# Patient Record
Sex: MALE | Race: WHITE | NOT HISPANIC OR LATINO | Employment: OTHER | URBAN - METROPOLITAN AREA
[De-identification: names, ages, dates, MRNs, and addresses within clinical notes are randomized per-mention and may not be internally consistent; named-entity substitution may affect disease eponyms.]

---

## 2017-01-12 ENCOUNTER — ALLSCRIPTS OFFICE VISIT (OUTPATIENT)
Dept: OTHER | Facility: OTHER | Age: 77
End: 2017-01-12

## 2017-03-06 ENCOUNTER — GENERIC CONVERSION - ENCOUNTER (OUTPATIENT)
Dept: OTHER | Facility: OTHER | Age: 77
End: 2017-03-06

## 2017-04-17 ENCOUNTER — GENERIC CONVERSION - ENCOUNTER (OUTPATIENT)
Dept: OTHER | Facility: OTHER | Age: 77
End: 2017-04-17

## 2017-04-28 ENCOUNTER — GENERIC CONVERSION - ENCOUNTER (OUTPATIENT)
Dept: OTHER | Facility: OTHER | Age: 77
End: 2017-04-28

## 2017-06-17 ENCOUNTER — TRANSCRIBE ORDERS (OUTPATIENT)
Dept: ADMINISTRATIVE | Facility: HOSPITAL | Age: 77
End: 2017-06-17

## 2017-06-17 ENCOUNTER — APPOINTMENT (OUTPATIENT)
Dept: LAB | Facility: HOSPITAL | Age: 77
End: 2017-06-17
Attending: INTERNAL MEDICINE
Payer: COMMERCIAL

## 2017-06-17 DIAGNOSIS — I10 ESSENTIAL HYPERTENSION, MALIGNANT: Primary | ICD-10-CM

## 2017-06-17 DIAGNOSIS — I10 ESSENTIAL HYPERTENSION, MALIGNANT: ICD-10-CM

## 2017-06-17 LAB
ALBUMIN SERPL BCP-MCNC: 3 G/DL (ref 3.5–5)
ALP SERPL-CCNC: 108 U/L (ref 46–116)
ALT SERPL W P-5'-P-CCNC: 31 U/L (ref 12–78)
ANION GAP SERPL CALCULATED.3IONS-SCNC: 10 MMOL/L (ref 4–13)
AST SERPL W P-5'-P-CCNC: 24 U/L (ref 5–45)
BILIRUB SERPL-MCNC: 0.2 MG/DL (ref 0.2–1)
BUN SERPL-MCNC: 26 MG/DL (ref 5–25)
CALCIUM SERPL-MCNC: 9.5 MG/DL (ref 8.3–10.1)
CHLORIDE SERPL-SCNC: 103 MMOL/L (ref 100–108)
CHOLEST SERPL-MCNC: 129 MG/DL (ref 50–200)
CO2 SERPL-SCNC: 27 MMOL/L (ref 21–32)
CREAT SERPL-MCNC: 1.13 MG/DL (ref 0.6–1.3)
ERYTHROCYTE [DISTWIDTH] IN BLOOD BY AUTOMATED COUNT: 19 % (ref 11.6–15.1)
GFR SERPL CREATININE-BSD FRML MDRD: >60 ML/MIN/1.73SQ M
GLUCOSE P FAST SERPL-MCNC: 113 MG/DL (ref 65–99)
HCT VFR BLD AUTO: 31.5 % (ref 36.5–49.3)
HDLC SERPL-MCNC: 33 MG/DL (ref 40–60)
HGB BLD-MCNC: 9.7 G/DL (ref 12–17)
LDLC SERPL CALC-MCNC: 75 MG/DL (ref 0–100)
MCH RBC QN AUTO: 22.1 PG (ref 26.8–34.3)
MCHC RBC AUTO-ENTMCNC: 30.8 G/DL (ref 31.4–37.4)
MCV RBC AUTO: 72 FL (ref 82–98)
PLATELET # BLD AUTO: 573 THOUSANDS/UL (ref 149–390)
PMV BLD AUTO: 10.2 FL (ref 8.9–12.7)
POTASSIUM SERPL-SCNC: 3.7 MMOL/L (ref 3.5–5.3)
PROT SERPL-MCNC: 6.7 G/DL (ref 6.4–8.2)
PSA SERPL-MCNC: 1 NG/ML (ref 0–4)
RBC # BLD AUTO: 4.39 MILLION/UL (ref 3.88–5.62)
SODIUM SERPL-SCNC: 140 MMOL/L (ref 136–145)
TRIGL SERPL-MCNC: 103 MG/DL
WBC # BLD AUTO: 10.58 THOUSAND/UL (ref 4.31–10.16)

## 2017-06-17 PROCEDURE — 80061 LIPID PANEL: CPT

## 2017-06-17 PROCEDURE — 36415 COLL VENOUS BLD VENIPUNCTURE: CPT

## 2017-06-17 PROCEDURE — 85027 COMPLETE CBC AUTOMATED: CPT

## 2017-06-17 PROCEDURE — G0103 PSA SCREENING: HCPCS

## 2017-06-17 PROCEDURE — 80053 COMPREHEN METABOLIC PANEL: CPT

## 2017-06-19 ENCOUNTER — ALLSCRIPTS OFFICE VISIT (OUTPATIENT)
Dept: OTHER | Facility: OTHER | Age: 77
End: 2017-06-19

## 2017-06-19 ENCOUNTER — APPOINTMENT (OUTPATIENT)
Dept: LAB | Facility: HOSPITAL | Age: 77
End: 2017-06-19
Attending: INTERNAL MEDICINE
Payer: COMMERCIAL

## 2017-06-19 ENCOUNTER — GENERIC CONVERSION - ENCOUNTER (OUTPATIENT)
Dept: OTHER | Facility: OTHER | Age: 77
End: 2017-06-19

## 2017-06-19 DIAGNOSIS — D50.9 IRON DEFICIENCY ANEMIA: ICD-10-CM

## 2017-06-19 LAB
IRON SATN MFR SERPL: 7 %
IRON SERPL-MCNC: 33 UG/DL (ref 65–175)
TIBC SERPL-MCNC: 492 UG/DL (ref 250–450)

## 2017-06-19 PROCEDURE — 83540 ASSAY OF IRON: CPT

## 2017-06-19 PROCEDURE — 36415 COLL VENOUS BLD VENIPUNCTURE: CPT

## 2017-06-19 PROCEDURE — 83550 IRON BINDING TEST: CPT

## 2017-07-17 ENCOUNTER — APPOINTMENT (OUTPATIENT)
Dept: LAB | Facility: HOSPITAL | Age: 77
End: 2017-07-17
Attending: INTERNAL MEDICINE
Payer: COMMERCIAL

## 2017-07-17 ENCOUNTER — TRANSCRIBE ORDERS (OUTPATIENT)
Dept: ADMINISTRATIVE | Facility: HOSPITAL | Age: 77
End: 2017-07-17

## 2017-07-17 DIAGNOSIS — D50.9 IRON DEFICIENCY ANEMIA, UNSPECIFIED: Primary | ICD-10-CM

## 2017-07-17 DIAGNOSIS — D50.9 IRON DEFICIENCY ANEMIA, UNSPECIFIED: ICD-10-CM

## 2017-07-17 LAB
HCT VFR BLD AUTO: 43.1 % (ref 36.5–49.3)
HGB BLD-MCNC: 13.6 G/DL (ref 12–17)
MCH RBC QN AUTO: 25.8 PG (ref 26.8–34.3)
MCHC RBC AUTO-ENTMCNC: 31.6 G/DL (ref 31.4–37.4)
MCV RBC AUTO: 82 FL (ref 82–98)
PLATELET # BLD AUTO: 488 THOUSANDS/UL (ref 149–390)
PMV BLD AUTO: 10.6 FL (ref 8.9–12.7)
RBC # BLD AUTO: 5.27 MILLION/UL (ref 3.88–5.62)
WBC # BLD AUTO: 12.76 THOUSAND/UL (ref 4.31–10.16)

## 2017-07-17 PROCEDURE — 85027 COMPLETE CBC AUTOMATED: CPT

## 2017-07-17 PROCEDURE — 36415 COLL VENOUS BLD VENIPUNCTURE: CPT

## 2017-07-18 ENCOUNTER — GENERIC CONVERSION - ENCOUNTER (OUTPATIENT)
Dept: OTHER | Facility: OTHER | Age: 77
End: 2017-07-18

## 2017-07-18 LAB — OCCULT BLD, FECAL IMMUNOLOGICAL (HISTORICAL): NEGATIVE

## 2017-07-20 ENCOUNTER — GENERIC CONVERSION - ENCOUNTER (OUTPATIENT)
Dept: OTHER | Facility: OTHER | Age: 77
End: 2017-07-20

## 2017-07-20 LAB — HM COLONOSCOPY: NORMAL

## 2017-09-18 DIAGNOSIS — D50.9 IRON DEFICIENCY ANEMIA: ICD-10-CM

## 2017-10-17 ENCOUNTER — ALLSCRIPTS OFFICE VISIT (OUTPATIENT)
Dept: OTHER | Facility: OTHER | Age: 77
End: 2017-10-17

## 2017-10-18 NOTE — PROGRESS NOTES
Assessment  1  Benign essential HTN (401 1) (I10)   2  History of hyperlipidemia (V12 29) (Z86 39)   3  Iron deficiency anemia (280 9) (D50 9)   4  DVT of leg (deep venous thrombosis) (453 40) (I82 409)    Plan  GERD without esophagitis    · Lansoprazole 30 MG Oral Capsule Delayed Release; TAKE ONE CAPSULE BY MOUTH  EVERY DAY  Health Maintenance    · Multiple Vitamins Oral Tablet; TAKE 1 TABLET DAILY  Iron deficiency anemia    · (1) CBC/ PLT (NO DIFF); Status:Active; Requested PXP:07SPK4959;   Need for influenza vaccination    · Fluzone High-Dose 0 5 ML Intramuscular Suspension Prefilled Syringe    Discussion/Summary  Discussion Summary:   Stop iron  me know when Xarelto runs out-will swtich to Eliquis 2 5 mg 2x per day  History of Present Illness  Hypertension (Follow-Up): The patient presents for follow-up of essential hypertension  The patient states he has been doing well with his blood pressure control since the last visit  Symptoms:      Review of Systems  Complete-Male:   Constitutional: No fever or chills, feels well, no tiredness, no recent weight gain or weight loss  Cardiovascular: No complaints of slow heart rate, no fast heart rate, no chest pain, no palpitations, no leg claudication, no lower extremity  Respiratory: No complaints of shortness of breath, no wheezing, no cough, no SOB on exertion, no orthopnea or PND  Gastrointestinal: No complaints of abdominal pain, no constipation, no nausea or vomiting, no diarrhea or bloody stools  Active Problems  1  History of Active advance directive (V49 89) (Z78 9)   2  Benign essential HTN (401 1) (I10)   3  BPH (benign prostatic hyperplasia) (600 00) (N40 0)   4  DVT of leg (deep venous thrombosis) (453 40) (I82 409)   5  Edema (782 3) (R60 9)   6  GERD without esophagitis (530 81) (K21 9)   7  History of hyperlipidemia (V12 29) (Z86 39)   8  Iron deficiency anemia (280 9) (D50 9)   9  Need for influenza vaccination (V04 81) (Z23)   10  Right foot drop (736 79) (M21 371)    Past Medical History  1  History of Allergic contact dermatitis due to plant (692 6) (L23 7)   2  History of Benign skin lesion (709 9) (L98 9)   3  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)   4  History of Elevated PSA (790 93) (R97 20)   5  History of Hand joint pain (719 44) (M79 643)   6  History of basal cell carcinoma (V10 83) (Z85 828)   7  History of blood clots (V12 51) (Z86 718)   8  History of BPH (V13 89) (Z87 438)   9  History of deep venous thrombosis (V12 51) (Z86 718)   10  History of hypotension (V12 59) (Z86 79)   11  History of intestinal obstruction (V12 79) (Z87 19)   12  History of Screening for colon cancer (V76 51) (Z12 11)   13  History of Screening for genitourinary condition (V81 6) (Z13 89)   14  History of Screening for neurological condition (V80 09) (Z13 89)   15  History of Social anxiety disorder (300 23) (F40 10)   16  History of Ventral hernia (553 20) (K43 9)    Surgical History  1  History of Appendectomy   2  History of Hernia Repair   3  History of Knee Replacement   4  History of Nose Surgery   5  History of Splenectomy   6  History of Tonsillectomy    Family History  Mother    1  Family history of Bleeding disorder   2  Family history of cerebrovascular accident (V17 1) (Z82 3)   3  Family history of diabetes mellitus (V18 0) (Z83 3)   4  Family history of Heart Disease (V17 49)  Father    5  Family history of Acute Myocardial Infarction (V17 3)   6  Family history of cerebrovascular accident (V17 1) (Z82 3)   7  Family history of Heart Disease (V17 49)    Social History   · History of Active advance directive (V49 89) (Z78 9)   · Being A Social Drinker   · Cultural background   · Dental care, regularly   · Living Situation: Supportive and safe   ·    · Never A Smoker   · Occasional alcohol use   · Primary language is English   · Racial background    Current Meds   1   Celecoxib 200 MG Oral Capsule; take 1 capsule by mouth once daily; Therapy: 85OAQ9000 to (Evaluate:14Jun2018)  Requested for: 79DTX8630; Last Rx:19Jun2017   Ordered   2  Docusate Sodium 100 MG Oral Capsule; TAKE 1 CAPSULE TWICE DAILY AS NEEDED; Therapy: 80Okw4733 to Recorded   3  Dutasteride 0 5 MG Oral Capsule; TAKE ONE CAPSULE BY MOUTH EVERY DAY; Therapy: 86LSW0500 to (Evaluate:16Apr2018)  Requested for: 52Dsa7808; Last Rx:21Apr2017   Ordered   4  Escitalopram Oxalate 10 MG Oral Tablet; take 1 tablet by mouth every day; Therapy: 02CEE6296 to (Evaluate:14Jun2018)  Requested for: 66UJC7673; Last Rx:19Jun2017   Ordered   5  Furosemide 80 MG Oral Tablet; take 1 tablet by mouth every day; Therapy: 84LFY4404 to (Evaluate:16Mar2018)  Requested for: 20UNA3946; Last Rx:19Jun2017   Ordered   6  Klor-Con 10 10 MEQ Oral Tablet Extended Release; Take 1 tablet daily; Therapy: 66THJ6214 to (Last Rx:19Jun2017)  Requested for: 28UQC3203 Ordered   7  Lansoprazole 30 MG Oral Capsule Delayed Release; TAKE ONE CAPSULE BY MOUTH EVERY DAY; Therapy: 79IGH5585 to (Lico Sanders)  Requested for: 76LWW4351; Last Rx:10Nov2016   Ordered   8  Polyethylene Glycol 3350 Oral Powder; MIX 1 CAPFUL IN 8 OUNCES OF WATER AND DRINK AT   BEDTIME AS NEEDED FOR CONSTIPATION; Therapy: 37TPY3093 to (Evaluate:22Dec2016) Recorded   9  Simvastatin 10 MG Oral Tablet; take 1 tablet by mouth every day; Therapy: 70KAY2387 to (Evaluate:14Jun2018)  Requested for: 14MIB8375; Last Rx:19Jun2017   Ordered   10  Spironolactone 25 MG Oral Tablet; take 1/2 tablet daily; Therapy: 38Jng0819 to (Evaluate:14Jun2018)  Requested for: 37GRR6728; Last Rx:19Jun2017    Ordered   11  Sulfamethoxazole-Trimethoprim 400-80 MG Oral Tablet; TAKE 1 TABLET DAILY; Therapy: 96RHA5188 to (Evaluate:50Stv2597); Last Rx:19Jun2017 Ordered   12  Tamsulosin HCl - 0 4 MG Oral Capsule; TAKE ONE CAPSULE BY MOUTH EVERY DAY AT SUPPER;     Therapy: 49AND2098 to (Flo Klein)  Requested for: 47OMS2726; Last Rx: 69EEC2615 Ordered   13  Xarelto 20 MG Oral Tablet; Take 1 tablet daily; Therapy: 01Apr2016 to (Evaluate:14Jun2018)  Requested for: 41GXG2944; Last Rx:19Jun2017    Ordered  Medication List Reviewed: The medication list was reviewed and updated today  Allergies  1  ACE Inhibitors  2  No Known Environmental Allergies   3  No Known Food Allergies    Vitals  Vital Signs    Recorded: 36WFA9277 02:46PM Recorded: 01QYJ5551 02:13PM   Heart Rate  78, L Radial   Pulse Quality  Regular, L Radial   Systolic 976 112, LUE, Sitting   Diastolic 78 78, LUE, Sitting   Weight  242 lb    BMI Calculated  39 06   BSA Calculated  2 17     Physical Exam    Constitutional   General appearance: No acute distress, well appearing and well nourished  Pulmonary   Auscultation of lungs: Clear to auscultation, equal breath sounds bilaterally, no wheezes, no rales, no rhonci  Cardiovascular   Auscultation of heart: Normal rate and rhythm, normal S1 and S2, without murmurs  Examination of extremities for edema and/or varicosities: Abnormal  -- plus 2 edema  Health Management  History of Screening for colon cancer   COLONOSCOPY; every 10 years; Last 44Hik1006; Next Due: 56Cms2523; Active  Health Maintenance   Medicare Annual Wellness Visit; every 1 year; Next Due: 14Apr2015;  Overdue    Signatures   Electronically signed by : JORGE Alegria ; Oct 17 2017  2:49PM EST                       (Author)

## 2018-01-09 NOTE — RESULT NOTES
Message   Call white count is 12 28     Verified Results  (1) CBC/PLT/DIFF 45KNG4372 07:43PM Stephania Andrade    Order Number: [id number]     Order Number: [id number]     Test Name Result Flag Reference   WBC COUNT 12 28 Thousand/uL H 4 31-10 16   RBC COUNT 4 53 Million/uL  3 88-5 62   HEMOGLOBIN 13 3 g/dL  12 0-17 0   HEMATOCRIT 40 3 %  36 5-49 3   MCV 89 0 fL  82 0-98 0   MCH 29 4 pg  26 8-34 3   MCHC 33 0 g/dL  31 4-37 4   RDW 17 3 % H 11 6-15 1   MPV 11 0 fL  8 9-12 7   PLATELET COUNT 340 Thousands/uL H 149-390   NEUTROPHILS - REL 46 %  43-75   LYMPHOCYTES - REL 23 %  14-44   MONOCYTES - REL 6 %  4-12   EOSINOPHILS - REL 1 %  0-6   BASOPHILS - REL 1 %  0-1   ATYPICAL LYMPH 23 % H <=0   NEUTROPHILS ABS 5 65 Thousand/uL  1 85-7 62   LYMPHOTCYTES ABS 2 82 Thousand/uL  0 60-4 47   MONOCYTES ABS 0 74 Thousand/uL  0 00-1 22   EOSINOPHILS ABS 0 12 Thousand/uL  0 00-0 40   BASOPHILS ABS 0 12 Thousand/uL H 0 00-0 10   TOTAL COUNTED 100     RBC MORPHOLOGY Present     Acanthocytosis Present     Hypochromasia Present     Poikilocytosis Present     SHISTOCYTES Present     Target Cells Present     PLT ESTIMATE Increased A Adequate     (1) BASIC METABOLIC PROFILE 19KSX4578 05:55PM Stephania Andrade    Order Number: [id number]      National Kidney Disease Education Program recommendations are as follows:  GFR calculation is accurate only with a steady state creatinine  Chronic Kidney disease less than 60 ml/min/1 73 sq  meters  Kidney failure less than 15 ml/min/1 73 sq  meters  Test Name Result Flag Reference   GLUCOSE,RANDM 109 mg/dL     If the patient is fasting, the ADA then defines impaired fasting glucose as > 100 mg/dL and diabetes as > or equal to 123 mg/dL     SODIUM 138 mmol/L  136-145   POTASSIUM 4 0 mmol/L  3 5-5 3   CHLORIDE 104 mmol/L  100-108   CARBON DIOXIDE 27 mmol/L  21-32   ANION GAP (CALC) 7 mmol/L  4-13   BLOOD UREA NITROGEN 19 mg/dL  5-25   CREATININE 1 11 mg/dL  0 60-1 30   Standardized to [facility] reference method   CALCIUM 9 5 mg/dL  8 3-10 1   eGFR Non-African American > ml/min/1 73sq m

## 2018-01-10 NOTE — PROGRESS NOTES
Assessment    1  Benign essential HTN (401 1) (I10)   2  History of hyperlipidemia (V12 29) (Z86 39)   3  Iron deficiency anemia (280 9) (D50 9)    Plan  GERD without esophagitis    · Lansoprazole 30 MG Oral Capsule Delayed Release; TAKE ONE CAPSULE BY  MOUTH EVERY DAY  Health Maintenance    · Multiple Vitamins Oral Tablet; TAKE 1 TABLET DAILY  Need for influenza vaccination    · Fluzone High-Dose 0 5 ML Intramuscular Suspension Prefilled Syringe    Discussion/Summary  Impression: Initial Annual Wellness Visit  Diabetes screening and counseling: screening is current  Colorectal cancer screening and counseling: screening is current  Prostate cancer screening and counseling: screening is current  Osteoporosis screening and counseling: screening not indicated  Abdominal aortic aneurysm screening and counseling: screening not indicated  HIV screening and counseling: screening not indicated  Chief Complaint  Pt is here for Medicare wellness visit  flu vaccine ordered  Med list reviewed  discuss otc iron  Pt is also concerned about ankle edema  CR      History of Present Illness  Welcome to Medicare and Wellness Visits: The patient is being seen for the initial annual wellness visit  Medicare Screening and Risk Factors   Hospitalizations: no previous hospitalizations  Once per lifetime medicare screening tests: ECG has not been done and AAA screening US has not yet been done  Medicare Screening Tests Risk Questions   Abdominal aortic aneurysm risk assessment: none indicated  Osteoporosis risk assessment:  and over 48years of age, but none indicated  HIV risk assessment: none indicated  Drug and Alcohol Use: The patient has never smoked cigarettes  The patient reports rare alcohol use  Alcohol concern:   The patient has no concerns about alcohol abuse  He has never used illicit drugs  Diet and Physical Activity: Current diet includes well balanced meals   He exercises infrequently and exercises 2-3 times per week  Exercise: walking hours per week, starting exercise tomorrow  Mood Disorder and Cognitive Impairment Screening: He denies feeling down, depressed, or hopeless over the past two weeks  He denies feeling little interest or pleasure in doing things over the past two weeks  Cognitive impairment screening: denies difficulty learning/retaining new information, denies difficulty handling complex tasks, denies difficulty with reasoning, denies difficulty with spatial ability and orientation, denies difficulty with language and denies difficulty with behavior  Functional Ability/Level of Safety: Hearing is slightly decreased, slightly decreased in the right ear and slightly decreased in the left ear  He does not use a hearing aid  The patient is currently unable to drive, but able to do activities of daily living without limitations and able to participate in social activities without limitations  Activities of daily living details: transportation help needed  Fall risk factors: The patient fell 0 times in the past 12 months  Advance Directives: Advance directives: living will, durable power of  for health care directives and advance directives  Co-Managers and Medical Equipment/Suppliers: See Patient Care Team      Patient Care Team    Care Team Member Role Specialty Office Number   Stewart Phillip MD  Orthopedic Surgery [phone]   Asaf RECINOS  Attending Internal Medicine [phone]   Mahesh Ansari   [phone]   Alfredo Moreno   [phone]     Active Problems    1  History of Active advance directive (V49 89) (Z78 9)   2  Benign essential HTN (401 1) (I10)   3  BPH (benign prostatic hyperplasia) (600 00) (N40 0)   4  DVT of leg (deep venous thrombosis) (453 40) (I82 409)   5  Edema (782 3) (R60 9)   6  GERD without esophagitis (530 81) (K21 9)   7  History of hyperlipidemia (V12 29) (Z86 39)   8  Iron deficiency anemia (280 9) (D50 9)   9   Right foot drop (736 79) (M21 371)    Past Medical History    · History of Allergic contact dermatitis due to plant (692 6) (L23 7)   · History of Benign skin lesion (709 9) (L98 9)   · 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)   · History of Elevated PSA (790 93) (R97 20)   · History of Hand joint pain (719 44) (M79 643)   · History of basal cell carcinoma (V10 83) (Z85 828)   · History of blood clots (V12 51) (Z86 718)   · History of BPH (V13 89) (C40 091)   · History of deep venous thrombosis (V12 51) (Z86 718)   · History of hypotension (V12 59) (Z86 79)   · History of intestinal obstruction (V12 79) (Z87 19)   · History of Screening for colon cancer (V76 51) (Z12 11)   · History of Screening for genitourinary condition (V81 6) (Z13 89)   · History of Screening for neurological condition (V80 09) (Z13 89)   · History of Social anxiety disorder (300 23) (F40 10)   · History of Ventral hernia (553 20) (K43 9)    Surgical History    · History of Appendectomy   · History of Hernia Repair   · History of Knee Replacement   · History of Nose Surgery   · History of Splenectomy   · History of Tonsillectomy    Family History  Mother    · Family history of Bleeding disorder   · Family history of cerebrovascular accident (V17 1) (Z82 3)   · Family history of diabetes mellitus (V18 0) (Z83 3)   · Family history of Heart Disease (V17 49)  Father    · Family history of Acute Myocardial Infarction (V17 3)   · Family history of cerebrovascular accident (V17 1) (Z82 3)   · Family history of Heart Disease (V17 49)    Social History    · History of Active advance directive (V49 89) (Z78 9)   · Being A Social Drinker   · Cultural background   · non-   · Dental care, regularly   · Living Situation: Supportive and safe   ·    · Never A Smoker   · Occasional alcohol use   · Primary language is English   · Racial background   · white    Current Meds   1   Celecoxib 200 MG Oral Capsule; take 1 capsule by mouth once daily; Therapy: 04JWU8569 to (Evaluate:14Jun2018)  Requested for: 49FXO2641; Last   Rx:19Jun2017 Ordered   2  Docusate Sodium 100 MG Oral Capsule; TAKE 1 CAPSULE TWICE DAILY AS NEEDED; Therapy: 71Czo6630 to Recorded   3  Dutasteride 0 5 MG Oral Capsule; TAKE ONE CAPSULE BY MOUTH EVERY DAY; Therapy: 40UWY5543 to (Evaluate:16Apr2018)  Requested for: 95Vif6327; Last   Rx:21Apr2017 Ordered   4  Escitalopram Oxalate 10 MG Oral Tablet; take 1 tablet by mouth every day; Therapy: 97GAF5795 to (Evaluate:14Jun2018)  Requested for: 22RKS9843; Last   Rx:19Jun2017 Ordered   5  Furosemide 80 MG Oral Tablet; take 1 tablet by mouth every day; Therapy: 05CPD4057 to (Evaluate:16Mar2018)  Requested for: 56QPN6333; Last   Rx:19Jun2017 Ordered   6  Klor-Con 10 10 MEQ Oral Tablet Extended Release; Take 1 tablet daily; Therapy: 70XNB5192 to (Last Rx:19Jun2017)  Requested for: 03WPD1714 Ordered   7  Lansoprazole 30 MG Oral Capsule Delayed Release; TAKE ONE CAPSULE BY MOUTH   EVERY DAY; Therapy: 24GNC9588 to (Jolene Baker)  Requested for: 38SRJ5373; Last   Rx:10Nov2016 Ordered   8  Polyethylene Glycol 3350 Oral Powder; MIX 1 CAPFUL IN 8 OUNCES OF WATER AND   DRINK AT BEDTIME AS NEEDED FOR CONSTIPATION; Therapy: 92AOE6982 to (Evaluate:72Vac3279) Recorded   9  Simvastatin 10 MG Oral Tablet; take 1 tablet by mouth every day; Therapy: 40UIC4543 to (Evaluate:14Jun2018)  Requested for: 50ZTC9885; Last   Rx:19Jun2017 Ordered   10  Spironolactone 25 MG Oral Tablet; take 1/2 tablet daily; Therapy: 17Epx5406 to (Evaluate:14Jun2018)  Requested for: 25DWG3377; Last    Rx:19Jun2017 Ordered   11  Sulfamethoxazole-Trimethoprim 400-80 MG Oral Tablet; TAKE 1 TABLET DAILY; Therapy: 10TIR0645 to (Evaluate:24Vkh9787); Last Rx:19Jun2017 Ordered   12  Tamsulosin HCl - 0 4 MG Oral Capsule; TAKE ONE CAPSULE BY MOUTH EVERY DAY    AT SUPPER;     Therapy: 40WJD0429 to (Emre )  Requested for: 66QKF4119; Last    Rx:85Dxs3762 Ordered   13  Xarelto 20 MG Oral Tablet; Take 1 tablet daily; Therapy: 2016 to (Evaluate:2018)  Requested for: 38JBK4960; Last    Rx:2017 Ordered    Allergies    1  ACE Inhibitors    2  No Known Environmental Allergies   3  No Known Food Allergies    Immunizations   ** Printed in Appendix #1 below  Vitals  Signs    Heart Rate: 78, L Radial  Pulse Quality: Regular, L Radial  Systolic: 453, LUE, Sitting  Diastolic: 78, LUE, Sitting  Weight: 242 lb   BMI Calculated: 39 06  BSA Calculated: 2 17    Health Management  History of Screening for colon cancer   COLONOSCOPY; every 10 years; Last 23Jpm2588; Next Due: 58Gmh6245; Active  Health Maintenance   Medicare Annual Wellness Visit; every 1 year; Next Due: 2015;  Overdue    Signatures   Electronically signed by : JORGE Canela ; Oct 17 2017  2:33PM EST                       (Author)    Appendix #1     Patient: Charu Hewitt ; : 1940; MRN: [id number]      1 2 3 4 5    Influenza  15-Sep-2010 03-Sep-2011 01-Oct-2012 23-Aug-2013 02-Oct-2015    PPSV  10-Nov-2016        Pneumo Other

## 2018-01-11 NOTE — RESULT NOTES
Verified Results  (1) IRON SATURATION %, TIBC 39IAT2274 11:16AM Darrellaubree Kapoor    Order Number: [id number]     Test Name Result Flag Reference   IRON SATURATION 7 %     TOTAL IRON BINDING CAPACITY 492 ug/dL H 250-450   IRON 33 ug/dL L    Patients treated with metal-binding drugs (ie  Deferoxamine) may have depressed iron values

## 2018-01-11 NOTE — RESULT NOTES
Message   labs ok     Verified Results  (1) Edwin 14CYE6135 05:55PM Antolin Guardado, [address] [address] [facility] Order Number: [id number]      National Kidney Disease Education Program recommendations are as follows:  GFR calculation is accurate only with a steady state creatinine  Chronic Kidney disease less than 60 ml/min/1 73 sq  meters  Kidney failure less than 15 ml/min/1 73 sq  meters  Test Name Result Flag Reference   GLUCOSE,RANDM 109 mg/dL     If the patient is fasting, the ADA then defines impaired fasting glucose as > 100 mg/dL and diabetes as > or equal to 123 mg/dL     SODIUM 138 mmol/L  136-145   POTASSIUM 4 0 mmol/L  3 5-5 3   CHLORIDE 104 mmol/L  100-108   CARBON DIOXIDE 27 mmol/L  21-32   ANION GAP (CALC) 7 mmol/L  4-13   BLOOD UREA NITROGEN 19 mg/dL  5-25   CREATININE 1 11 mg/dL  0 60-1 30   Standardized to IDMS reference method   CALCIUM 9 5 mg/dL  8 3-10 1   eGFR Non-African American      >60 0 ml/min/1 73sq m

## 2018-01-12 VITALS
DIASTOLIC BLOOD PRESSURE: 78 MMHG | WEIGHT: 242 LBS | SYSTOLIC BLOOD PRESSURE: 120 MMHG | HEART RATE: 78 BPM | BODY MASS INDEX: 39.06 KG/M2

## 2018-01-12 NOTE — RESULT NOTES
Message   Call TSH normal     Verified Results  (1) TSH 34PIS7200 02:03PM [facility]     Test Name Result Flag Reference   TSH 2 911 uIU/mL  0 358-3 740   Patients undergoing fluorescein dye angiography may retain small amounts of fluorescein in the body for 48-72 hours post procedure  Samples containing fluorescein can produce falsely depressed TSH values  If the patient had this procedure,a specimen should be resubmitted post fluorescein clearance

## 2018-01-12 NOTE — RESULT NOTES
Verified Results  Hemoccult Screening - POC 56ANI6154 09:01AM Freeman Cesia     Test Name Result Flag Reference   Hemoccult Negative       (1) CBC/ PLT (NO DIFF) 42DTK3636 03:33PM Freeman Cesia     Test Name Result Flag Reference   HEMATOCRIT 43 1 %  36 5-49 3   HEMOGLOBIN 13 6 g/dL  12 0-17 0   MCHC 31 6 g/dL  31 4-37 4   MCH 25 8 pg L 26 8-34 3   MCV 82 fL  82-98   PLATELET COUNT 096 Thousands/uL H 149-390   RBC COUNT 5 27 Million/uL  3 88-5 62   WBC COUNT 12 76 Thousand/uL H 4 31-10 16   MPV 10 6 fL  8 9-12 7

## 2018-01-13 VITALS
HEIGHT: 66 IN | SYSTOLIC BLOOD PRESSURE: 126 MMHG | HEART RATE: 76 BPM | BODY MASS INDEX: 37.45 KG/M2 | DIASTOLIC BLOOD PRESSURE: 72 MMHG | WEIGHT: 233 LBS

## 2018-01-14 VITALS
HEIGHT: 66 IN | BODY MASS INDEX: 39.1 KG/M2 | HEART RATE: 80 BPM | WEIGHT: 243.31 LBS | DIASTOLIC BLOOD PRESSURE: 78 MMHG | SYSTOLIC BLOOD PRESSURE: 120 MMHG

## 2018-01-17 NOTE — RESULT NOTES
Message   same 3 weeks or can go back on xarelto     Verified Results  (1) PT WITH INR 81MGA6405 10:05AM Yun Armstrong     Test Name Result Flag Reference   INR 2 12 H 0 86-1 16   PT 22 4 seconds H 11 8-14 1

## 2018-01-17 NOTE — RESULT NOTES
Message   Add KCl 10 mEq daily repeat BMP in 4 weeks     Verified Results  (1) CBC/ PLT (NO DIFF) 13NAX1477 02:04PM [facility]     Test Name Result Flag Reference   HEMATOCRIT 41 6 %  36 5-49 3   HEMOGLOBIN 13 3 g/dL  12 0-17 0   MCHC 32 0 g/dL  31 4-37 4   MCH 27 0 pg  26 8-34 3   MCV 84 fL  82-98   PLATELET COUNT 329 Thousands/uL H 149-390   RBC COUNT 4 93 Million/uL  3 88-5 62   RDW 18 4 % H 11 6-15 1   WBC COUNT 12 02 Thousand/uL H 4 31-10 16   MPV 10 8 fL  8 9-12 7     (1) COMPREHENSIVE METABOLIC PANEL 73HLJ0090 95:60EG [facility]     Test Name Result Flag Reference   GLUCOSE,RANDM 108 mg/dL     If the patient is fasting, the ADA then defines impaired fasting glucose as > 100 mg/dL and diabetes as > or equal to 123 mg/dL  SODIUM 141 mmol/L  136-145   POTASSIUM 3 4 mmol/L L 3 5-5 3   CHLORIDE 102 mmol/L  100-108   CARBON DIOXIDE 31 mmol/L  21-32   ANION GAP (CALC) 8 mmol/L  4-13   BLOOD UREA NITROGEN 25 mg/dL  5-25   CREATININE 1 00 mg/dL  0 60-1 30   Standardized to IDMS reference method   CALCIUM 9 3 mg/dL  8 3-10 1   BILI, TOTAL 0 30 mg/dL  0 20-1 00   ALK PHOSPHATAS 134 U/L H    ALT (SGPT) 26 U/L  12-78   AST(SGOT) 19 U/L  5-45   ALBUMIN 3 3 g/dL L 3 5-5 0   TOTAL PROTEIN 7 2 g/dL  6 4-8 2   eGFR Non-African American      >60 0 ml/min/1 73sq [facility] [employer] Disease Education Program recommendations are as follows:  GFR calculation is accurate only with a steady state creatinine  Chronic Kidney disease less than 60 ml/min/1 73 sq  meters  Kidney failure less than 15 ml/min/1 73 sq  meters       (1) PSA (SCREEN) (Dx V76 44 Screen for Prostate Cancer) 19ASA6204 02:04PM Hessie Bouche     Test Name Result Flag Reference   PROSTATE SPECIFIC ANTIGEN 1 2 ng/mL  0 0-4 0

## 2018-03-29 ENCOUNTER — TELEPHONE (OUTPATIENT)
Dept: FAMILY MEDICINE CLINIC | Facility: HOSPITAL | Age: 78
End: 2018-03-29

## 2018-03-30 DIAGNOSIS — D50.9 IRON DEFICIENCY ANEMIA, UNSPECIFIED IRON DEFICIENCY ANEMIA TYPE: Primary | ICD-10-CM

## 2018-03-30 DIAGNOSIS — E78.49 OTHER HYPERLIPIDEMIA: ICD-10-CM

## 2018-03-30 DIAGNOSIS — I10 HTN (HYPERTENSION), BENIGN: ICD-10-CM

## 2018-03-30 DIAGNOSIS — Z12.5 SCREENING PSA (PROSTATE SPECIFIC ANTIGEN): ICD-10-CM

## 2018-04-24 ENCOUNTER — TELEPHONE (OUTPATIENT)
Dept: FAMILY MEDICINE CLINIC | Facility: HOSPITAL | Age: 78
End: 2018-04-24

## 2018-04-24 RX ORDER — SULFAMETHOXAZOLE AND TRIMETHOPRIM 800; 160 MG/1; MG/1
1 TABLET ORAL DAILY
Refills: 0 | COMMUNITY
Start: 2018-02-21 | End: 2018-04-30 | Stop reason: SDUPTHER

## 2018-04-24 RX ORDER — FUROSEMIDE 80 MG
80 TABLET ORAL DAILY
Refills: 0 | COMMUNITY
Start: 2018-02-14 | End: 2018-04-30 | Stop reason: SDUPTHER

## 2018-04-24 RX ORDER — POTASSIUM CHLORIDE 750 MG/1
1 TABLET, FILM COATED, EXTENDED RELEASE ORAL DAILY
COMMUNITY
Start: 2016-06-14 | End: 2018-10-30 | Stop reason: ALTCHOICE

## 2018-04-24 RX ORDER — ESCITALOPRAM OXALATE 5 MG/1
5 TABLET ORAL
COMMUNITY
End: 2018-04-24 | Stop reason: SDUPTHER

## 2018-04-24 RX ORDER — POLYETHYLENE GLYCOL 3350 17 G/17G
POWDER, FOR SOLUTION ORAL
COMMUNITY

## 2018-04-24 RX ORDER — POTASSIUM CHLORIDE 750 MG/1
10 TABLET, FILM COATED, EXTENDED RELEASE ORAL DAILY
Refills: 3 | COMMUNITY
Start: 2018-01-19 | End: 2018-04-24 | Stop reason: SDUPTHER

## 2018-04-24 RX ORDER — DUTASTERIDE 0.5 MG/1
CAPSULE, LIQUID FILLED ORAL
COMMUNITY
End: 2018-04-30 | Stop reason: SDUPTHER

## 2018-04-24 RX ORDER — SPIRONOLACTONE 25 MG/1
12.5 TABLET ORAL DAILY
Refills: 1 | COMMUNITY
Start: 2018-02-14 | End: 2018-08-22 | Stop reason: SDUPTHER

## 2018-04-24 RX ORDER — SIMVASTATIN 10 MG
TABLET ORAL
COMMUNITY
End: 2018-04-30 | Stop reason: SDUPTHER

## 2018-04-24 RX ORDER — LANSOPRAZOLE 30 MG/1
CAPSULE, DELAYED RELEASE ORAL
COMMUNITY
End: 2018-12-07 | Stop reason: SDUPTHER

## 2018-04-24 RX ORDER — TAMSULOSIN HYDROCHLORIDE 0.4 MG/1
CAPSULE ORAL
Refills: 3 | COMMUNITY
Start: 2018-01-16 | End: 2018-04-30 | Stop reason: SDUPTHER

## 2018-04-24 RX ORDER — ESCITALOPRAM OXALATE 10 MG/1
10 TABLET ORAL DAILY
Refills: 3 | COMMUNITY
Start: 2018-01-19 | End: 2020-01-10 | Stop reason: ALTCHOICE

## 2018-04-24 RX ORDER — CELECOXIB 200 MG/1
CAPSULE ORAL
COMMUNITY
End: 2018-04-30 | Stop reason: SDUPTHER

## 2018-04-24 RX ORDER — DOCUSATE SODIUM 100 MG/1
CAPSULE, LIQUID FILLED ORAL
COMMUNITY
End: 2022-04-15

## 2018-04-24 NOTE — TELEPHONE ENCOUNTER
Spoke to Dr Snow Cross, and reviewed patient's chart, patient has been on both meds  For a while, he is due to have blood test  Last potassium level was June 2017, will attempt to call patient to have blood rechecked, orders in chart

## 2018-04-24 NOTE — TELEPHONE ENCOUNTER
I spoke with the pharmacist, there is a class 1 severity interaction between Spironolactone and Klor-Con  Spironolactone is K-sparing, using both together could cause toxic levels of K  Should patient be taking both of these drugs? [facility] [place] [phone]

## 2018-04-25 ENCOUNTER — APPOINTMENT (OUTPATIENT)
Dept: LAB | Facility: HOSPITAL | Age: 78
End: 2018-04-25
Attending: INTERNAL MEDICINE
Payer: COMMERCIAL

## 2018-04-25 DIAGNOSIS — E78.49 OTHER HYPERLIPIDEMIA: ICD-10-CM

## 2018-04-25 DIAGNOSIS — D50.9 IRON DEFICIENCY ANEMIA, UNSPECIFIED IRON DEFICIENCY ANEMIA TYPE: ICD-10-CM

## 2018-04-25 DIAGNOSIS — Z12.5 SCREENING PSA (PROSTATE SPECIFIC ANTIGEN): ICD-10-CM

## 2018-04-25 DIAGNOSIS — I10 HTN (HYPERTENSION), BENIGN: ICD-10-CM

## 2018-04-25 LAB
ALBUMIN SERPL BCP-MCNC: 3.3 G/DL (ref 3.5–5)
ALP SERPL-CCNC: 121 U/L (ref 46–116)
ALT SERPL W P-5'-P-CCNC: 44 U/L (ref 12–78)
ANION GAP SERPL CALCULATED.3IONS-SCNC: 6 MMOL/L (ref 4–13)
AST SERPL W P-5'-P-CCNC: 26 U/L (ref 5–45)
BASOPHILS # BLD AUTO: 0.04 THOUSANDS/ΜL (ref 0–0.1)
BASOPHILS NFR BLD AUTO: 0 % (ref 0–1)
BILIRUB SERPL-MCNC: 0.4 MG/DL (ref 0.2–1)
BUN SERPL-MCNC: 29 MG/DL (ref 5–25)
CALCIUM SERPL-MCNC: 9.1 MG/DL (ref 8.3–10.1)
CHLORIDE SERPL-SCNC: 105 MMOL/L (ref 100–108)
CHOLEST SERPL-MCNC: 121 MG/DL (ref 50–200)
CO2 SERPL-SCNC: 31 MMOL/L (ref 21–32)
CREAT SERPL-MCNC: 0.98 MG/DL (ref 0.6–1.3)
EOSINOPHIL # BLD AUTO: 0.18 THOUSAND/ΜL (ref 0–0.61)
EOSINOPHIL NFR BLD AUTO: 2 % (ref 0–6)
ERYTHROCYTE [DISTWIDTH] IN BLOOD BY AUTOMATED COUNT: 16.5 % (ref 11.6–15.1)
GFR SERPL CREATININE-BSD FRML MDRD: 75 ML/MIN/1.73SQ M
GLUCOSE P FAST SERPL-MCNC: 105 MG/DL (ref 65–99)
HCT VFR BLD AUTO: 42.4 % (ref 36.5–49.3)
HDLC SERPL-MCNC: 38 MG/DL (ref 40–60)
HGB BLD-MCNC: 13.8 G/DL (ref 12–17)
LDLC SERPL CALC-MCNC: 69 MG/DL (ref 0–100)
LYMPHOCYTES # BLD AUTO: 3.54 THOUSANDS/ΜL (ref 0.6–4.47)
LYMPHOCYTES NFR BLD AUTO: 33 % (ref 14–44)
MCH RBC QN AUTO: 28.6 PG (ref 26.8–34.3)
MCHC RBC AUTO-ENTMCNC: 32.5 G/DL (ref 31.4–37.4)
MCV RBC AUTO: 88 FL (ref 82–98)
MONOCYTES # BLD AUTO: 1.24 THOUSAND/ΜL (ref 0.17–1.22)
MONOCYTES NFR BLD AUTO: 12 % (ref 4–12)
NEUTROPHILS # BLD AUTO: 5.65 THOUSANDS/ΜL (ref 1.85–7.62)
NEUTS SEG NFR BLD AUTO: 53 % (ref 43–75)
NONHDLC SERPL-MCNC: 83 MG/DL
PLATELET # BLD AUTO: 426 THOUSANDS/UL (ref 149–390)
PMV BLD AUTO: 10 FL (ref 8.9–12.7)
POTASSIUM SERPL-SCNC: 3.8 MMOL/L (ref 3.5–5.3)
PROT SERPL-MCNC: 6.9 G/DL (ref 6.4–8.2)
RBC # BLD AUTO: 4.82 MILLION/UL (ref 3.88–5.62)
SODIUM SERPL-SCNC: 142 MMOL/L (ref 136–145)
TRIGL SERPL-MCNC: 70 MG/DL
WBC # BLD AUTO: 10.65 THOUSAND/UL (ref 4.31–10.16)

## 2018-04-25 PROCEDURE — 80053 COMPREHEN METABOLIC PANEL: CPT

## 2018-04-25 PROCEDURE — 84153 ASSAY OF PSA TOTAL: CPT

## 2018-04-25 PROCEDURE — 84154 ASSAY OF PSA FREE: CPT

## 2018-04-25 PROCEDURE — 36415 COLL VENOUS BLD VENIPUNCTURE: CPT

## 2018-04-25 PROCEDURE — 80061 LIPID PANEL: CPT

## 2018-04-25 PROCEDURE — 85025 COMPLETE CBC W/AUTO DIFF WBC: CPT

## 2018-04-26 LAB
PSA FREE MFR SERPL: 21.7 %
PSA FREE SERPL-MCNC: 0.26 NG/ML
PSA SERPL-MCNC: 1.2 NG/ML (ref 0–4)

## 2018-04-28 PROBLEM — N40.0 BPH (BENIGN PROSTATIC HYPERPLASIA): Status: ACTIVE | Noted: 2017-07-13

## 2018-04-30 ENCOUNTER — OFFICE VISIT (OUTPATIENT)
Dept: FAMILY MEDICINE CLINIC | Facility: HOSPITAL | Age: 78
End: 2018-04-30
Payer: COMMERCIAL

## 2018-04-30 VITALS
SYSTOLIC BLOOD PRESSURE: 122 MMHG | BODY MASS INDEX: 36.08 KG/M2 | HEIGHT: 66 IN | DIASTOLIC BLOOD PRESSURE: 86 MMHG | WEIGHT: 224.5 LBS

## 2018-04-30 DIAGNOSIS — I10 BENIGN ESSENTIAL HTN: Primary | ICD-10-CM

## 2018-04-30 DIAGNOSIS — E78.00 PURE HYPERCHOLESTEROLEMIA: ICD-10-CM

## 2018-04-30 DIAGNOSIS — T84.53XS INFECTION ASSOCIATED WITH INTERNAL RIGHT KNEE PROSTHESIS, SEQUELA: ICD-10-CM

## 2018-04-30 DIAGNOSIS — I87.8 VENOUS STASIS: ICD-10-CM

## 2018-04-30 DIAGNOSIS — K21.9 GERD WITHOUT ESOPHAGITIS: ICD-10-CM

## 2018-04-30 DIAGNOSIS — I82.5Y3 CHRONIC DEEP VEIN THROMBOSIS (DVT) OF PROXIMAL VEIN OF BOTH LOWER EXTREMITIES (HCC): ICD-10-CM

## 2018-04-30 DIAGNOSIS — N40.0 BENIGN PROSTATIC HYPERPLASIA WITHOUT LOWER URINARY TRACT SYMPTOMS: ICD-10-CM

## 2018-04-30 PROBLEM — T84.53XA INFECTION OF PROSTHETIC RIGHT KNEE JOINT (HCC): Status: ACTIVE | Noted: 2018-04-30

## 2018-04-30 PROCEDURE — 3008F BODY MASS INDEX DOCD: CPT | Performed by: INTERNAL MEDICINE

## 2018-04-30 PROCEDURE — 3725F SCREEN DEPRESSION PERFORMED: CPT | Performed by: INTERNAL MEDICINE

## 2018-04-30 PROCEDURE — 1101F PT FALLS ASSESS-DOCD LE1/YR: CPT | Performed by: INTERNAL MEDICINE

## 2018-04-30 PROCEDURE — 3074F SYST BP LT 130 MM HG: CPT | Performed by: INTERNAL MEDICINE

## 2018-04-30 PROCEDURE — 99214 OFFICE O/P EST MOD 30 MIN: CPT | Performed by: INTERNAL MEDICINE

## 2018-04-30 PROCEDURE — 3079F DIAST BP 80-89 MM HG: CPT | Performed by: INTERNAL MEDICINE

## 2018-04-30 RX ORDER — ESCITALOPRAM OXALATE 10 MG/1
10 TABLET ORAL DAILY
Qty: 90 TABLET | Refills: 3 | Status: CANCELLED | OUTPATIENT
Start: 2018-04-30

## 2018-04-30 RX ORDER — CELECOXIB 200 MG/1
200 CAPSULE ORAL DAILY
Qty: 90 CAPSULE | Refills: 3 | Status: SHIPPED | OUTPATIENT
Start: 2018-04-30 | End: 2018-07-08 | Stop reason: SDUPTHER

## 2018-04-30 RX ORDER — POTASSIUM CHLORIDE 750 MG/1
10 TABLET, FILM COATED, EXTENDED RELEASE ORAL DAILY
Qty: 90 TABLET | Refills: 3 | Status: CANCELLED | OUTPATIENT
Start: 2018-04-30

## 2018-04-30 RX ORDER — DUTASTERIDE 0.5 MG/1
CAPSULE, LIQUID FILLED ORAL
Qty: 90 CAPSULE | Refills: 3 | Status: SHIPPED | OUTPATIENT
Start: 2018-04-30 | End: 2018-05-19 | Stop reason: SDUPTHER

## 2018-04-30 RX ORDER — SULFAMETHOXAZOLE AND TRIMETHOPRIM 800; 160 MG/1; MG/1
1 TABLET ORAL DAILY
Qty: 90 TABLET | Refills: 3 | Status: SHIPPED | OUTPATIENT
Start: 2018-04-30 | End: 2019-06-20 | Stop reason: SDUPTHER

## 2018-04-30 RX ORDER — SIMVASTATIN 10 MG
10 TABLET ORAL
Qty: 90 TABLET | Refills: 3 | Status: SHIPPED | OUTPATIENT
Start: 2018-04-30 | End: 2018-07-08 | Stop reason: SDUPTHER

## 2018-04-30 RX ORDER — TAMSULOSIN HYDROCHLORIDE 0.4 MG/1
0.4 CAPSULE ORAL
Qty: 90 CAPSULE | Refills: 3 | Status: SHIPPED | OUTPATIENT
Start: 2018-04-30 | End: 2019-05-29 | Stop reason: SDUPTHER

## 2018-04-30 RX ORDER — FUROSEMIDE 40 MG/1
40 TABLET ORAL DAILY
Qty: 90 TABLET | Refills: 3 | Status: SHIPPED | OUTPATIENT
Start: 2018-04-30 | End: 2018-10-30 | Stop reason: ALTCHOICE

## 2018-04-30 NOTE — PROGRESS NOTES
Assessment/Plan:       Diagnoses and all orders for this visit:    Benign essential HTN    Chronic deep vein thrombosis (DVT) of proximal vein of both lower extremities (HCC)    Venous stasis    Other orders  -     furosemide (LASIX) 80 mg tablet; Take 1 tablet (80 mg total) by mouth daily  -     dutasteride (AVODART) 0 5 mg capsule; One daily  -     simvastatin (ZOCOR) 10 mg tablet; Take 1 tablet (10 mg total) by mouth daily at bedtime  -     celecoxib (CELEBREX) 200 mg capsule; Take 1 capsule (200 mg total) by mouth daily  -     potassium chloride (KLOR-CON 10) 10 mEq tablet; Take 1 tablet (10 mEq total) by mouth daily  -     rivaroxaban (XARELTO) 10 mg tablet; Take 2 tablets (20 mg total) by mouth daily with breakfast  -     escitalopram (LEXAPRO) 10 mg tablet; Take 1 tablet (10 mg total) by mouth daily  -     tamsulosin (FLOMAX) 0 4 mg; Take 1 capsule (0 4 mg total) by mouth daily with dinner          All of the above diagnoses have been assessed  Additional COMMENTS/PLAN: Has done well with weight watchers  Labs reviewed and discussed with the patient  Will stop the Lexapro and see how he does off of it  Subjective:      Patient ID: Pritesh Loving is a 78 y o  male  HPI     Hypertension  Patient is here for follow-up of elevated blood pressure  He is  adherent to a low-salt diet  Patient denies headache, dizziness or lightheadedness Cardiovascular risk factors: none  History of target organ damage: none  GERD-this is stable    Anemia-has been off the iron  The following portions of the patient's history were revised and updated as appropriate: Problem list, allergies, med list, FH, SH, Past medical and surgical histories      Current Outpatient Prescriptions   Medication Sig Dispense Refill    celecoxib (CELEBREX) 200 mg capsule 1 CAPSULE  DAILY      docusate sodium (COLACE) 100 mg capsule Take by mouth      dutasteride (AVODART) 0 5 mg capsule 1 CAPSULE DAILY      escitalopram (LEXAPRO) 10 mg tablet Take 10 mg by mouth daily  3    furosemide (LASIX) 80 mg tablet Take 80 mg by mouth daily  0    lansoprazole (PREVACID) 30 mg capsule Take by mouth      Multiple Vitamins-Minerals (MULTIVITAMIN ADULT EXTRA C PO) 1 TABLET DAILY      polyethylene glycol (MIRALAX) 17 g packet Take by mouth      potassium chloride (KLOR-CON 10) 10 mEq tablet Take 1 tablet by mouth daily      Probiotic Product (SOLUBLE FIBER/PROBIOTICS PO) Take by mouth      rivaroxaban (XARELTO) 10 mg tablet Take by mouth      simvastatin (ZOCOR) 10 mg tablet 1 TABLET AT BEDTIME      spironolactone (ALDACTONE) 25 mg tablet Take 12 5 mg by mouth daily  1    sulfamethoxazole-trimethoprim (BACTRIM DS) 800-160 mg per tablet Take 1 tablet by mouth daily  0    tamsulosin (FLOMAX) 0 4 mg TAKE ONE CAPSULE BY MOUTH EVERY DAY AT SUPPER  3     No current facility-administered medications for this visit  Review of Systems   Constitutional: Negative  Respiratory: Negative  Cardiovascular: Negative  Gastrointestinal: Negative  Genitourinary: Negative  Objective:    /86 (BP Location: Left arm, Patient Position: Sitting, Cuff Size: Standard)   Ht 5' 6" (1 676 m)   Wt 102 kg (224 lb 8 oz)   BMI 36 24 kg/m²      Physical Exam   Constitutional: He appears well-developed and well-nourished  Neck: No JVD present  Cardiovascular: Normal rate and regular rhythm  Pulmonary/Chest: Effort normal and breath sounds normal    Abdominal: Soft  Bowel sounds are normal    Ventral hernia no   Musculoskeletal: He exhibits no edema  Venous stasis changes   Vitals reviewed          Abstract on 04/26/2018   Component Date Value Ref Range Status    HM Colonoscopy 07/20/2017 colonoscopy   Final   Appointment on 04/25/2018   Component Date Value Ref Range Status    WBC 04/25/2018 10 65* 4 31 - 10 16 Thousand/uL Final    RBC 04/25/2018 4 82  3 88 - 5 62 Million/uL Final    Hemoglobin 04/25/2018 13 8  12 0 - 17 0 g/dL Final    Hematocrit 04/25/2018 42 4  36 5 - 49 3 % Final    MCV 04/25/2018 88  82 - 98 fL Final    MCH 04/25/2018 28 6  26 8 - 34 3 pg Final    MCHC 04/25/2018 32 5  31 4 - 37 4 g/dL Final    RDW 04/25/2018 16 5* 11 6 - 15 1 % Final    MPV 04/25/2018 10 0  8 9 - 12 7 fL Final    Platelets 05/46/9645 426* 149 - 390 Thousands/uL Final    Neutrophils Relative 04/25/2018 53  43 - 75 % Final    Lymphocytes Relative 04/25/2018 33  14 - 44 % Final    Monocytes Relative 04/25/2018 12  4 - 12 % Final    Eosinophils Relative 04/25/2018 2  0 - 6 % Final    Basophils Relative 04/25/2018 0  0 - 1 % Final    Neutrophils Absolute 04/25/2018 5 65  1 85 - 7 62 Thousands/µL Final    Lymphocytes Absolute 04/25/2018 3 54  0 60 - 4 47 Thousands/µL Final    Monocytes Absolute 04/25/2018 1 24* 0 17 - 1 22 Thousand/µL Final    Eosinophils Absolute 04/25/2018 0 18  0 00 - 0 61 Thousand/µL Final    Basophils Absolute 04/25/2018 0 04  0 00 - 0 10 Thousands/µL Final    Sodium 04/25/2018 142  136 - 145 mmol/L Final    Potassium 04/25/2018 3 8  3 5 - 5 3 mmol/L Final    Chloride 04/25/2018 105  100 - 108 mmol/L Final    CO2 04/25/2018 31  21 - 32 mmol/L Final    Anion Gap 04/25/2018 6  4 - 13 mmol/L Final    BUN 04/25/2018 29* 5 - 25 mg/dL Final    Creatinine 04/25/2018 0 98  0 60 - 1 30 mg/dL Final    Standardized to IDMS reference method    Glucose, Fasting 04/25/2018 105* 65 - 99 mg/dL Final      Specimen collection should occur prior to Sulfasalazine administration due to the potential for falsely depressed results  Specimen collection should occur prior to Sulfapyridine administration due to the potential for falsely elevated results   Calcium 04/25/2018 9 1  8 3 - 10 1 mg/dL Final    AST 04/25/2018 26  5 - 45 U/L Final      Specimen collection should occur prior to Sulfasalazine administration due to the potential for falsely depressed results       ALT 04/25/2018 44  12 - 78 U/L Final      Specimen collection should occur prior to Sulfasalazine administration due to the potential for falsely depressed results   Alkaline Phosphatase 04/25/2018 121* 46 - 116 U/L Final    Total Protein 04/25/2018 6 9  6 4 - 8 2 g/dL Final    Albumin 04/25/2018 3 3* 3 5 - 5 0 g/dL Final    Total Bilirubin 04/25/2018 0 40  0 20 - 1 00 mg/dL Final    eGFR 04/25/2018 75  ml/min/1 73sq m Final    Prostate Specific Antigen Total 04/25/2018 1 2  0 0 - 4 0 ng/mL Final    Roche ECLIA methodology  According to the American Urological Association, Serum PSA should  decrease and remain at undetectable levels after radical  prostatectomy  The AUA defines biochemical recurrence as an initial  PSA value 0 2 ng/mL or greater followed by a subsequent confirmatory  PSA value 0 2 ng/mL or greater  Values obtained with different assay methods or kits cannot be used  interchangeably  Results cannot be interpreted as absolute evidence  of the presence or absence of malignant disease   PSA, Free 04/25/2018 0 26  N/A ng/mL Final    Roche ECLIA methodology   PSA, Free Pct 04/25/2018 21 7  % Final    The table below lists the probability of prostate cancer for  men with non-suspicious JOSE results and total PSA between  4 and 10 ng/mL, by patient age Prakash Casandra, [facility],  [phone])  % Free PSA       50-64 yr        65-75 yr                    0 00-10 00%        56%             55%                   10 01-15 00%        24%             35%                   15 01-20 00%        17%             23%                   20 01-25 00%        10%             20%                        >25 00%         5%              9%  Please note:  Marysol et al did not make specific                recommendations regarding the use of                percent free PSA for any other population                of men      Cholesterol 04/25/2018 121  50 - 200 mg/dL Final      Cholesterol:       Desirable         <200 mg/dl       Borderline         200-239 mg/dl       High              >239           Triglycerides 04/25/2018 70  <=150 mg/dL Final    Specimen collection should occur prior to N-Acetylcysteine or Metamizole administration due to the potential for falsely depressed results   HDL, Direct 04/25/2018 38* 40 - 60 mg/dL Final      HDL Cholesterol:       High    >59 mg/dL       Low     <41 mg/dL    LDL Calculated 04/25/2018 69  0 - 100 mg/dL Final      This screening LDL is a calculated result  It does not have the accuracy of the Direct Measured LDL in the monitoring of patients with hyperlipidemia and/or statin therapy  Direct Measure LDL (BVU618) must be ordered separately in these patients      Non-HDL-Chol (CHOL-HDL) 04/25/2018 83  mg/dl Final         Brett Campuzano MD

## 2018-05-19 DIAGNOSIS — I10 ESSENTIAL HYPERTENSION: Primary | ICD-10-CM

## 2018-05-19 DIAGNOSIS — N40.0 BENIGN PROSTATIC HYPERPLASIA WITHOUT LOWER URINARY TRACT SYMPTOMS: ICD-10-CM

## 2018-05-20 RX ORDER — FUROSEMIDE 80 MG
TABLET ORAL
Qty: 90 TABLET | Refills: 0 | Status: SHIPPED | OUTPATIENT
Start: 2018-05-20 | End: 2018-09-07 | Stop reason: SDUPTHER

## 2018-05-21 RX ORDER — DUTASTERIDE 0.5 MG/1
CAPSULE, LIQUID FILLED ORAL
Qty: 90 CAPSULE | Refills: 0 | Status: SHIPPED | OUTPATIENT
Start: 2018-05-21 | End: 2020-09-15

## 2018-07-08 DIAGNOSIS — E78.00 PURE HYPERCHOLESTEROLEMIA: ICD-10-CM

## 2018-07-08 DIAGNOSIS — I87.8 VENOUS STASIS: ICD-10-CM

## 2018-07-08 RX ORDER — CELECOXIB 200 MG/1
CAPSULE ORAL
Qty: 90 CAPSULE | Refills: 0 | Status: SHIPPED | OUTPATIENT
Start: 2018-07-08 | End: 2020-01-10

## 2018-07-08 RX ORDER — SIMVASTATIN 10 MG
TABLET ORAL
Qty: 90 TABLET | Refills: 0 | Status: SHIPPED | OUTPATIENT
Start: 2018-07-08 | End: 2021-03-22 | Stop reason: SDUPTHER

## 2018-08-22 DIAGNOSIS — R60.0 LOCAL EDEMA: Primary | ICD-10-CM

## 2018-08-22 DIAGNOSIS — I10 ESSENTIAL HYPERTENSION: Primary | ICD-10-CM

## 2018-08-22 RX ORDER — SPIRONOLACTONE 25 MG/1
12.5 TABLET ORAL DAILY
Qty: 30 TABLET | Refills: 1 | Status: SHIPPED | OUTPATIENT
Start: 2018-08-22 | End: 2019-06-24 | Stop reason: SDUPTHER

## 2018-08-22 RX ORDER — SPIRONOLACTONE 25 MG/1
TABLET ORAL
Qty: 45 TABLET | Refills: 1 | Status: SHIPPED | OUTPATIENT
Start: 2018-08-22 | End: 2018-08-22 | Stop reason: SDUPTHER

## 2018-09-07 DIAGNOSIS — I82.409 DEEP VEIN THROMBOSIS (DVT) DURING CURRENT HOSPITALIZATION (HCC): Primary | ICD-10-CM

## 2018-09-07 DIAGNOSIS — I10 ESSENTIAL HYPERTENSION: ICD-10-CM

## 2018-09-07 RX ORDER — RIVAROXABAN 20 MG/1
TABLET, FILM COATED ORAL
Qty: 90 TABLET | Refills: 3 | Status: SHIPPED | OUTPATIENT
Start: 2018-09-07 | End: 2019-10-09 | Stop reason: SDUPTHER

## 2018-09-07 RX ORDER — FUROSEMIDE 80 MG
80 TABLET ORAL DAILY
Qty: 90 TABLET | Refills: 3 | Status: SHIPPED | OUTPATIENT
Start: 2018-09-07 | End: 2019-11-18 | Stop reason: SDUPTHER

## 2018-10-30 ENCOUNTER — OFFICE VISIT (OUTPATIENT)
Dept: FAMILY MEDICINE CLINIC | Facility: HOSPITAL | Age: 78
End: 2018-10-30
Payer: COMMERCIAL

## 2018-10-30 ENCOUNTER — APPOINTMENT (OUTPATIENT)
Dept: LAB | Facility: HOSPITAL | Age: 78
End: 2018-10-30
Attending: INTERNAL MEDICINE
Payer: COMMERCIAL

## 2018-10-30 VITALS
HEART RATE: 86 BPM | HEIGHT: 66 IN | WEIGHT: 220 LBS | DIASTOLIC BLOOD PRESSURE: 70 MMHG | BODY MASS INDEX: 35.36 KG/M2 | SYSTOLIC BLOOD PRESSURE: 125 MMHG

## 2018-10-30 DIAGNOSIS — I87.8 VENOUS STASIS: ICD-10-CM

## 2018-10-30 DIAGNOSIS — I82.5Y3 CHRONIC DEEP VEIN THROMBOSIS (DVT) OF PROXIMAL VEIN OF BOTH LOWER EXTREMITIES (HCC): Primary | ICD-10-CM

## 2018-10-30 DIAGNOSIS — E78.00 PURE HYPERCHOLESTEROLEMIA: ICD-10-CM

## 2018-10-30 DIAGNOSIS — I10 BENIGN ESSENTIAL HTN: ICD-10-CM

## 2018-10-30 LAB
ANION GAP SERPL CALCULATED.3IONS-SCNC: 8 MMOL/L (ref 4–13)
BUN SERPL-MCNC: 39 MG/DL (ref 5–25)
CALCIUM SERPL-MCNC: 10.1 MG/DL (ref 8.3–10.1)
CHLORIDE SERPL-SCNC: 103 MMOL/L (ref 100–108)
CO2 SERPL-SCNC: 30 MMOL/L (ref 21–32)
CREAT SERPL-MCNC: 1.2 MG/DL (ref 0.6–1.3)
GFR SERPL CREATININE-BSD FRML MDRD: 58 ML/MIN/1.73SQ M
GLUCOSE P FAST SERPL-MCNC: 104 MG/DL (ref 65–99)
POTASSIUM SERPL-SCNC: 3.5 MMOL/L (ref 3.5–5.3)
SODIUM SERPL-SCNC: 141 MMOL/L (ref 136–145)

## 2018-10-30 PROCEDURE — 1036F TOBACCO NON-USER: CPT | Performed by: INTERNAL MEDICINE

## 2018-10-30 PROCEDURE — 80048 BASIC METABOLIC PNL TOTAL CA: CPT

## 2018-10-30 PROCEDURE — 99214 OFFICE O/P EST MOD 30 MIN: CPT | Performed by: INTERNAL MEDICINE

## 2018-10-30 PROCEDURE — 1160F RVW MEDS BY RX/DR IN RCRD: CPT | Performed by: INTERNAL MEDICINE

## 2018-10-30 PROCEDURE — 36415 COLL VENOUS BLD VENIPUNCTURE: CPT

## 2018-10-30 PROCEDURE — 4040F PNEUMOC VAC/ADMIN/RCVD: CPT | Performed by: INTERNAL MEDICINE

## 2018-10-30 PROCEDURE — 3008F BODY MASS INDEX DOCD: CPT | Performed by: INTERNAL MEDICINE

## 2018-10-30 RX ORDER — SULFAMETHOXAZOLE AND TRIMETHOPRIM 800; 160 MG/1; MG/1
1 TABLET ORAL DAILY
Refills: 3 | COMMUNITY
Start: 2018-09-07 | End: 2020-03-30 | Stop reason: SDUPTHER

## 2018-10-30 NOTE — PROGRESS NOTES
Assessment/Plan:       Diagnoses and all orders for this visit:    Chronic deep vein thrombosis (DVT) of proximal vein of both lower extremities (HCC)  -     CBC; Future  -     Comprehensive metabolic panel; Future    Pure hypercholesterolemia  -     Lipid Panel with Direct LDL reflex; Future    Venous stasis    Other orders  -     sulfamethoxazole-trimethoprim (BACTRIM DS) 800-160 mg per tablet; Take 1 tablet by mouth daily          All of the above diagnoses have been assessed  Additional COMMENTS/PLAN: Labs reviewed and discussed with the patient  Subjective:      Patient ID: Trina Pimentel is a 70 y o  male  HPI     HTN-this is treated with DM    Infected joint-on maintenance Bactrim daily    Hyperlipidemia- The patient is compliant with diet and taking meds  The patient understands the efficacy of the treatment in vascular prevention  The following portions of the patient's history were revised and updated as appropriate: Problem list, allergies, med list, FH, SH, Past medical and surgical histories      Current Outpatient Prescriptions   Medication Sig Dispense Refill    celecoxib (CeleBREX) 200 mg capsule TAKE 1 CAPSULE EVERY DAY 90 capsule 0    docusate sodium (COLACE) 100 mg capsule Take by mouth      dutasteride (AVODART) 0 5 mg capsule TAKE ONE CAPSULE BY MOUTH EVERY DAY 90 capsule 0    escitalopram (LEXAPRO) 10 mg tablet Take 10 mg by mouth daily  3    furosemide (LASIX) 80 mg tablet Take 1 tablet (80 mg total) by mouth daily 90 tablet 3    lansoprazole (PREVACID) 30 mg capsule Take by mouth      Multiple Vitamins-Minerals (MULTIVITAMIN ADULT EXTRA C PO) 1 TABLET DAILY      polyethylene glycol (MIRALAX) 17 g packet Take by mouth      Probiotic Product (SOLUBLE FIBER/PROBIOTICS PO) Take by mouth      simvastatin (ZOCOR) 10 mg tablet TAKE 1 TABLET BY MOUTH EVERY DAY 90 tablet 0    spironolactone (ALDACTONE) 25 mg tablet Take 0 5 tablets (12 5 mg total) by mouth daily 30 tablet 1    sulfamethoxazole-trimethoprim (BACTRIM DS) 800-160 mg per tablet Take 1 tablet by mouth daily  3    tamsulosin (FLOMAX) 0 4 mg Take 1 capsule (0 4 mg total) by mouth daily with dinner 90 capsule 3    XARELTO 20 MG tablet TAKE 1 TABLET BY MOUTH EVERY DAY 90 tablet 3     No current facility-administered medications for this visit  Review of Systems   Constitutional: Negative  Respiratory: Negative  Cardiovascular: Negative  Gastrointestinal: Negative  Genitourinary: Negative  Musculoskeletal: Positive for arthralgias  Has some neck and left shoulder pain         Objective:    /70 (BP Location: Left arm, Patient Position: Sitting, Cuff Size: Large)   Pulse 86   Ht 5' 6" (1 676 m)   Wt 99 8 kg (220 lb)   BMI 35 51 kg/m²      Physical Exam   Constitutional: He appears well-developed and well-nourished  Neck: No JVD present  Cardiovascular: Normal rate and regular rhythm  Pulmonary/Chest: Effort normal and breath sounds normal    Abdominal: Soft  Bowel sounds are normal  He exhibits distension  Ventral hernia   Musculoskeletal: Edema: plus 1 edema  Skin: Skin is warm and dry  Vitals reviewed  Appointment on 10/30/2018   Component Date Value Ref Range Status    Sodium 10/30/2018 141  136 - 145 mmol/L Final    Potassium 10/30/2018 3 5  3 5 - 5 3 mmol/L Final    Chloride 10/30/2018 103  100 - 108 mmol/L Final    CO2 10/30/2018 30  21 - 32 mmol/L Final    ANION GAP 10/30/2018 8  4 - 13 mmol/L Final    BUN 10/30/2018 39* 5 - 25 mg/dL Final    Creatinine 10/30/2018 1 20  0 60 - 1 30 mg/dL Final    Standardized to IDMS reference method    Glucose, Fasting 10/30/2018 104* 65 - 99 mg/dL Final      Specimen collection should occur prior to Sulfasalazine administration due to the potential for falsely depressed results  Specimen collection should occur prior to Sulfapyridine administration due to the potential for falsely elevated results      Calcium 10/30/2018 10 1  8 3 - 10 1 mg/dL Final    eGFR 10/30/2018 58  ml/min/1 73sq m Final         Radha Palm MD

## 2018-12-07 DIAGNOSIS — K21.9 GASTROESOPHAGEAL REFLUX DISEASE WITHOUT ESOPHAGITIS: Primary | ICD-10-CM

## 2018-12-07 RX ORDER — LANSOPRAZOLE 30 MG/1
CAPSULE, DELAYED RELEASE ORAL
Qty: 90 CAPSULE | Refills: 3 | Status: SHIPPED | OUTPATIENT
Start: 2018-12-07 | End: 2020-02-09

## 2019-01-10 ENCOUNTER — TELEPHONE (OUTPATIENT)
Dept: FAMILY MEDICINE CLINIC | Facility: HOSPITAL | Age: 79
End: 2019-01-10

## 2019-05-06 DIAGNOSIS — E78.00 PURE HYPERCHOLESTEROLEMIA: ICD-10-CM

## 2019-05-06 DIAGNOSIS — I87.8 VENOUS STASIS: ICD-10-CM

## 2019-05-06 RX ORDER — CELECOXIB 200 MG/1
CAPSULE ORAL
Qty: 90 CAPSULE | Refills: 2 | Status: SHIPPED | OUTPATIENT
Start: 2019-05-06 | End: 2020-01-10 | Stop reason: SDUPTHER

## 2019-05-06 RX ORDER — SIMVASTATIN 10 MG
TABLET ORAL
Qty: 90 TABLET | Refills: 2 | Status: SHIPPED | OUTPATIENT
Start: 2019-05-06 | End: 2020-01-10 | Stop reason: SDUPTHER

## 2019-05-29 DIAGNOSIS — N40.0 BENIGN PROSTATIC HYPERPLASIA WITHOUT LOWER URINARY TRACT SYMPTOMS: ICD-10-CM

## 2019-05-29 RX ORDER — TAMSULOSIN HYDROCHLORIDE 0.4 MG/1
CAPSULE ORAL
Qty: 90 CAPSULE | Refills: 3 | Status: SHIPPED | OUTPATIENT
Start: 2019-05-29 | End: 2020-03-30 | Stop reason: SDUPTHER

## 2019-06-17 DIAGNOSIS — N40.0 BENIGN PROSTATIC HYPERPLASIA WITHOUT LOWER URINARY TRACT SYMPTOMS: ICD-10-CM

## 2019-06-17 RX ORDER — DUTASTERIDE 0.5 MG/1
CAPSULE, LIQUID FILLED ORAL
Qty: 90 CAPSULE | Refills: 3 | Status: SHIPPED | OUTPATIENT
Start: 2019-06-17 | End: 2020-01-10 | Stop reason: SDUPTHER

## 2019-06-20 DIAGNOSIS — T84.53XS INFECTION ASSOCIATED WITH INTERNAL RIGHT KNEE PROSTHESIS, SEQUELA: ICD-10-CM

## 2019-06-20 RX ORDER — SULFAMETHOXAZOLE AND TRIMETHOPRIM 800; 160 MG/1; MG/1
TABLET ORAL
Qty: 90 TABLET | Refills: 3 | Status: SHIPPED | OUTPATIENT
Start: 2019-06-20 | End: 2019-09-18

## 2019-06-24 DIAGNOSIS — I10 ESSENTIAL HYPERTENSION: ICD-10-CM

## 2019-06-24 RX ORDER — SPIRONOLACTONE 25 MG/1
TABLET ORAL
Qty: 45 TABLET | Refills: 3 | Status: SHIPPED | OUTPATIENT
Start: 2019-06-24 | End: 2020-07-17 | Stop reason: SDUPTHER

## 2019-07-03 ENCOUNTER — TELEPHONE (OUTPATIENT)
Dept: FAMILY MEDICINE CLINIC | Facility: HOSPITAL | Age: 79
End: 2019-07-03

## 2019-07-03 ENCOUNTER — APPOINTMENT (EMERGENCY)
Dept: CT IMAGING | Facility: HOSPITAL | Age: 79
End: 2019-07-03
Payer: COMMERCIAL

## 2019-07-03 ENCOUNTER — HOSPITAL ENCOUNTER (EMERGENCY)
Facility: HOSPITAL | Age: 79
Discharge: HOME | End: 2019-07-03
Attending: EMERGENCY MEDICINE
Payer: COMMERCIAL

## 2019-07-03 VITALS
RESPIRATION RATE: 18 BRPM | OXYGEN SATURATION: 89 % | DIASTOLIC BLOOD PRESSURE: 56 MMHG | HEIGHT: 66 IN | HEART RATE: 78 BPM | BODY MASS INDEX: 35.36 KG/M2 | WEIGHT: 220.02 LBS | TEMPERATURE: 98.7 F | SYSTOLIC BLOOD PRESSURE: 108 MMHG

## 2019-07-03 DIAGNOSIS — S09.90XA HEAD INJURY: Primary | ICD-10-CM

## 2019-07-03 PROCEDURE — 99284 EMERGENCY DEPT VISIT MOD MDM: CPT | Performed by: EMERGENCY MEDICINE

## 2019-07-03 PROCEDURE — 72125 CT NECK SPINE W/O DYE: CPT

## 2019-07-03 PROCEDURE — 70450 CT HEAD/BRAIN W/O DYE: CPT

## 2019-07-03 PROCEDURE — 99284 EMERGENCY DEPT VISIT MOD MDM: CPT

## 2019-07-03 NOTE — ED PROVIDER NOTES
H&P Exam - Trauma   Gopi Small 70 y o  male MRN: [id number]  Unit/Bed#: ED 03/ED 03 Encounter: [id number]    Assessment/Plan   Trauma Alert: Trauma Acuity: Trauma Evaluation  Model of Arrival: Trauma Mode of Arrival: Other (Comment) via    Trauma Team: Current Providers  Attending Provider: Abdirahman Elliott DO  Registered Nurse: Lillian Heath RN  ED Technician: Dariela Smith  Consultants: None    Trauma Active Problems:  Head injury on anticoagulant    Trauma Plan:  CT scan ordered    Chief Complaint:   Chief Complaint   Patient presents with    Fall     Pt presents to ED from home following a fall on concrete       History of Present Illness   HPI:  Gopi Small is a 70 y o  male who presents with right parietal scalp injury after a trip and fall some chronic neck pain patient is on Xarelto for history of blood clots  Mechanism:Details of Incident: Pt was walking with his cane and tripped and landed on cement  Pt hqas an abrasion on top right side of head and c/o right shoulder pain  Injury Date: 07/03/19        This is a 79-year-old male who presents for evaluation of a trip and fall he is currently on Xarelto and has a contusion to the right side of his head and some chronic neck pain      History provided by:  Patient  Fall   Mechanism of injury: fall    Injury location:  Head/neck  Head/neck injury location:  Scalp  Incident location:  Home  Time since incident:  2 hours  Fall:     Fall occurred:  Standing    Impact surface: Fell onto a concrete porch and hit his head on wooden siding  Point of impact:  Head  Suspicion of alcohol use: no    Associated symptoms: headaches and neck pain      Review of Systems   Cardiovascular: Positive for leg swelling (Chronic)  Musculoskeletal: Positive for neck pain  Neurological: Positive for headaches  All other systems reviewed and are negative        Historical Information     Immunizations:   Immunization History   Administered Date(s) Administered  INFLUENZA 09/15/2018    Influenza Split High Dose Preservative Free IM 10/02/2015, 10/17/2017    Influenza TIV (IM) 09/15/2010, 09/03/2011, 10/01/2012, 08/23/2013    Pneumococcal Conjugate 13-Valent 10/17/2017    Pneumococcal Polysaccharide PPV23 01/01/2006, 11/10/2016       Past Medical History:   Diagnosis Date    Basal cell carcinoma     nose     Benign skin lesion     BPH (benign prostatic hyperplasia)     Diverticular disease of colon     DVT (deep venous thrombosis) (HCC)     Elevated PSA     History of blood clots     Hypotension     Intestinal obstruction (HCC)     Social anxiety disorder     last assessed 1/12/17, resolved 10/17/17    Ventral hernia        Family History   Problem Relation Age of Onset    Other Mother         Bleeding disorder     Stroke Mother         CVA    Diabetes Mother     Heart disease Mother     Stroke Father         CVA    Heart disease Father     Heart attack Father     Substance Abuse Neg Hx     Mental illness Neg Hx      Past Surgical History:   Procedure Laterality Date    APPENDECTOMY      BASAL CELL CARCINOMA EXCISION      Nose     HERNIA REPAIR      9 hernia repairs 1995 - 2012   [address]    with speenectomy    REPLACEMENT TOTAL KNEE Bilateral     SPLENECTOMY  1995    TONSILLECTOMY         Social History     Socioeconomic History    Marital status: /Civil Union     Spouse name: None    Number of children: None    Years of education: None    Highest education level: None   Occupational History    None   Social Needs    Financial resource strain: None    Food insecurity:     Worry: None     Inability: None    Transportation needs:     Medical: None     Non-medical: None   Tobacco Use    Smoking status: Never Smoker    Smokeless tobacco: Never Used   Substance and Sexual Activity    Alcohol use: Yes     Comment: Social / occasional     Drug use: No    Sexual activity: None   Lifestyle    Physical activity:     Days per week: None     Minutes per session: None    Stress: None   Relationships    Social connections:     Talks on phone: None     Gets together: None     Attends Protestant service: None     Active member of club or organization: None     Attends meetings of clubs or organizations: None     Relationship status: None    Intimate partner violence:     Fear of current or ex partner: None     Emotionally abused: None     Physically abused: None     Forced sexual activity: None   Other Topics Concern    None   Social History Narrative    Active advance directive     Dental care, regularly     Living situation, supportive and safe        Family History: non-contributory    Meds/Allergies   Prior to Admission Medications   Prescriptions Last Dose Informant Patient Reported? Taking?    Multiple Vitamins-Minerals (MULTIVITAMIN ADULT EXTRA C PO)  Self Yes No   Si TABLET DAILY   Probiotic Product (SOLUBLE FIBER/PROBIOTICS PO)  Self Yes No   Sig: Take by mouth   XARELTO 20 MG tablet  Self No No   Sig: TAKE 1 TABLET BY MOUTH EVERY DAY   celecoxib (CeleBREX) 200 mg capsule  Self No No   Sig: TAKE 1 CAPSULE EVERY DAY   celecoxib (CeleBREX) 200 mg capsule   No No   Sig: TAKE 1 CAPSULE EVERY DAY   docusate sodium (COLACE) 100 mg capsule  Self Yes No   Sig: Take by mouth   dutasteride (AVODART) 0 5 mg capsule  Self No No   Sig: TAKE ONE CAPSULE BY MOUTH EVERY DAY   dutasteride (AVODART) 0 5 mg capsule   No No   Sig: TAKE 1 CAPSULE EVERY DAY   escitalopram (LEXAPRO) 10 mg tablet  Self Yes No   Sig: Take 10 mg by mouth daily   furosemide (LASIX) 80 mg tablet  Self No No   Sig: Take 1 tablet (80 mg total) by mouth daily   lansoprazole (PREVACID) 30 mg capsule   No No   Sig: TAKE ONE CAPSULE BY MOUTH EVERY DAY   polyethylene glycol (MIRALAX) 17 g packet  Self Yes No   Sig: Take by mouth   simvastatin (ZOCOR) 10 mg tablet  Self No No   Sig: TAKE 1 TABLET BY MOUTH EVERY DAY   simvastatin (ZOCOR) 10 mg tablet   No No Sig: TAKE 1 TABLET AT BEDTIME   spironolactone (ALDACTONE) 25 mg tablet   No No   Sig: TAKE 1/2 TABLET BY MOUTH EVERY DAY   sulfamethoxazole-trimethoprim (BACTRIM DS) 800-160 mg per tablet  Self Yes No   Sig: Take 1 tablet by mouth daily   sulfamethoxazole-trimethoprim (BACTRIM DS) 800-160 mg per tablet   No No   Sig: TAKE 1 TABLET EVERY DAY   tamsulosin (FLOMAX) 0 4 mg   No No   Sig: TAKE 1 CAPSULE EVERY DAY WITH DINNER      Facility-Administered Medications: None       Allergies   Allergen Reactions    Ace Inhibitors Cough    Fosinopril Cough     Cough after years of being on the medication       PHYSICAL EXAM    PE limited by:  Nothing    Objective   Vitals:   First set: Temperature: 98 7 °F (37 1 °C) (07/03/19 1102)  Pulse: 83 (07/03/19 1102)  Respirations: 18 (07/03/19 1102)  Blood Pressure: 150/67 (07/03/19 1102)  SpO2: 98 % (07/03/19 1102)    Primary Survey:   (A) Airway:  Patent  (B) Breathing:  Clear bilateral  (C) Circulation: Pulses:   normal  (D) Disabliity:  GCS Total:  15  (E) Expose:  Completed    Secondary Survey: (Click on Physical Exam tab above)  Physical Exam   Constitutional: He is oriented to person, place, and time  He appears well-developed and well-nourished  No distress  HENT:   Head: Normocephalic  Small contusion to the right parietal area   Eyes: Pupils are equal, round, and reactive to light  EOM are normal  Right eye exhibits no discharge  Left eye exhibits no discharge  Neck: No JVD present  No tracheal deviation present  Generalized chronic cervical pain   Cardiovascular: Normal rate, regular rhythm and intact distal pulses  Exam reveals no gallop and no friction rub  No murmur heard  Pulmonary/Chest: Effort normal and breath sounds normal  No stridor  No respiratory distress  He has no wheezes  Abdominal: Soft  Bowel sounds are normal  He exhibits distension  There is no tenderness  There is no rebound and no guarding  Musculoskeletal: He exhibits edema   He exhibits no tenderness or deformity  Neurological: He is alert and oriented to person, place, and time  No cranial nerve deficit  Skin: Skin is warm and dry  No rash noted  He is not diaphoretic  Psychiatric: He has a normal mood and affect  His behavior is normal  Thought content normal    Nursing note and vitals reviewed  Invasive Devices     None                 Lab Results:   Results Reviewed     None                 Imaging Studies:   Direct to CT: No  CT head without contrast   Final Result by Faby Garcia MD (07/03 1153)      No acute intracranial hemorrhage or depressed calvarial fracture identified  Workstation performed: [id number]         CT cervical spine without contrast   Final Result by Faby Garcia MD (07/03 1153)      No definite evidence for acute fracture or traumatic subluxation  Osteopenia with multilevel cervical spondylosis and degenerative change, as described above  Workstation performed: [id number]             Other Studies:  Not indicated    Code Status: No Order  Advance Directive and Living Will:      Power of :    POLST:      Procedures  Procedures         ED Course         MDM    Disposition  [facility] Transfer: No  Final diagnoses:   Head injury     Time reflects when diagnosis was documented in both MDM as applicable and the Disposition within this note     Time User Action Codes Description Comment    7/3/2019 12:02 PM [address] [[id number]] Head injury       ED Disposition     ED Disposition Condition Date/Time Comment    Discharge Stable Wed Jul 3, 2019 12:02 PM Pink Vega discharge to home/self care              Follow-up Information     Follow up With Specialties Details Why Contact Info    Ayla Rossi MD Internal Medicine  As needed [place]  [address]  [phone]          Patient's Medications   Discharge Prescriptions    No medications on file     No discharge procedures on file       ED Provider  Electronically Signed by         [facility], DO  07/03/19 [id number]

## 2019-07-09 ENCOUNTER — VBI (OUTPATIENT)
Dept: ADMINISTRATIVE | Facility: OTHER | Age: 79
End: 2019-07-09

## 2019-07-09 NOTE — TELEPHONE ENCOUNTER
Surjit Reach    ED Visit Information     Ed visit date: 7/3/2019  Diagnosis Description: Head injury  In Network? Yes Thomas Sin  Discharge status: Home  Discharged with meds ? No  Number of ED visits to date: 1  ED Severity:n/a     Outreach Information    Outreach successful: Yes 2  Date letter mailed:7/10/2019  Date Finalized:7/10/2019    Care Coordination    Follow up appointment with pcp: no No ED f/u appt scheduled  Transportation issues ? NA    Value [employer] type:  7 Day Outreach  Emergent necessity warranted by diagnosis:  No   [facility] PCP:  Yes  Transportation:  Friend/Family Transport  07/09/2019 12:20 PM Phone (Marino Marinelli) Ever Alvarenga (Self) [phone] (H)   Left Message - requesting a call back  Unable to reach patient regarding recent ED visit on 7/3 for Head injury  Patient was discharged without medication and advised to follow up with PCP, as needed  2nd attempt will be made on 7/10      07/10/2019 03:23 PM Phone (Your.MDadán) Ever Alvarenga (Self) [phone] (H)  Left Message - requesting a call back  Unable to reach patient regarding recent ED visit on 7/3 for Head injury  Patient was discharged without medication and advised to follow up with PCP, as needed   Letter mailed

## 2019-10-09 DIAGNOSIS — I82.409 DEEP VEIN THROMBOSIS (DVT) DURING CURRENT HOSPITALIZATION (HCC): ICD-10-CM

## 2019-10-09 RX ORDER — RIVAROXABAN 20 MG/1
TABLET, FILM COATED ORAL
Qty: 90 TABLET | Refills: 3 | Status: SHIPPED | OUTPATIENT
Start: 2019-10-09 | End: 2020-01-10

## 2019-11-18 DIAGNOSIS — I10 ESSENTIAL HYPERTENSION: ICD-10-CM

## 2019-11-18 RX ORDER — FUROSEMIDE 80 MG
TABLET ORAL
Qty: 90 TABLET | Refills: 3 | Status: SHIPPED | OUTPATIENT
Start: 2019-11-18 | End: 2020-09-16

## 2019-11-20 ENCOUNTER — TELEPHONE (OUTPATIENT)
Dept: FAMILY MEDICINE CLINIC | Facility: HOSPITAL | Age: 79
End: 2019-11-20

## 2019-11-20 DIAGNOSIS — I82.5Y3 CHRONIC DEEP VEIN THROMBOSIS (DVT) OF PROXIMAL VEIN OF BOTH LOWER EXTREMITIES (HCC): ICD-10-CM

## 2019-11-20 DIAGNOSIS — E78.00 PURE HYPERCHOLESTEROLEMIA: Primary | ICD-10-CM

## 2020-01-06 ENCOUNTER — APPOINTMENT (OUTPATIENT)
Dept: LAB | Facility: HOSPITAL | Age: 80
End: 2020-01-06
Attending: INTERNAL MEDICINE
Payer: COMMERCIAL

## 2020-01-06 ENCOUNTER — HOSPITAL ENCOUNTER (EMERGENCY)
Facility: HOSPITAL | Age: 80
Discharge: HOME | End: 2020-01-07
Attending: EMERGENCY MEDICINE | Admitting: EMERGENCY MEDICINE
Payer: COMMERCIAL

## 2020-01-06 ENCOUNTER — TELEPHONE (OUTPATIENT)
Dept: FAMILY MEDICINE CLINIC | Facility: HOSPITAL | Age: 80
End: 2020-01-06

## 2020-01-06 DIAGNOSIS — D64.9 ANEMIA: Primary | ICD-10-CM

## 2020-01-06 DIAGNOSIS — E78.00 PURE HYPERCHOLESTEROLEMIA: ICD-10-CM

## 2020-01-06 DIAGNOSIS — I82.5Y3 CHRONIC DEEP VEIN THROMBOSIS (DVT) OF PROXIMAL VEIN OF BOTH LOWER EXTREMITIES (HCC): ICD-10-CM

## 2020-01-06 LAB
ABO GROUP BLD: NORMAL
ABO GROUP BLD: NORMAL
ALBUMIN SERPL BCP-MCNC: 3.2 G/DL (ref 3.5–5)
ALBUMIN SERPL BCP-MCNC: 3.3 G/DL (ref 3.5–5)
ALP SERPL-CCNC: 154 U/L (ref 46–116)
ALP SERPL-CCNC: 155 U/L (ref 46–116)
ALT SERPL W P-5'-P-CCNC: 25 U/L (ref 12–78)
ALT SERPL W P-5'-P-CCNC: 27 U/L (ref 12–78)
ANION GAP SERPL CALCULATED.3IONS-SCNC: 7 MMOL/L (ref 4–13)
ANION GAP SERPL CALCULATED.3IONS-SCNC: 8 MMOL/L (ref 4–13)
ANISOCYTOSIS BLD QL SMEAR: PRESENT
APTT PPP: 32 SECONDS (ref 23–37)
AST SERPL W P-5'-P-CCNC: 15 U/L (ref 5–45)
AST SERPL W P-5'-P-CCNC: 31 U/L (ref 5–45)
BASOPHILS # BLD AUTO: 0.14 THOUSANDS/ΜL (ref 0–0.1)
BASOPHILS # BLD MANUAL: 0 THOUSAND/UL (ref 0–0.1)
BASOPHILS NFR BLD AUTO: 1 % (ref 0–1)
BASOPHILS NFR MAR MANUAL: 0 % (ref 0–1)
BILIRUB SERPL-MCNC: 0.2 MG/DL (ref 0.2–1)
BILIRUB SERPL-MCNC: 0.45 MG/DL (ref 0.2–1)
BLD GP AB SCN SERPL QL: NEGATIVE
BUN SERPL-MCNC: 22 MG/DL (ref 5–25)
BUN SERPL-MCNC: 26 MG/DL (ref 5–25)
CALCIUM SERPL-MCNC: 10 MG/DL (ref 8.3–10.1)
CALCIUM SERPL-MCNC: 10.1 MG/DL (ref 8.3–10.1)
CHLORIDE SERPL-SCNC: 105 MMOL/L (ref 100–108)
CHLORIDE SERPL-SCNC: 108 MMOL/L (ref 100–108)
CHOLEST SERPL-MCNC: 120 MG/DL (ref 50–200)
CO2 SERPL-SCNC: 28 MMOL/L (ref 21–32)
CO2 SERPL-SCNC: 28 MMOL/L (ref 21–32)
CREAT SERPL-MCNC: 1.05 MG/DL (ref 0.6–1.3)
CREAT SERPL-MCNC: 1.19 MG/DL (ref 0.6–1.3)
EOSINOPHIL # BLD AUTO: 0.3 THOUSAND/ΜL (ref 0–0.61)
EOSINOPHIL # BLD MANUAL: 0 THOUSAND/UL (ref 0–0.4)
EOSINOPHIL NFR BLD AUTO: 2 % (ref 0–6)
EOSINOPHIL NFR BLD MANUAL: 0 % (ref 0–6)
ERYTHROCYTE [DISTWIDTH] IN BLOOD BY AUTOMATED COUNT: 22.6 % (ref 11.6–15.1)
ERYTHROCYTE [DISTWIDTH] IN BLOOD BY AUTOMATED COUNT: 23.1 % (ref 11.6–15.1)
GFR SERPL CREATININE-BSD FRML MDRD: 59 ML/MIN/1.73SQ M
GFR SERPL CREATININE-BSD FRML MDRD: 68 ML/MIN/1.73SQ M
GLUCOSE P FAST SERPL-MCNC: 113 MG/DL (ref 65–99)
GLUCOSE SERPL-MCNC: 114 MG/DL (ref 65–140)
HCT VFR BLD AUTO: 25.1 % (ref 36.5–49.3)
HCT VFR BLD AUTO: 25.9 % (ref 36.5–49.3)
HDLC SERPL-MCNC: 38 MG/DL
HGB BLD-MCNC: 6.6 G/DL (ref 12–17)
HGB BLD-MCNC: 6.6 G/DL (ref 12–17)
HYPERCHROMIA BLD QL SMEAR: PRESENT
IMM GRANULOCYTES # BLD AUTO: 0.08 THOUSAND/UL (ref 0–0.2)
IMM GRANULOCYTES NFR BLD AUTO: 1 % (ref 0–2)
INR PPP: 1.86 (ref 0.84–1.19)
LDLC SERPL CALC-MCNC: 64 MG/DL (ref 0–100)
LYMPHOCYTES # BLD AUTO: 2.84 THOUSAND/UL (ref 0.6–4.47)
LYMPHOCYTES # BLD AUTO: 21 % (ref 14–44)
LYMPHOCYTES # BLD AUTO: 3.6 THOUSANDS/ΜL (ref 0.6–4.47)
LYMPHOCYTES NFR BLD AUTO: 23 % (ref 14–44)
MCH RBC QN AUTO: 15.6 PG (ref 26.8–34.3)
MCH RBC QN AUTO: 16 PG (ref 26.8–34.3)
MCHC RBC AUTO-ENTMCNC: 25.5 G/DL (ref 31.4–37.4)
MCHC RBC AUTO-ENTMCNC: 26.3 G/DL (ref 31.4–37.4)
MCV RBC AUTO: 61 FL (ref 82–98)
MCV RBC AUTO: 61 FL (ref 82–98)
MONOCYTES # BLD AUTO: 1.27 THOUSAND/ΜL (ref 0.17–1.22)
MONOCYTES # BLD AUTO: 1.49 THOUSAND/UL (ref 0–1.22)
MONOCYTES NFR BLD AUTO: 8 % (ref 4–12)
MONOCYTES NFR BLD: 11 % (ref 4–12)
NEUTROPHILS # BLD AUTO: 10.06 THOUSANDS/ΜL (ref 1.85–7.62)
NEUTROPHILS # BLD MANUAL: 9.18 THOUSAND/UL (ref 1.85–7.62)
NEUTS SEG NFR BLD AUTO: 65 % (ref 43–75)
NEUTS SEG NFR BLD AUTO: 68 % (ref 43–75)
NONHDLC SERPL-MCNC: 82 MG/DL
NRBC BLD AUTO-RTO: 5 /100 WBC (ref 0–2)
NRBC BLD AUTO-RTO: 5 /100 WBCS
NRBC BLD AUTO-RTO: 5 /100 WBCS
PLATELET # BLD AUTO: 647 THOUSANDS/UL (ref 149–390)
PLATELET # BLD AUTO: 651 THOUSANDS/UL (ref 149–390)
PLATELET BLD QL SMEAR: ABNORMAL
PMV BLD AUTO: 10.3 FL (ref 8.9–12.7)
PMV BLD AUTO: 10.8 FL (ref 8.9–12.7)
POIKILOCYTOSIS BLD QL SMEAR: PRESENT
POLYCHROMASIA BLD QL SMEAR: PRESENT
POTASSIUM SERPL-SCNC: 3.4 MMOL/L (ref 3.5–5.3)
POTASSIUM SERPL-SCNC: 4 MMOL/L (ref 3.5–5.3)
PROT SERPL-MCNC: 6.7 G/DL (ref 6.4–8.2)
PROT SERPL-MCNC: 6.8 G/DL (ref 6.4–8.2)
PROTHROMBIN TIME: 21.1 SECONDS (ref 11.6–14.5)
RBC # BLD AUTO: 4.13 MILLION/UL (ref 3.88–5.62)
RBC # BLD AUTO: 4.22 MILLION/UL (ref 3.88–5.62)
RBC MORPH BLD: PRESENT
RH BLD: POSITIVE
RH BLD: POSITIVE
SODIUM SERPL-SCNC: 141 MMOL/L (ref 136–145)
SODIUM SERPL-SCNC: 143 MMOL/L (ref 136–145)
SPECIMEN EXPIRATION DATE: NORMAL
TARGETS BLD QL SMEAR: PRESENT
TOTAL CELLS COUNTED SPEC: 100
TRIGL SERPL-MCNC: 90 MG/DL
WBC # BLD AUTO: 13.5 THOUSAND/UL (ref 4.31–10.16)
WBC # BLD AUTO: 15.45 THOUSAND/UL (ref 4.31–10.16)

## 2020-01-06 PROCEDURE — 86901 BLOOD TYPING SEROLOGIC RH(D): CPT | Performed by: EMERGENCY MEDICINE

## 2020-01-06 PROCEDURE — 85730 THROMBOPLASTIN TIME PARTIAL: CPT | Performed by: PHYSICIAN ASSISTANT

## 2020-01-06 PROCEDURE — 86900 BLOOD TYPING SEROLOGIC ABO: CPT | Performed by: EMERGENCY MEDICINE

## 2020-01-06 PROCEDURE — 99284 EMERGENCY DEPT VISIT MOD MDM: CPT | Performed by: PHYSICIAN ASSISTANT

## 2020-01-06 PROCEDURE — P9016 RBC LEUKOCYTES REDUCED: HCPCS

## 2020-01-06 PROCEDURE — 99283 EMERGENCY DEPT VISIT LOW MDM: CPT

## 2020-01-06 PROCEDURE — 85027 COMPLETE CBC AUTOMATED: CPT

## 2020-01-06 PROCEDURE — 85007 BL SMEAR W/DIFF WBC COUNT: CPT

## 2020-01-06 PROCEDURE — 86920 COMPATIBILITY TEST SPIN: CPT

## 2020-01-06 PROCEDURE — 80061 LIPID PANEL: CPT

## 2020-01-06 PROCEDURE — 36430 TRANSFUSION BLD/BLD COMPNT: CPT

## 2020-01-06 PROCEDURE — 85610 PROTHROMBIN TIME: CPT | Performed by: PHYSICIAN ASSISTANT

## 2020-01-06 PROCEDURE — 83540 ASSAY OF IRON: CPT | Performed by: EMERGENCY MEDICINE

## 2020-01-06 PROCEDURE — 80053 COMPREHEN METABOLIC PANEL: CPT

## 2020-01-06 PROCEDURE — 36415 COLL VENOUS BLD VENIPUNCTURE: CPT

## 2020-01-06 PROCEDURE — 83550 IRON BINDING TEST: CPT | Performed by: EMERGENCY MEDICINE

## 2020-01-06 PROCEDURE — 82728 ASSAY OF FERRITIN: CPT | Performed by: EMERGENCY MEDICINE

## 2020-01-06 PROCEDURE — 86850 RBC ANTIBODY SCREEN: CPT | Performed by: EMERGENCY MEDICINE

## 2020-01-06 PROCEDURE — 85025 COMPLETE CBC W/AUTO DIFF WBC: CPT

## 2020-01-06 NOTE — ED PROVIDER NOTES
History  Chief Complaint   Patient presents with    Abnormal Lab     pt presents to ED d/t his PCP calling and telling him his hemoglobin is low  pt states he has been tired for week  Patient is a 69 y/o M with h/o multiple abdominal surgeries, iron deficiency anemia that presents to the ED with low hemoglobin  Patient denies blood in stool or dark stools  He states he has felt lightheaded and fatigued  He has SOB on exertion  He states he has had anemia after surgeries in the past   He is on xarelto  History provided by:  Patient  Evaluation of Abnormal Diagnostic Test   Time since result: This am  Patient referred by:  PCP  Resulting agency:  [facility]  Result type: hematology    Hematology:     Hemoglobin:  Low (6 6)      Prior to Admission Medications   Prescriptions Last Dose Informant Patient Reported? Taking?    Multiple Vitamins-Minerals (MULTIVITAMIN ADULT EXTRA C PO)  Self Yes No   Si TABLET DAILY   Probiotic Product (SOLUBLE FIBER/PROBIOTICS PO)  Self Yes No   Sig: Take by mouth   XARELTO 20 MG tablet   No No   Sig: TAKE 1 TABLET BY MOUTH EVERY DAY   celecoxib (CeleBREX) 200 mg capsule  Self No No   Sig: TAKE 1 CAPSULE EVERY DAY   celecoxib (CeleBREX) 200 mg capsule   No No   Sig: TAKE 1 CAPSULE EVERY DAY   docusate sodium (COLACE) 100 mg capsule  Self Yes No   Sig: Take by mouth   dutasteride (AVODART) 0 5 mg capsule  Self No No   Sig: TAKE ONE CAPSULE BY MOUTH EVERY DAY   dutasteride (AVODART) 0 5 mg capsule   No No   Sig: TAKE 1 CAPSULE EVERY DAY   escitalopram (LEXAPRO) 10 mg tablet  Self Yes No   Sig: Take 10 mg by mouth daily   furosemide (LASIX) 80 mg tablet   No No   Sig: TAKE 1 TABLET BY MOUTH EVERY DAY   lansoprazole (PREVACID) 30 mg capsule   No No   Sig: TAKE ONE CAPSULE BY MOUTH EVERY DAY   polyethylene glycol (MIRALAX) 17 g packet  Self Yes No   Sig: Take by mouth   simvastatin (ZOCOR) 10 mg tablet  Self No No   Sig: TAKE 1 TABLET BY MOUTH EVERY DAY   simvastatin (ZOCOR) 10 mg tablet   No No   Sig: TAKE 1 TABLET AT BEDTIME   spironolactone (ALDACTONE) 25 mg tablet   No No   Sig: TAKE 1/2 TABLET BY MOUTH EVERY DAY   sulfamethoxazole-trimethoprim (BACTRIM DS) 800-160 mg per tablet  Self Yes No   Sig: Take 1 tablet by mouth daily   tamsulosin (FLOMAX) 0 4 mg   No No   Sig: TAKE 1 CAPSULE EVERY DAY WITH DINNER      Facility-Administered Medications: None       Past Medical History:   Diagnosis Date    Basal cell carcinoma     nose     Benign skin lesion     BPH (benign prostatic hyperplasia)     Diverticular disease of colon     DVT (deep venous thrombosis) (HCC)     Elevated PSA     History of blood clots     Hypotension     Intestinal obstruction (HCC)     Social anxiety disorder     last assessed 1/12/17, resolved 10/17/17    Ventral hernia        Past Surgical History:   Procedure Laterality Date    APPENDECTOMY      BASAL CELL CARCINOMA EXCISION      Nose     HERNIA REPAIR      9 hernia repairs 1995 - 2012    HIATAL HERNIA REPAIR  1995    with speenectomy    REPLACEMENT TOTAL KNEE Bilateral     SPLENECTOMY  1995    TONSILLECTOMY         Family History   Problem Relation Age of Onset    Other Mother         Bleeding disorder     Stroke Mother         CVA    Diabetes Mother     Heart disease Mother     Stroke Father         CVA    Heart disease Father     Heart attack Father     Substance Abuse Neg Hx     Mental illness Neg Hx      I have reviewed and agree with the history as documented  Social History     Tobacco Use    Smoking status: Never Smoker    Smokeless tobacco: Never Used   Substance Use Topics    Alcohol use: Yes     Comment: Social / occasional     Drug use: No        Review of Systems   Constitutional: Positive for fatigue  HENT: Negative  Respiratory: Positive for shortness of breath  Negative for cough  Cardiovascular: Negative for chest pain and leg swelling     Gastrointestinal: Negative for abdominal pain, diarrhea, nausea and vomiting  Skin: Positive for pallor  Neurological: Positive for weakness  All other systems reviewed and are negative  Physical Exam  Physical Exam   Constitutional: He is oriented to person, place, and time  He appears well-developed and well-nourished  He is cooperative  He does not appear ill  No distress  HENT:   Head: Normocephalic and atraumatic  Nose: Nose normal    Mouth/Throat: Oropharynx is clear and moist and mucous membranes are normal    Eyes: Conjunctivae are normal    Neck: Normal range of motion  Cardiovascular: Normal rate, regular rhythm and normal heart sounds  No murmur heard  Pulmonary/Chest: Effort normal  He has no wheezes  He has no rhonchi  He has no rales  Abdominal: Soft  Bowel sounds are normal  There is no tenderness  Multiple scars to abdomen from previous surgeries  Genitourinary: Rectal exam shows no external hemorrhoid, no mass, no tenderness, anal tone normal and guaiac negative stool (light brown stool  )  Musculoskeletal: Normal range of motion  He exhibits no edema  Neurological: He is alert and oriented to person, place, and time  He has normal strength  No sensory deficit  Gait normal    Skin: Skin is warm and dry  No rash noted  He is not diaphoretic  No pallor  Nursing note and vitals reviewed        Vital Signs  ED Triage Vitals [01/06/20 1814]   Temperature Pulse Respirations Blood Pressure SpO2   98 7 °F (37 1 °C) 84 17 156/70 100 %      Temp Source Heart Rate Source Patient Position - Orthostatic VS BP Location FiO2 (%)   Oral Monitor Lying Left arm --      Pain Score       --           Vitals:    01/06/20 1815 01/06/20 1830 01/06/20 1900 01/06/20 1930   BP: 156/70 160/66 132/63 122/58   Pulse: 79 77 78 73   Patient Position - Orthostatic VS: Lying  Lying          Visual Acuity      ED Medications  Medications - No data to display    Diagnostic Studies  Results Reviewed     Procedure Component Value Units Date/Time    Protime-INR [[id number]]  (Abnormal) Collected:  01/06/20 1857    Lab Status:  Final result Specimen:  Blood from Arm, Left Updated:  01/06/20 1917     Protime 21 1 seconds      INR 1 86    APTT [[id number]]  (Normal) Collected:  01/06/20 1857    Lab Status:  Final result Specimen:  Blood from Arm, Left Updated:  01/06/20 1917     PTT 32 seconds     CBC and differential [[id number]]  (Abnormal) Collected:  01/06/20 1821    Lab Status:  Final result Specimen:  Blood from Arm, Left Updated:  01/06/20 1905     WBC 13 50 Thousand/uL      RBC 4 13 Million/uL      Hemoglobin 6 6 g/dL      Hematocrit 25 1 %      MCV 61 fL      MCH 16 0 pg      MCHC 26 3 g/dL      RDW 22 6 %      MPV 10 3 fL      Platelets 048 Thousands/uL      nRBC 5 /100 WBCs     Comprehensive metabolic panel [[id number]]  (Abnormal) Collected:  01/06/20 1821    Lab Status:  Final result Specimen:  Blood from Arm, Left Updated:  01/06/20 1856     Sodium 141 mmol/L      Potassium 4 0 mmol/L      Chloride 105 mmol/L      CO2 28 mmol/L      ANION GAP 8 mmol/L      BUN 26 mg/dL      Creatinine 1 19 mg/dL      Glucose 114 mg/dL      Calcium 10 1 mg/dL      AST 31 U/L      ALT 27 U/L      Alkaline Phosphatase 154 U/L      Total Protein 6 8 g/dL      Albumin 3 3 g/dL      Total Bilirubin 0 20 mg/dL      eGFR 59 ml/min/1 73sq m     Narrative:       [place] guidelines for Chronic Kidney Disease (CKD):     Stage 1 with normal or high GFR (GFR > 90 mL/min/1 73 square meters)    Stage 2 Mild CKD (GFR = 60-89 mL/min/1 73 square meters)    Stage 3A Moderate CKD (GFR = 45-59 mL/min/1 73 square meters)    Stage 3B Moderate CKD (GFR = 30-44 mL/min/1 73 square meters)    Stage 4 Severe CKD (GFR = 15-29 mL/min/1 73 square meters)    Stage 5 End Stage CKD (GFR <15 mL/min/1 73 square meters)  Note: GFR calculation is accurate only with a steady state creatinine                 No orders to display              Procedures  Procedures         ED Course  ED Course as of Jan 06 2103   Grady Snyder Jan 06, 2020 2101 Care transferred to Dr Dee Vallejo  [facility]  Number of Diagnoses or Management Options  Anemia:   Diagnosis management comments: Offered patient admission, but he declined and states he would rather have a workup as an outpatient  Disposition  Final diagnoses:   Anemia     Time reflects when diagnosis was documented in both MDM as applicable and the Disposition within this note     Time User Action Codes Description Comment    1/6/2020  9:02 PM Yasmine Mueller Add [D64 9] Anemia       ED Disposition     None      Follow-up Information    None         Patient's Medications   Discharge Prescriptions    No medications on file     No discharge procedures on file      ED Provider  Electronically Signed by           Roel Jackman PA-C  01/06/20 2103

## 2020-01-07 ENCOUNTER — TELEPHONE (OUTPATIENT)
Dept: FAMILY MEDICINE CLINIC | Facility: HOSPITAL | Age: 80
End: 2020-01-07

## 2020-01-07 ENCOUNTER — APPOINTMENT (OUTPATIENT)
Dept: LAB | Facility: HOSPITAL | Age: 80
End: 2020-01-07
Attending: EMERGENCY MEDICINE
Payer: COMMERCIAL

## 2020-01-07 VITALS
SYSTOLIC BLOOD PRESSURE: 141 MMHG | TEMPERATURE: 97.5 F | RESPIRATION RATE: 18 BRPM | OXYGEN SATURATION: 98 % | WEIGHT: 220.02 LBS | DIASTOLIC BLOOD PRESSURE: 67 MMHG | BODY MASS INDEX: 35.36 KG/M2 | HEIGHT: 66 IN | HEART RATE: 65 BPM

## 2020-01-07 DIAGNOSIS — D64.9 ANEMIA: ICD-10-CM

## 2020-01-07 LAB
ABO GROUP BLD BPU: NORMAL
ABO GROUP BLD BPU: NORMAL
ANION GAP BLD CALC-SCNC: 14 MMOL/L (ref 4–13)
BASOPHILS # BLD AUTO: 0.16 THOUSANDS/ΜL (ref 0–0.1)
BASOPHILS NFR BLD AUTO: 1 % (ref 0–1)
BPU ID: NORMAL
BPU ID: NORMAL
BUN BLD-MCNC: 27 MG/DL (ref 5–25)
CA-I BLD-SCNC: 1.35 MMOL/L (ref 1.12–1.32)
CHLORIDE BLD-SCNC: 104 MMOL/L (ref 100–108)
CREAT BLD-MCNC: 1.2 MG/DL (ref 0.6–1.3)
CROSSMATCH: NORMAL
CROSSMATCH: NORMAL
EOSINOPHIL # BLD AUTO: 0.46 THOUSAND/ΜL (ref 0–0.61)
EOSINOPHIL NFR BLD AUTO: 4 % (ref 0–6)
ERYTHROCYTE [DISTWIDTH] IN BLOOD BY AUTOMATED COUNT: 25.9 % (ref 11.6–15.1)
FERRITIN SERPL-MCNC: 7 NG/ML (ref 8–388)
GFR SERPL CREATININE-BSD FRML MDRD: 58 ML/MIN/1.73SQ M
GLUCOSE SERPL-MCNC: 117 MG/DL (ref 65–140)
HCT VFR BLD AUTO: 28.1 % (ref 36.5–49.3)
HCT VFR BLD CALC: 28 % (ref 36.5–49.3)
HGB BLD-MCNC: 7.6 G/DL (ref 12–17)
HGB BLDA-MCNC: 9.5 G/DL (ref 12–17)
IMM GRANULOCYTES # BLD AUTO: 0.08 THOUSAND/UL (ref 0–0.2)
IMM GRANULOCYTES NFR BLD AUTO: 1 % (ref 0–2)
IRON SATN MFR SERPL: 3 %
IRON SERPL-MCNC: 18 UG/DL (ref 65–175)
LYMPHOCYTES # BLD AUTO: 2.56 THOUSANDS/ΜL (ref 0.6–4.47)
LYMPHOCYTES NFR BLD AUTO: 20 % (ref 14–44)
MCH RBC QN AUTO: 17.8 PG (ref 26.8–34.3)
MCHC RBC AUTO-ENTMCNC: 27 G/DL (ref 31.4–37.4)
MCV RBC AUTO: 66 FL (ref 82–98)
MONOCYTES # BLD AUTO: 1.15 THOUSAND/ΜL (ref 0.17–1.22)
MONOCYTES NFR BLD AUTO: 9 % (ref 4–12)
NEUTROPHILS # BLD AUTO: 8.11 THOUSANDS/ΜL (ref 1.85–7.62)
NEUTS SEG NFR BLD AUTO: 65 % (ref 43–75)
NRBC BLD AUTO-RTO: 4 /100 WBCS
PCO2 BLD: 27 MMOL/L (ref 21–32)
PLATELET # BLD AUTO: 573 THOUSANDS/UL (ref 149–390)
PMV BLD AUTO: 10.8 FL (ref 8.9–12.7)
POTASSIUM BLD-SCNC: 3.5 MMOL/L (ref 3.5–5.3)
RBC # BLD AUTO: 4.28 MILLION/UL (ref 3.88–5.62)
SODIUM BLD-SCNC: 141 MMOL/L (ref 136–145)
SPECIMEN SOURCE: ABNORMAL
TIBC SERPL-MCNC: 575 UG/DL (ref 250–450)
UNIT DISPENSE STATUS: NORMAL
UNIT DISPENSE STATUS: NORMAL
UNIT PRODUCT CODE: NORMAL
UNIT PRODUCT CODE: NORMAL
UNIT RH: NORMAL
UNIT RH: NORMAL
WBC # BLD AUTO: 12.52 THOUSAND/UL (ref 4.31–10.16)

## 2020-01-07 PROCEDURE — 85014 HEMATOCRIT: CPT

## 2020-01-07 PROCEDURE — 80047 BASIC METABLC PNL IONIZED CA: CPT

## 2020-01-07 PROCEDURE — 85025 COMPLETE CBC W/AUTO DIFF WBC: CPT

## 2020-01-07 PROCEDURE — 36415 COLL VENOUS BLD VENIPUNCTURE: CPT

## 2020-01-07 NOTE — TELEPHONE ENCOUNTER
----- Message from Pau Abrams MD sent at 1/7/2020  3:32 PM EST -----  Call wife-hgb 7 6-will repeat at [address]

## 2020-01-07 NOTE — ED NOTES
Chem 8 iStat    Na 141  K 3 5  Cl 104  Ca 1 35  CO2 27  Glu 117  Bun 27  Creat 1 2  Hct: 28  Hgb: 9 5       Brendan Diaz, [facility]  01/07/20 0324

## 2020-01-07 NOTE — ED NOTES
Barcodes for blood would not scan, this is a known issue per Erica Hassan  Blood was verified 4 times with my self and  2 other RNs   Will monitor patient as per policy          Vilma Peña RN  01/06/20 4694

## 2020-01-07 NOTE — ED CARE HANDOFF
Emergency Department Sign Out Note        Sign out and transfer of care from [place] [id number]  See Separate Emergency Department note  The patient, Muriel Bonilla, was evaluated by the previous provider for anemia  Workup Completed:  No orders to display      Labs Reviewed   CBC AND DIFFERENTIAL - Abnormal       Result Value Ref Range Status    WBC 13 50 (*) 4 31 - 10 16 Thousand/uL Final    RBC 4 13  3 88 - 5 62 Million/uL Final    Hemoglobin 6 6 (*) 12 0 - 17 0 g/dL Final    Comment: This result has been called to [facility] by [facility] on 01 06 2020 at 1903, and has been read back  critical result was called by LD at 1830    Hematocrit 25 1 (*) 36 5 - 49 3 % Final    MCV 61 (*) 82 - 98 fL Final    MCH 16 0 (*) 26 8 - 34 3 pg Final    MCHC 26 3 (*) 31 4 - 37 4 g/dL Final    RDW 22 6 (*) 11 6 - 15 1 % Final    MPV 10 3  8 9 - 12 7 fL Final    Platelets 615 (*) 113 - 390 Thousands/uL Final    nRBC 5  /100 WBCs Final   COMPREHENSIVE METABOLIC PANEL - Abnormal    Sodium 141  136 - 145 mmol/L Final    Potassium 4 0  3 5 - 5 3 mmol/L Final    Chloride 105  100 - 108 mmol/L Final    CO2 28  21 - 32 mmol/L Final    ANION GAP 8  4 - 13 mmol/L Final    BUN 26 (*) 5 - 25 mg/dL Final    Creatinine 1 19  0 60 - 1 30 mg/dL Final    Comment: Standardized to IDMS reference method    Glucose 114  65 - 140 mg/dL Final    Comment:   If the patient is fasting, the ADA then defines impaired fasting glucose as > 100 mg/dL and diabetes as > or equal to 123 mg/dL  Specimen collection should occur prior to Sulfasalazine administration due to the potential for falsely depressed results  Specimen collection should occur prior to Sulfapyridine administration due to the potential for falsely elevated results  Calcium 10 1  8 3 - 10 1 mg/dL Final    AST 31  5 - 45 U/L Final    Comment:   Specimen collection should occur prior to Sulfasalazine administration due to the potential for falsely depressed results       ALT 27  12 - 78 U/L Final    Comment:   Specimen collection should occur prior to Sulfasalazine administration due to the potential for falsely depressed results       Alkaline Phosphatase 154 (*) 46 - 116 U/L Final    Total Protein 6 8  6 4 - 8 2 g/dL Final    Albumin 3 3 (*) 3 5 - 5 0 g/dL Final    Total Bilirubin 0 20  0 20 - 1 00 mg/dL Final    eGFR 59  ml/min/1 73sq m Final    Narrative:     [place] guidelines for Chronic Kidney Disease (CKD):     Stage 1 with normal or high GFR (GFR > 90 mL/min/1 73 square meters)    Stage 2 Mild CKD (GFR = 60-89 mL/min/1 73 square meters)    Stage 3A Moderate CKD (GFR = 45-59 mL/min/1 73 square meters)    Stage 3B Moderate CKD (GFR = 30-44 mL/min/1 73 square meters)    Stage 4 Severe CKD (GFR = 15-29 mL/min/1 73 square meters)    Stage 5 End Stage CKD (GFR <15 mL/min/1 73 square meters)  Note: GFR calculation is accurate only with a steady state creatinine   PROTIME-INR - Abnormal    Protime 21 1 (*) 11 6 - 14 5 seconds Final    Comment: Specimen hemolyzed-results may be affected     INR 1 86 (*) 0 84 - 1 19 Final   POCT CHEM 8+ - Abnormal    SODIUM, I-STAT 141  136 - 145 mmol/l Final    Potassium, i-STAT 3 5  3 5 - 5 3 mmol/L Final    Chloride, istat 104  100 - 108 mmol/L Final    CO2, i-STAT 27  21 - 32 mmol/L Final    Anion Gap, i-STAT 14 (*) 4 - 13 mmol/L Final    Calcium, Ionized i-STAT 1 35 (*) 1 12 - 1 32 mmol/L Final    BUN, I-STAT 27 (*) 5 - 25 mg/dl Final    Creatinine, i-STAT 1 2  0 6 - 1 3 mg/dl Final    eGFR 58  ml/min/1 73sq m Final    Glucose, i-STAT 117  65 - 140 mg/dl Final    Hct, i-STAT 28 (*) 36 5 - 49 3 % Final    Hgb, i-STAT 9 5 (*) 12 0 - 17 0 g/dl Final    Specimen Type VENOUS   Final    Narrative:     National Kidney Disease Foundation guidelines for Chronic Kidney Disease (CKD):     Stage 1 with normal or high GFR (GFR > 90 mL/min/1 73 square meters)    Stage 2 Mild CKD (GFR = 60-89 mL/min/1 73 square meters)    Stage 3A Moderate CKD (GFR = 45-59 mL/min/1 73 square meters)    Stage 3B Moderate CKD (GFR = 30-44 mL/min/1 73 square meters)    Stage 4 Severe CKD (GFR = 15-29 mL/min/1 73 square meters)    Stage 5 End Stage CKD (GFR <15 mL/min/1 73 square meters)  Note: GFR calculation is accurate only with a steady state creatinine   MANUAL DIFFERENTIAL(PHLEBS DO NOT ORDER) - Abnormal    Segmented % 68  43 - 75 % Final    Lymphocytes % 21  14 - 44 % Final    Monocytes % 11  4 - 12 % Final    Eosinophils, % 0  0 - 6 % Final    Basophils % 0  0 - 1 % Final    Absolute Neutrophils 9 18 (*) 1 85 - 7 62 Thousand/uL Final    Lymphocytes Absolute 2 84  0 60 - 4 47 Thousand/uL Final    Monocytes Absolute 1 49 (*) 0 00 - 1 22 Thousand/uL Final    Eosinophils Absolute 0 00  0 00 - 0 40 Thousand/uL Final    Basophils Absolute 0 00  0 00 - 0 10 Thousand/uL Final    Total Counted 100   Final    nRBC 5 (*) 0 - 2 /100 WBC Final    RBC Morphology Present   Final    Anisocytosis Present   Final    Hypochromia Present   Final    Poikilocytes Present   Final    Polychromasia Present   Final    Target Cells Present   Final    Platelet Estimate Increased (*) Adequate Final   APTT - Normal    PTT 32  23 - 37 seconds Final    Comment: Therapeutic Heparin Range =  60-90 seconds   IRON SATURATION   FERRITIN   PREPARE LEUKOREDUCED RBC    Unit Product Code F2028W69       Unit Number [id number]       Unit ABO A       Unit [facility] POS       Crossmatch Compatible   Final    Unit Dispense Status Issued       Unit Product Code W1615T15       Unit Number [id number]       Unit ABO A       Unit [facility] POS       Crossmatch Compatible   Final    Unit Dispense Status Issued      TYPE AND SCREEN    ABO Grouping A   Final    Rh Factor Positive   Final    Antibody Screen Negative   Final    Specimen Expiration Date [id number]   Final   ABO/RH    ABO Grouping A   Final    Rh Factor Positive   Final   IRON PANEL (INCLUDES FERRITIN,IRON SAT%, IRON, AND TIBC)    Narrative:      The following orders were created for panel order Iron Panel (Includes Ferritin, Iron Sat%, Iron, and TIBC)  Procedure                               Abnormality         Status                     ---------                               -----------         ------                     Iron Saturation %[[id number]]                                In process                 Ferritin[[id number]]                                         In process                   Please view results for these tests on the individual orders  ED Course / Workup Pending (followup):                            ED Course as of Jan 07 0205   [facility] Jan 06, 2020   2054 Pt signed out from Bhupinder Wise, awaiting blood transfusion x 2 units, then discharge  2228 Eval'd pt, doing well, getting transfusion  Tue Jan 07, 2020   0200 Pt has ambulated, feels good  Repeat Hg is 9 5 after 2 uPRBCs  Will d/c home to f/u with PCP  RTED if sx worsen  Procedures  MDM  Number of Diagnoses or Management Options  Anemia: new and requires workup     Amount and/or Complexity of Data Reviewed  Clinical lab tests: reviewed  Tests in the medicine section of CPT®: reviewed  Discuss the patient with other providers: yes    Risk of Complications, Morbidity, and/or Mortality  Presenting problems: moderate  Diagnostic procedures: low  Management options: low    Patient Progress  Patient progress: stable      Disposition  Final diagnoses:   Anemia     Time reflects when diagnosis was documented in both MDM as applicable and the Disposition within this note     Time User Action Codes Description Comment    1/6/2020  9:02 PM Tarun Orr Add [D64 9] Anemia       ED Disposition     ED Disposition Condition Date/Time Comment    Discharge Stable Tue Jan 7, 2020  2:00 AM Dickson Olivas discharge to home/self care              Follow-up Information     Follow up With Specialties Details Why Contact Info Additional Information    Jael Otto MD Internal Medicine Call today [address]  [facility] Emergency Medicine  If symptoms worsen [address] [place], [id number] [phone]  ED, [address] [place] [address], [facility]        Patient's Medications   Discharge Prescriptions    No medications on file     Outpatient Discharge Orders   CBC and differential   Standing Status: Future Standing Exp   Date: 01/07/21          ED Provider  Electronically Signed by     Malgorzata Taylor MD  01/07/20 8380

## 2020-01-07 NOTE — TELEPHONE ENCOUNTER
Patient was sent to the emergency room yesterday for hemoglobin of 6 6  The given 2 units of cells  I talked to his wife  Patient did have a complete GI evaluation at [facility] including capsule endoscopy 2 years ago  Will place on b i d  Iron  I have stopped his Xarelto as well as his Celebrex  He has an appointment in 3 days

## 2020-01-10 ENCOUNTER — APPOINTMENT (OUTPATIENT)
Dept: LAB | Facility: HOSPITAL | Age: 80
End: 2020-01-10
Attending: INTERNAL MEDICINE
Payer: COMMERCIAL

## 2020-01-10 ENCOUNTER — OFFICE VISIT (OUTPATIENT)
Dept: FAMILY MEDICINE CLINIC | Facility: HOSPITAL | Age: 80
End: 2020-01-10
Payer: COMMERCIAL

## 2020-01-10 ENCOUNTER — TELEPHONE (OUTPATIENT)
Dept: FAMILY MEDICINE CLINIC | Facility: HOSPITAL | Age: 80
End: 2020-01-10

## 2020-01-10 VITALS
HEART RATE: 72 BPM | SYSTOLIC BLOOD PRESSURE: 120 MMHG | WEIGHT: 230 LBS | HEIGHT: 66 IN | DIASTOLIC BLOOD PRESSURE: 70 MMHG | BODY MASS INDEX: 36.96 KG/M2

## 2020-01-10 DIAGNOSIS — D50.9 IRON DEFICIENCY ANEMIA, UNSPECIFIED IRON DEFICIENCY ANEMIA TYPE: ICD-10-CM

## 2020-01-10 DIAGNOSIS — E78.00 PURE HYPERCHOLESTEROLEMIA: ICD-10-CM

## 2020-01-10 DIAGNOSIS — M79.672 LEFT FOOT PAIN: ICD-10-CM

## 2020-01-10 DIAGNOSIS — M54.2 CERVICALGIA: ICD-10-CM

## 2020-01-10 DIAGNOSIS — I82.5Y3 CHRONIC DEEP VEIN THROMBOSIS (DVT) OF PROXIMAL VEIN OF BOTH LOWER EXTREMITIES (HCC): Primary | ICD-10-CM

## 2020-01-10 DIAGNOSIS — K43.2 INCISIONAL HERNIA, WITHOUT OBSTRUCTION OR GANGRENE: ICD-10-CM

## 2020-01-10 LAB
ERYTHROCYTE [DISTWIDTH] IN BLOOD BY AUTOMATED COUNT: 29.8 % (ref 11.6–15.1)
HCT VFR BLD AUTO: 33.1 % (ref 36.5–49.3)
HGB BLD-MCNC: 9 G/DL (ref 12–17)
MCH RBC QN AUTO: 18.4 PG (ref 26.8–34.3)
MCHC RBC AUTO-ENTMCNC: 27.2 G/DL (ref 31.4–37.4)
MCV RBC AUTO: 68 FL (ref 82–98)
PLATELET # BLD AUTO: 531 THOUSANDS/UL (ref 149–390)
PMV BLD AUTO: 10.7 FL (ref 8.9–12.7)
RBC # BLD AUTO: 4.89 MILLION/UL (ref 3.88–5.62)
WBC # BLD AUTO: 17.47 THOUSAND/UL (ref 4.31–10.16)

## 2020-01-10 PROCEDURE — 1125F AMNT PAIN NOTED PAIN PRSNT: CPT | Performed by: INTERNAL MEDICINE

## 2020-01-10 PROCEDURE — 1170F FXNL STATUS ASSESSED: CPT | Performed by: INTERNAL MEDICINE

## 2020-01-10 PROCEDURE — 1160F RVW MEDS BY RX/DR IN RCRD: CPT | Performed by: INTERNAL MEDICINE

## 2020-01-10 PROCEDURE — 85027 COMPLETE CBC AUTOMATED: CPT

## 2020-01-10 PROCEDURE — 3725F SCREEN DEPRESSION PERFORMED: CPT | Performed by: INTERNAL MEDICINE

## 2020-01-10 PROCEDURE — G0439 PPPS, SUBSEQ VISIT: HCPCS | Performed by: INTERNAL MEDICINE

## 2020-01-10 PROCEDURE — 36415 COLL VENOUS BLD VENIPUNCTURE: CPT

## 2020-01-10 PROCEDURE — 99214 OFFICE O/P EST MOD 30 MIN: CPT | Performed by: INTERNAL MEDICINE

## 2020-01-10 RX ORDER — FERROUS SULFATE 325(65) MG
325 TABLET ORAL DAILY
COMMUNITY

## 2020-01-10 NOTE — PROGRESS NOTES
Assessment and Plan:     Problem List Items Addressed This Visit     None      Visit Diagnoses     Medicare annual wellness visit, subsequent    -  Primary           Preventive health issues were discussed with patient, and age appropriate screening tests were ordered as noted in patient's After Visit Summary  Personalized health advice and appropriate referrals for health education or preventive services given if needed, as noted in patient's After Visit Summary       History of Present Illness:     Patient presents for Medicare Annual Wellness visit    Patient Care Team:  Lc Doherty MD as PCP - General  MD Lc Singh MD     Problem List:     Patient Active Problem List   Diagnosis    Benign prostatic hyperplasia without lower urinary tract symptoms    Chronic deep vein thrombosis (DVT) of proximal vein of both lower extremities (HCC)    GERD without esophagitis    Incisional hernia, without obstruction or gangrene    Venous stasis    Infection of prosthetic right knee joint ([address] )    Pure hypercholesterolemia      Past Medical and Surgical History:     Past Medical History:   Diagnosis Date    Basal cell carcinoma     nose     Benign skin lesion     BPH (benign prostatic hyperplasia)     Diverticular disease of colon     DVT (deep venous thrombosis) (HCC)     Elevated PSA     History of blood clots     Hypotension     Intestinal obstruction ([address] )     Social anxiety disorder     last assessed 1/12/17, resolved 10/17/17    Ventral hernia      Past Surgical History:   Procedure Laterality Date    APPENDECTOMY      BASAL CELL CARCINOMA EXCISION      Nose     HERNIA REPAIR      9 hernia repairs 1995 - 2012    HIATAL HERNIA REPAIR  1995    with speenectomy    REPLACEMENT TOTAL KNEE Bilateral     SPLENECTOMY  1995    TONSILLECTOMY        Family History:     Family History   Problem Relation Age of Onset    Other Mother         Bleeding disorder     Stroke Mother         CVA  Diabetes Mother     Heart disease Mother     Stroke Father         CVA    Heart disease Father     Heart attack Father     Substance Abuse Neg Hx     Mental illness Neg Hx       Social History:     Social History     Socioeconomic History    Marital status: /Civil Union     Spouse name: None    Number of children: None    Years of education: None    Highest education level: None   Occupational History    None   Social Needs    Financial resource strain: None    Food insecurity:     Worry: None     Inability: None    Transportation needs:     Medical: None     Non-medical: None   Tobacco Use    Smoking status: Never Smoker    Smokeless tobacco: Never Used   Substance and Sexual Activity    Alcohol use: Yes     Frequency: Monthly or less     Drinks per session: 1 or 2     Binge frequency: Never    Drug use: No    Sexual activity: None   Lifestyle    Physical activity:     Days per week: None     Minutes per session: None    Stress: None   Relationships    Social connections:     Talks on phone: None     Gets together: None     Attends Christian service: None     Active member of club or organization: None     Attends meetings of clubs or organizations: None     Relationship status: None    Intimate partner violence:     Fear of current or ex partner: None     Emotionally abused: None     Physically abused: None     Forced sexual activity: None   Other Topics Concern    None   Social History Narrative    Active advance directive     Dental care, regularly     Living situation, supportive and safe        Medications and Allergies:     Current Outpatient Medications   Medication Sig Dispense Refill    docusate sodium (COLACE) 100 mg capsule Take by mouth      dutasteride (AVODART) 0 5 mg capsule TAKE ONE CAPSULE BY MOUTH EVERY DAY 90 capsule 0    ferrous sulfate 325 (65 Fe) mg tablet Take 325 mg by mouth 2 (two) times a day      furosemide (LASIX) 80 mg tablet TAKE 1 TABLET BY MOUTH EVERY DAY 90 tablet 3    lansoprazole (PREVACID) 30 mg capsule TAKE ONE CAPSULE BY MOUTH EVERY DAY 90 capsule 3    Multiple Vitamins-Minerals (MULTIVITAMIN ADULT EXTRA C PO) 1 TABLET DAILY      polyethylene glycol (MIRALAX) 17 g packet Take by mouth      Probiotic Product (SOLUBLE FIBER/PROBIOTICS PO) Take by mouth      simvastatin (ZOCOR) 10 mg tablet TAKE 1 TABLET BY MOUTH EVERY DAY 90 tablet 0    spironolactone (ALDACTONE) 25 mg tablet TAKE 1/2 TABLET BY MOUTH EVERY DAY 45 tablet 3    sulfamethoxazole-trimethoprim (BACTRIM DS) 800-160 mg per tablet Take 1 tablet by mouth daily  3    tamsulosin (FLOMAX) 0 4 mg TAKE 1 CAPSULE EVERY DAY WITH DINNER 90 capsule 3    celecoxib (CeleBREX) 200 mg capsule TAKE 1 CAPSULE EVERY DAY (Patient not taking: Reported on 1/10/2020) 90 capsule 0    XARELTO 20 MG tablet TAKE 1 TABLET BY MOUTH EVERY DAY (Patient not taking: Reported on 1/10/2020) 90 tablet 3     No current facility-administered medications for this visit        Allergies   Allergen Reactions    Ace Inhibitors Cough    Fosinopril Cough     Cough after years of being on the medication      Immunizations:     Immunization History   Administered Date(s) Administered    INFLUENZA 09/15/2018    Influenza Split High Dose Preservative Free IM 10/02/2015, 10/17/2017, 09/21/2019    Influenza TIV (IM) 09/15/2010, 09/03/2011, 10/01/2012, 08/23/2013    Pneumococcal Conjugate 13-Valent 10/17/2017    Pneumococcal Polysaccharide PPV23 01/01/2006, 11/10/2016      Health Maintenance:         Topic Date Due    CRC Screening: Colonoscopy  07/20/2027         Topic Date Due    Meningococcal ACWY Vaccine (1 - Risk start before 7 months 4-dose series) 1940    HIB Vaccine (1 of 1 - Risk 1-dose series) 12[birth year]17/1943    DTaP,Tdap,and Td Vaccines (1 - Tdap) 09[birth year]17/1953      Medicare Health Risk Assessment:     /70 (BP Location: Left arm, Patient Position: Sitting, Cuff Size: Large)   Pulse 72   Ht 5' 6" (1 676 m) Wt 104 kg (230 lb)   BMI 37 12 kg/m²      Jd Soriano is here for his Subsequent Wellness visit  Health Risk Assessment:   Patient rates overall health as good  Patient feels that their physical health rating is slightly worse  Eyesight was rated as same  Hearing was rated as same  Patient feels that their emotional and mental health rating is same  Pain experienced in the last 7 days has been some  Patient's pain rating has been 5/10  Patient states that he has experienced no weight loss or gain in last 6 months  Neck pain from time to time  Depression Screening:   PHQ-2 Score: 0      Fall Risk Screening: In the past year, patient has experienced: history of falling in past year    Number of falls: 1  Injured during fall?: No    Feels unsteady when standing or walking?: No    Worried about falling?: Yes      Home Safety:  Patient has trouble with stairs inside or outside of their home  Patient has working smoke alarms and has no working carbon monoxide detector  Home safety hazards include: none  Nutrition:   Current diet is Regular  Medications:   Patient is currently taking over-the-counter supplements  OTC medications include: see medication list  Patient is able to manage medications  Activities of Daily Living (ADLs)/Instrumental Activities of Daily Living (IADLs):   Walk and transfer into and out of bed and chair?: Yes  Dress and groom yourself?: Yes    Bathe or shower yourself?: Yes    Feed yourself? Yes  Do your laundry/housekeeping?: Yes  Manage your money, pay your bills and track your expenses?: Yes  Make your own meals?: Yes    Do your own shopping?: Yes    Previous Hospitalizations:   Any hospitalizations or ED visits within the last 12 months?: Yes    How many hospitalizations have you had in the last year?: 1-2    Advance Care Planning:   Living will: Yes    Durable POA for healthcare:  Yes    Advanced directive: Yes      PREVENTIVE SCREENINGS      Cardiovascular Screening: General: Screening Not Indicated and History Lipid Disorder      Diabetes Screening:     General: Screening Current      Colorectal Cancer Screening:     General: Screening Current      Prostate Cancer Screening:    General: Screening Not Indicated      Osteoporosis Screening:    General: Screening Not Indicated      Abdominal Aortic Aneurysm (AAA) Screening:        General: Screening Not Indicated      Lung Cancer Screening:     General: Screening Not Indicated      Hepatitis C Screening:    General: Screening Current      Gretel Krishna MD  BMI Counseling: Body mass index is 37 12 kg/m²  The BMI is above normal  Nutrition recommendations include reducing portion sizes

## 2020-01-10 NOTE — TELEPHONE ENCOUNTER
Hemoglobin up to 9 0, patient notified  I told him you still need to review it and I will call Monday with any instructions from you

## 2020-01-10 NOTE — PROGRESS NOTES
Assessment/Plan:       Diagnoses and all orders for this visit:    Chronic deep vein thrombosis (DVT) of proximal vein of both lower extremities ([address] )    Pure hypercholesterolemia    Incisional hernia, without obstruction or gangrene    Iron deficiency anemia, unspecified iron deficiency anemia type  -     CBC; Future    Left foot pain  -     Ambulatory referral to Orthopedic Surgery; Future    Cervicalgia    Other orders  -     ferrous sulfate 325 (65 Fe) mg tablet; Take 325 mg by mouth 2 (two) times a day          All of the above diagnoses have been assessed  Additional COMMENTS/PLAN:  Will get repeat CBC today  Will plan on restarting the Eliquis in about a week or 2 if his H&H good was up  If he is not responding to oral iron I will order IV iron  Considering whether to restart the Celebrex will be something more problematic  Subjective:      Patient ID: Aga Lopez is a 70 y o  male  HPI     Anemia-has noted not change in the stools  Is on iron  Had complete W/U at [facility]  Did have whole GI tract looked including capsule endoscopy  Will need iron  Chronic DVT  WIllgo to Eliquis half dose in the next week  Hyperlipidemia- The patient is compliant with diet and taking meds  The patient understands the efficacy of the treatment in vascular prevention  The following portions of the patient's history were revised and updated as appropriate: Problem list, allergies, med list, FH, SH, Past medical and surgical histories      Current Outpatient Medications   Medication Sig Dispense Refill    docusate sodium (COLACE) 100 mg capsule Take by mouth      dutasteride (AVODART) 0 5 mg capsule TAKE ONE CAPSULE BY MOUTH EVERY DAY 90 capsule 0    ferrous sulfate 325 (65 Fe) mg tablet Take 325 mg by mouth 2 (two) times a day      furosemide (LASIX) 80 mg tablet TAKE 1 TABLET BY MOUTH EVERY DAY 90 tablet 3    lansoprazole (PREVACID) 30 mg capsule TAKE ONE CAPSULE BY MOUTH EVERY DAY 90 capsule 3    Multiple Vitamins-Minerals (MULTIVITAMIN ADULT EXTRA C PO) 1 TABLET DAILY      polyethylene glycol (MIRALAX) 17 g packet Take by mouth      Probiotic Product (SOLUBLE FIBER/PROBIOTICS PO) Take by mouth      simvastatin (ZOCOR) 10 mg tablet TAKE 1 TABLET BY MOUTH EVERY DAY 90 tablet 0    spironolactone (ALDACTONE) 25 mg tablet TAKE 1/2 TABLET BY MOUTH EVERY DAY 45 tablet 3    sulfamethoxazole-trimethoprim (BACTRIM DS) 800-160 mg per tablet Take 1 tablet by mouth daily  3    tamsulosin (FLOMAX) 0 4 mg TAKE 1 CAPSULE EVERY DAY WITH DINNER 90 capsule 3     No current facility-administered medications for this visit  Review of Systems   Constitutional: Positive for activity change  Respiratory:        Did have sob with exertion  Cardiovascular: Negative  Gastrointestinal: Abdominal distention: chronic distention  Genitourinary: Negative  Musculoskeletal:        Foot pain-has been on steroid taper  Objective:    /70 (BP Location: Left arm, Patient Position: Sitting, Cuff Size: Large)   Pulse 72   Ht 5' 6" (1 676 m)   Wt 104 kg (230 lb)   BMI 37 12 kg/m²     BP Readings from Last 3 Encounters:   01/10/20 120/70   01/07/20 141/67   07/03/19 108/56                  Wt Readings from Last 3 Encounters:   01/10/20 104 kg (230 lb)   01/06/20 99 8 kg (220 lb 0 3 oz)   07/03/19 99 8 kg (220 lb 0 3 oz)         Physical Exam   Constitutional: He appears well-developed and well-nourished  Neck:   Significant decreased range of motion neck  No bruits   Cardiovascular: Normal rate and regular rhythm  Pulmonary/Chest: Effort normal and breath sounds normal    Abdominal: Soft  Bowel sounds are normal    Ventral hernia state   Musculoskeletal: He exhibits edema (1 to 2)  Left great toe shows rocker bottom deformity MCT joint  Skin: Skin is warm and dry  Vitals reviewed          Appointment on 01/07/2020   Component Date Value Ref Range Status    WBC 01/07/2020 12 52* 4 31 - 10 16 Thousand/uL Final    RBC 01/07/2020 4 28  3 88 - 5 62 Million/uL Final    Hemoglobin 01/07/2020 7 6* 12 0 - 17 0 g/dL Final    Hematocrit 01/07/2020 28 1* 36 5 - 49 3 % Final    MCV 01/07/2020 66* 82 - 98 fL Final    MCH 01/07/2020 17 8* 26 8 - 34 3 pg Final    MCHC 01/07/2020 27 0* 31 4 - 37 4 g/dL Final    RDW 01/07/2020 25 9* 11 6 - 15 1 % Final    MPV 01/07/2020 10 8  8 9 - 12 7 fL Final    Platelets 61/36/6092 573* 149 - 390 Thousands/uL Final    nRBC 01/07/2020 4  /100 WBCs Final    Neutrophils Relative 01/07/2020 65  43 - 75 % Final    Immat GRANS % 01/07/2020 1  0 - 2 % Final    Lymphocytes Relative 01/07/2020 20  14 - 44 % Final    Monocytes Relative 01/07/2020 9  4 - 12 % Final    Eosinophils Relative 01/07/2020 4  0 - 6 % Final    Basophils Relative 01/07/2020 1  0 - 1 % Final    Neutrophils Absolute 01/07/2020 8 11* 1 85 - 7 62 Thousands/µL Final    Immature Grans Absolute 01/07/2020 0 08  0 00 - 0 20 Thousand/uL Final    Lymphocytes Absolute 01/07/2020 2 56  0 60 - 4 47 Thousands/µL Final    Monocytes Absolute 01/07/2020 1 15  0 17 - 1 22 Thousand/µL Final    Eosinophils Absolute 01/07/2020 0 46  0 00 - 0 61 Thousand/µL Final    Basophils Absolute 01/07/2020 0 16* 0 00 - 0 10 Thousands/µL Final   Admission on 01/06/2020, Discharged on 01/07/2020   Component Date Value Ref Range Status    WBC 01/06/2020 13 50* 4 31 - 10 16 Thousand/uL Final    RBC 01/06/2020 4 13  3 88 - 5 62 Million/uL Final    Hemoglobin 01/06/2020 6 6* 12 0 - 17 0 g/dL Final    This result has been called to Skyler Vera by MICKIE KILGORE on 01 06 2020 at 1903, and has been read back   critical result was called by RALPH at 1830    Hematocrit 01/06/2020 25 1* 36 5 - 49 3 % Final    MCV 01/06/2020 61* 82 - 98 fL Final    MCH 01/06/2020 16 0* 26 8 - 34 3 pg Final    MCHC 01/06/2020 26 3* 31 4 - 37 4 g/dL Final    RDW 01/06/2020 22 6* 11 6 - 15 1 % Final    MPV 01/06/2020 10 3  8 9 - 12 7 fL Final  Platelets 52/97/0309 651* 149 - 390 Thousands/uL Final    nRBC 01/06/2020 5  /100 WBCs Final    Sodium 01/06/2020 141  136 - 145 mmol/L Final    Potassium 01/06/2020 4 0  3 5 - 5 3 mmol/L Final    Chloride 01/06/2020 105  100 - 108 mmol/L Final    CO2 01/06/2020 28  21 - 32 mmol/L Final    ANION GAP 01/06/2020 8  4 - 13 mmol/L Final    BUN 01/06/2020 26* 5 - 25 mg/dL Final    Creatinine 01/06/2020 1 19  0 60 - 1 30 mg/dL Final    Standardized to IDMS reference method    Glucose 01/06/2020 114  65 - 140 mg/dL Final      If the patient is fasting, the ADA then defines impaired fasting glucose as > 100 mg/dL and diabetes as > or equal to 123 mg/dL  Specimen collection should occur prior to Sulfasalazine administration due to the potential for falsely depressed results  Specimen collection should occur prior to Sulfapyridine administration due to the potential for falsely elevated results   Calcium 01/06/2020 10 1  8 3 - 10 1 mg/dL Final    AST 01/06/2020 31  5 - 45 U/L Final      Specimen collection should occur prior to Sulfasalazine administration due to the potential for falsely depressed results   ALT 01/06/2020 27  12 - 78 U/L Final      Specimen collection should occur prior to Sulfasalazine administration due to the potential for falsely depressed results       Alkaline Phosphatase 01/06/2020 154* 46 - 116 U/L Final    Total Protein 01/06/2020 6 8  6 4 - 8 2 g/dL Final    Albumin 01/06/2020 3 3* 3 5 - 5 0 g/dL Final    Total Bilirubin 01/06/2020 0 20  0 20 - 1 00 mg/dL Final    eGFR 01/06/2020 59  ml/min/1 73sq m Final    Unit Product Code 01/07/2020 [id number]   Final-Edited    Unit Number 01/07/2020 [id number]   Final-Edited    Unit ABO 01/07/2020 A   Final-Edited    Unit [facility] 01/07/2020 POS   Final-Edited    Crossmatch 01/07/2020 Compatible   Final    Unit Dispense Status 01/07/2020 Presumed Trans   Final-Edited    Unit Product Code 01/07/2020 [id number]   Final-Edited    Unit Number 01/07/2020 [id number]   Final-Edited    Unit ABO 01/07/2020 A   Final-Edited    Unit [facility] 01/07/2020 POS   Final-Edited    Crossmatch 01/07/2020 Compatible   Final    Unit Dispense Status 01/07/2020 Presumed Trans   Final-Edited    Protime 01/06/2020 21 1* 11 6 - 14 5 seconds Final    Specimen hemolyzed-results may be affected     INR 01/06/2020 1 86* 0 84 - 1 19 Final    PTT 01/06/2020 32  23 - 37 seconds Final    Therapeutic Heparin Range =  60-90 seconds    Segmented % 01/06/2020 68  43 - 75 % Final    Lymphocytes % 01/06/2020 21  14 - 44 % Final    Monocytes % 01/06/2020 11  4 - 12 % Final    Eosinophils, % 01/06/2020 0  0 - 6 % Final    Basophils % 01/06/2020 0  0 - 1 % Final    Absolute Neutrophils 01/06/2020 9 18* 1 85 - 7 62 Thousand/uL Final    Lymphocytes Absolute 01/06/2020 2 84  0 60 - 4 47 Thousand/uL Final    Monocytes Absolute 01/06/2020 1 49* 0 00 - 1 22 Thousand/uL Final    Eosinophils Absolute 01/06/2020 0 00  0 00 - 0 40 Thousand/uL Final    Basophils Absolute 01/06/2020 0 00  0 00 - 0 10 Thousand/uL Final    Total Counted 01/06/2020 100   Final    nRBC 01/06/2020 5* 0 - 2 /100 WBC Final    RBC Morphology 01/06/2020 Present   Final    Anisocytosis 01/06/2020 Present   Final    Hypochromia 01/06/2020 Present   Final    Poikilocytes 01/06/2020 Present   Final    Polychromasia 01/06/2020 Present   Final    Target Cells 01/06/2020 Present   Final    Platelet Estimate 92/62/5675 Increased* Adequate Final    ABO Grouping 01/06/2020 A   Final    Rh Factor 01/06/2020 Positive   Final    Antibody Screen 01/06/2020 Negative   Final    Specimen Expiration Date 01/06/2020 [id number]   Final    ABO Grouping 01/06/2020 A   Final    Rh Factor 01/06/2020 Positive   Final    Iron Saturation 01/06/2020 3  % Final    TIBC 01/06/2020 575* 250 - 450 ug/dL Final    Slightly Hemolyzed; Results May be Affected&XA&Slightly Hemolyzed;  Results May be Affected    Iron 01/06/2020 18* 65 - 175 ug/dL Final    Slightly Hemolyzed; Results May be Affected&XA&Slightly Hemolyzed; Results May be Affected  Patients treated with metal-binding drugs (ie  Deferoxamine) may have depressed iron values   Ferritin 01/06/2020 7* 8 - 388 ng/mL Final    SODIUM, I-STAT 01/07/2020 141  136 - 145 mmol/l Final    Potassium, i-STAT 01/07/2020 3 5  3 5 - 5 3 mmol/L Final    Chloride, istat 01/07/2020 104  100 - 108 mmol/L Final    CO2, i-STAT 01/07/2020 27  21 - 32 mmol/L Final    Anion Gap, i-STAT 01/07/2020 14* 4 - 13 mmol/L Final    Calcium, Ionized i-STAT 01/07/2020 1 35* 1 12 - 1 32 mmol/L Final    BUN, I-STAT 01/07/2020 27* 5 - 25 mg/dl Final    Creatinine, i-STAT 01/07/2020 1 2  0 6 - 1 3 mg/dl Final    eGFR 01/07/2020 58  ml/min/1 73sq m Final    Glucose, i-STAT 01/07/2020 117  65 - 140 mg/dl Final    Hct, i-STAT 01/07/2020 28* 36 5 - 49 3 % Final    Hgb, i-STAT 01/07/2020 9 5* 12 0 - 17 0 g/dl Final    Specimen Type 01/07/2020 VENOUS   Final   Appointment on 01/06/2020   Component Date Value Ref Range Status    WBC 01/06/2020 15 45* 4 31 - 10 16 Thousand/uL Final    RBC 01/06/2020 4 22  3 88 - 5 62 Million/uL Final    Hemoglobin 01/06/2020 6 6* 12 0 - 17 0 g/dL Final    This result has been called to Fabrizio Mark by Rina Knight on 01 06 2020 at , and has been read back       Hematocrit 01/06/2020 25 9* 36 5 - 49 3 % Final    MCV 01/06/2020 61* 82 - 98 fL Final    MCH 01/06/2020 15 6* 26 8 - 34 3 pg Final    MCHC 01/06/2020 25 5* 31 4 - 37 4 g/dL Final    RDW 01/06/2020 23 1* 11 6 - 15 1 % Final    MPV 01/06/2020 10 8  8 9 - 12 7 fL Final    Platelets 11/78/0850 647* 149 - 390 Thousands/uL Final    nRBC 01/06/2020 5  /100 WBCs Final    Neutrophils Relative 01/06/2020 65  43 - 75 % Final    Immat GRANS % 01/06/2020 1  0 - 2 % Final    Lymphocytes Relative 01/06/2020 23  14 - 44 % Final    Monocytes Relative 01/06/2020 8  4 - 12 % Final    Eosinophils Relative 01/06/2020 2  0 - 6 % Final    Basophils Relative 01/06/2020 1  0 - 1 % Final    Neutrophils Absolute 01/06/2020 10 06* 1 85 - 7 62 Thousands/µL Final    Immature Grans Absolute 01/06/2020 0 08  0 00 - 0 20 Thousand/uL Final    Lymphocytes Absolute 01/06/2020 3 60  0 60 - 4 47 Thousands/µL Final    Monocytes Absolute 01/06/2020 1 27* 0 17 - 1 22 Thousand/µL Final    Eosinophils Absolute 01/06/2020 0 30  0 00 - 0 61 Thousand/µL Final    Basophils Absolute 01/06/2020 0 14* 0 00 - 0 10 Thousands/µL Final    Sodium 01/06/2020 143  136 - 145 mmol/L Final    Potassium 01/06/2020 3 4* 3 5 - 5 3 mmol/L Final    Chloride 01/06/2020 108  100 - 108 mmol/L Final    CO2 01/06/2020 28  21 - 32 mmol/L Final    ANION GAP 01/06/2020 7  4 - 13 mmol/L Final    BUN 01/06/2020 22  5 - 25 mg/dL Final    Creatinine 01/06/2020 1 05  0 60 - 1 30 mg/dL Final    Standardized to IDMS reference method    Glucose, Fasting 01/06/2020 113* 65 - 99 mg/dL Final      Specimen collection should occur prior to Sulfasalazine administration due to the potential for falsely depressed results  Specimen collection should occur prior to Sulfapyridine administration due to the potential for falsely elevated results   Calcium 01/06/2020 10 0  8 3 - 10 1 mg/dL Final    AST 01/06/2020 15  5 - 45 U/L Final      Specimen collection should occur prior to Sulfasalazine administration due to the potential for falsely depressed results   ALT 01/06/2020 25  12 - 78 U/L Final      Specimen collection should occur prior to Sulfasalazine and/or Sulfapyridine administration due to the potential for falsely depressed results       Alkaline Phosphatase 01/06/2020 155* 46 - 116 U/L Final    Total Protein 01/06/2020 6 7  6 4 - 8 2 g/dL Final    Albumin 01/06/2020 3 2* 3 5 - 5 0 g/dL Final    Total Bilirubin 01/06/2020 0 45  0 20 - 1 00 mg/dL Final    eGFR 01/06/2020 68  ml/min/1 73sq m Final    Cholesterol 01/06/2020 120  50 - 200 mg/dL Final      Cholesterol:       Desirable         <200 mg/dl       Borderline         200-239 mg/dl       High              >239           Triglycerides 01/06/2020 90  <=150 mg/dL Final      Triglyceride:     Normal          <150 mg/dl     Borderline High 150-199 mg/dl     High            200-499 mg/dl        Very High       >499 mg/dl    Specimen collection should occur prior to N-Acetylcysteine or Metamizole administration due to the potential for falsely depressed results   HDL, Direct 01/06/2020 38* >=40 mg/dL Final      HDL Cholesterol:       Low     <41 mg/dL  Specimen collection should occur prior to Metamizole administration due to the potential for falsley depressed results   LDL Calculated 01/06/2020 64  0 - 100 mg/dL Final      LDL Cholesterol:     Optimal           <100 mg/dl     Near Optimal      100-129 mg/dl     Above Optimal       Borderline High 130-159 mg/dl       High            160-189 mg/dl       Very High       >189 mg/dl         This screening LDL is a calculated result  It does not have the accuracy of the Direct Measured LDL in the monitoring of patients with hyperlipidemia and/or statin therapy  Direct Measure LDL (WAM110) must be ordered separately in these patients      Non-HDL-Chol (CHOL-HDL) 01/06/2020 82  mg/dl Final         Piotr Landeros MD

## 2020-01-13 ENCOUNTER — TELEPHONE (OUTPATIENT)
Dept: FAMILY MEDICINE CLINIC | Facility: HOSPITAL | Age: 80
End: 2020-01-13

## 2020-01-13 DIAGNOSIS — I82.5Y3 CHRONIC DEEP VEIN THROMBOSIS (DVT) OF PROXIMAL VEIN OF BOTH LOWER EXTREMITIES (HCC): ICD-10-CM

## 2020-01-13 DIAGNOSIS — D50.9 IRON DEFICIENCY ANEMIA, UNSPECIFIED IRON DEFICIENCY ANEMIA TYPE: Primary | ICD-10-CM

## 2020-01-23 ENCOUNTER — APPOINTMENT (OUTPATIENT)
Dept: RADIOLOGY | Facility: CLINIC | Age: 80
End: 2020-01-23
Payer: COMMERCIAL

## 2020-01-23 ENCOUNTER — CONSULT (OUTPATIENT)
Dept: OBGYN CLINIC | Facility: CLINIC | Age: 80
End: 2020-01-23
Payer: COMMERCIAL

## 2020-01-23 VITALS — SYSTOLIC BLOOD PRESSURE: 130 MMHG | HEART RATE: 89 BPM | DIASTOLIC BLOOD PRESSURE: 90 MMHG

## 2020-01-23 DIAGNOSIS — M79.672 PAIN IN LEFT FOOT: ICD-10-CM

## 2020-01-23 DIAGNOSIS — S99.922A INJURY OF PLANTAR PLATE OF LEFT FOOT, INITIAL ENCOUNTER: Primary | ICD-10-CM

## 2020-01-23 DIAGNOSIS — M79.672 LEFT FOOT PAIN: ICD-10-CM

## 2020-01-23 PROCEDURE — 73630 X-RAY EXAM OF FOOT: CPT

## 2020-01-23 PROCEDURE — 1160F RVW MEDS BY RX/DR IN RCRD: CPT | Performed by: ORTHOPAEDIC SURGERY

## 2020-01-23 PROCEDURE — 99203 OFFICE O/P NEW LOW 30 MIN: CPT | Performed by: ORTHOPAEDIC SURGERY

## 2020-01-23 NOTE — PROGRESS NOTES
JORGE Hogan  Attending, Orthopaedic Surgery  [facility] and [address] [facility] [address] [facility]      ORTHOPAEDIC FOOT AND ANKLE CLINIC VISIT     Assessment:     Encounter Diagnoses   Name Primary?  Pain in left foot     Left foot pain     Injury of plantar plate of left foot, initial encounter Yes            Plan:   The patient verbalized understanding of exam findings and treatment plan  We engaged in the shared decision-making process and treatment options were discussed at length with the patient  Surgical and conservative management discussed today along with risks and benefits  Brent Sandy has a left plantar plate injury  Treatment options were discuss conservative vs surgical intervention  He is to wear a budin splint on the left 2nd toe given to him today  He is to wear a metatarsal pad on the insole of his well supportive shoe given to him today  Information on how to obtain more budin splints and metatarsal pads was given  Activity to tolerance  Return in about 3 months (around 4/23/2020) for Recheck left foot, plantar plate injury  Taurus Hardenin History of Present Illness:   Chief Complaint:   Chief Complaint   Patient presents with   [address] [place] [address] Main is a 70 y o  male who is referred by Dr Zarate Cea  being seen for left forefoot pain  He denies any injury mechanism and states pain started 1 month ago  Pain is localized at forefoot with minimal radiating and described as sharp and severe  Patient denies numbness, tingling or radicular pain  Denies history of neuropathy  Patient does not smoke, does not have diabetes and does not take blood thinners  Patient denies family history of anesthesia complications and has not had any complications with anesthesia       Pain/symptom timing:  Worse during the day when active  Pain/symptom context:  Worse with activites and work  Pain/symptom modifying factors:  Rest makes better, activities make worse  Pain/symptom associated signs/symptoms: none    Prior treatment   NSAIDsNo   Injections No   Bracing/Orthotics No    Physical Therapy No     Orthopedic Surgical History:   See below    Past Medical, Surgical and Social History:  Past Medical History:  has a past medical history of Basal cell carcinoma, Benign skin lesion, BPH (benign prostatic hyperplasia), Diverticular disease of colon, DVT (deep venous thrombosis) ([facility]), Elevated PSA, History of blood clots, Hypotension, Intestinal obstruction ([address] ), Social anxiety disorder, and Ventral hernia  Problem List: does not have any pertinent problems on file  Past Surgical History:  has a past surgical history that includes Appendectomy; Splenectomy (1995); Hiatal hernia repair (1995); Hernia repair; Replacement total knee (Bilateral); Excision basal cell carcinoma; and Tonsillectomy  Family History: family history includes Diabetes in his mother; Heart attack in his father; Heart disease in his father and mother; Other in his mother; Stroke in his father and mother  Social History:  reports that he has never smoked  He has never used smokeless tobacco  He reports that he drinks alcohol  He reports that he does not use drugs  Current Medications: has a current medication list which includes the following prescription(s): apixaban, docusate sodium, dutasteride, ferrous sulfate, furosemide, lansoprazole, multiple vitamins-minerals, polyethylene glycol, probiotic product, simvastatin, spironolactone, sulfamethoxazole-trimethoprim, and tamsulosin  Allergies: is allergic to ace inhibitors and fosinopril       Review of Systems:  General- denies fever/chills  HEENT- denies hearing loss or sore throat  Eyes- denies eye pain or visual disturbances, denies red eyes  Respiratory- denies cough or SOB  Cardio- denies chest pain or palpitations  GI- denies abdominal pain  Endocrine- denies urinary frequency  Urinary- denies pain with urination  Musculoskeletal- Negative except noted above  Skin- denies rashes or wounds  Neurological- denies dizziness or headache  Psychiatric- denies anxiety or difficulty concentrating    Physical Exam:   /90 (BP Location: Left arm, Patient Position: Sitting, Cuff Size: Adult)   Pulse 89   General/Constitutional: No apparent distress: well-nourished and well developed  Eyes: normal ocular motion  Lymphatic: No appreciable lymphadenopathy  Respiratory: Non-labored breathing  Vascular: No edema, swelling or tenderness, except as noted in detailed exam   Integumentary: No impressive skin lesions present, except as noted in detailed exam   Neuro: No ataxia or tremors noted  Psych: Normal mood and affect, oriented to person, place and time  Appropriate affect  Musculoskeletal: Normal, except as noted in detailed exam and in HPI  Examination    Left    Gait Antalgic   Musculoskeletal Tender to palpation at 2nd metatarsal head plantar aspect  Skin Venous stasis changes present bilateral lower extremities with +3 pitting edema, absent hair growth, dry skin, no skin fissures present  Nails Normal    Range of Motion  10 degrees dorsiflexion, 30 degrees plantarflexion  Subtalar motion: wnl, subluxation of the 2nd toe MTPJ present  Stability Stable    Muscle Strength 5/5 tibialis anterior  5/5 gastrocnemius-soleus  5/5 posterior tibialis  5/5 peroneal/eversion strength  5/5 EHL  5/5 FHL    Neurologic Normal    Sensation Intact to light touch throughout sural, saphenous, superficial peroneal, deep peroneal and medial/lateral plantar nerve distributions  Quinton-Tonia 5 07 filament (10g) testing deferred  Cardiovascular Brisk capillary refill < 2 seconds,intact DP and PT pulses    Special Tests       Imaging Studies:   3 views of the left foot were taken, reviewed and interpreted independently that demonstrate no fracture or dislocation present  Reviewed by me personally      Scribe Attestation    I,:   Sandra Will am acting as a scribe while in the presence of the attending physician :        I,:   Shelia Cruz MD personally performed the services described in this documentation    as scribed in my presence :            Jude Sing Lachman, MD  Foot & Ankle Surgery   Department of [address] [place] [address]      I personally performed the service  Jude Sing Lachman, MD

## 2020-01-23 NOTE — PATIENT INSTRUCTIONS
INSTRUCTIONS FOR THE USE OF METATARSAL PADS   The purpose of the METATARSAL PAD or HAPAD is to transfer pressure away from   the sore area of your foot under the metatarsal heads and place the pressure more in the   arch  This area if your foot is not used to taking this pressure and you must accomplish   this transition gradually  To obtain the best results the following schedule should be   followed  DAY 1: Wear the shoe with the pad for 4 hours, then switch to a shoe without the   pad  DAY 2: Wear the shoe with the pad for one additional hour (5 hours) then switch   to a shoe without the pad  THE FOLLOWING DAYS:   Continue to increase the length of time that you wear the pad by one hour   each day until you are wearing the pad all day  We always begin with a small pad  Often it is necessary to increase the pad to get   complete relief of the symptoms  If this is necessary we will do this on the follow-up   visit once we have your foot accustomed to the pad  To order these pads you can call their toll free number at [phone] or go to [employer] and order directly from the website  [employer]

## 2020-02-09 DIAGNOSIS — K21.9 GASTROESOPHAGEAL REFLUX DISEASE WITHOUT ESOPHAGITIS: ICD-10-CM

## 2020-02-09 RX ORDER — LANSOPRAZOLE 30 MG/1
CAPSULE, DELAYED RELEASE ORAL
Qty: 90 CAPSULE | Refills: 3 | Status: SHIPPED | OUTPATIENT
Start: 2020-02-09 | End: 2021-01-19 | Stop reason: SDUPTHER

## 2020-02-10 ENCOUNTER — TELEPHONE (OUTPATIENT)
Dept: FAMILY MEDICINE CLINIC | Facility: HOSPITAL | Age: 80
End: 2020-02-10

## 2020-02-10 NOTE — TELEPHONE ENCOUNTER
RELAYED TO PATIENT - I CALLED [facility] AND SPOKE TO PHARMACIST SHE SAID IT MUST HAVE BEEN AN ERROR ON THEIR PART - HE WILL GET REFILL READY

## 2020-02-18 ENCOUNTER — APPOINTMENT (OUTPATIENT)
Dept: LAB | Facility: HOSPITAL | Age: 80
End: 2020-02-18
Attending: INTERNAL MEDICINE
Payer: COMMERCIAL

## 2020-02-18 DIAGNOSIS — D50.9 IRON DEFICIENCY ANEMIA, UNSPECIFIED IRON DEFICIENCY ANEMIA TYPE: ICD-10-CM

## 2020-02-18 LAB
BASOPHILS # BLD AUTO: 0.11 THOUSANDS/ΜL (ref 0–0.1)
BASOPHILS NFR BLD AUTO: 1 % (ref 0–1)
EOSINOPHIL # BLD AUTO: 0.16 THOUSAND/ΜL (ref 0–0.61)
EOSINOPHIL NFR BLD AUTO: 1 % (ref 0–6)
HCT VFR BLD AUTO: 46.4 % (ref 36.5–49.3)
HGB BLD-MCNC: 13.2 G/DL (ref 12–17)
IMM GRANULOCYTES # BLD AUTO: 0.05 THOUSAND/UL (ref 0–0.2)
IMM GRANULOCYTES NFR BLD AUTO: 0 % (ref 0–2)
LYMPHOCYTES # BLD AUTO: 3.1 THOUSANDS/ΜL (ref 0.6–4.47)
LYMPHOCYTES NFR BLD AUTO: 26 % (ref 14–44)
MCH RBC QN AUTO: 24.3 PG (ref 26.8–34.3)
MCHC RBC AUTO-ENTMCNC: 28.4 G/DL (ref 31.4–37.4)
MCV RBC AUTO: 85 FL (ref 82–98)
MONOCYTES # BLD AUTO: 1.33 THOUSAND/ΜL (ref 0.17–1.22)
MONOCYTES NFR BLD AUTO: 11 % (ref 4–12)
NEUTROPHILS # BLD AUTO: 7.38 THOUSANDS/ΜL (ref 1.85–7.62)
NEUTS SEG NFR BLD AUTO: 61 % (ref 43–75)
NRBC BLD AUTO-RTO: 0 /100 WBCS
PLATELET # BLD AUTO: 472 THOUSANDS/UL (ref 149–390)
PMV BLD AUTO: 10.3 FL (ref 8.9–12.7)
RBC # BLD AUTO: 5.44 MILLION/UL (ref 3.88–5.62)
WBC # BLD AUTO: 12.13 THOUSAND/UL (ref 4.31–10.16)

## 2020-02-18 PROCEDURE — 36415 COLL VENOUS BLD VENIPUNCTURE: CPT

## 2020-02-18 PROCEDURE — 85025 COMPLETE CBC W/AUTO DIFF WBC: CPT

## 2020-02-24 ENCOUNTER — OFFICE VISIT (OUTPATIENT)
Dept: FAMILY MEDICINE CLINIC | Facility: HOSPITAL | Age: 80
End: 2020-02-24
Payer: COMMERCIAL

## 2020-02-24 VITALS
SYSTOLIC BLOOD PRESSURE: 140 MMHG | DIASTOLIC BLOOD PRESSURE: 78 MMHG | HEIGHT: 66 IN | WEIGHT: 229 LBS | BODY MASS INDEX: 36.8 KG/M2 | OXYGEN SATURATION: 93 % | HEART RATE: 75 BPM

## 2020-02-24 DIAGNOSIS — D50.9 IRON DEFICIENCY ANEMIA, UNSPECIFIED IRON DEFICIENCY ANEMIA TYPE: ICD-10-CM

## 2020-02-24 DIAGNOSIS — K21.9 GERD WITHOUT ESOPHAGITIS: ICD-10-CM

## 2020-02-24 DIAGNOSIS — I82.5Y3 CHRONIC DEEP VEIN THROMBOSIS (DVT) OF PROXIMAL VEIN OF BOTH LOWER EXTREMITIES (HCC): Primary | ICD-10-CM

## 2020-02-24 DIAGNOSIS — E78.00 PURE HYPERCHOLESTEROLEMIA: ICD-10-CM

## 2020-02-24 PROCEDURE — 3077F SYST BP >= 140 MM HG: CPT | Performed by: INTERNAL MEDICINE

## 2020-02-24 PROCEDURE — 1160F RVW MEDS BY RX/DR IN RCRD: CPT | Performed by: INTERNAL MEDICINE

## 2020-02-24 PROCEDURE — 4040F PNEUMOC VAC/ADMIN/RCVD: CPT | Performed by: INTERNAL MEDICINE

## 2020-02-24 PROCEDURE — 3078F DIAST BP <80 MM HG: CPT | Performed by: INTERNAL MEDICINE

## 2020-02-24 PROCEDURE — 1036F TOBACCO NON-USER: CPT | Performed by: INTERNAL MEDICINE

## 2020-02-24 PROCEDURE — 99214 OFFICE O/P EST MOD 30 MIN: CPT | Performed by: INTERNAL MEDICINE

## 2020-02-24 PROCEDURE — 3008F BODY MASS INDEX DOCD: CPT | Performed by: INTERNAL MEDICINE

## 2020-02-24 NOTE — PROGRESS NOTES
Assessment/Plan:       Diagnoses and all orders for this visit:    Chronic deep vein thrombosis (DVT) of proximal vein of both lower extremities (HCC)  -     apixaban (Eliquis) 2 5 mg; Take 1 tablet (2 5 mg total) by mouth 2 (two) times a day    Pure hypercholesterolemia    Iron deficiency anemia, unspecified iron deficiency anemia type  -     CBC; Future  -     Basic metabolic panel; Future    GERD without esophagitis          All of the above diagnoses have been assessed  Additional COMMENTS/PLAN:  Will decrease iron to once daily  Will hold off using Celebrex at this time  Will see back in 4 months with CBC and a BMP prior to that  Subjective:      Patient ID: Alba James is a 70 y o  male  HPI   Patient here to follow-up on iron deficiency anemia  Hgb back to normal  Will decrease the iron to once daily    DJD-this is stable off the celebrex  GERD-this is stable on the current meds  The following portions of the patient's history were revised and updated as appropriate: Problem list, allergies, med list, FH, SH, Past medical and surgical histories      Current Outpatient Medications   Medication Sig Dispense Refill    apixaban (Eliquis) 2 5 mg Take 1 tablet (2 5 mg total) by mouth 2 (two) times a day 180 tablet 3    docusate sodium (COLACE) 100 mg capsule Take by mouth      dutasteride (AVODART) 0 5 mg capsule TAKE ONE CAPSULE BY MOUTH EVERY DAY 90 capsule 0    ferrous sulfate 325 (65 Fe) mg tablet Take 325 mg by mouth daily      furosemide (LASIX) 80 mg tablet TAKE 1 TABLET BY MOUTH EVERY DAY 90 tablet 3    lansoprazole (PREVACID) 30 mg capsule TAKE ONE CAPSULE BY MOUTH EVERY DAY 90 capsule 3    Multiple Vitamins-Minerals (MULTIVITAMIN ADULT EXTRA C PO) 1 TABLET DAILY      polyethylene glycol (MIRALAX) 17 g packet Take by mouth      Probiotic Product (SOLUBLE FIBER/PROBIOTICS PO) Take by mouth      simvastatin (ZOCOR) 10 mg tablet TAKE 1 TABLET BY MOUTH EVERY DAY 90 tablet 0    spironolactone (ALDACTONE) 25 mg tablet TAKE 1/2 TABLET BY MOUTH EVERY DAY 45 tablet 3    sulfamethoxazole-trimethoprim (BACTRIM DS) 800-160 mg per tablet Take 1 tablet by mouth daily  3    tamsulosin (FLOMAX) 0 4 mg TAKE 1 CAPSULE EVERY DAY WITH DINNER 90 capsule 3     No current facility-administered medications for this visit  Review of Systems   All other systems reviewed and are negative  Objective:    /78 (BP Location: Left arm, Patient Position: Sitting, Cuff Size: Standard)   Pulse 75   Ht 5' 6" (1 676 m)   Wt 104 kg (229 lb)   SpO2 93%   BMI 36 96 kg/m²     BP Readings from Last 3 Encounters:   02/24/20 140/78   01/23/20 130/90   01/10/20 120/70                  Wt Readings from Last 3 Encounters:   02/24/20 104 kg (229 lb)   01/10/20 104 kg (230 lb)   01/06/20 99 8 kg (220 lb 0 3 oz)         Physical Exam   Constitutional: He appears well-developed and well-nourished  Neck: No JVD present  Cardiovascular: Normal rate and regular rhythm  Pulmonary/Chest: Effort normal and breath sounds normal    Abdominal: Soft  Bowel sounds are normal  He exhibits no distension  Musculoskeletal: Edema: plus 1  Vitals reviewed          Appointment on 02/18/2020   Component Date Value Ref Range Status    WBC 02/18/2020 12 13* 4 31 - 10 16 Thousand/uL Final    RBC 02/18/2020 5 44  3 88 - 5 62 Million/uL Final    Hemoglobin 02/18/2020 13 2  12 0 - 17 0 g/dL Final    Hematocrit 02/18/2020 46 4  36 5 - 49 3 % Final    MCV 02/18/2020 85  82 - 98 fL Final    MCH 02/18/2020 24 3* 26 8 - 34 3 pg Final    MCHC 02/18/2020 28 4* 31 4 - 37 4 g/dL Final    MPV 02/18/2020 10 3  8 9 - 12 7 fL Final    Platelets 62/43/9340 472* 149 - 390 Thousands/uL Final    nRBC 02/18/2020 0  /100 WBCs Final    Neutrophils Relative 02/18/2020 61  43 - 75 % Final    Immat GRANS % 02/18/2020 0  0 - 2 % Final    Lymphocytes Relative 02/18/2020 26  14 - 44 % Final    Monocytes Relative 02/18/2020 11  4 - 12 % Final    Eosinophils Relative 02/18/2020 1  0 - 6 % Final    Basophils Relative 02/18/2020 1  0 - 1 % Final    Neutrophils Absolute 02/18/2020 7 38  1 85 - 7 62 Thousands/µL Final    Immature Grans Absolute 02/18/2020 0 05  0 00 - 0 20 Thousand/uL Final    Lymphocytes Absolute 02/18/2020 3 10  0 60 - 4 47 Thousands/µL Final    Monocytes Absolute 02/18/2020 1 33* 0 17 - 1 22 Thousand/µL Final    Eosinophils Absolute 02/18/2020 0 16  0 00 - 0 61 Thousand/µL Final    Basophils Absolute 02/18/2020 0 11* 0 00 - 0 10 Thousands/µL Final         Tonja Hampton MD

## 2020-03-30 DIAGNOSIS — T84.53XS INFECTION ASSOCIATED WITH INTERNAL RIGHT KNEE PROSTHESIS, SEQUELA: Primary | ICD-10-CM

## 2020-03-30 DIAGNOSIS — N40.0 BENIGN PROSTATIC HYPERPLASIA WITHOUT LOWER URINARY TRACT SYMPTOMS: ICD-10-CM

## 2020-03-30 NOTE — TELEPHONE ENCOUNTER
PT needs his Avodart - when I try to refill - it wants to change it to finasteride/Proscar what would you like to do?

## 2020-03-31 RX ORDER — SULFAMETHOXAZOLE AND TRIMETHOPRIM 800; 160 MG/1; MG/1
1 TABLET ORAL DAILY
Qty: 90 TABLET | Refills: 3 | Status: SHIPPED | OUTPATIENT
Start: 2020-03-31 | End: 2020-06-29

## 2020-03-31 RX ORDER — TAMSULOSIN HYDROCHLORIDE 0.4 MG/1
CAPSULE ORAL
Qty: 90 CAPSULE | Refills: 3 | Status: SHIPPED | OUTPATIENT
Start: 2020-03-31 | End: 2021-04-14 | Stop reason: SDUPTHER

## 2020-04-01 ENCOUNTER — TELEPHONE (OUTPATIENT)
Dept: FAMILY MEDICINE CLINIC | Facility: HOSPITAL | Age: 80
End: 2020-04-01

## 2020-06-08 ENCOUNTER — OFFICE VISIT (OUTPATIENT)
Dept: FAMILY MEDICINE CLINIC | Facility: HOSPITAL | Age: 80
End: 2020-06-08
Payer: COMMERCIAL

## 2020-06-08 ENCOUNTER — TELEPHONE (OUTPATIENT)
Dept: FAMILY MEDICINE CLINIC | Facility: HOSPITAL | Age: 80
End: 2020-06-08

## 2020-06-08 VITALS
HEIGHT: 66 IN | BODY MASS INDEX: 35.65 KG/M2 | TEMPERATURE: 98.1 F | SYSTOLIC BLOOD PRESSURE: 120 MMHG | DIASTOLIC BLOOD PRESSURE: 70 MMHG | WEIGHT: 221.8 LBS | HEART RATE: 78 BPM

## 2020-06-08 DIAGNOSIS — M54.50 ACUTE MIDLINE LOW BACK PAIN WITHOUT SCIATICA: Primary | ICD-10-CM

## 2020-06-08 PROCEDURE — 3074F SYST BP LT 130 MM HG: CPT | Performed by: INTERNAL MEDICINE

## 2020-06-08 PROCEDURE — 1036F TOBACCO NON-USER: CPT | Performed by: INTERNAL MEDICINE

## 2020-06-08 PROCEDURE — 1160F RVW MEDS BY RX/DR IN RCRD: CPT | Performed by: INTERNAL MEDICINE

## 2020-06-08 PROCEDURE — 99214 OFFICE O/P EST MOD 30 MIN: CPT | Performed by: INTERNAL MEDICINE

## 2020-06-08 PROCEDURE — 3008F BODY MASS INDEX DOCD: CPT | Performed by: INTERNAL MEDICINE

## 2020-06-08 PROCEDURE — 4040F PNEUMOC VAC/ADMIN/RCVD: CPT | Performed by: INTERNAL MEDICINE

## 2020-06-08 PROCEDURE — 3078F DIAST BP <80 MM HG: CPT | Performed by: INTERNAL MEDICINE

## 2020-06-08 RX ORDER — METHYLPREDNISOLONE 4 MG/1
TABLET ORAL
Qty: 1 EACH | Refills: 0 | Status: SHIPPED | OUTPATIENT
Start: 2020-06-08 | End: 2020-08-13 | Stop reason: ALTCHOICE

## 2020-06-08 RX ORDER — METHOCARBAMOL 500 MG/1
500 TABLET, FILM COATED ORAL 3 TIMES DAILY PRN
Qty: 40 TABLET | Refills: 1 | Status: SHIPPED | OUTPATIENT
Start: 2020-06-08 | End: 2020-08-13 | Stop reason: ALTCHOICE

## 2020-06-08 RX ORDER — TRAMADOL HYDROCHLORIDE 50 MG/1
50 TABLET ORAL EVERY 6 HOURS PRN
Qty: 30 TABLET | Refills: 0 | Status: SHIPPED | OUTPATIENT
Start: 2020-06-08 | End: 2020-06-17

## 2020-06-09 ENCOUNTER — APPOINTMENT (OUTPATIENT)
Dept: LAB | Facility: HOSPITAL | Age: 80
End: 2020-06-09
Attending: INTERNAL MEDICINE
Payer: COMMERCIAL

## 2020-06-09 ENCOUNTER — HOSPITAL ENCOUNTER (OUTPATIENT)
Dept: RADIOLOGY | Facility: HOSPITAL | Age: 80
Discharge: HOME | End: 2020-06-09
Attending: INTERNAL MEDICINE
Payer: COMMERCIAL

## 2020-06-09 ENCOUNTER — TELEPHONE (OUTPATIENT)
Dept: FAMILY MEDICINE CLINIC | Facility: HOSPITAL | Age: 80
End: 2020-06-09

## 2020-06-09 DIAGNOSIS — M54.50 ACUTE MIDLINE LOW BACK PAIN WITHOUT SCIATICA: ICD-10-CM

## 2020-06-09 DIAGNOSIS — D50.9 IRON DEFICIENCY ANEMIA, UNSPECIFIED IRON DEFICIENCY ANEMIA TYPE: ICD-10-CM

## 2020-06-09 DIAGNOSIS — E83.52 SERUM CALCIUM ELEVATED: Primary | ICD-10-CM

## 2020-06-09 LAB
ALBUMIN SERPL BCP-MCNC: 3.5 G/DL (ref 3.5–5)
ANION GAP SERPL CALCULATED.3IONS-SCNC: 6 MMOL/L (ref 4–13)
BUN SERPL-MCNC: 26 MG/DL (ref 5–25)
CALCIUM ALBUM COR SERPL-MCNC: 11.2 MG/DL (ref 8.3–10.1)
CALCIUM SERPL-MCNC: 10.8 MG/DL (ref 8.3–10.1)
CALCIUM SERPL-MCNC: 10.8 MG/DL (ref 8.3–10.1)
CHLORIDE SERPL-SCNC: 103 MMOL/L (ref 100–108)
CO2 SERPL-SCNC: 28 MMOL/L (ref 21–32)
CREAT SERPL-MCNC: 1.1 MG/DL (ref 0.6–1.3)
ERYTHROCYTE [DISTWIDTH] IN BLOOD BY AUTOMATED COUNT: 16.7 % (ref 11.6–15.1)
GFR SERPL CREATININE-BSD FRML MDRD: 64 ML/MIN/1.73SQ M
GLUCOSE P FAST SERPL-MCNC: 114 MG/DL (ref 65–99)
HCT VFR BLD AUTO: 46.7 % (ref 36.5–49.3)
HGB BLD-MCNC: 15.6 G/DL (ref 12–17)
MCH RBC QN AUTO: 30.3 PG (ref 26.8–34.3)
MCHC RBC AUTO-ENTMCNC: 33.4 G/DL (ref 31.4–37.4)
MCV RBC AUTO: 91 FL (ref 82–98)
PLATELET # BLD AUTO: 460 THOUSANDS/UL (ref 149–390)
PMV BLD AUTO: 10.6 FL (ref 8.9–12.7)
POTASSIUM SERPL-SCNC: 3.8 MMOL/L (ref 3.5–5.3)
RBC # BLD AUTO: 5.15 MILLION/UL (ref 3.88–5.62)
SODIUM SERPL-SCNC: 137 MMOL/L (ref 136–145)
WBC # BLD AUTO: 11.51 THOUSAND/UL (ref 4.31–10.16)

## 2020-06-09 PROCEDURE — 36415 COLL VENOUS BLD VENIPUNCTURE: CPT

## 2020-06-09 PROCEDURE — 72110 X-RAY EXAM L-2 SPINE 4/>VWS: CPT

## 2020-06-09 PROCEDURE — 85027 COMPLETE CBC AUTOMATED: CPT

## 2020-06-09 PROCEDURE — 82040 ASSAY OF SERUM ALBUMIN: CPT

## 2020-06-09 PROCEDURE — 80048 BASIC METABOLIC PNL TOTAL CA: CPT

## 2020-06-10 ENCOUNTER — TELEPHONE (OUTPATIENT)
Dept: PHYSICAL THERAPY | Facility: OTHER | Age: 80
End: 2020-06-10

## 2020-06-11 ENCOUNTER — APPOINTMENT (OUTPATIENT)
Dept: LAB | Facility: HOSPITAL | Age: 80
End: 2020-06-11
Attending: INTERNAL MEDICINE
Payer: COMMERCIAL

## 2020-06-11 ENCOUNTER — TELEPHONE (OUTPATIENT)
Dept: FAMILY MEDICINE CLINIC | Facility: HOSPITAL | Age: 80
End: 2020-06-11

## 2020-06-11 DIAGNOSIS — E83.52 SERUM CALCIUM ELEVATED: ICD-10-CM

## 2020-06-11 PROBLEM — E21.3: Status: ACTIVE | Noted: 2020-06-11

## 2020-06-11 LAB — PTH-INTACT SERPL-MCNC: 117.1 PG/ML (ref 18.4–80.1)

## 2020-06-11 PROCEDURE — 36415 COLL VENOUS BLD VENIPUNCTURE: CPT

## 2020-06-11 PROCEDURE — 83970 ASSAY OF PARATHORMONE: CPT

## 2020-06-16 ENCOUNTER — TELEPHONE (OUTPATIENT)
Dept: PHYSICAL THERAPY | Facility: OTHER | Age: 80
End: 2020-06-16

## 2020-06-16 ENCOUNTER — NURSE TRIAGE (OUTPATIENT)
Dept: PHYSICAL THERAPY | Facility: OTHER | Age: 80
End: 2020-06-16

## 2020-06-16 ENCOUNTER — TELEPHONE (OUTPATIENT)
Dept: FAMILY MEDICINE CLINIC | Facility: HOSPITAL | Age: 80
End: 2020-06-16

## 2020-06-16 DIAGNOSIS — M54.50 ACUTE MIDLINE LOW BACK PAIN WITHOUT SCIATICA: Primary | ICD-10-CM

## 2020-06-16 DIAGNOSIS — M54.50 ACUTE MIDLINE LOW BACK PAIN WITHOUT SCIATICA: ICD-10-CM

## 2020-06-17 RX ORDER — TRAMADOL HYDROCHLORIDE 50 MG/1
50 TABLET ORAL EVERY 6 HOURS PRN
Qty: 30 TABLET | Refills: 0 | Status: SHIPPED | OUTPATIENT
Start: 2020-06-17 | End: 2020-07-10 | Stop reason: SDUPTHER

## 2020-06-19 ENCOUNTER — EVALUATION (OUTPATIENT)
Dept: PHYSICAL THERAPY | Facility: CLINIC | Age: 80
End: 2020-06-19
Payer: COMMERCIAL

## 2020-06-19 VITALS — SYSTOLIC BLOOD PRESSURE: 140 MMHG | DIASTOLIC BLOOD PRESSURE: 90 MMHG | TEMPERATURE: 97.9 F | HEART RATE: 66 BPM

## 2020-06-19 DIAGNOSIS — M54.50 ACUTE MIDLINE LOW BACK PAIN WITHOUT SCIATICA: ICD-10-CM

## 2020-06-19 PROCEDURE — 97162 PT EVAL MOD COMPLEX 30 MIN: CPT | Performed by: PHYSICAL THERAPIST

## 2020-06-24 ENCOUNTER — OFFICE VISIT (OUTPATIENT)
Dept: PHYSICAL THERAPY | Facility: CLINIC | Age: 80
End: 2020-06-24
Payer: COMMERCIAL

## 2020-06-24 DIAGNOSIS — M54.50 ACUTE MIDLINE LOW BACK PAIN WITHOUT SCIATICA: Primary | ICD-10-CM

## 2020-06-24 PROCEDURE — 97110 THERAPEUTIC EXERCISES: CPT | Performed by: PHYSICAL THERAPIST

## 2020-06-24 PROCEDURE — 97112 NEUROMUSCULAR REEDUCATION: CPT | Performed by: PHYSICAL THERAPIST

## 2020-06-26 ENCOUNTER — APPOINTMENT (OUTPATIENT)
Dept: PHYSICAL THERAPY | Facility: CLINIC | Age: 80
End: 2020-06-26
Payer: COMMERCIAL

## 2020-06-29 ENCOUNTER — OFFICE VISIT (OUTPATIENT)
Dept: PHYSICAL THERAPY | Facility: CLINIC | Age: 80
End: 2020-06-29
Payer: COMMERCIAL

## 2020-06-29 DIAGNOSIS — M54.50 ACUTE MIDLINE LOW BACK PAIN WITHOUT SCIATICA: Primary | ICD-10-CM

## 2020-06-29 PROCEDURE — 97110 THERAPEUTIC EXERCISES: CPT | Performed by: PHYSICAL THERAPIST

## 2020-06-29 PROCEDURE — 97112 NEUROMUSCULAR REEDUCATION: CPT | Performed by: PHYSICAL THERAPIST

## 2020-07-01 ENCOUNTER — OFFICE VISIT (OUTPATIENT)
Dept: PHYSICAL THERAPY | Facility: CLINIC | Age: 80
End: 2020-07-01
Payer: COMMERCIAL

## 2020-07-01 DIAGNOSIS — M54.50 ACUTE MIDLINE LOW BACK PAIN WITHOUT SCIATICA: Primary | ICD-10-CM

## 2020-07-01 PROCEDURE — 97112 NEUROMUSCULAR REEDUCATION: CPT | Performed by: PHYSICAL THERAPIST

## 2020-07-01 PROCEDURE — 97110 THERAPEUTIC EXERCISES: CPT | Performed by: PHYSICAL THERAPIST

## 2020-07-01 NOTE — PROGRESS NOTES
Daily Note     Today's date: 2020  Patient name: bAigail Matt  : 1940  MRN: [id number]  Referring provider: Afshin Del Valle PT  Dx:   Encounter Diagnosis     ICD-10-CM    1  Acute midline low back pain without sciatica M54 5                   Subjective: Patient reports he continues to have less pain - very controllable with his HEP  He notes he is performing his HEP regularly  He had good tolerance to his LV  He notes he is seeing improvements with strength and ability to get in and out of his chair  Objective: See treatment diary below      Assessment:   Stage: subacute  Stability of Symptoms: stable - unchanging  Symptom Irritability Level: high  Primary Movement Diagnosis: lumbar hypomobility  Goal(s):   STG to be met in 2 weeks (7/3/20):  - Increase Lumbar AROM: moderate restriction all planes  - Increase (B) LE strength by 1/2 MMT grade  - I with HEP   - Patient will be able to perform 5x sit to  25 seconds  LTG to be met in 4 weeks (20):  - Increase Lumbar AROM: minimal to moderate restrictions all planes  - Increase (B) LE strength to 4-4+/5 all planes  - I with updated HEP   - Patient will be able to stand to cook a meal    Greatest Concern: difficulty getting in and out of chair  Current Activity Recommendations: added HEP (); added to HEP ()  Current Educational Needs: progressions, expectations    Patient continues to tolerate progressions with strengthening  He had no complaints of increased pain with exercise progressions  He was able to perform sit to stand exercise with improved form and technique  He was fatigued post treatment - no reproduction of LBP         Plan: Continue with POC - monitor tolerance to treatment progressions     Daily Treatment Diary     DX: Acute midline LBP  EPOC: 20  Precautions: standard  CO-MORBIDITIES: several abdominal surgeries, (B) TKA, anxiety, Hx of DVT      Manual Exercise Diary  HEP 6/24 6/29 7/1      Therapeutic Exercise           Recumbent Bike  5 min 5 min Seat 4  Ankle Strap  6 min                Standing Lumbar Ext 6/19 10x 10x 10x                LAQ  15x ea 20x ea 20x ea      SLR  AAROM  5x ea AAROM  5x ea AAROM  10x ea      H/L Hip ABD  GTB  5"x10 GTB  5"x10 BTB  5"x15      Bridge  2x5 2x5 10x      Seated Marching  10x ea 15x ea 15x ea                Sit to Stand     Lo mat  5x                                    Neuromuscular Reeducation                    Std Hip Flex 6/29 5x ea 10x ea 10x ea      Std Hip ABD 6/29 5x ea 10x ea 10x ea      Std Hip Ext 6/29 5x ea 10x ea 10x ea      HS Curls  6/29 5x ea 10x ea 10x ea      TB Trunk Rotation           Seated on PB  TB Row          TB Ext                                        Therapeutic Activity                              Gait Training                        Modalities

## 2020-07-06 ENCOUNTER — OFFICE VISIT (OUTPATIENT)
Dept: PHYSICAL THERAPY | Facility: CLINIC | Age: 80
End: 2020-07-06
Payer: COMMERCIAL

## 2020-07-06 DIAGNOSIS — M54.50 ACUTE MIDLINE LOW BACK PAIN WITHOUT SCIATICA: Primary | ICD-10-CM

## 2020-07-06 PROCEDURE — 97112 NEUROMUSCULAR REEDUCATION: CPT | Performed by: PHYSICAL THERAPIST

## 2020-07-06 PROCEDURE — 97110 THERAPEUTIC EXERCISES: CPT | Performed by: PHYSICAL THERAPIST

## 2020-07-06 NOTE — PROGRESS NOTES
Daily Note     Today's date: 2020  Patient name: Mikaela Mcfarlane  : 1940  MRN: [id number]  Referring provider: Ayanna Sanabria PT  Dx:   Encounter Diagnosis     ICD-10-CM    1  Acute midline low back pain without sciatica M54 5            Subjective: Patient reports good toelrance to his LV  He has increased his activity level at home with good overall tolerance  He notes less back pain - no longer requires medication  Objective: See treatment diary below      Assessment:   Stage: subacute  Stability of Symptoms: stable - unchanging  Symptom Irritability Level: high  Primary Movement Diagnosis: lumbar hypomobility  Goal(s):   STG to be met in 2 weeks (7/3/20):  - Increase Lumbar AROM: moderate restriction all planes  - Increase (B) LE strength by 1/2 MMT grade  - I with HEP   - Patient will be able to perform 5x sit to  25 seconds  LTG to be met in 4 weeks (20):  - Increase Lumbar AROM: minimal to moderate restrictions all planes  - Increase (B) LE strength to 4-4+/5 all planes  - I with updated HEP   - Patient will be able to stand to cook a meal    Greatest Concern: difficulty getting in and out of chair  Current Activity Recommendations: added HEP (); added to HEP ()  Current Educational Needs: progressions, expectations    Patient continues to progress with exercises well - repetitions and resistance  Overall patient displays improvements with functional mobility - sit to stand transfer as well as bed mobility  Patient was fatigued post treatment - no complaints of increased pain         Plan: Continue with POC - monitor tolerance to treatment progressions     Daily Treatment Diary     DX: Acute midline LBP  EPOC: 20  Precautions: standard  CO-MORBIDITIES: several abdominal surgeries, (B) TKA, anxiety, Hx of DVT      Manual                                                           Exercise Diary  HEP      Therapeutic Exercise Recumbent Bike  5 min 5 min Seat 4  Ankle Strap  6 min 6 min               Standing Lumbar Ext 6/19 10x 10x 10x 10x                LAQ  15x ea 20x ea 20x ea 1# 20x ea     SLR  AAROM  5x ea AAROM  5x ea AAROM  10x ea AROM  10x ea       H/L Hip ABD  GTB  5"x10 GTB  5"x10 BTB  5"x15 BTB  5"x20     Bridge  2x5 2x5 10x 10x     Seated Marching  10x ea 15x ea 15x ea 1# 20x ea               Sit to Stand     Lo mat  5x Lo mat  10x                                   Neuromuscular Reeducation                    Std Hip Flex 6/29 5x ea 10x ea 10x ea 1# 20x ea     Std Hip ABD 6/29 5x ea 10x ea 10x ea 1# 20x ea     Std Hip Ext 6/29 5x ea 10x ea 10x ea 1# 20x ea     HS Curls  6/29 5x ea 10x ea 10x ea 1# 20x ea     TB Trunk Rotation           Seated on PB  TB Row          TB Ext                                        Therapeutic Activity                              Gait Training                        Modalities

## 2020-07-08 ENCOUNTER — OFFICE VISIT (OUTPATIENT)
Dept: PHYSICAL THERAPY | Facility: CLINIC | Age: 80
End: 2020-07-08
Payer: COMMERCIAL

## 2020-07-08 DIAGNOSIS — M54.50 ACUTE MIDLINE LOW BACK PAIN WITHOUT SCIATICA: Primary | ICD-10-CM

## 2020-07-08 PROCEDURE — 97112 NEUROMUSCULAR REEDUCATION: CPT | Performed by: PHYSICAL THERAPIST

## 2020-07-08 PROCEDURE — 97110 THERAPEUTIC EXERCISES: CPT | Performed by: PHYSICAL THERAPIST

## 2020-07-08 NOTE — PROGRESS NOTES
Daily Note     Today's date: 2020  Patient name: Ruben Hoyos  : 1940  MRN: [id number]  Referring provider: Ladi Carrasquillo PT  Dx:   Encounter Diagnosis     ICD-10-CM    1  Acute midline low back pain without sciatica M54 5            Subjective: Patient reports continued improvements with pain levels and strength  Patient notes he is having an easier time with chair transfers  He currently has no low back pain  Objective: See treatment diary below      Assessment:   Stage: subacute  Stability of Symptoms: evolving - improving  Symptom Irritability Level: low  Primary Movement Diagnosis: poor motor control   Goal(s):   STG to be met in 2 weeks (7/3/20):  - Increase Lumbar AROM: moderate restriction all planes  - Increase (B) LE strength by 1/2 MMT grade  - I with HEP   - Patient will be able to perform 5x sit to  25 seconds  LTG to be met in 4 weeks (20):  - Increase Lumbar AROM: minimal to moderate restrictions all planes  - Increase (B) LE strength to 4-4+/5 all planes  - I with updated HEP   - Patient will be able to stand to cook a meal    Greatest Concern: difficulty getting in and out of chair  Current Activity Recommendations: added HEP (); added to HEP ()  Current Educational Needs: progressions, expectations    Patient continues to respond well to exercise progressions  He is improving with functional mobility  Patient had complaints of fatigue with exercises but no reproduction of pain         Plan: Continue with POC - monitor tolerance to treatment progressions     Daily Treatment Diary     DX: Acute midline LBP  EPOC: 20  Precautions: standard  CO-MORBIDITIES: several abdominal surgeries, (B) TKA, anxiety, Hx of DVT      Manual                                                           Exercise Diary  HEP     Therapeutic Exercise           Recumbent Bike  5 min 5 min Seat 4  Ankle Strap  6 min 6 min 6 min              Standing Lumbar Ext 6/19 10x 10x 10x 10x  10x              LAQ  15x ea 20x ea 20x ea 1# 20x ea 2# 20x ea    SLR  AAROM  5x ea AAROM  5x ea AAROM  10x ea AROM  10x ea   15x ea    H/L Hip ABD  GTB  5"x10 GTB  5"x10 BTB  5"x15 BTB  5"x20 BTB  5"x20    Bridge  2x5 2x5 10x 10x 15x    Seated Marching  10x ea 15x ea 15x ea 1# 20x ea 2# 20x ea              Sit to Stand     Lo mat  5x Lo mat  10x Lo mat  15x                                  Neuromuscular Reeducation                    Std Hip Flex 6/29 5x ea 10x ea 10x ea 1# 20x ea 1# 20x ea    Std Hip ABD 6/29 5x ea 10x ea 10x ea 1# 20x ea 1# 20x ea    Std Hip Ext 6/29 5x ea 10x ea 10x ea 1# 20x ea 1# 20x ea    HS Curls  6/29 5x ea 10x ea 10x ea 1# 20x ea 1# 20x ea    TB Trunk Rotation           Seated on PB  TB Row          TB Ext                                        Therapeutic Activity                              Gait Training                        Modalities

## 2020-07-10 DIAGNOSIS — M54.50 ACUTE MIDLINE LOW BACK PAIN WITHOUT SCIATICA: ICD-10-CM

## 2020-07-10 RX ORDER — TRAMADOL HYDROCHLORIDE 50 MG/1
50 TABLET ORAL EVERY 6 HOURS PRN
Qty: 30 TABLET | Refills: 0 | Status: SHIPPED | OUTPATIENT
Start: 2020-07-10 | End: 2020-08-13 | Stop reason: ALTCHOICE

## 2020-07-13 ENCOUNTER — OFFICE VISIT (OUTPATIENT)
Dept: PHYSICAL THERAPY | Facility: CLINIC | Age: 80
End: 2020-07-13
Payer: COMMERCIAL

## 2020-07-13 DIAGNOSIS — M54.50 ACUTE MIDLINE LOW BACK PAIN WITHOUT SCIATICA: Primary | ICD-10-CM

## 2020-07-13 PROCEDURE — 97110 THERAPEUTIC EXERCISES: CPT | Performed by: PHYSICAL THERAPIST

## 2020-07-13 PROCEDURE — 97112 NEUROMUSCULAR REEDUCATION: CPT | Performed by: PHYSICAL THERAPIST

## 2020-07-13 NOTE — PROGRESS NOTES
Daily Note     Today's date: 2020  Patient name: Ruben Hoyos  : 1940  MRN: [id number]  Referring provider: Ladi Carrasquillo PT  Dx:   Encounter Diagnosis     ICD-10-CM    1  Acute midline low back pain without sciatica M54 5            Subjective: Patient reports good tolerance to his LV  He notes he continues to see improvements with his overall functional level - easier getting in and out of a chair  He reports his HEP is going well - recently purchased ankle weights to continue using  Objective: See treatment diary below      Assessment:   Stage: subacute  Stability of Symptoms: evolving - improving  Symptom Irritability Level: low  Primary Movement Diagnosis: poor motor control   Goal(s):   STG to be met in 2 weeks (7/3/20):  - Increase Lumbar AROM: moderate restriction all planes  - Increase (B) LE strength by 1/2 MMT grade  - I with HEP   - Patient will be able to perform 5x sit to  25 seconds  LTG to be met in 4 weeks (20):  - Increase Lumbar AROM: minimal to moderate restrictions all planes  - Increase (B) LE strength to 4-4+/5 all planes  - I with updated HEP   - Patient will be able to stand to cook a meal    Greatest Concern: difficulty getting in and out of chair  Current Activity Recommendations: added HEP (); added to HEP ()  Current Educational Needs: progressions, expectations    Patient continues to respond well to exercise progressions  He was able to perform full sit to supine to sit transfer independently today for the first time since starting therapy  He was able to progress with repetitions on exercises with good overall tolerance  He was fatigued with exercises but had no complaints of increased pain post treatment  Plan: Continue with POC - monitor tolerance to treatment progressions   Plan to transition to I HEP NV     Daily Treatment Diary     DX: Acute midline LBP  EPOC: 20  Precautions: standard  CO-MORBIDITIES: several abdominal surgeries, (B) TKA, anxiety, Hx of DVT      Manual                                                           Exercise Diary  HEP 7/1 7/6 7/8 7/13     Therapeutic Exercise           Recumbent Bike  Seat 4  Ankle Strap  6 min 6 min 6 min 6 min               Standing Lumbar Ext 6/19 10x 10x  10x 10x                LAQ  20x ea 1# 20x ea 2# 20x ea 2# 20x ea     SLR  AAROM  10x ea AROM  10x ea   15x ea 2x10 ea     H/L Hip ABD  BTB  5"x15 BTB  5"x20 BTB  5"x20 BTB  5"x20     Bridge  10x 10x 15x 20x     Seated Marching  15x ea 1# 20x ea 2# 20x ea 2# 20x ea               Sit to Stand   Lo mat  5x Lo mat  10x Lo mat  15x Lo mat  10x2                                   Neuromuscular Reeducation                    Std Hip Flex 6/29 10x ea 1# 20x ea 1# 20x ea 1# 20x ea     Std Hip ABD 6/29 10x ea 1# 20x ea 1# 20x ea 1# 20x ea     Std Hip Ext 6/29 10x ea 1# 20x ea 1# 20x ea 1# 20x ea     HS Curls  6/29 10x ea 1# 20x ea 1# 20x ea 1# 20x ea     TB Trunk Rotation           Seated on PB  TB Row          TB Ext                                        Therapeutic Activity                              Gait Training                        Modalities

## 2020-07-15 ENCOUNTER — OFFICE VISIT (OUTPATIENT)
Dept: PHYSICAL THERAPY | Facility: CLINIC | Age: 80
End: 2020-07-15
Payer: COMMERCIAL

## 2020-07-15 DIAGNOSIS — M54.50 ACUTE MIDLINE LOW BACK PAIN WITHOUT SCIATICA: Primary | ICD-10-CM

## 2020-07-15 PROCEDURE — 97110 THERAPEUTIC EXERCISES: CPT | Performed by: PHYSICAL THERAPIST

## 2020-07-15 PROCEDURE — 97112 NEUROMUSCULAR REEDUCATION: CPT | Performed by: PHYSICAL THERAPIST

## 2020-07-15 NOTE — PROGRESS NOTES
Daily Note     Today's date: 7/15/2020  Patient name: Sean Deutsch  : 1940  MRN: [id number]  Referring provider: Emi Albright PT  Dx:   Encounter Diagnosis     ICD-10-CM    1  Acute midline low back pain without sciatica M54 5            Subjective: Patient reports no adverse reaction to his LV  He reports he continues to progress well with exercises and function  He reports he no longer is experiencing back pain  Objective: See treatment diary below      Assessment:   Patient demonstrated excellent exercise form and recall throughout treatment  He continues to display improved form with sit to supine to sit transitions  At this time it is recommended that he continue with an I HEP  He is to contact me if he has return of symptoms or any questions/ concerns  All patient's questions were answered to their satisfaction  If questions arise they were encouraged to call or email me        Plan: DC to I HEP      Daily Treatment Diary     DX: Acute midline LBP  EPOC: 20  Precautions: standard  CO-MORBIDITIES: several abdominal surgeries, (B) TKA, anxiety, Hx of DVT      Manual                                                           Exercise Diary  HEP 7/1 7/6 7/8 7/13 7/15    Therapeutic Exercise           Recumbent Bike  Seat 4  Ankle Strap  6 min 6 min 6 min 6 min 6 min              Standing Lumbar Ext  10x 10x  10x 10x                LAQ  20x ea 1# 20x ea 2# 20x ea 2# 20x ea 2# 20x ea    SLR  AAROM  10x ea AROM  10x ea   15x ea 2x10 ea 2x10 ea    H/L Hip ABD  BTB  5"x15 BTB  5"x20 BTB  5"x20 BTB  5"x20 BTB  5"x20    Bridge  10x 10x 15x 20x 20x    Seated Marching  15x ea 1# 20x ea 2# 20x ea 2# 20x ea 2# 20x ea              Sit to Stand   Lo mat  5x Lo mat  10x Lo mat  15x Lo mat  10x2 Lo mat  10x2                                  Neuromuscular Reeducation                    Std Hip Flex  10x ea 1# 20x ea 1# 20x ea 1# 20x ea 2# 20x ea    Std Hip ABD  10x ea 1# 20x ea 1# 20x ea 1# 20x ea 2# 20x ea    Std Hip Ext 6/29 10x ea 1# 20x ea 1# 20x ea 1# 20x ea 2# 20x ea    HS Curls  6/29 10x ea 1# 20x ea 1# 20x ea 1# 20x ea 2# 20x ea    TB Trunk Rotation           Seated on PB  TB Row          TB Ext                                        Therapeutic Activity                              Gait Training                        Modalities

## 2020-07-17 DIAGNOSIS — I10 ESSENTIAL HYPERTENSION: ICD-10-CM

## 2020-07-17 DIAGNOSIS — T84.53XS INFECTION ASSOCIATED WITH INTERNAL RIGHT KNEE PROSTHESIS, SEQUELA: Primary | ICD-10-CM

## 2020-07-17 RX ORDER — SPIRONOLACTONE 25 MG/1
12.5 TABLET ORAL DAILY
Qty: 45 TABLET | Refills: 3 | Status: SHIPPED | OUTPATIENT
Start: 2020-07-17 | End: 2021-06-29

## 2020-07-17 RX ORDER — SULFAMETHOXAZOLE AND TRIMETHOPRIM 800; 160 MG/1; MG/1
1 TABLET ORAL DAILY
Qty: 90 TABLET | Refills: 0 | Status: SHIPPED | OUTPATIENT
Start: 2020-07-17 | End: 2020-10-15

## 2020-08-13 ENCOUNTER — OFFICE VISIT (OUTPATIENT)
Dept: FAMILY MEDICINE CLINIC | Facility: HOSPITAL | Age: 80
End: 2020-08-13
Payer: COMMERCIAL

## 2020-08-13 VITALS
DIASTOLIC BLOOD PRESSURE: 70 MMHG | BODY MASS INDEX: 35.84 KG/M2 | WEIGHT: 223 LBS | SYSTOLIC BLOOD PRESSURE: 130 MMHG | HEIGHT: 66 IN | HEART RATE: 74 BPM | TEMPERATURE: 98.3 F

## 2020-08-13 DIAGNOSIS — E21.3 MILD HYPERPARATHYROIDISM (HCC): ICD-10-CM

## 2020-08-13 DIAGNOSIS — E78.00 PURE HYPERCHOLESTEROLEMIA: ICD-10-CM

## 2020-08-13 DIAGNOSIS — I82.5Y3 CHRONIC DEEP VEIN THROMBOSIS (DVT) OF PROXIMAL VEIN OF BOTH LOWER EXTREMITIES (HCC): Primary | ICD-10-CM

## 2020-08-13 DIAGNOSIS — D50.9 IRON DEFICIENCY ANEMIA, UNSPECIFIED IRON DEFICIENCY ANEMIA TYPE: ICD-10-CM

## 2020-08-13 DIAGNOSIS — M54.16 LUMBAR RADICULOPATHY: ICD-10-CM

## 2020-08-13 PROCEDURE — 3078F DIAST BP <80 MM HG: CPT | Performed by: INTERNAL MEDICINE

## 2020-08-13 PROCEDURE — 3008F BODY MASS INDEX DOCD: CPT | Performed by: INTERNAL MEDICINE

## 2020-08-13 PROCEDURE — 4040F PNEUMOC VAC/ADMIN/RCVD: CPT | Performed by: INTERNAL MEDICINE

## 2020-08-13 PROCEDURE — 1036F TOBACCO NON-USER: CPT | Performed by: INTERNAL MEDICINE

## 2020-08-13 PROCEDURE — 99213 OFFICE O/P EST LOW 20 MIN: CPT | Performed by: INTERNAL MEDICINE

## 2020-08-13 PROCEDURE — 3075F SYST BP GE 130 - 139MM HG: CPT | Performed by: INTERNAL MEDICINE

## 2020-08-13 PROCEDURE — 1160F RVW MEDS BY RX/DR IN RCRD: CPT | Performed by: INTERNAL MEDICINE

## 2020-08-13 NOTE — PROGRESS NOTES
Assessment/Plan:       Diagnoses and all orders for this visit:    Chronic deep vein thrombosis (DVT) of proximal vein of both lower extremities (HCC)    Pure hypercholesterolemia    Lumbar radiculopathy    Mild hyperparathyroidism (HCC)  -     CALCIUM & CALCIUM IONIZED; Future    Iron deficiency anemia, unspecified iron deficiency anemia type  -     CBC; Future          All of the above diagnoses have been assessed  Additional COMMENTS/PLAN:  Will get repeat calcium in October along with CBC  He is going to defer the colonoscopy at this time due to the COVID crisis  Will re-evaluate in 4 months  Subjective:      Patient ID: Jessica Jung is a 70 y o  male  HPI     Lumbar radicuopathy-this has resolved with PT      DVT- on Eliquis  Hyperparathyroidism-this was just discovered  Will be repeating Calcium in October  The following portions of the patient's history were revised and updated as appropriate: Problem list, allergies, med list, FH, SH, Past medical and surgical histories      Current Outpatient Medications   Medication Sig Dispense Refill    apixaban (Eliquis) 2 5 mg Take 1 tablet (2 5 mg total) by mouth 2 (two) times a day 180 tablet 3    docusate sodium (COLACE) 100 mg capsule Take by mouth      dutasteride (AVODART) 0 5 mg capsule TAKE ONE CAPSULE BY MOUTH EVERY DAY 90 capsule 0    ferrous sulfate 325 (65 Fe) mg tablet Take 325 mg by mouth daily      furosemide (LASIX) 80 mg tablet TAKE 1 TABLET BY MOUTH EVERY DAY 90 tablet 3    lansoprazole (PREVACID) 30 mg capsule TAKE ONE CAPSULE BY MOUTH EVERY DAY 90 capsule 3    Multiple Vitamins-Minerals (MULTIVITAMIN ADULT EXTRA C PO) 1 TABLET DAILY      polyethylene glycol (MIRALAX) 17 g packet Take by mouth      Probiotic Product (SOLUBLE FIBER/PROBIOTICS PO) Take by mouth      simvastatin (ZOCOR) 10 mg tablet TAKE 1 TABLET BY MOUTH EVERY DAY 90 tablet 0    spironolactone (ALDACTONE) 25 mg tablet Take 0 5 tablets (12 5 mg total) by mouth daily 45 tablet 3    sulfamethoxazole-trimethoprim (BACTRIM DS) 800-160 mg per tablet Take 1 tablet by mouth daily 90 tablet 0    tamsulosin (FLOMAX) 0 4 mg Daily with dinner 90 capsule 3     No current facility-administered medications for this visit  Review of Systems   All other systems reviewed and are negative  Objective:    /70 (BP Location: Left arm, Patient Position: Sitting, Cuff Size: Standard)   Pulse 74   Temp 98 3 °F (36 8 °C) (Tympanic)   Ht 5' 6" (1 676 m)   Wt 101 kg (223 lb)   BMI 35 99 kg/m²     BP Readings from Last 3 Encounters:   08/13/20 130/70   06/19/20 140/90   06/08/20 120/70                  Wt Readings from Last 3 Encounters:   08/13/20 101 kg (223 lb)   06/08/20 101 kg (221 lb 12 8 oz)   02/24/20 104 kg (229 lb)         Physical Exam  Vitals signs reviewed  Cardiovascular:      Rate and Rhythm: Normal rate and regular rhythm  Pulmonary:      Effort: Pulmonary effort is normal       Breath sounds: Normal breath sounds  Musculoskeletal:      Comments: Plus 2 edema           No visits with results within 2 Week(s) from this visit     Latest known visit with results is:   Appointment on 06/11/2020   Component Date Value Ref Range Status    PTH 06/11/2020 117 1* 18 4 - 80 1 pg/mL Final         Isaac Zavala MD

## 2020-08-26 ENCOUNTER — TELEPHONE (OUTPATIENT)
Dept: FAMILY MEDICINE CLINIC | Facility: HOSPITAL | Age: 80
End: 2020-08-26

## 2020-08-26 NOTE — TELEPHONE ENCOUNTER
FYI - Patient called to let you know that he applied for a mail in [facility] for voting, due to his difficulty walking  He wanted you to be aware in case you receive a call or letter to verify this

## 2020-09-15 DIAGNOSIS — N40.0 BENIGN PROSTATIC HYPERPLASIA WITHOUT LOWER URINARY TRACT SYMPTOMS: ICD-10-CM

## 2020-09-15 RX ORDER — DUTASTERIDE 0.5 MG/1
CAPSULE, LIQUID FILLED ORAL
Qty: 90 CAPSULE | Refills: 3 | Status: SHIPPED | OUTPATIENT
Start: 2020-09-15 | End: 2021-09-27 | Stop reason: SDUPTHER

## 2020-09-16 DIAGNOSIS — I10 ESSENTIAL HYPERTENSION: ICD-10-CM

## 2020-09-16 RX ORDER — FUROSEMIDE 80 MG
TABLET ORAL
Qty: 90 TABLET | Refills: 3 | Status: SHIPPED | OUTPATIENT
Start: 2020-09-16 | End: 2021-11-12

## 2020-10-13 ENCOUNTER — TELEPHONE (OUTPATIENT)
Dept: FAMILY MEDICINE CLINIC | Facility: HOSPITAL | Age: 80
End: 2020-10-13

## 2020-12-08 ENCOUNTER — LAB (OUTPATIENT)
Dept: LAB | Facility: HOSPITAL | Age: 80
End: 2020-12-08
Attending: INTERNAL MEDICINE
Payer: COMMERCIAL

## 2020-12-08 DIAGNOSIS — D50.9 IRON DEFICIENCY ANEMIA, UNSPECIFIED IRON DEFICIENCY ANEMIA TYPE: ICD-10-CM

## 2020-12-08 DIAGNOSIS — E21.3 MILD HYPERPARATHYROIDISM (HCC): ICD-10-CM

## 2020-12-08 DIAGNOSIS — E83.52 SERUM CALCIUM ELEVATED: ICD-10-CM

## 2020-12-08 DIAGNOSIS — E83.52 SERUM CALCIUM ELEVATED: Primary | ICD-10-CM

## 2020-12-08 LAB
CA-I BLD-SCNC: 1.36 MMOL/L (ref 1.12–1.32)
CALCIUM SERPL-MCNC: 11.3 MG/DL (ref 8.3–10.1)
ERYTHROCYTE [DISTWIDTH] IN BLOOD BY AUTOMATED COUNT: 15.9 % (ref 11.6–15.1)
HCT VFR BLD AUTO: 50.9 % (ref 36.5–49.3)
HGB BLD-MCNC: 16.6 G/DL (ref 12–17)
MCH RBC QN AUTO: 30.8 PG (ref 26.8–34.3)
MCHC RBC AUTO-ENTMCNC: 32.6 G/DL (ref 31.4–37.4)
MCV RBC AUTO: 94 FL (ref 82–98)
PLATELET # BLD AUTO: 402 THOUSANDS/UL (ref 149–390)
PMV BLD AUTO: 10.7 FL (ref 8.9–12.7)
PTH-INTACT SERPL-MCNC: 132 PG/ML (ref 18.4–80.1)
RBC # BLD AUTO: 5.39 MILLION/UL (ref 3.88–5.62)
WBC # BLD AUTO: 11.4 THOUSAND/UL (ref 4.31–10.16)

## 2020-12-08 PROCEDURE — 82310 ASSAY OF CALCIUM: CPT

## 2020-12-08 PROCEDURE — 36415 COLL VENOUS BLD VENIPUNCTURE: CPT

## 2020-12-08 PROCEDURE — 85027 COMPLETE CBC AUTOMATED: CPT

## 2020-12-08 PROCEDURE — 82330 ASSAY OF CALCIUM: CPT

## 2020-12-08 PROCEDURE — 83970 ASSAY OF PARATHORMONE: CPT

## 2020-12-11 ENCOUNTER — OFFICE VISIT (OUTPATIENT)
Dept: FAMILY MEDICINE CLINIC | Facility: HOSPITAL | Age: 80
End: 2020-12-11
Payer: COMMERCIAL

## 2020-12-11 VITALS
DIASTOLIC BLOOD PRESSURE: 82 MMHG | SYSTOLIC BLOOD PRESSURE: 130 MMHG | HEIGHT: 66 IN | BODY MASS INDEX: 35.55 KG/M2 | HEART RATE: 73 BPM | WEIGHT: 221.2 LBS

## 2020-12-11 DIAGNOSIS — E21.3 MILD HYPERPARATHYROIDISM (HCC): ICD-10-CM

## 2020-12-11 DIAGNOSIS — T84.53XS INFECTION ASSOCIATED WITH INTERNAL RIGHT KNEE PROSTHESIS, SEQUELA: ICD-10-CM

## 2020-12-11 DIAGNOSIS — D50.9 IRON DEFICIENCY ANEMIA, UNSPECIFIED IRON DEFICIENCY ANEMIA TYPE: ICD-10-CM

## 2020-12-11 DIAGNOSIS — K21.9 GERD WITHOUT ESOPHAGITIS: ICD-10-CM

## 2020-12-11 DIAGNOSIS — E78.00 PURE HYPERCHOLESTEROLEMIA: ICD-10-CM

## 2020-12-11 DIAGNOSIS — Z74.09 IMMOBILITY: ICD-10-CM

## 2020-12-11 DIAGNOSIS — E66.01 OBESITY, MORBID (HCC): ICD-10-CM

## 2020-12-11 DIAGNOSIS — I82.5Y3 CHRONIC DEEP VEIN THROMBOSIS (DVT) OF PROXIMAL VEIN OF BOTH LOWER EXTREMITIES (HCC): Primary | ICD-10-CM

## 2020-12-11 PROBLEM — D75.839 THROMBOCYTOSIS: Status: ACTIVE | Noted: 2020-12-11

## 2020-12-11 PROCEDURE — 3075F SYST BP GE 130 - 139MM HG: CPT | Performed by: INTERNAL MEDICINE

## 2020-12-11 PROCEDURE — 1160F RVW MEDS BY RX/DR IN RCRD: CPT | Performed by: INTERNAL MEDICINE

## 2020-12-11 PROCEDURE — 99214 OFFICE O/P EST MOD 30 MIN: CPT | Performed by: INTERNAL MEDICINE

## 2020-12-11 PROCEDURE — 3079F DIAST BP 80-89 MM HG: CPT | Performed by: INTERNAL MEDICINE

## 2020-12-11 PROCEDURE — 1036F TOBACCO NON-USER: CPT | Performed by: INTERNAL MEDICINE

## 2020-12-11 RX ORDER — SULFAMETHOXAZOLE AND TRIMETHOPRIM 800; 160 MG/1; MG/1
1 TABLET ORAL DAILY
COMMUNITY
End: 2021-01-19 | Stop reason: SDUPTHER

## 2021-01-08 ENCOUNTER — TELEPHONE (OUTPATIENT)
Dept: OBGYN CLINIC | Facility: HOSPITAL | Age: 81
End: 2021-01-08

## 2021-01-08 NOTE — TELEPHONE ENCOUNTER
Dr Mera Ross    [phone]    Patient states that when he was was the last appt he was given information on an insert that he can use for his feet  He would like to know the name and where he can purchase  Please advise

## 2021-01-08 NOTE — TELEPHONE ENCOUNTER
Called and spoke with patient  Gave him info about metatarsal pads  I also put his after visit instructions in a letter today, could we please print this letter and email it to him at [email or web address] ? Thanks!

## 2021-01-19 DIAGNOSIS — K21.9 GASTROESOPHAGEAL REFLUX DISEASE WITHOUT ESOPHAGITIS: ICD-10-CM

## 2021-01-19 DIAGNOSIS — T84.53XS INFECTION ASSOCIATED WITH INTERNAL RIGHT KNEE PROSTHESIS, SEQUELA: Primary | ICD-10-CM

## 2021-01-19 RX ORDER — LANSOPRAZOLE 30 MG/1
30 CAPSULE, DELAYED RELEASE ORAL DAILY
Qty: 90 CAPSULE | Refills: 3 | Status: SHIPPED | OUTPATIENT
Start: 2021-01-19 | End: 2022-01-03

## 2021-01-19 RX ORDER — SULFAMETHOXAZOLE AND TRIMETHOPRIM 800; 160 MG/1; MG/1
1 TABLET ORAL DAILY
Qty: 30 TABLET | Refills: 11 | Status: SHIPPED | OUTPATIENT
Start: 2021-01-19 | End: 2021-01-26 | Stop reason: SDUPTHER

## 2021-01-22 ENCOUNTER — IMMUNIZATIONS (OUTPATIENT)
Dept: FAMILY MEDICINE CLINIC | Facility: HOSPITAL | Age: 81
End: 2021-01-22

## 2021-01-22 DIAGNOSIS — Z23 ENCOUNTER FOR IMMUNIZATION: Primary | ICD-10-CM

## 2021-01-22 PROCEDURE — 91301 SARS-COV-2 / COVID-19 MRNA VACCINE (MODERNA) 100 MCG: CPT

## 2021-01-22 PROCEDURE — 0011A SARS-COV-2 / COVID-19 MRNA VACCINE (MODERNA) 100 MCG: CPT

## 2021-01-26 DIAGNOSIS — T84.53XS INFECTION ASSOCIATED WITH INTERNAL RIGHT KNEE PROSTHESIS, SEQUELA: ICD-10-CM

## 2021-01-26 RX ORDER — SULFAMETHOXAZOLE AND TRIMETHOPRIM 800; 160 MG/1; MG/1
1 TABLET ORAL DAILY
Qty: 90 TABLET | Refills: 3 | Status: SHIPPED | OUTPATIENT
Start: 2021-01-26 | End: 2022-01-26

## 2021-02-17 ENCOUNTER — IMMUNIZATIONS (OUTPATIENT)
Dept: FAMILY MEDICINE CLINIC | Facility: HOSPITAL | Age: 81
End: 2021-02-17

## 2021-02-17 DIAGNOSIS — Z23 ENCOUNTER FOR IMMUNIZATION: Primary | ICD-10-CM

## 2021-02-17 PROCEDURE — 0012A SARS-COV-2 / COVID-19 MRNA VACCINE (MODERNA) 100 MCG: CPT

## 2021-02-17 PROCEDURE — 91301 SARS-COV-2 / COVID-19 MRNA VACCINE (MODERNA) 100 MCG: CPT

## 2021-03-22 DIAGNOSIS — E78.00 PURE HYPERCHOLESTEROLEMIA: ICD-10-CM

## 2021-03-22 RX ORDER — SIMVASTATIN 10 MG
10 TABLET ORAL DAILY
Qty: 90 TABLET | Refills: 3 | Status: SHIPPED | OUTPATIENT
Start: 2021-03-22 | End: 2022-03-29 | Stop reason: SDUPTHER

## 2021-03-23 DIAGNOSIS — I82.5Y3 CHRONIC DEEP VEIN THROMBOSIS (DVT) OF PROXIMAL VEIN OF BOTH LOWER EXTREMITIES (HCC): ICD-10-CM

## 2021-03-29 ENCOUNTER — TELEPHONE (OUTPATIENT)
Dept: FAMILY MEDICINE CLINIC | Facility: HOSPITAL | Age: 81
End: 2021-03-29

## 2021-03-29 NOTE — TELEPHONE ENCOUNTER
Pt states he needs a refill for simvastatin (ZOCOR) 10 mg tablet and   apixaban (Eliquis) 2 5 mg sent to [facility] in [place]

## 2021-04-06 ENCOUNTER — LAB (OUTPATIENT)
Dept: LAB | Facility: HOSPITAL | Age: 81
End: 2021-04-06
Attending: INTERNAL MEDICINE
Payer: COMMERCIAL

## 2021-04-06 DIAGNOSIS — E21.3 MILD HYPERPARATHYROIDISM (HCC): ICD-10-CM

## 2021-04-06 DIAGNOSIS — E78.00 PURE HYPERCHOLESTEROLEMIA: ICD-10-CM

## 2021-04-06 LAB
ALBUMIN SERPL BCP-MCNC: 3.5 G/DL (ref 3.5–5)
ALP SERPL-CCNC: 121 U/L (ref 46–116)
ALT SERPL W P-5'-P-CCNC: 32 U/L (ref 12–78)
ANION GAP SERPL CALCULATED.3IONS-SCNC: 5 MMOL/L (ref 4–13)
AST SERPL W P-5'-P-CCNC: 22 U/L (ref 5–45)
BILIRUB SERPL-MCNC: 0.54 MG/DL (ref 0.2–1)
BUN SERPL-MCNC: 26 MG/DL (ref 5–25)
CA-I BLD-SCNC: 1.39 MMOL/L (ref 1.12–1.32)
CALCIUM SERPL-MCNC: 10.5 MG/DL (ref 8.3–10.1)
CHLORIDE SERPL-SCNC: 108 MMOL/L (ref 100–108)
CHOLEST SERPL-MCNC: 174 MG/DL (ref 50–200)
CO2 SERPL-SCNC: 28 MMOL/L (ref 21–32)
CREAT SERPL-MCNC: 0.95 MG/DL (ref 0.6–1.3)
ERYTHROCYTE [DISTWIDTH] IN BLOOD BY AUTOMATED COUNT: 15.7 % (ref 11.6–15.1)
GFR SERPL CREATININE-BSD FRML MDRD: 76 ML/MIN/1.73SQ M
GLUCOSE P FAST SERPL-MCNC: 106 MG/DL (ref 65–99)
HCT VFR BLD AUTO: 51.3 % (ref 36.5–49.3)
HDLC SERPL-MCNC: 42 MG/DL
HGB BLD-MCNC: 16.8 G/DL (ref 12–17)
LDLC SERPL CALC-MCNC: 104 MG/DL (ref 0–100)
MCH RBC QN AUTO: 30.8 PG (ref 26.8–34.3)
MCHC RBC AUTO-ENTMCNC: 32.7 G/DL (ref 31.4–37.4)
MCV RBC AUTO: 94 FL (ref 82–98)
PLATELET # BLD AUTO: 381 THOUSANDS/UL (ref 149–390)
PMV BLD AUTO: 10.8 FL (ref 8.9–12.7)
POTASSIUM SERPL-SCNC: 3.8 MMOL/L (ref 3.5–5.3)
PROT SERPL-MCNC: 6.9 G/DL (ref 6.4–8.2)
RBC # BLD AUTO: 5.45 MILLION/UL (ref 3.88–5.62)
SODIUM SERPL-SCNC: 141 MMOL/L (ref 136–145)
TRIGL SERPL-MCNC: 142 MG/DL
WBC # BLD AUTO: 10.4 THOUSAND/UL (ref 4.31–10.16)

## 2021-04-06 PROCEDURE — 80061 LIPID PANEL: CPT

## 2021-04-06 PROCEDURE — 36415 COLL VENOUS BLD VENIPUNCTURE: CPT

## 2021-04-06 PROCEDURE — 80053 COMPREHEN METABOLIC PANEL: CPT

## 2021-04-06 PROCEDURE — 82330 ASSAY OF CALCIUM: CPT

## 2021-04-06 PROCEDURE — 85027 COMPLETE CBC AUTOMATED: CPT

## 2021-04-14 DIAGNOSIS — N40.0 BENIGN PROSTATIC HYPERPLASIA WITHOUT LOWER URINARY TRACT SYMPTOMS: ICD-10-CM

## 2021-04-14 RX ORDER — TAMSULOSIN HYDROCHLORIDE 0.4 MG/1
CAPSULE ORAL
Qty: 90 CAPSULE | Refills: 3 | Status: SHIPPED | OUTPATIENT
Start: 2021-04-14 | End: 2021-04-16 | Stop reason: SDUPTHER

## 2021-04-16 ENCOUNTER — OFFICE VISIT (OUTPATIENT)
Dept: FAMILY MEDICINE CLINIC | Facility: HOSPITAL | Age: 81
End: 2021-04-16
Payer: COMMERCIAL

## 2021-04-16 VITALS
DIASTOLIC BLOOD PRESSURE: 72 MMHG | SYSTOLIC BLOOD PRESSURE: 130 MMHG | BODY MASS INDEX: 38.62 KG/M2 | WEIGHT: 218 LBS | HEART RATE: 76 BPM | HEIGHT: 63 IN

## 2021-04-16 DIAGNOSIS — N40.0 BENIGN PROSTATIC HYPERPLASIA WITHOUT LOWER URINARY TRACT SYMPTOMS: Primary | ICD-10-CM

## 2021-04-16 DIAGNOSIS — E21.3 MILD HYPERPARATHYROIDISM (HCC): ICD-10-CM

## 2021-04-16 DIAGNOSIS — K21.9 GERD WITHOUT ESOPHAGITIS: ICD-10-CM

## 2021-04-16 DIAGNOSIS — I82.5Y3 CHRONIC DEEP VEIN THROMBOSIS (DVT) OF PROXIMAL VEIN OF BOTH LOWER EXTREMITIES (HCC): ICD-10-CM

## 2021-04-16 DIAGNOSIS — E78.00 PURE HYPERCHOLESTEROLEMIA: ICD-10-CM

## 2021-04-16 PROBLEM — D50.9 IRON DEFICIENCY ANEMIA: Status: RESOLVED | Noted: 2020-01-10 | Resolved: 2021-04-16

## 2021-04-16 PROBLEM — D75.839 THROMBOCYTOSIS: Status: RESOLVED | Noted: 2020-12-11 | Resolved: 2021-04-16

## 2021-04-16 PROCEDURE — G0439 PPPS, SUBSEQ VISIT: HCPCS | Performed by: INTERNAL MEDICINE

## 2021-04-16 PROCEDURE — 1036F TOBACCO NON-USER: CPT | Performed by: INTERNAL MEDICINE

## 2021-04-16 PROCEDURE — 1170F FXNL STATUS ASSESSED: CPT | Performed by: INTERNAL MEDICINE

## 2021-04-16 PROCEDURE — 1125F AMNT PAIN NOTED PAIN PRSNT: CPT | Performed by: INTERNAL MEDICINE

## 2021-04-16 PROCEDURE — 3288F FALL RISK ASSESSMENT DOCD: CPT | Performed by: INTERNAL MEDICINE

## 2021-04-16 PROCEDURE — 3725F SCREEN DEPRESSION PERFORMED: CPT | Performed by: INTERNAL MEDICINE

## 2021-04-16 PROCEDURE — 99214 OFFICE O/P EST MOD 30 MIN: CPT | Performed by: INTERNAL MEDICINE

## 2021-04-16 PROCEDURE — 1160F RVW MEDS BY RX/DR IN RCRD: CPT | Performed by: INTERNAL MEDICINE

## 2021-04-16 RX ORDER — TAMSULOSIN HYDROCHLORIDE 0.4 MG/1
CAPSULE ORAL
Qty: 90 CAPSULE | Refills: 3 | Status: SHIPPED | OUTPATIENT
Start: 2021-04-16 | End: 2022-05-08

## 2021-04-16 NOTE — PROGRESS NOTES
Assessment/Plan:       Diagnoses and all orders for this visit:    Benign prostatic hyperplasia without lower urinary tract symptoms  -     tamsulosin (FLOMAX) 0 4 mg; Daily with dinner    Pure hypercholesterolemia    GERD without esophagitis    Chronic deep vein thrombosis (DVT) of proximal vein of both lower extremities (HCC)    Mild hyperparathyroidism (HCC)  -     Comprehensive metabolic panel; Future          All of the above diagnoses have been assessed  Additional COMMENTS/PLAN: seems compensated    Will see in 6 months  Subjective:      Patient ID: En Ziegler is a 68 y o  male  HPI   Mobility-this is about the same  Hyperlipidemia- The patient is compliant with diet and taking meds  The patient understands the efficacy of the treatment in vascular prevention  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  Labs reviewed and discussed with the patient  GERD-this is stable with the protonix  The following portions of the patient's history were revised and updated as appropriate: Problem list, allergies, med list, FH, SH, Past medical and surgical histories      Current Outpatient Medications   Medication Sig Dispense Refill    apixaban (Eliquis) 2 5 mg Take 1 tablet (2 5 mg total) by mouth 2 (two) times a day 180 tablet 3    docusate sodium (COLACE) 100 mg capsule Take by mouth      dutasteride (AVODART) 0 5 mg capsule TAKE 1 CAPSULE EVERY DAY 90 capsule 3    ferrous sulfate 325 (65 Fe) mg tablet Take 325 mg by mouth daily      furosemide (LASIX) 80 mg tablet TAKE 1 TABLET BY MOUTH EVERY DAY 90 tablet 3    lansoprazole (PREVACID) 30 mg capsule Take 1 capsule (30 mg total) by mouth daily 90 capsule 3    Multiple Vitamins-Minerals (MULTIVITAMIN ADULT EXTRA C PO) 1 TABLET DAILY      polyethylene glycol (MIRALAX) 17 g packet Take by mouth      Probiotic Product (SOLUBLE FIBER/PROBIOTICS PO) Take by mouth      simvastatin (ZOCOR) 10 mg tablet Take 1 tablet (10 mg total) by mouth daily 90 tablet 3    spironolactone (ALDACTONE) 25 mg tablet Take 0 5 tablets (12 5 mg total) by mouth daily 45 tablet 3    sulfamethoxazole-trimethoprim (Bactrim DS) 800-160 mg per tablet Take 1 tablet by mouth daily 90 tablet 3    tamsulosin (FLOMAX) 0 4 mg Daily with dinner 90 capsule 3     No current facility-administered medications for this visit  Review of Systems   All other systems reviewed and are negative  Objective:    /72 (BP Location: Left arm, Patient Position: Sitting, Cuff Size: Standard)   Pulse 76   Ht 5' 3" (1 6 m)   Wt 98 9 kg (218 lb)   BMI 38 62 kg/m²     BP Readings from Last 3 Encounters:   04/16/21 130/72   12/11/20 130/82   08/13/20 130/70                  Wt Readings from Last 3 Encounters:   04/16/21 98 9 kg (218 lb)   12/11/20 100 kg (221 lb 3 2 oz)   08/13/20 101 kg (223 lb)         Physical Exam  Vitals signs reviewed  Constitutional:       Appearance: He is obese  Neck:      Musculoskeletal: Normal range of motion  Vascular: No carotid bruit  Cardiovascular:      Rate and Rhythm: Normal rate and regular rhythm  Pulmonary:      Effort: Pulmonary effort is normal       Breath sounds: Normal breath sounds  Abdominal:      General: Abdomen is flat  Bowel sounds are normal       Palpations: Abdomen is soft  Musculoskeletal:         General: Deformity present  Comments: Plus 2 edema   Lymphadenopathy:      Cervical: No cervical adenopathy  Neurological:      General: No focal deficit present  Mental Status: He is alert  He is disoriented  Psychiatric:         Mood and Affect: Mood normal          Thought Content:  Thought content normal            Lab on 04/06/2021   Component Date Value Ref Range Status    WBC 04/06/2021 10 40* 4 31 - 10 16 Thousand/uL Final    RBC 04/06/2021 5 45  3 88 - 5 62 Million/uL Final    Hemoglobin 04/06/2021 16 8  12 0 - 17 0 g/dL Final    Hematocrit 04/06/2021 51 3* 36 5 - 49 3 % Final  MCV 04/06/2021 94  82 - 98 fL Final    MCH 04/06/2021 30 8  26 8 - 34 3 pg Final    MCHC 04/06/2021 32 7  31 4 - 37 4 g/dL Final    RDW 04/06/2021 15 7* 11 6 - 15 1 % Final    Platelets 55/35/4806 381  149 - 390 Thousands/uL Final    MPV 04/06/2021 10 8  8 9 - 12 7 fL Final    Sodium 04/06/2021 141  136 - 145 mmol/L Final    Potassium 04/06/2021 3 8  3 5 - 5 3 mmol/L Final    Chloride 04/06/2021 108  100 - 108 mmol/L Final    CO2 04/06/2021 28  21 - 32 mmol/L Final    ANION GAP 04/06/2021 5  4 - 13 mmol/L Final    BUN 04/06/2021 26* 5 - 25 mg/dL Final    Creatinine 04/06/2021 0 95  0 60 - 1 30 mg/dL Final    Standardized to IDMS reference method    Glucose, Fasting 04/06/2021 106* 65 - 99 mg/dL Final    Specimen collection should occur prior to Sulfasalazine administration due to the potential for falsely depressed results  Specimen collection should occur prior to Sulfapyridine administration due to the potential for falsely elevated results   Calcium 04/06/2021 10 5* 8 3 - 10 1 mg/dL Final    AST 04/06/2021 22  5 - 45 U/L Final    Specimen collection should occur prior to Sulfasalazine administration due to the potential for falsely depressed results   ALT 04/06/2021 32  12 - 78 U/L Final    Specimen collection should occur prior to Sulfasalazine and/or Sulfapyridine administration due to the potential for falsely depressed results   Alkaline Phosphatase 04/06/2021 121* 46 - 116 U/L Final    Total Protein 04/06/2021 6 9  6 4 - 8 2 g/dL Final    Albumin 04/06/2021 3 5  3 5 - 5 0 g/dL Final    Total Bilirubin 04/06/2021 0 54  0 20 - 1 00 mg/dL Final    Use of this assay is not recommended for patients undergoing treatment with eltrombopag due to the potential for falsely elevated results      eGFR 04/06/2021 76  ml/min/1 73sq m Final    Cholesterol 04/06/2021 174  50 - 200 mg/dL Final    Cholesterol:       Desirable         <200 mg/dl       Borderline         200-239 mg/dl       High              >239           Triglycerides 04/06/2021 142  <=150 mg/dL Final    Triglyceride:     Normal          <150 mg/dl     Borderline High 150-199 mg/dl     High            200-499 mg/dl        Very High       >499 mg/dl    Specimen collection should occur prior to N-Acetylcysteine or Metamizole administration due to the potential for falsely depressed results   HDL, Direct 04/06/2021 42  >=40 mg/dL Final    HDL Cholesterol:       Low     <41 mg/dL  Specimen collection should occur prior to Metamizole administration due to the potential for falsley depressed results   LDL Calculated 04/06/2021 104* 0 - 100 mg/dL Final    LDL Cholesterol:     Optimal           <100 mg/dl     Near Optimal      100-129 mg/dl     Above Optimal       Borderline High 130-159 mg/dl       High            160-189 mg/dl       Very High       >189 mg/dl         This screening LDL is a calculated result  It does not have the accuracy of the Direct Measured LDL in the monitoring of patients with hyperlipidemia and/or statin therapy  Direct Measure LDL (UGB416) must be ordered separately in these patients   Calcium, Ionized 04/06/2021 1 39* 1 12 - 1 32 mmol/L Final         Emmanuelle Perdomo MD    Some or all of this note was generated with a voice recognition dictation system and therefore my contain grammatical or spelling errors

## 2021-04-16 NOTE — PROGRESS NOTES
Assessment and Plan:     Problem List Items Addressed This Visit        Genitourinary    Benign prostatic hyperplasia without lower urinary tract symptoms    Relevant Medications    tamsulosin (FLOMAX) 0 4 mg      Other Visit Diagnoses     Medicare annual wellness visit, subsequent    -  Primary           Preventive health issues were discussed with patient, and age appropriate screening tests were ordered as noted in patient's After Visit Summary  Personalized health advice and appropriate referrals for health education or preventive services given if needed, as noted in patient's After Visit Summary       History of Present Illness:     Patient presents for Medicare Annual Wellness visit    Patient Care Team:  Willy Randolph MD as PCP - General  MD Willy Dias MD     Problem List:     Patient Active Problem List   Diagnosis    Benign prostatic hyperplasia without lower urinary tract symptoms    Chronic deep vein thrombosis (DVT) of proximal vein of both lower extremities (HCC)    GERD without esophagitis    Incisional hernia, without obstruction or gangrene    Venous stasis    Infection of prosthetic right knee joint ([address] )    Pure hypercholesterolemia    Cervicalgia    Mild hyperparathyroidism ([address] )    Immobility    Obesity, morbid ([address] )      Past Medical and Surgical History:     Past Medical History:   Diagnosis Date    Basal cell carcinoma     nose     Benign skin lesion     BPH (benign prostatic hyperplasia)     Diverticular disease of colon     DVT (deep venous thrombosis) ([address] )     Elevated PSA     History of blood clots     Hypotension     Intestinal obstruction ([address] )     Social anxiety disorder     last assessed 1/12/17, resolved 10/17/17    Ventral hernia      Past Surgical History:   Procedure Laterality Date    APPENDECTOMY      BASAL CELL CARCINOMA EXCISION      Nose     HERNIA REPAIR      9 hernia repairs 1995 - 2012   [address]    with speenectomy    REPLACEMENT TOTAL KNEE Bilateral     SPLENECTOMY  1995    TONSILLECTOMY        Family History:     Family History   Problem Relation Age of Onset    Other Mother         Bleeding disorder     Stroke Mother         CVA    Diabetes Mother     Heart disease Mother     Stroke Father         CVA    Heart disease Father     Heart attack Father     Substance Abuse Neg Hx     Mental illness Neg Hx       Social History:        Social History     Socioeconomic History    Marital status: /Civil Union     Spouse name: None    Number of children: None    Years of education: None    Highest education level: None   Occupational History    None   Social Needs    Financial resource strain: None    Food insecurity     Worry: None     Inability: None    Transportation needs     Medical: None     Non-medical: None   Tobacco Use    Smoking status: Never Smoker    Smokeless tobacco: Never Used   Substance and Sexual Activity    Alcohol use: Yes     Frequency: Monthly or less     Drinks per session: 1 or 2     Binge frequency: Never    Drug use: No    Sexual activity: None   Lifestyle    Physical activity     Days per week: None     Minutes per session: None    Stress: None   Relationships    Social connections     Talks on phone: None     Gets together: None     Attends Christianity service: None     Active member of club or organization: None     Attends meetings of clubs or organizations: None     Relationship status: None    Intimate partner violence     Fear of current or ex partner: None     Emotionally abused: None     Physically abused: None     Forced sexual activity: None   Other Topics Concern    None   Social History Narrative    Active advance directive     Dental care, regularly     Living situation, supportive and safe       Medications and Allergies:     Current Outpatient Medications   Medication Sig Dispense Refill    apixaban (Eliquis) 2 5 mg Take 1 tablet (2 5 mg total) by mouth 2 (two) times a day 180 tablet 3    docusate sodium (COLACE) 100 mg capsule Take by mouth      dutasteride (AVODART) 0 5 mg capsule TAKE 1 CAPSULE EVERY DAY 90 capsule 3    ferrous sulfate 325 (65 Fe) mg tablet Take 325 mg by mouth daily      furosemide (LASIX) 80 mg tablet TAKE 1 TABLET BY MOUTH EVERY DAY 90 tablet 3    lansoprazole (PREVACID) 30 mg capsule Take 1 capsule (30 mg total) by mouth daily 90 capsule 3    Multiple Vitamins-Minerals (MULTIVITAMIN ADULT EXTRA C PO) 1 TABLET DAILY      polyethylene glycol (MIRALAX) 17 g packet Take by mouth      Probiotic Product (SOLUBLE FIBER/PROBIOTICS PO) Take by mouth      simvastatin (ZOCOR) 10 mg tablet Take 1 tablet (10 mg total) by mouth daily 90 tablet 3    spironolactone (ALDACTONE) 25 mg tablet Take 0 5 tablets (12 5 mg total) by mouth daily 45 tablet 3    sulfamethoxazole-trimethoprim (Bactrim DS) 800-160 mg per tablet Take 1 tablet by mouth daily 90 tablet 3    tamsulosin (FLOMAX) 0 4 mg Daily with dinner 90 capsule 3     No current facility-administered medications for this visit  Allergies   Allergen Reactions    Ace Inhibitors Cough    Fosinopril Cough     Cough after years of being on the medication      Immunizations:     Immunization History   Administered Date(s) Administered    INFLUENZA 09/15/2018    Influenza Split High Dose Preservative Free IM 10/02/2015, 10/17/2017, 09/21/2019    Influenza, high dose seasonal 0 7 mL 08/30/2020    Influenza, seasonal, injectable 09/15/2010, 09/03/2011, 10/01/2012, 08/23/2013    Pneumococcal Conjugate 13-Valent 10/17/2017    Pneumococcal Polysaccharide PPV23 01/01/2006, 11/10/2016    SARS-CoV-2 / COVID-19 mRNA IM (Alverna Justice) 01/22/2021, 02/17/2021      Health Maintenance: There are no preventive care reminders to display for this patient        Topic Date Due    Meningococcal ACWY Vaccine (1 - Risk start before 7 months 4-dose series) Never done    HIB Vaccine (1 of 1 - Risk 1-dose series) Never done    DTaP,Tdap,and Td Vaccines (1 - Tdap) Never done      Medicare Health Risk Assessment:     /82 (BP Location: Left arm, Patient Position: Sitting, Cuff Size: Standard)   Pulse 76   Ht 5' 3" (1 6 m)   Wt 98 9 kg (218 lb)   BMI 38 62 kg/m²      Gunnar Sevilla is here for his Subsequent Wellness visit  Health Risk Assessment:   Patient rates overall health as good  Patient feels that their physical health rating is same  Patient is satisfied with their life  Eyesight was rated as same  Hearing was rated as same  Patient feels that their emotional and mental health rating is same  Patients states they are never, rarely angry  Patient states they are sometimes unusually tired/fatigued  Pain experienced in the last 7 days has been some  Patient's pain rating has been 4/10  Patient states that he has experienced no weight loss or gain in last 6 months  This is foot  Depression Screening:   PHQ-2 Score: 1      Fall Risk Screening: In the past year, patient has experienced: history of falling in past year    Number of falls: 1  Injured during fall?: No    Feels unsteady when standing or walking?: Yes    Worried about falling?: No      Home Safety:  Patient has trouble with stairs inside or outside of their home  Patient has working smoke alarms and has no working carbon monoxide detector  Home safety hazards include: none  Nutrition:   Current diet is Regular  Medications:   Patient is currently taking over-the-counter supplements  OTC medications include: see medication list  Patient is able to manage medications  Activities of Daily Living (ADLs)/Instrumental Activities of Daily Living (IADLs):   Walk and transfer into and out of bed and chair?: Yes  Dress and groom yourself?: Yes    Bathe or shower yourself?: Yes    Feed yourself?  Yes  Do your laundry/housekeeping?: Yes  Manage your money, pay your bills and track your expenses?: Yes  Make your own meals?: Yes    Do your own shopping?: Yes    Previous Hospitalizations:   Any hospitalizations or ED visits within the last 12 months?: No    How many hospitalizations have you had in the last year?: 1-2    Advance Care Planning:   Living will: Yes    Durable POA for healthcare: Yes    Advanced directive: Yes      Cognitive Screening:   Provider or family/friend/caregiver concerned regarding cognition?: No    PREVENTIVE SCREENINGS      Cardiovascular Screening:    General: Screening Not Indicated and History Lipid Disorder      Diabetes Screening:     General: Screening Current      Prostate Cancer Screening:    General: Screening Not Indicated      Abdominal Aortic Aneurysm (AAA) Screening:        General: Screening Not Indicated      Lung Cancer Screening:     General: Screening Not Indicated      Hepatitis C Screening:    General: Screening Not Indicated      Preventive Screening Comments: Exercise-some leg lifts with weights and back exercises    Screening, Brief Intervention, and Referral to Treatment (SBIRT)    Screening  Typical number of drinks in a day: 0  Typical number of drinks in a week: 0  Interpretation: Low risk drinking behavior  Single Item Drug Screening:  How often have you used an illegal drug (including marijuana) or a prescription medication for non-medical reasons in the past year? never    Single Item Drug Screen Score: 0  Interpretation: Negative screen for possible drug use disorder    Other Counseling Topics:   Up to date on vaccines  BMI Counseling: Body mass index is 38 62 kg/m²  The BMI is above normal  Nutrition recommendations include reducing portion sizes  Radha Palm MD     Falls Plan of Care: Balance, strength, and gait training instructions were provided

## 2021-04-16 NOTE — PATIENT INSTRUCTIONS
Medicare Preventive Visit Patient Instructions  Thank you for completing your Welcome to Medicare Visit or Medicare Annual Wellness Visit today  Your next wellness visit will be due in one year (4/17/2022)  The screening/preventive services that you may require over the next 5-10 years are detailed below  Some tests may not apply to you based off risk factors and/or age  Screening tests ordered at today's visit but not completed yet may show as past due  Also, please note that scanned in results may not display below  Preventive Screenings:  Service Recommendations Previous Testing/Comments   Colorectal Cancer Screening  · Colonoscopy    · Fecal Occult Blood Test (FOBT)/Fecal Immunochemical Test (FIT)  · Fecal DNA/Cologuard Test  · Flexible Sigmoidoscopy Age: 54-65 years old   Colonoscopy: every 10 years (May be performed more frequently if at higher risk)  OR  FOBT/FIT: every 1 year  OR  Cologuard: every 3 years  OR  Sigmoidoscopy: every 5 years  Screening may be recommended earlier than age 48 if at higher risk for colorectal cancer  Also, an individualized decision between you and your healthcare provider will decide whether screening between the ages of 74-80 would be appropriate   Colonoscopy: 07/20/2017  FOBT/FIT: Not on file  Cologuard: Not on file  Sigmoidoscopy: Not on file          Prostate Cancer Screening Individualized decision between patient and health care provider in men between ages of 53-78   Medicare will cover every 12 months beginning on the day after your 50th birthday PSA: 1 2 ng/mL     Screening Not Indicated     Hepatitis C Screening Once for adults born between 1945 and 1965  More frequently in patients at high risk for Hepatitis C Hep C Antibody: Not on file        Diabetes Screening 1-2 times per year if you're at risk for diabetes or have pre-diabetes Fasting glucose: 106 mg/dL   A1C: No results in last 5 years    Screening Current   Cholesterol Screening Once every 5 years if you don't have a lipid disorder  May order more often based on risk factors  Lipid panel: 04/06/2021    Screening Not Indicated  History Lipid Disorder      Other Preventive Screenings Covered by Medicare:  1  Abdominal Aortic Aneurysm (AAA) Screening: covered once if your at risk  You're considered to be at risk if you have a family history of AAA or a male between the age of 73-68 who smoking at least 100 cigarettes in your lifetime  2  Lung Cancer Screening: covers low dose CT scan once per year if you meet all of the following conditions: (1) Age 50-69; (2) No signs or symptoms of lung cancer; (3) Current smoker or have quit smoking within the last 15 years; (4) You have a tobacco smoking history of at least 30 pack years (packs per day x number of years you smoked); (5) You get a written order from a healthcare provider  3  Glaucoma Screening: covered annually if you're considered high risk: (1) You have diabetes OR (2) Family history of glaucoma OR (3)  aged 48 and older OR (3)  American aged 72 and older  3  Osteoporosis Screening: covered every 2 years if you meet one of the following conditions: (1) Have a vertebral abnormality; (2) On glucocorticoid therapy for more than 3 months; (3) Have primary hyperparathyroidism; (4) On osteoporosis medications and need to assess response to drug therapy  5  HIV Screening: covered annually if you're between the age of 12-76  Also covered annually if you are younger than 13 and older than 72 with risk factors for HIV infection  For pregnant patients, it is covered up to 3 times per pregnancy      Immunizations:  Immunization Recommendations   Influenza Vaccine Annual influenza vaccination during flu season is recommended for all persons aged >= 6 months who do not have contraindications   Pneumococcal Vaccine (Prevnar and Pneumovax)  * Prevnar = PCV13  * Pneumovax = PPSV23 Adults 25-60 years old: 1-3 doses may be recommended based on certain risk factors  Adults 72 years old: Prevnar (PCV13) vaccine recommended followed by Pneumovax (PPSV23) vaccine  If already received PPSV23 since turning 65, then PCV13 recommended at least one year after PPSV23 dose  Hepatitis B Vaccine 3 dose series if at intermediate or high risk (ex: diabetes, end stage renal disease, liver disease)   Tetanus (Td) Vaccine - COST NOT COVERED BY MEDICARE PART B Following completion of primary series, a booster dose should be given every 10 years to maintain immunity against tetanus  Td may also be given as tetanus wound prophylaxis  Tdap Vaccine - COST NOT COVERED BY MEDICARE PART B Recommended at least once for all adults  For pregnant patients, recommended with each pregnancy  Shingles Vaccine (Shingrix) - COST NOT COVERED BY MEDICARE PART B  2 shot series recommended in those aged 48 and above     Health Maintenance Due:  There are no preventive care reminders to display for this patient  Immunizations Due:      Topic Date Due    Meningococcal ACWY Vaccine (1 - Risk start before 7 months 4-dose series) Never done    HIB Vaccine (1 of 1 - Risk 1-dose series) Never done    DTaP,Tdap,and Td Vaccines (1 - Tdap) Never done     Advance Directives   What are advance directives? Advance directives are legal documents that state your wishes and plans for medical care  These plans are made ahead of time in case you lose your ability to make decisions for yourself  Advance directives can apply to any medical decision, such as the treatments you want, and if you want to donate organs  What are the types of advance directives? There are many types of advance directives, and each state has rules about how to use them  You may choose a combination of any of the following:  · Living will: This is a written record of the treatment you want  You can also choose which treatments you do not want, which to limit, and which to stop at a certain time   This includes surgery, medicine, IV fluid, and tube feedings  · Durable power of  for healthcare [facility]): This is a written record that states who you want to make healthcare choices for you when you are unable to make them for yourself  This person, called a proxy, is usually a family member or a friend  You may choose more than 1 proxy  · Do not resuscitate (DNR) order:  A DNR order is used in case your heart stops beating or you stop breathing  It is a request not to have certain forms of treatment, such as CPR  A DNR order may be included in other types of advance directives  · Medical directive: This covers the care that you want if you are in a coma, near death, or unable to make decisions for yourself  You can list the treatments you want for each condition  Treatment may include pain medicine, surgery, blood transfusions, dialysis, IV or tube feedings, and a ventilator (breathing machine)  · Values history: This document has questions about your views, beliefs, and how you feel and think about life  This information can help others choose the care that you would choose  Why are advance directives important? An advance directive helps you control your care  Although spoken wishes may be used, it is better to have your wishes written down  Spoken wishes can be misunderstood, or not followed  Treatments may be given even if you do not want them  An advance directive may make it easier for your family to make difficult choices about your care  Fall Prevention    Fall prevention  includes ways to make your home and other areas safer  It also includes ways you can move more carefully to prevent a fall  Health conditions that cause changes in your blood pressure, vision, or muscle strength and coordination may increase your risk for falls  Medicines may also increase your risk for falls if they make you dizzy, weak, or sleepy  Fall prevention tips:   · Stand or sit up slowly  · Use assistive devices as directed      · Wear shoes that fit well and have soles that   · Wear a personal alarm  · Stay active  · Manage your medical conditions  Home Safety Tips:  · Add items to prevent falls in the bathroom  · Keep paths clear  · Install bright lights in your home  · Keep items you use often on shelves within reach  · Paint or place reflective tape on the edges of your stairs  Weight Management   Why it is important to manage your weight:  Being overweight increases your risk of health conditions such as heart disease, high blood pressure, type 2 diabetes, and certain types of cancer  It can also increase your risk for osteoarthritis, sleep apnea, and other respiratory problems  Aim for a slow, steady weight loss  Even a small amount of weight loss can lower your risk of health problems  How to lose weight safely:  A safe and healthy way to lose weight is to eat fewer calories and get regular exercise  You can lose up about 1 pound a week by decreasing the number of calories you eat by 500 calories each day  Healthy meal plan for weight management:  A healthy meal plan includes a variety of foods, contains fewer calories, and helps you stay healthy  A healthy meal plan includes the following:  · Eat whole-grain foods more often  A healthy meal plan should contain fiber  Fiber is the part of grains, fruits, and vegetables that is not broken down by your body  Whole-grain foods are healthy and provide extra fiber in your diet  Some examples of whole-grain foods are whole-wheat breads and pastas, oatmeal, brown rice, and bulgur  · Eat a variety of vegetables every day  Include dark, leafy greens such as spinach, kale, troy greens, and mustard greens  Eat yellow and orange vegetables such as carrots, sweet potatoes, and winter squash  · Eat a variety of fruits every day  Choose fresh or canned fruit (canned in its own juice or light syrup) instead of juice  Fruit juice has very little or no fiber    · Eat low-fat dairy foods  Drink fat-free (skim) milk or 1% milk  Eat fat-free yogurt and low-fat cottage cheese  Try low-fat cheeses such as mozzarella and other reduced-fat cheeses  · Choose meat and other protein foods that are low in fat  Choose beans or other legumes such as split peas or lentils  Choose fish, skinless poultry (chicken or turkey), or lean cuts of red meat (beef or pork)  Before you cook meat or poultry, cut off any visible fat  · Use less fat and oil  Try baking foods instead of frying them  Add less fat, such as margarine, sour cream, regular salad dressing and mayonnaise to foods  Eat fewer high-fat foods  Some examples of high-fat foods include french fries, doughnuts, ice cream, and cakes  · Eat fewer sweets  Limit foods and drinks that are high in sugar  This includes candy, cookies, regular soda, and sweetened drinks  Exercise:  Exercise at least 30 minutes per day on most days of the week  Some examples of exercise include walking, biking, dancing, and swimming  You can also fit in more physical activity by taking the stairs instead of the elevator or parking farther away from stores  Ask your healthcare provider about the best exercise plan for you  © Copyright [address] 2018 Information is for End User's use only and may not be sold, redistributed or otherwise used for commercial purposes   All illustrations and images included in CareNotes® are the copyrighted property of A D A M , Inc  or [address] [place] [address]

## 2021-06-29 DIAGNOSIS — I10 ESSENTIAL HYPERTENSION: ICD-10-CM

## 2021-06-29 RX ORDER — SPIRONOLACTONE 25 MG/1
TABLET ORAL
Qty: 45 TABLET | Refills: 3 | Status: SHIPPED | OUTPATIENT
Start: 2021-06-29 | End: 2022-03-29 | Stop reason: SDUPTHER

## 2021-09-14 ENCOUNTER — OFFICE VISIT (OUTPATIENT)
Dept: URGENT CARE | Facility: CLINIC | Age: 81
End: 2021-09-14
Payer: COMMERCIAL

## 2021-09-14 VITALS
WEIGHT: 215 LBS | HEIGHT: 63 IN | SYSTOLIC BLOOD PRESSURE: 119 MMHG | HEART RATE: 87 BPM | BODY MASS INDEX: 38.09 KG/M2 | OXYGEN SATURATION: 93 % | TEMPERATURE: 97.4 F | RESPIRATION RATE: 20 BRPM | DIASTOLIC BLOOD PRESSURE: 71 MMHG

## 2021-09-14 DIAGNOSIS — H65.03 NON-RECURRENT ACUTE SEROUS OTITIS MEDIA OF BOTH EARS: Primary | ICD-10-CM

## 2021-09-14 PROCEDURE — S9083 URGENT CARE CENTER GLOBAL: HCPCS | Performed by: FAMILY MEDICINE

## 2021-09-14 PROCEDURE — 99213 OFFICE O/P EST LOW 20 MIN: CPT | Performed by: FAMILY MEDICINE

## 2021-09-14 RX ORDER — AMOXICILLIN 500 MG/1
500 CAPSULE ORAL EVERY 12 HOURS SCHEDULED
Qty: 14 CAPSULE | Refills: 0 | Status: SHIPPED | OUTPATIENT
Start: 2021-09-14 | End: 2021-09-21

## 2021-09-14 NOTE — PROGRESS NOTES
[facility]        NAME: Silverio Hardy is a 68 y o  male  : 1940    MRN: [id number]  DATE: 2021  TIME: 10:26 AM    Assessment and Plan   Non-recurrent acute serous otitis media of both ears [H65 03]  1  Non-recurrent acute serous otitis media of both ears  amoxicillin (AMOXIL) 500 mg capsule         Patient Instructions       Follow up with PCP in 3-5 days  Proceed to  ER if symptoms worsen  Chief Complaint     Chief Complaint   Patient presents with   Dany Santa     started yesterday, left ear pain, painful and itchym no draiange, no meds used, no hx of ear wax impactions  no fevers or cold symptoms  History of Present Illness        21-year-old male with left ear pain for 1 week  Review of Systems   Review of Systems   Constitutional: Negative  HENT: Positive for ear pain  Eyes: Negative  Respiratory: Negative  Cardiovascular: Negative  Gastrointestinal: Negative  Genitourinary: Negative  Musculoskeletal: Negative  Skin: Negative  Allergic/Immunologic: Negative  Neurological: Negative  Hematological: Negative  Psychiatric/Behavioral: Negative            Current Medications       Current Outpatient Medications:     apixaban (Eliquis) 2 5 mg, Take 1 tablet (2 5 mg total) by mouth 2 (two) times a day, Disp: 180 tablet, Rfl: 3    docusate sodium (COLACE) 100 mg capsule, Take by mouth, Disp: , Rfl:     dutasteride (AVODART) 0 5 mg capsule, TAKE 1 CAPSULE EVERY DAY, Disp: 90 capsule, Rfl: 3    ferrous sulfate 325 (65 Fe) mg tablet, Take 325 mg by mouth daily, Disp: , Rfl:     furosemide (LASIX) 80 mg tablet, TAKE 1 TABLET BY MOUTH EVERY DAY, Disp: 90 tablet, Rfl: 3    lansoprazole (PREVACID) 30 mg capsule, Take 1 capsule (30 mg total) by mouth daily, Disp: 90 capsule, Rfl: 3    Multiple Vitamins-Minerals (MULTIVITAMIN ADULT EXTRA C PO), 1 TABLET DAILY, Disp: , Rfl:     polyethylene glycol (MIRALAX) 17 g packet, Take by mouth, Disp: , Rfl:     Probiotic Product (SOLUBLE FIBER/PROBIOTICS PO), Take by mouth, Disp: , Rfl:     simvastatin (ZOCOR) 10 mg tablet, Take 1 tablet (10 mg total) by mouth daily, Disp: 90 tablet, Rfl: 3    spironolactone (ALDACTONE) 25 mg tablet, TAKE 1/2 TABLET BY MOUTH EVERY DAY, Disp: 45 tablet, Rfl: 3    sulfamethoxazole-trimethoprim (Bactrim DS) 800-160 mg per tablet, Take 1 tablet by mouth daily, Disp: 90 tablet, Rfl: 3    tamsulosin (FLOMAX) 0 4 mg, Daily with dinner, Disp: 90 capsule, Rfl: 3    amoxicillin (AMOXIL) 500 mg capsule, Take 1 capsule (500 mg total) by mouth every 12 (twelve) hours for 7 days, Disp: 14 capsule, Rfl: 0    Current Allergies     Allergies as of 09/14/2021 - Reviewed 09/14/2021   Allergen Reaction Noted    Ace inhibitors Cough 04/02/2014    Fosinopril Cough 09/29/2008            The following portions of the patient's history were reviewed and updated as appropriate: allergies, current medications, past family history, past medical history, past social history, past surgical history and problem list      Past Medical History:   Diagnosis Date    Basal cell carcinoma     nose     Benign skin lesion     BPH (benign prostatic hyperplasia)     Diverticular disease of colon     DVT (deep venous thrombosis) (HCC)     Elevated PSA     History of blood clots     Hypotension     Intestinal obstruction (HCC)     Social anxiety disorder     last assessed 1/12/17, resolved 10/17/17    Ventral hernia        Past Surgical History:   Procedure Laterality Date    APPENDECTOMY      BASAL CELL CARCINOMA EXCISION      Nose     HERNIA REPAIR      9 hernia repairs 1995 - 2012    HIATAL HERNIA REPAIR  1995    with speenectomy    REPLACEMENT TOTAL KNEE Bilateral     SPLENECTOMY  1995    TONSILLECTOMY         Family History   Problem Relation Age of Onset    Other Mother         Bleeding disorder     Stroke Mother         CVA    Diabetes Mother     Heart disease Mother     Stroke Father         CVA    Heart disease Father     Heart attack Father     Substance Abuse Neg Hx     Mental illness Neg Hx          Medications have been verified  Objective   /71   Pulse 87   Temp (!) 97 4 °F (36 3 °C) (Tympanic)   Resp 20   Ht 5' 3" (1 6 m)   Wt 97 5 kg (215 lb)   SpO2 93% Comment: according to pt this is his normal o2 stat  BMI 38 09 kg/m²   No LMP for male patient         Physical Exam     Physical Exam

## 2021-09-27 DIAGNOSIS — N40.0 BENIGN PROSTATIC HYPERPLASIA WITHOUT LOWER URINARY TRACT SYMPTOMS: ICD-10-CM

## 2021-09-27 RX ORDER — DUTASTERIDE 0.5 MG/1
0.5 CAPSULE, LIQUID FILLED ORAL DAILY
Qty: 90 CAPSULE | Refills: 3 | Status: SHIPPED | OUTPATIENT
Start: 2021-09-27

## 2021-10-23 ENCOUNTER — IMMUNIZATIONS (OUTPATIENT)
Dept: FAMILY MEDICINE CLINIC | Facility: HOSPITAL | Age: 81
End: 2021-10-23

## 2021-10-23 DIAGNOSIS — Z23 ENCOUNTER FOR IMMUNIZATION: Primary | ICD-10-CM

## 2021-11-12 ENCOUNTER — TELEPHONE (OUTPATIENT)
Dept: FAMILY MEDICINE CLINIC | Facility: HOSPITAL | Age: 81
End: 2021-11-12

## 2021-11-12 DIAGNOSIS — I10 ESSENTIAL HYPERTENSION: ICD-10-CM

## 2021-11-12 RX ORDER — FUROSEMIDE 80 MG
TABLET ORAL
Qty: 90 TABLET | Refills: 3 | Status: SHIPPED | OUTPATIENT
Start: 2021-11-12

## 2022-01-03 DIAGNOSIS — K21.9 GASTROESOPHAGEAL REFLUX DISEASE WITHOUT ESOPHAGITIS: ICD-10-CM

## 2022-01-03 RX ORDER — LANSOPRAZOLE 30 MG/1
CAPSULE, DELAYED RELEASE ORAL
Qty: 90 CAPSULE | Refills: 3 | Status: SHIPPED | OUTPATIENT
Start: 2022-01-03

## 2022-03-29 DIAGNOSIS — I82.5Y3 CHRONIC DEEP VEIN THROMBOSIS (DVT) OF PROXIMAL VEIN OF BOTH LOWER EXTREMITIES (HCC): ICD-10-CM

## 2022-03-29 DIAGNOSIS — E78.00 PURE HYPERCHOLESTEROLEMIA: ICD-10-CM

## 2022-03-29 DIAGNOSIS — T84.53XA INFECTION ASSOCIATED WITH INTERNAL RIGHT KNEE PROSTHESIS, INITIAL ENCOUNTER (HCC): Primary | ICD-10-CM

## 2022-03-29 DIAGNOSIS — I10 ESSENTIAL HYPERTENSION: ICD-10-CM

## 2022-03-29 RX ORDER — SULFAMETHOXAZOLE AND TRIMETHOPRIM 800; 160 MG/1; MG/1
1 TABLET ORAL DAILY
Qty: 90 TABLET | Refills: 1 | Status: SHIPPED | OUTPATIENT
Start: 2022-03-29 | End: 2022-06-27

## 2022-03-29 RX ORDER — SIMVASTATIN 10 MG
10 TABLET ORAL DAILY
Qty: 90 TABLET | Refills: 3 | Status: SHIPPED | OUTPATIENT
Start: 2022-03-29

## 2022-03-29 RX ORDER — SPIRONOLACTONE 25 MG/1
12.5 TABLET ORAL DAILY
Qty: 45 TABLET | Refills: 3 | Status: SHIPPED | OUTPATIENT
Start: 2022-03-29

## 2022-04-05 ENCOUNTER — APPOINTMENT (OUTPATIENT)
Dept: LAB | Facility: HOSPITAL | Age: 82
End: 2022-04-05
Attending: INTERNAL MEDICINE
Payer: COMMERCIAL

## 2022-04-05 DIAGNOSIS — E21.3 MILD HYPERPARATHYROIDISM (HCC): ICD-10-CM

## 2022-04-05 LAB
ALBUMIN SERPL BCP-MCNC: 3.5 G/DL (ref 3.5–5)
ALP SERPL-CCNC: 122 U/L (ref 46–116)
ALT SERPL W P-5'-P-CCNC: 35 U/L (ref 12–78)
ANION GAP SERPL CALCULATED.3IONS-SCNC: 4 MMOL/L (ref 4–13)
AST SERPL W P-5'-P-CCNC: 26 U/L (ref 5–45)
BILIRUB SERPL-MCNC: 0.58 MG/DL (ref 0.2–1)
BUN SERPL-MCNC: 28 MG/DL (ref 5–25)
CALCIUM SERPL-MCNC: 10.6 MG/DL (ref 8.3–10.1)
CHLORIDE SERPL-SCNC: 105 MMOL/L (ref 100–108)
CO2 SERPL-SCNC: 30 MMOL/L (ref 21–32)
CREAT SERPL-MCNC: 1.03 MG/DL (ref 0.6–1.3)
GFR SERPL CREATININE-BSD FRML MDRD: 68 ML/MIN/1.73SQ M
GLUCOSE P FAST SERPL-MCNC: 123 MG/DL (ref 65–99)
POTASSIUM SERPL-SCNC: 4 MMOL/L (ref 3.5–5.3)
PROT SERPL-MCNC: 6.7 G/DL (ref 6.4–8.2)
SODIUM SERPL-SCNC: 139 MMOL/L (ref 136–145)

## 2022-04-05 PROCEDURE — 80053 COMPREHEN METABOLIC PANEL: CPT

## 2022-04-05 PROCEDURE — 36415 COLL VENOUS BLD VENIPUNCTURE: CPT

## 2022-04-08 ENCOUNTER — RA CDI HCC (OUTPATIENT)
Dept: OTHER | Facility: HOSPITAL | Age: 82
End: 2022-04-08

## 2022-04-08 NOTE — PROGRESS NOTES
[facility]  coding opportunities       Chart reviewed, no opportunity found:   [address]        Patients Insurance     Medicare Insurance: [employer]age

## 2022-04-15 ENCOUNTER — OFFICE VISIT (OUTPATIENT)
Dept: FAMILY MEDICINE CLINIC | Facility: HOSPITAL | Age: 82
End: 2022-04-15
Payer: COMMERCIAL

## 2022-04-15 VITALS
SYSTOLIC BLOOD PRESSURE: 130 MMHG | HEIGHT: 63 IN | DIASTOLIC BLOOD PRESSURE: 78 MMHG | BODY MASS INDEX: 38.31 KG/M2 | WEIGHT: 216.2 LBS | HEART RATE: 67 BPM | OXYGEN SATURATION: 95 %

## 2022-04-15 DIAGNOSIS — K21.9 GERD WITHOUT ESOPHAGITIS: ICD-10-CM

## 2022-04-15 DIAGNOSIS — E66.01 OBESITY, MORBID (HCC): ICD-10-CM

## 2022-04-15 DIAGNOSIS — E21.3 MILD HYPERPARATHYROIDISM (HCC): ICD-10-CM

## 2022-04-15 DIAGNOSIS — T84.53XS INFECTION ASSOCIATED WITH INTERNAL RIGHT KNEE PROSTHESIS, SEQUELA: ICD-10-CM

## 2022-04-15 DIAGNOSIS — I87.8 VENOUS STASIS: ICD-10-CM

## 2022-04-15 DIAGNOSIS — R73.01 IFG (IMPAIRED FASTING GLUCOSE): ICD-10-CM

## 2022-04-15 DIAGNOSIS — K43.2 INCISIONAL HERNIA, WITHOUT OBSTRUCTION OR GANGRENE: ICD-10-CM

## 2022-04-15 DIAGNOSIS — E78.00 PURE HYPERCHOLESTEROLEMIA: ICD-10-CM

## 2022-04-15 DIAGNOSIS — I82.5Y3 CHRONIC DEEP VEIN THROMBOSIS (DVT) OF PROXIMAL VEIN OF BOTH LOWER EXTREMITIES (HCC): Primary | ICD-10-CM

## 2022-04-15 PROBLEM — H65.03 NON-RECURRENT ACUTE SEROUS OTITIS MEDIA OF BOTH EARS: Status: RESOLVED | Noted: 2021-09-14 | Resolved: 2022-04-15

## 2022-04-15 PROCEDURE — 1036F TOBACCO NON-USER: CPT | Performed by: INTERNAL MEDICINE

## 2022-04-15 PROCEDURE — 1160F RVW MEDS BY RX/DR IN RCRD: CPT | Performed by: INTERNAL MEDICINE

## 2022-04-15 PROCEDURE — 99214 OFFICE O/P EST MOD 30 MIN: CPT | Performed by: INTERNAL MEDICINE

## 2022-04-15 NOTE — PROGRESS NOTES
Assessment/Plan:       Diagnoses and all orders for this visit:    Chronic deep vein thrombosis (DVT) of proximal vein of both lower extremities (HCC)    GERD without esophagitis    Pure hypercholesterolemia  -     Lipid Panel with Direct LDL reflex; Future    Mild hyperparathyroidism (HCC)  -     CBC; Future  -     PTH, intact; Future    IFG (impaired fasting glucose)  -     Hemoglobin A1C; Future    Obesity, morbid (HCC)    Incisional hernia, without obstruction or gangrene    Infection associated with internal right knee prosthesis, sequela    Venous stasis          All of the above diagnoses have been assessed  Additional COMMENTS/PLAN: needs to get BW completed      Subjective:      Patient ID: Aga Lopez is a 80 y o  male  HPI     Has developed some upper abd pain in area of previous surgery  Local edema-still with stockings  On diuretics  Hyperlipidemia- The patient is compliant with diet and taking meds  The patient understands the efficacy of the treatment in vascular prevention  Infected knee-on maintenance  Bactrim  The following portions of the patient's history were revised and updated as appropriate: Problem list, allergies, med list, FH, SH, Past medical and surgical histories      Current Outpatient Medications   Medication Sig Dispense Refill    apixaban (Eliquis) 2 5 mg Take 1 tablet (2 5 mg total) by mouth 2 (two) times a day 180 tablet 3    dutasteride (AVODART) 0 5 mg capsule Take 1 capsule (0 5 mg total) by mouth daily 90 capsule 3    ferrous sulfate 325 (65 Fe) mg tablet Take 325 mg by mouth daily      furosemide (LASIX) 80 mg tablet TAKE 1 TABLET BY MOUTH EVERY DAY 90 tablet 3    lansoprazole (PREVACID) 30 mg capsule TAKE 1 CAPSULE BY MOUTH EVERY DAY 90 capsule 3    Multiple Vitamins-Minerals (MULTIVITAMIN ADULT EXTRA C PO) 1 TABLET DAILY      polyethylene glycol (MIRALAX) 17 g packet Take by mouth      Probiotic Product (SOLUBLE FIBER/PROBIOTICS PO) Take by mouth      simvastatin (ZOCOR) 10 mg tablet Take 1 tablet (10 mg total) by mouth daily 90 tablet 3    spironolactone (ALDACTONE) 25 mg tablet Take 0 5 tablets (12 5 mg total) by mouth daily 45 tablet 3    sulfamethoxazole-trimethoprim (BACTRIM DS) 800-160 mg per tablet Take 1 tablet by mouth daily for 90 doses 90 tablet 1    tamsulosin (FLOMAX) 0 4 mg Daily with dinner 90 capsule 3     No current facility-administered medications for this visit  Review of Systems   All other systems reviewed and are negative  Objective:    /78   Pulse 67   Ht 5' 3" (1 6 m)   Wt 98 1 kg (216 lb 3 2 oz)   SpO2 95%   BMI 38 30 kg/m²     BP Readings from Last 3 Encounters:   04/15/22 130/78   09/14/21 119/71   04/16/21 130/72                  Wt Readings from Last 3 Encounters:   04/15/22 98 1 kg (216 lb 3 2 oz)   09/14/21 97 5 kg (215 lb)   04/16/21 98 9 kg (218 lb)         Physical Exam  Vitals reviewed  Constitutional:       Appearance: Normal appearance  Neck:      Comments: Dec ROM  Cardiovascular:      Rate and Rhythm: Normal rate and regular rhythm  Heart sounds: Normal heart sounds  Pulmonary:      Effort: Pulmonary effort is normal       Breath sounds: Normal breath sounds  Abdominal:      Comments: Dementia abdomen reveals normal bowel sounds  There is obvious defects were previous mesh and surgeries are noted  There is no focal distension  There is no significant point tenderness or rebound  Musculoskeletal:      Comments: Bilateral edema left greater than right   Neurological:      Mental Status: He is alert             Appointment on 04/05/2022   Component Date Value Ref Range Status    Sodium 04/05/2022 139  136 - 145 mmol/L Final    Potassium 04/05/2022 4 0  3 5 - 5 3 mmol/L Final    Chloride 04/05/2022 105  100 - 108 mmol/L Final    CO2 04/05/2022 30  21 - 32 mmol/L Final    ANION GAP 04/05/2022 4  4 - 13 mmol/L Final    BUN 04/05/2022 28* 5 - 25 mg/dL Final    Creatinine 04/05/2022 1 03  0 60 - 1 30 mg/dL Final    Standardized to IDMS reference method    Glucose, Fasting 04/05/2022 123* 65 - 99 mg/dL Final    Specimen collection should occur prior to Sulfasalazine administration due to the potential for falsely depressed results  Specimen collection should occur prior to Sulfapyridine administration due to the potential for falsely elevated results   Calcium 04/05/2022 10 6* 8 3 - 10 1 mg/dL Final    AST 04/05/2022 26  5 - 45 U/L Final    Specimen collection should occur prior to Sulfasalazine administration due to the potential for falsely depressed results   ALT 04/05/2022 35  12 - 78 U/L Final    Specimen collection should occur prior to Sulfasalazine and/or Sulfapyridine administration due to the potential for falsely depressed results   Alkaline Phosphatase 04/05/2022 122* 46 - 116 U/L Final    Total Protein 04/05/2022 6 7  6 4 - 8 2 g/dL Final    Albumin 04/05/2022 3 5  3 5 - 5 0 g/dL Final    Total Bilirubin 04/05/2022 0 58  0 20 - 1 00 mg/dL Final    Use of this assay is not recommended for patients undergoing treatment with eltrombopag due to the potential for falsely elevated results   eGFR 04/05/2022 68  ml/min/1 73sq m Final         Elvi Delgado MD    Some or all of this note was generated with a voice recognition dictation system and therefore my contain grammatical or spelling errors

## 2022-05-08 DIAGNOSIS — N40.0 BENIGN PROSTATIC HYPERPLASIA WITHOUT LOWER URINARY TRACT SYMPTOMS: ICD-10-CM

## 2022-05-08 RX ORDER — TAMSULOSIN HYDROCHLORIDE 0.4 MG/1
CAPSULE ORAL
Qty: 90 CAPSULE | Refills: 3 | Status: SHIPPED | OUTPATIENT
Start: 2022-05-08 | End: 2022-05-09 | Stop reason: SDUPTHER

## 2022-05-09 DIAGNOSIS — N40.0 BENIGN PROSTATIC HYPERPLASIA WITHOUT LOWER URINARY TRACT SYMPTOMS: ICD-10-CM

## 2022-05-09 RX ORDER — TAMSULOSIN HYDROCHLORIDE 0.4 MG/1
CAPSULE ORAL
Qty: 90 CAPSULE | Refills: 3 | Status: SHIPPED | OUTPATIENT
Start: 2022-05-09 | End: 2022-05-10 | Stop reason: SDUPTHER

## 2022-05-10 DIAGNOSIS — N40.0 BENIGN PROSTATIC HYPERPLASIA WITHOUT LOWER URINARY TRACT SYMPTOMS: ICD-10-CM

## 2022-05-10 RX ORDER — TAMSULOSIN HYDROCHLORIDE 0.4 MG/1
CAPSULE ORAL
Qty: 90 CAPSULE | Refills: 3 | Status: SHIPPED | OUTPATIENT
Start: 2022-05-10

## 2022-05-24 ENCOUNTER — LAB (OUTPATIENT)
Dept: LAB | Facility: HOSPITAL | Age: 82
End: 2022-05-24
Attending: INTERNAL MEDICINE
Payer: COMMERCIAL

## 2022-05-24 DIAGNOSIS — E21.3 MILD HYPERPARATHYROIDISM (HCC): ICD-10-CM

## 2022-05-24 DIAGNOSIS — R73.01 IFG (IMPAIRED FASTING GLUCOSE): ICD-10-CM

## 2022-05-24 DIAGNOSIS — E78.00 PURE HYPERCHOLESTEROLEMIA: ICD-10-CM

## 2022-05-24 LAB
CHOLEST SERPL-MCNC: 144 MG/DL
ERYTHROCYTE [DISTWIDTH] IN BLOOD BY AUTOMATED COUNT: 15.1 % (ref 11.6–15.1)
EST. AVERAGE GLUCOSE BLD GHB EST-MCNC: 117 MG/DL
HBA1C MFR BLD: 5.7 %
HCT VFR BLD AUTO: 49.9 % (ref 36.5–49.3)
HDLC SERPL-MCNC: 40 MG/DL
HGB BLD-MCNC: 16.5 G/DL (ref 12–17)
LDLC SERPL CALC-MCNC: 79 MG/DL (ref 0–100)
MCH RBC QN AUTO: 31.9 PG (ref 26.8–34.3)
MCHC RBC AUTO-ENTMCNC: 33.1 G/DL (ref 31.4–37.4)
MCV RBC AUTO: 96 FL (ref 82–98)
PLATELET # BLD AUTO: 399 THOUSANDS/UL (ref 149–390)
PMV BLD AUTO: 10.5 FL (ref 8.9–12.7)
PTH-INTACT SERPL-MCNC: 147.3 PG/ML (ref 18.4–80.1)
RBC # BLD AUTO: 5.18 MILLION/UL (ref 3.88–5.62)
TRIGL SERPL-MCNC: 124 MG/DL
WBC # BLD AUTO: 10.06 THOUSAND/UL (ref 4.31–10.16)

## 2022-05-24 PROCEDURE — 85027 COMPLETE CBC AUTOMATED: CPT

## 2022-05-24 PROCEDURE — 83036 HEMOGLOBIN GLYCOSYLATED A1C: CPT

## 2022-05-24 PROCEDURE — 80061 LIPID PANEL: CPT

## 2022-05-24 PROCEDURE — 36415 COLL VENOUS BLD VENIPUNCTURE: CPT

## 2022-05-24 PROCEDURE — 83970 ASSAY OF PARATHORMONE: CPT

## 2022-06-23 ENCOUNTER — TELEPHONE (OUTPATIENT)
Dept: FAMILY MEDICINE CLINIC | Facility: HOSPITAL | Age: 82
End: 2022-06-23

## 2022-06-23 NOTE — TELEPHONE ENCOUNTER
Pt states he has had fluid in his legs for years, and within the last 3 weeks it has disappeared   Just wanted to call with [facility]

## 2022-09-22 DIAGNOSIS — N40.0 BENIGN PROSTATIC HYPERPLASIA WITHOUT LOWER URINARY TRACT SYMPTOMS: ICD-10-CM

## 2022-09-22 RX ORDER — DUTASTERIDE 0.5 MG/1
CAPSULE, LIQUID FILLED ORAL
Qty: 90 CAPSULE | Refills: 3 | Status: SHIPPED | OUTPATIENT
Start: 2022-09-22

## 2022-09-28 DIAGNOSIS — N40.0 BENIGN PROSTATIC HYPERPLASIA WITHOUT LOWER URINARY TRACT SYMPTOMS: ICD-10-CM

## 2022-09-28 DIAGNOSIS — I10 ESSENTIAL HYPERTENSION: ICD-10-CM

## 2022-09-28 DIAGNOSIS — T84.53XA INFECTION ASSOCIATED WITH INTERNAL RIGHT KNEE PROSTHESIS, INITIAL ENCOUNTER (HCC): ICD-10-CM

## 2022-09-29 RX ORDER — FUROSEMIDE 80 MG
TABLET ORAL
Qty: 90 TABLET | Refills: 3 | Status: SHIPPED | OUTPATIENT
Start: 2022-09-29

## 2022-09-29 RX ORDER — DUTASTERIDE 0.5 MG/1
0.5 CAPSULE, LIQUID FILLED ORAL DAILY
Qty: 90 CAPSULE | Refills: 3 | OUTPATIENT
Start: 2022-09-29

## 2022-09-29 RX ORDER — SULFAMETHOXAZOLE AND TRIMETHOPRIM 800; 160 MG/1; MG/1
TABLET ORAL
Qty: 90 TABLET | Refills: 1 | Status: SHIPPED | OUTPATIENT
Start: 2022-09-29 | End: 2022-12-28

## 2022-10-15 ENCOUNTER — APPOINTMENT (EMERGENCY)
Dept: RADIOLOGY | Facility: HOSPITAL | Age: 82
DRG: 871 | End: 2022-10-15
Payer: COMMERCIAL

## 2022-10-15 ENCOUNTER — HOSPITAL ENCOUNTER (INPATIENT)
Facility: HOSPITAL | Age: 82
LOS: 6 days | Discharge: HOME HEALTH | DRG: 871 | End: 2022-10-21
Attending: EMERGENCY MEDICINE | Admitting: HOSPITALIST
Payer: COMMERCIAL

## 2022-10-15 DIAGNOSIS — E87.6 HYPOKALEMIA: ICD-10-CM

## 2022-10-15 DIAGNOSIS — U07.1 COVID-19: Primary | ICD-10-CM

## 2022-10-15 DIAGNOSIS — R06.00 DYSPNEA: ICD-10-CM

## 2022-10-15 PROBLEM — R09.02 HYPOXIA: Status: ACTIVE | Noted: 2022-10-15

## 2022-10-15 PROBLEM — A41.89 SEPSIS DUE TO COVID-19 (HCC): Status: ACTIVE | Noted: 2022-10-15

## 2022-10-15 LAB
ALBUMIN SERPL BCP-MCNC: 3.3 G/DL (ref 3.5–5)
ALP SERPL-CCNC: 140 U/L (ref 46–116)
ALT SERPL W P-5'-P-CCNC: 28 U/L (ref 12–78)
ANION GAP SERPL CALCULATED.3IONS-SCNC: 11 MMOL/L (ref 4–13)
APTT PPP: 30 SECONDS (ref 23–37)
AST SERPL W P-5'-P-CCNC: 18 U/L (ref 5–45)
BASOPHILS # BLD AUTO: 0.05 THOUSANDS/ÂΜL (ref 0–0.1)
BASOPHILS NFR BLD AUTO: 0 % (ref 0–1)
BILIRUB SERPL-MCNC: 0.5 MG/DL (ref 0.2–1)
BUN SERPL-MCNC: 21 MG/DL (ref 5–25)
CALCIUM ALBUM COR SERPL-MCNC: 10.6 MG/DL (ref 8.3–10.1)
CALCIUM SERPL-MCNC: 10 MG/DL (ref 8.3–10.1)
CARDIAC TROPONIN I PNL SERPL HS: 12 NG/L
CHLORIDE SERPL-SCNC: 102 MMOL/L (ref 96–108)
CK SERPL-CCNC: 112 U/L (ref 39–308)
CO2 SERPL-SCNC: 28 MMOL/L (ref 21–32)
CREAT SERPL-MCNC: 1.18 MG/DL (ref 0.6–1.3)
CRP SERPL QL: 49.2 MG/L
D DIMER PPP FEU-MCNC: 0.69 UG/ML FEU
EOSINOPHIL # BLD AUTO: 0.01 THOUSAND/ÂΜL (ref 0–0.61)
EOSINOPHIL NFR BLD AUTO: 0 % (ref 0–6)
ERYTHROCYTE [DISTWIDTH] IN BLOOD BY AUTOMATED COUNT: 15.2 % (ref 11.6–15.1)
FLUAV RNA RESP QL NAA+PROBE: NEGATIVE
FLUBV RNA RESP QL NAA+PROBE: NEGATIVE
GFR SERPL CREATININE-BSD FRML MDRD: 57 ML/MIN/1.73SQ M
GLUCOSE SERPL-MCNC: 122 MG/DL (ref 65–140)
HCT VFR BLD AUTO: 49.2 % (ref 36.5–49.3)
HGB BLD-MCNC: 16.5 G/DL (ref 12–17)
IMM GRANULOCYTES # BLD AUTO: 0.04 THOUSAND/UL (ref 0–0.2)
IMM GRANULOCYTES NFR BLD AUTO: 0 % (ref 0–2)
INR PPP: 1.01 (ref 0.84–1.19)
LACTATE SERPL-SCNC: 1 MMOL/L (ref 0.5–2)
LYMPHOCYTES # BLD AUTO: 1.95 THOUSANDS/ÂΜL (ref 0.6–4.47)
LYMPHOCYTES NFR BLD AUTO: 13 % (ref 14–44)
MCH RBC QN AUTO: 31.7 PG (ref 26.8–34.3)
MCHC RBC AUTO-ENTMCNC: 33.5 G/DL (ref 31.4–37.4)
MCV RBC AUTO: 95 FL (ref 82–98)
MONOCYTES # BLD AUTO: 1.24 THOUSAND/ÂΜL (ref 0.17–1.22)
MONOCYTES NFR BLD AUTO: 8 % (ref 4–12)
NEUTROPHILS # BLD AUTO: 11.87 THOUSANDS/ÂΜL (ref 1.85–7.62)
NEUTS SEG NFR BLD AUTO: 79 % (ref 43–75)
NRBC BLD AUTO-RTO: 0 /100 WBCS
NT-PROBNP SERPL-MCNC: 144 PG/ML
PLATELET # BLD AUTO: 350 THOUSANDS/UL (ref 149–390)
PMV BLD AUTO: 10.1 FL (ref 8.9–12.7)
POTASSIUM SERPL-SCNC: 2.9 MMOL/L (ref 3.5–5.3)
PROCALCITONIN SERPL-MCNC: 0.32 NG/ML
PROT SERPL-MCNC: 7.3 G/DL (ref 6.4–8.4)
PROTHROMBIN TIME: 14 SECONDS (ref 11.6–14.5)
RBC # BLD AUTO: 5.2 MILLION/UL (ref 3.88–5.62)
RSV RNA RESP QL NAA+PROBE: NEGATIVE
SARS-COV-2 RNA RESP QL NAA+PROBE: POSITIVE
SODIUM SERPL-SCNC: 141 MMOL/L (ref 135–147)
WBC # BLD AUTO: 15.16 THOUSAND/UL (ref 4.31–10.16)

## 2022-10-15 PROCEDURE — 93005 ELECTROCARDIOGRAM TRACING: CPT

## 2022-10-15 PROCEDURE — 0241U HB NFCT DS VIR RESP RNA 4 TRGT: CPT | Performed by: EMERGENCY MEDICINE

## 2022-10-15 PROCEDURE — 80053 COMPREHEN METABOLIC PANEL: CPT | Performed by: EMERGENCY MEDICINE

## 2022-10-15 PROCEDURE — 87040 BLOOD CULTURE FOR BACTERIA: CPT | Performed by: INTERNAL MEDICINE

## 2022-10-15 PROCEDURE — 85379 FIBRIN DEGRADATION QUANT: CPT | Performed by: INTERNAL MEDICINE

## 2022-10-15 PROCEDURE — 83880 ASSAY OF NATRIURETIC PEPTIDE: CPT | Performed by: INTERNAL MEDICINE

## 2022-10-15 PROCEDURE — 85025 COMPLETE CBC W/AUTO DIFF WBC: CPT | Performed by: EMERGENCY MEDICINE

## 2022-10-15 PROCEDURE — 85610 PROTHROMBIN TIME: CPT | Performed by: INTERNAL MEDICINE

## 2022-10-15 PROCEDURE — XW033E5 INTRODUCTION OF REMDESIVIR ANTI-INFECTIVE INTO PERIPHERAL VEIN, PERCUTANEOUS APPROACH, NEW TECHNOLOGY GROUP 5: ICD-10-PCS | Performed by: INTERNAL MEDICINE

## 2022-10-15 PROCEDURE — 99285 EMERGENCY DEPT VISIT HI MDM: CPT

## 2022-10-15 PROCEDURE — 99285 EMERGENCY DEPT VISIT HI MDM: CPT | Performed by: EMERGENCY MEDICINE

## 2022-10-15 PROCEDURE — 71046 X-RAY EXAM CHEST 2 VIEWS: CPT

## 2022-10-15 PROCEDURE — 84145 PROCALCITONIN (PCT): CPT | Performed by: INTERNAL MEDICINE

## 2022-10-15 PROCEDURE — 84484 ASSAY OF TROPONIN QUANT: CPT | Performed by: EMERGENCY MEDICINE

## 2022-10-15 PROCEDURE — 82550 ASSAY OF CK (CPK): CPT | Performed by: INTERNAL MEDICINE

## 2022-10-15 PROCEDURE — 36415 COLL VENOUS BLD VENIPUNCTURE: CPT | Performed by: EMERGENCY MEDICINE

## 2022-10-15 PROCEDURE — 86140 C-REACTIVE PROTEIN: CPT | Performed by: INTERNAL MEDICINE

## 2022-10-15 PROCEDURE — 85730 THROMBOPLASTIN TIME PARTIAL: CPT | Performed by: INTERNAL MEDICINE

## 2022-10-15 PROCEDURE — 83605 ASSAY OF LACTIC ACID: CPT | Performed by: INTERNAL MEDICINE

## 2022-10-15 RX ORDER — ONDANSETRON 2 MG/ML
4 INJECTION INTRAMUSCULAR; INTRAVENOUS EVERY 6 HOURS PRN
Status: DISCONTINUED | OUTPATIENT
Start: 2022-10-15 | End: 2022-10-21 | Stop reason: HOSPADM

## 2022-10-15 RX ORDER — FINASTERIDE 5 MG/1
5 TABLET, FILM COATED ORAL DAILY
Refills: 3 | Status: DISCONTINUED | OUTPATIENT
Start: 2022-10-16 | End: 2022-10-21 | Stop reason: HOSPADM

## 2022-10-15 RX ORDER — FUROSEMIDE 80 MG
80 TABLET ORAL DAILY
Status: DISCONTINUED | OUTPATIENT
Start: 2022-10-16 | End: 2022-10-21 | Stop reason: HOSPADM

## 2022-10-15 RX ORDER — POLYETHYLENE GLYCOL 3350 17 G/17G
17 POWDER, FOR SOLUTION ORAL DAILY PRN
Status: DISCONTINUED | OUTPATIENT
Start: 2022-10-15 | End: 2022-10-21 | Stop reason: HOSPADM

## 2022-10-15 RX ORDER — ACETAMINOPHEN 325 MG/1
650 TABLET ORAL EVERY 6 HOURS PRN
Status: DISCONTINUED | OUTPATIENT
Start: 2022-10-15 | End: 2022-10-21 | Stop reason: HOSPADM

## 2022-10-15 RX ORDER — DEXAMETHASONE SODIUM PHOSPHATE 4 MG/ML
6 INJECTION, SOLUTION INTRA-ARTICULAR; INTRALESIONAL; INTRAMUSCULAR; INTRAVENOUS; SOFT TISSUE EVERY 24 HOURS
Status: DISCONTINUED | OUTPATIENT
Start: 2022-10-15 | End: 2022-10-21 | Stop reason: HOSPADM

## 2022-10-15 RX ORDER — HEPARIN SODIUM 10000 [USP'U]/100ML
3-20 INJECTION, SOLUTION INTRAVENOUS
Status: DISCONTINUED | OUTPATIENT
Start: 2022-10-15 | End: 2022-10-21

## 2022-10-15 RX ORDER — POTASSIUM CHLORIDE 20 MEQ/1
40 TABLET, EXTENDED RELEASE ORAL ONCE
Status: COMPLETED | OUTPATIENT
Start: 2022-10-15 | End: 2022-10-15

## 2022-10-15 RX ORDER — SPIRONOLACTONE 25 MG/1
12.5 TABLET ORAL DAILY
Status: DISCONTINUED | OUTPATIENT
Start: 2022-10-16 | End: 2022-10-21 | Stop reason: HOSPADM

## 2022-10-15 RX ORDER — PRAVASTATIN SODIUM 20 MG
20 TABLET ORAL
Refills: 3 | Status: DISCONTINUED | OUTPATIENT
Start: 2022-10-16 | End: 2022-10-21 | Stop reason: HOSPADM

## 2022-10-15 RX ORDER — HEPARIN SODIUM 1000 [USP'U]/ML
2000 INJECTION, SOLUTION INTRAVENOUS; SUBCUTANEOUS
Status: DISCONTINUED | OUTPATIENT
Start: 2022-10-15 | End: 2022-10-21

## 2022-10-15 RX ORDER — FERROUS SULFATE 325(65) MG
325 TABLET ORAL DAILY
Status: DISCONTINUED | OUTPATIENT
Start: 2022-10-16 | End: 2022-10-21 | Stop reason: HOSPADM

## 2022-10-15 RX ORDER — HEPARIN SODIUM 1000 [USP'U]/ML
4000 INJECTION, SOLUTION INTRAVENOUS; SUBCUTANEOUS
Status: DISCONTINUED | OUTPATIENT
Start: 2022-10-15 | End: 2022-10-21

## 2022-10-15 RX ORDER — CEFTRIAXONE 1 G/50ML
1000 INJECTION, SOLUTION INTRAVENOUS EVERY 24 HOURS
Status: DISCONTINUED | OUTPATIENT
Start: 2022-10-15 | End: 2022-10-21 | Stop reason: HOSPADM

## 2022-10-15 RX ORDER — POTASSIUM CHLORIDE 14.9 MG/ML
20 INJECTION INTRAVENOUS ONCE
Status: COMPLETED | OUTPATIENT
Start: 2022-10-15 | End: 2022-10-15

## 2022-10-15 RX ORDER — TAMSULOSIN HYDROCHLORIDE 0.4 MG/1
0.4 CAPSULE ORAL
Status: DISCONTINUED | OUTPATIENT
Start: 2022-10-16 | End: 2022-10-21 | Stop reason: HOSPADM

## 2022-10-15 RX ORDER — PANTOPRAZOLE SODIUM 40 MG/1
40 TABLET, DELAYED RELEASE ORAL
Refills: 3 | Status: DISCONTINUED | OUTPATIENT
Start: 2022-10-16 | End: 2022-10-21 | Stop reason: HOSPADM

## 2022-10-15 RX ORDER — HEPARIN SODIUM 1000 [USP'U]/ML
4000 INJECTION, SOLUTION INTRAVENOUS; SUBCUTANEOUS ONCE
Status: COMPLETED | OUTPATIENT
Start: 2022-10-15 | End: 2022-10-16

## 2022-10-15 RX ADMIN — POTASSIUM CHLORIDE 20 MEQ: 14.9 INJECTION, SOLUTION INTRAVENOUS at 20:52

## 2022-10-15 RX ADMIN — POTASSIUM CHLORIDE 40 MEQ: 1500 TABLET, EXTENDED RELEASE ORAL at 20:52

## 2022-10-15 RX ADMIN — CEFTRIAXONE 1000 MG: 1 INJECTION, SOLUTION INTRAVENOUS at 23:33

## 2022-10-15 RX ADMIN — SODIUM CHLORIDE 250 ML: 0.9 INJECTION, SOLUTION INTRAVENOUS at 20:52

## 2022-10-15 RX ADMIN — DEXAMETHASONE SODIUM PHOSPHATE 6 MG: 4 INJECTION, SOLUTION INTRAMUSCULAR; INTRAVENOUS at 23:55

## 2022-10-15 NOTE — ED PROVIDER NOTES
History  Chief Complaint   Patient presents with   • Shortness of Breath     SOB that started on Thursday  Reported oxygenation of 84  Denies fevers  72-year-old male history of chronic DVT, history of IVC filter placed currently on Eliquis, presenting due to dyspnea  States symptoms started around Thursday and has an associated wet cough as well as sore throat  Has confirmed recent contact with COVID positive individual around 10 days ago  He is vaccinated and boosted  States he checks his oxygen saturation at home and with minor exertion earlier noted his oxygen to be in the mid 80s  States he feels more tired than usual and has decreased appetite  Denies fever, vomiting, abdominal pain, urinary bowel symptoms, swelling weakness numbness or tingling in extremities  Denies any missed doses of Eliquis  Prior to Admission Medications   Prescriptions Last Dose Informant Patient Reported? Taking?    Multiple Vitamins-Minerals (MULTIVITAMIN ADULT EXTRA C PO) 10/15/2022 at Unknown time Self Yes Yes   Si TABLET DAILY   Probiotic Product (SOLUBLE FIBER/PROBIOTICS PO) 10/15/2022 at Unknown time Self Yes Yes   Sig: Take by mouth   apixaban (Eliquis) 2 5 mg 10/15/2022 at Unknown time  No Yes   Sig: Take 1 tablet (2 5 mg total) by mouth 2 (two) times a day   dutasteride (AVODART) 0 5 mg capsule 10/15/2022 at Unknown time  No Yes   Sig: TAKE 1 CAPSULE BY MOUTH EVERY DAY   ferrous sulfate 325 (65 Fe) mg tablet 10/15/2022 at Unknown time  Yes Yes   Sig: Take 325 mg by mouth daily   furosemide (LASIX) 80 mg tablet 10/15/2022 at Unknown time  No Yes   Sig: TAKE 1 TABLET BY MOUTH EVERY DAY   lansoprazole (PREVACID) 30 mg capsule 10/15/2022 at Unknown time  No Yes   Sig: TAKE 1 CAPSULE BY MOUTH EVERY DAY   polyethylene glycol (MIRALAX) 17 g packet Past Week at Unknown time Self Yes Yes   Sig: Take by mouth   simvastatin (ZOCOR) 10 mg tablet 10/14/2022 at Unknown time  No Yes   Sig: Take 1 tablet (10 mg total) by mouth daily   spironolactone (ALDACTONE) 25 mg tablet 10/15/2022 at Unknown time  No Yes   Sig: Take 0 5 tablets (12 5 mg total) by mouth daily   sulfamethoxazole-trimethoprim (BACTRIM DS) 800-160 mg per tablet 10/14/2022 at Unknown time  No Yes   Sig: TAKE 1 TABLET BY MOUTH DAILY   tamsulosin (FLOMAX) 0 4 mg 10/14/2022 at Unknown time  No Yes   Sig: TAKE 1 CAPSULE BY MOUTH EVERY DAY WITH DINNER      Facility-Administered Medications: None       Past Medical History:   Diagnosis Date   • Basal cell carcinoma     nose    • Benign skin lesion    • BPH (benign prostatic hyperplasia)    • Diverticular disease of colon    • DVT (deep venous thrombosis) (HCC)    • Elevated PSA    • History of blood clots    • Hypotension    • Intestinal obstruction (HCC)    • Social anxiety disorder     last assessed 1/12/17, resolved 10/17/17   • Ventral hernia        Past Surgical History:   Procedure Laterality Date   • APPENDECTOMY     • BASAL CELL CARCINOMA EXCISION      Nose    • HERNIA REPAIR      9 hernia repairs 1995 - 2012   • [place] [facility]  20     with speenectomy   • REPLACEMENT TOTAL KNEE Bilateral    • SPLENECTOMY  1995   • TONSILLECTOMY         Family History   Problem Relation Age of Onset   • Other Mother         Bleeding disorder    • Stroke Mother         CVA   • Diabetes Mother    • Heart disease Mother    • Stroke Father         CVA   • Heart disease Father    • Heart attack Father    • Substance Abuse Neg Hx    • Mental illness Neg Hx      I have reviewed and agree with the history as documented  E-Cigarette/Vaping   • E-Cigarette Use Never User      E-Cigarette/Vaping Substances     Social History     Tobacco Use   • Smoking status: Never Smoker   • Smokeless tobacco: Never Used   Vaping Use   • Vaping Use: Never used   Substance Use Topics   • Alcohol use: Never   • Drug use: No       Review of Systems   Constitutional: Positive for fatigue  Negative for fever  HENT: Positive for sore throat  Negative for trouble swallowing  Eyes: Negative for visual disturbance  Respiratory: Positive for cough  Negative for chest tightness and shortness of breath  Cardiovascular: Negative for chest pain  Gastrointestinal: Negative for diarrhea, nausea and vomiting  Musculoskeletal: Negative for back pain  Skin: Negative for rash  Neurological: Positive for weakness  Negative for headaches  Psychiatric/Behavioral: Negative for agitation  All other systems reviewed and are negative  Physical Exam  Physical Exam  Vitals and nursing note reviewed  Constitutional:       Appearance: He is well-developed  He is not diaphoretic  Comments: Frail appearing   HENT:      Head: Normocephalic and atraumatic  Right Ear: External ear normal       Left Ear: External ear normal    Eyes:      General:         Right eye: No discharge  Left eye: No discharge  Conjunctiva/sclera: Conjunctivae normal    Neck:      Vascular: No JVD  Trachea: No tracheal deviation  Cardiovascular:      Rate and Rhythm: Normal rate and regular rhythm  Heart sounds: Normal heart sounds  Pulmonary:      Effort: Pulmonary effort is normal       Breath sounds: Decreased breath sounds present  Abdominal:      General: There is no distension  Musculoskeletal:         General: Normal range of motion  Cervical back: Normal range of motion  Skin:     General: Skin is warm and dry  Neurological:      Mental Status: He is alert and oriented to person, place, and time     Psychiatric:         Mood and Affect: Mood normal          Speech: Speech normal          Behavior: Behavior normal          Vital Signs  ED Triage Vitals   Temperature Pulse Respirations Blood Pressure SpO2   10/15/22 1934 10/15/22 1929 10/15/22 1929 10/15/22 1929 10/15/22 1929   98 2 °F (36 8 °C) 94 20 137/78 95 %      Temp Source Heart Rate Source Patient Position - Orthostatic VS BP Location FiO2 (%)   10/15/22 1934 10/15/22 1929 10/15/22 1929 10/15/22 1929 --   Oral Monitor Sitting Right arm       Pain Score       10/15/22 1929       No Pain           Vitals:    10/15/22 2100 10/15/22 2130 10/15/22 2156 10/15/22 2158   BP: 140/77 119/53 117/63 117/63   Pulse: 86 84 78 76   Patient Position - Orthostatic VS:             Visual Acuity      ED Medications  Medications   cefTRIAXone (ROCEPHIN) IVPB (premix in dextrose) 1,000 mg 50 mL (1,000 mg Intravenous New Bag 10/15/22 2333)   ferrous sulfate tablet 325 mg (has no administration in time range)   pravastatin (PRAVACHOL) tablet 20 mg (has no administration in time range)   spironolactone (ALDACTONE) tablet 12 5 mg (has no administration in time range)   finasteride (PROSCAR) tablet 5 mg (has no administration in time range)   furosemide (LASIX) tablet 80 mg (has no administration in time range)   pantoprazole (PROTONIX) EC tablet 40 mg (has no administration in time range)   multivitamin-minerals (CENTRUM) tablet 1 tablet (has no administration in time range)   polyethylene glycol (MIRALAX) packet 17 g (has no administration in time range)   tamsulosin (FLOMAX) capsule 0 4 mg (has no administration in time range)   acetaminophen (TYLENOL) tablet 650 mg (has no administration in time range)   ondansetron (ZOFRAN) injection 4 mg (has no administration in time range)   dexamethasone (DECADRON) injection 6 mg (6 mg Intravenous Given 10/15/22 5045)   remdesivir (Veklury) 200 mg in sodium chloride 0 9 % 290 mL IVPB (has no administration in time range)     Followed by   remdesivir Ever [place]) 100 mg in sodium chloride 0 9 % 270 mL IVPB (has no administration in time range)   heparin (porcine) 25,000 units in 0 45% NaCl 250 mL infusion (premix) (11 1 Units/kg/hr × 90 kg (Order-Specific) Intravenous New Bag 10/16/22 0002)   heparin (porcine) injection 4,000 Units (has no administration in time range)   heparin (porcine) injection 2,000 Units (has no administration in time range)   potassium chloride (K-DUR,KLOR-CON) CR tablet 40 mEq (40 mEq Oral Given 10/15/22 2052)   potassium chloride 20 mEq IVPB (premix) (20 mEq Intravenous New Bag 10/15/22 2052)   sodium chloride 0 9 % bolus 250 mL (250 mL Intravenous New Bag 10/15/22 2052)   heparin (porcine) injection 4,000 Units (4,000 Units Intravenous Given 10/16/22 0001)       Diagnostic Studies  Results Reviewed     Procedure Component Value Units Date/Time    APTT [[id number]]  (Normal) Collected: 10/15/22 1947    Lab Status: Final result Specimen: Blood Updated: 10/15/22 2328     PTT 30 seconds     Protime-INR [[id number]]  (Normal) Collected: 10/15/22 1947    Lab Status: Final result Specimen: Blood Updated: 10/15/22 2328     Protime 14 0 seconds      INR 1 01    D-dimer, quantitative [[id number]]  (Abnormal) Collected: 10/15/22 1947    Lab Status: Final result Specimen: Blood Updated: 10/15/22 2218     D-Dimer, Quant 0 69 ug/ml FEU     Narrative: In the evaluation for possible pulmonary embolism, in the appropriate (Well's Score of 4 or less) patient, the age adjusted d-dimer cutoff for this patient can be calculated as:    Age x 0 01 (in ug/mL) for Age-adjusted D-dimer exclusion threshold for a patient over 50 years      NT-BNP PRO [[id number]]  (Normal) Collected: 10/15/22 1946    Lab Status: Final result Specimen: Blood from Arm, Left Updated: 10/15/22 2131     NT-proBNP 144 pg/mL     C-reactive protein [[id number]]  (Abnormal) Collected: 10/15/22 1946    Lab Status: Final result Specimen: Blood from Arm, Left Updated: 10/15/22 2131     CRP 49 2 mg/L     CK (with reflex to MB) [[id number]]  (Normal) Collected: 10/15/22 1946    Lab Status: Final result Specimen: Blood from Arm, Left Updated: 10/15/22 2122     Total  U/L     FLU/RSV/COVID - if FLU/RSV clinically relevant [[id number]]  (Abnormal) Collected: 10/15/22 1946    Lab Status: Final result Specimen: Nares from Nose Updated: 10/15/22 2029     SARS-CoV-2 Positive     INFLUENZA A PCR Negative INFLUENZA B PCR Negative     RSV PCR Negative    Narrative:      FOR PEDIATRIC PATIENTS - copy/paste COVID Guidelines URL to browser: [email or web address]    SARS-CoV-2 assay is a Nucleic Acid Amplification assay intended for the  qualitative detection of nucleic acid from SARS-CoV-2 in nasopharyngeal  swabs  Results are for the presumptive identification of SARS-CoV-2 RNA  Positive results are indicative of infection with SARS-CoV-2, the virus  causing COVID-19, but do not rule out bacterial infection or co-infection  with other viruses  Laboratories within the [place] and its  territories are required to report all positive results to the appropriate  public health authorities  Negative results do not preclude SARS-CoV-2  infection and should not be used as the sole basis for treatment or other  patient management decisions  Negative results must be combined with  clinical observations, patient history, and epidemiological information  This test has not been FDA cleared or approved  This test has been authorized by FDA under an Emergency Use Authorization  (EUA)  This test is only authorized for the duration of time the  declaration that circumstances exist justifying the authorization of the  emergency use of an in vitro diagnostic tests for detection of SARS-CoV-2  virus and/or diagnosis of COVID-19 infection under section 564(b)(1) of  the Act, 21 U  S C  486WRO-7(E)(5), unless the authorization is terminated  or revoked sooner  The test has been validated but independent review by FDA  and CLIA is pending  Test performed using [employer] GeneXpert: This RT-PCR assay targets N2,  a region unique to SARS-CoV-2  A conserved region in the E-gene was chosen  for pan-Sarbecovirus detection which includes SARS-CoV-2  According to CMS-2020-01-R, this platform meets the definition of high-throughput technology      HS Troponin 0hr (reflex protocol) [[id number]]  (Normal) Collected: 10/15/22 1946    Lab Status: Final result Specimen: Blood from Arm, Left Updated: 10/15/22 2018     hs TnI 0hr 12 ng/L     HS Troponin I 2hr [[id number]]     Lab Status: No result Specimen: Blood     HS Troponin I 4hr [[id number]]     Lab Status: No result Specimen: Blood     Comprehensive metabolic panel [[id number]]  (Abnormal) Collected: 10/15/22 1946    Lab Status: Final result Specimen: Blood from Arm, Left Updated: 10/15/22 2009     Sodium 141 mmol/L      Potassium 2 9 mmol/L      Chloride 102 mmol/L      CO2 28 mmol/L      ANION GAP 11 mmol/L      BUN 21 mg/dL      Creatinine 1 18 mg/dL      Glucose 122 mg/dL      Calcium 10 0 mg/dL      Corrected Calcium 10 6 mg/dL      AST 18 U/L      ALT 28 U/L      Alkaline Phosphatase 140 U/L      Total Protein 7 3 g/dL      Albumin 3 3 g/dL      Total Bilirubin 0 50 mg/dL      eGFR 57 ml/min/1 73sq m     Narrative:      [facility] guidelines for Chronic Kidney Disease (CKD):   •  Stage 1 with normal or high GFR (GFR > 90 mL/min/1 73 square meters)  •  Stage 2 Mild CKD (GFR = 60-89 mL/min/1 73 square meters)  •  Stage 3A Moderate CKD (GFR = 45-59 mL/min/1 73 square meters)  •  Stage 3B Moderate CKD (GFR = 30-44 mL/min/1 73 square meters)  •  Stage 4 Severe CKD (GFR = 15-29 mL/min/1 73 square meters)  •  Stage 5 End Stage CKD (GFR <15 mL/min/1 73 square meters)  Note: GFR calculation is accurate only with a steady state creatinine    CBC and differential [[id number]]  (Abnormal) Collected: 10/15/22 1946    Lab Status: Final result Specimen: Blood from Arm, Left Updated: 10/15/22 1954     WBC 15 16 Thousand/uL      RBC 5 20 Million/uL      Hemoglobin 16 5 g/dL      Hematocrit 49 2 %      MCV 95 fL      MCH 31 7 pg      MCHC 33 5 g/dL      RDW 15 2 %      MPV 10 1 fL      Platelets 983 Thousands/uL      nRBC 0 /100 WBCs      Neutrophils Relative 79 %      Immat GRANS % 0 %      Lymphocytes Relative 13 %      Monocytes Relative 8 %      Eosinophils Relative 0 %      Basophils Relative 0 %      Neutrophils Absolute 11 87 Thousands/µL      Immature Grans Absolute 0 04 Thousand/uL      Lymphocytes Absolute 1 95 Thousands/µL      Monocytes Absolute 1 24 Thousand/µL      Eosinophils Absolute 0 01 Thousand/µL      Basophils Absolute 0 05 Thousands/µL                  XR chest 2 views    (Results Pending)              Procedures  Procedures         ED Course  ED Course as of 10/16/22 0024   Sat Oct 15, 2022   2004 Now requiring 2L NC   2014 Potassium(!): 2 9  Will replete   2032 SARS-COV-2(!): Positive                                             MDM  Number of Diagnoses or Management Options  COVID-19  Dyspnea  Hypokalemia  Diagnosis management comments: Admitted to Medicine for COVID with new oxygen requirements  In stable condition at time of admission  Hypokalemia repleted with oral and IV potassium  Disposition  Final diagnoses:   COVID-19   Dyspnea   Hypokalemia     Time reflects when diagnosis was documented in both MDM as applicable and the Disposition within this note     Time User Action Codes Description Comment    10/15/2022  8:50 PM Theodore Buckley Add [U07 1] COVID-19     10/15/2022  8:50 PM Theodore Buckley Add [R06 00] Dyspnea     10/15/2022  8:50 PM Fran Segovia Add [E87 6] Hypokalemia       ED Disposition     ED Disposition   Admit    Condition   Stable    Date/Time   Sat Oct 15, 2022  8:50 PM    Comment   Case was discussed with BEATRIZ and the patient's admission status was agreed to be Admission Status: inpatient status to the service of Dr Familia Del Cid              Follow-up Information    None         Current Discharge Medication List      CONTINUE these medications which have NOT CHANGED    Details   apixaban (Eliquis) 2 5 mg Take 1 tablet (2 5 mg total) by mouth 2 (two) times a day  Qty: 180 tablet, Refills: 3    Associated Diagnoses: Chronic deep vein thrombosis (DVT) of proximal vein of both lower extremities ([address] ) dutasteride (AVODART) 0 5 mg capsule TAKE 1 CAPSULE BY MOUTH EVERY DAY  Qty: 90 capsule, Refills: 3    Associated Diagnoses: Benign prostatic hyperplasia without lower urinary tract symptoms      ferrous sulfate 325 (65 Fe) mg tablet Take 325 mg by mouth daily      furosemide (LASIX) 80 mg tablet TAKE 1 TABLET BY MOUTH EVERY DAY  Qty: 90 tablet, Refills: 3    Comments: DX Code Needed    Associated Diagnoses: Essential hypertension      lansoprazole (PREVACID) 30 mg capsule TAKE 1 CAPSULE BY MOUTH EVERY DAY  Qty: 90 capsule, Refills: 3    Associated Diagnoses: Gastroesophageal reflux disease without esophagitis      Multiple Vitamins-Minerals (MULTIVITAMIN ADULT EXTRA C PO) 1 TABLET DAILY      polyethylene glycol (MIRALAX) 17 g packet Take by mouth      Probiotic Product (SOLUBLE FIBER/PROBIOTICS PO) Take by mouth      simvastatin (ZOCOR) 10 mg tablet Take 1 tablet (10 mg total) by mouth daily  Qty: 90 tablet, Refills: 3    Associated Diagnoses: Pure hypercholesterolemia      spironolactone (ALDACTONE) 25 mg tablet Take 0 5 tablets (12 5 mg total) by mouth daily  Qty: 45 tablet, Refills: 3    Comments: DX Code Needed    Associated Diagnoses: Essential hypertension      sulfamethoxazole-trimethoprim (BACTRIM DS) 800-160 mg per tablet TAKE 1 TABLET BY MOUTH DAILY  Qty: 90 tablet, Refills: 1    Comments: DX Code Needed    Associated Diagnoses: Infection associated with internal right knee prosthesis, initial encounter ([facility] [address] )      tamsulosin (FLOMAX) 0 4 mg TAKE 1 CAPSULE BY MOUTH EVERY DAY WITH DINNER  Qty: 90 capsule, Refills: 3    Associated Diagnoses: Benign prostatic hyperplasia without lower urinary tract symptoms             No discharge procedures on file      PDMP Review       Value Time User    PDMP Reviewed  Yes 6/8/2020  4:14 PM Cha William MD          ED Provider  Electronically Signed by           Pb Badillo DO  10/16/22 8656

## 2022-10-16 PROBLEM — E66.9 OBESITY (BMI 30-39.9): Status: ACTIVE | Noted: 2022-10-16

## 2022-10-16 PROBLEM — E87.6 HYPOKALEMIA: Status: ACTIVE | Noted: 2022-10-16

## 2022-10-16 LAB
ALBUMIN SERPL BCP-MCNC: 2.5 G/DL (ref 3.5–5)
ALP SERPL-CCNC: 113 U/L (ref 46–116)
ALT SERPL W P-5'-P-CCNC: 21 U/L (ref 12–78)
ANION GAP SERPL CALCULATED.3IONS-SCNC: 8 MMOL/L (ref 4–13)
APTT PPP: 31 SECONDS (ref 23–37)
APTT PPP: 43 SECONDS (ref 23–37)
APTT PPP: 66 SECONDS (ref 23–37)
APTT PPP: 82 SECONDS (ref 23–37)
AST SERPL W P-5'-P-CCNC: 21 U/L (ref 5–45)
ATRIAL RATE: 85 BPM
BASOPHILS # BLD AUTO: 0.03 THOUSANDS/ÂΜL (ref 0–0.1)
BASOPHILS NFR BLD AUTO: 0 % (ref 0–1)
BILIRUB SERPL-MCNC: 0.3 MG/DL (ref 0.2–1)
BUN SERPL-MCNC: 22 MG/DL (ref 5–25)
CALCIUM ALBUM COR SERPL-MCNC: 10.7 MG/DL (ref 8.3–10.1)
CALCIUM SERPL-MCNC: 9.5 MG/DL (ref 8.3–10.1)
CHLORIDE SERPL-SCNC: 109 MMOL/L (ref 96–108)
CO2 SERPL-SCNC: 25 MMOL/L (ref 21–32)
CREAT SERPL-MCNC: 0.82 MG/DL (ref 0.6–1.3)
EOSINOPHIL # BLD AUTO: 0 THOUSAND/ÂΜL (ref 0–0.61)
EOSINOPHIL NFR BLD AUTO: 0 % (ref 0–6)
ERYTHROCYTE [DISTWIDTH] IN BLOOD BY AUTOMATED COUNT: 16 % (ref 11.6–15.1)
GFR SERPL CREATININE-BSD FRML MDRD: 83 ML/MIN/1.73SQ M
GLUCOSE SERPL-MCNC: 143 MG/DL (ref 65–140)
HCT VFR BLD AUTO: 48.4 % (ref 36.5–49.3)
HGB BLD-MCNC: 15.1 G/DL (ref 12–17)
IMM GRANULOCYTES # BLD AUTO: 0.06 THOUSAND/UL (ref 0–0.2)
IMM GRANULOCYTES NFR BLD AUTO: 0 % (ref 0–2)
LYMPHOCYTES # BLD AUTO: 1.75 THOUSANDS/ÂΜL (ref 0.6–4.47)
LYMPHOCYTES NFR BLD AUTO: 12 % (ref 14–44)
MCH RBC QN AUTO: 31.7 PG (ref 26.8–34.3)
MCHC RBC AUTO-ENTMCNC: 31.2 G/DL (ref 31.4–37.4)
MCV RBC AUTO: 102 FL (ref 82–98)
MONOCYTES # BLD AUTO: 0.63 THOUSAND/ÂΜL (ref 0.17–1.22)
MONOCYTES NFR BLD AUTO: 5 % (ref 4–12)
NEUTROPHILS # BLD AUTO: 11.68 THOUSANDS/ÂΜL (ref 1.85–7.62)
NEUTS SEG NFR BLD AUTO: 83 % (ref 43–75)
NRBC BLD AUTO-RTO: 0 /100 WBCS
P AXIS: 58 DEGREES
PLATELET # BLD AUTO: 308 THOUSANDS/UL (ref 149–390)
PMV BLD AUTO: 10 FL (ref 8.9–12.7)
POTASSIUM SERPL-SCNC: 3.4 MMOL/L (ref 3.5–5.3)
PR INTERVAL: 196 MS
PROCALCITONIN SERPL-MCNC: 0.25 NG/ML
PROT SERPL-MCNC: 5.8 G/DL (ref 6.4–8.4)
QRS AXIS: -49 DEGREES
QRSD INTERVAL: 88 MS
QT INTERVAL: 372 MS
QTC INTERVAL: 442 MS
RBC # BLD AUTO: 4.77 MILLION/UL (ref 3.88–5.62)
SODIUM SERPL-SCNC: 142 MMOL/L (ref 135–147)
T WAVE AXIS: 29 DEGREES
VENTRICULAR RATE: 85 BPM
WBC # BLD AUTO: 14.15 THOUSAND/UL (ref 4.31–10.16)

## 2022-10-16 PROCEDURE — 85730 THROMBOPLASTIN TIME PARTIAL: CPT | Performed by: INTERNAL MEDICINE

## 2022-10-16 PROCEDURE — 80053 COMPREHEN METABOLIC PANEL: CPT | Performed by: INTERNAL MEDICINE

## 2022-10-16 PROCEDURE — 85025 COMPLETE CBC W/AUTO DIFF WBC: CPT | Performed by: INTERNAL MEDICINE

## 2022-10-16 PROCEDURE — 99223 1ST HOSP IP/OBS HIGH 75: CPT | Performed by: HOSPITALIST

## 2022-10-16 PROCEDURE — 85730 THROMBOPLASTIN TIME PARTIAL: CPT | Performed by: HOSPITALIST

## 2022-10-16 PROCEDURE — 93010 ELECTROCARDIOGRAM REPORT: CPT | Performed by: INTERNAL MEDICINE

## 2022-10-16 PROCEDURE — 84145 PROCALCITONIN (PCT): CPT | Performed by: INTERNAL MEDICINE

## 2022-10-16 RX ORDER — POTASSIUM CHLORIDE 20 MEQ/1
40 TABLET, EXTENDED RELEASE ORAL ONCE
Status: COMPLETED | OUTPATIENT
Start: 2022-10-16 | End: 2022-10-16

## 2022-10-16 RX ADMIN — FUROSEMIDE 80 MG: 80 TABLET ORAL at 09:38

## 2022-10-16 RX ADMIN — FINASTERIDE 5 MG: 5 TABLET, FILM COATED ORAL at 09:39

## 2022-10-16 RX ADMIN — TAMSULOSIN HYDROCHLORIDE 0.4 MG: 0.4 CAPSULE ORAL at 16:53

## 2022-10-16 RX ADMIN — POTASSIUM CHLORIDE 40 MEQ: 1500 TABLET, EXTENDED RELEASE ORAL at 12:25

## 2022-10-16 RX ADMIN — CEFTRIAXONE 1000 MG: 1 INJECTION, SOLUTION INTRAVENOUS at 21:36

## 2022-10-16 RX ADMIN — REMDESIVIR 100 MG: 100 INJECTION, POWDER, LYOPHILIZED, FOR SOLUTION INTRAVENOUS at 23:14

## 2022-10-16 RX ADMIN — HEPARIN SODIUM 13.1 UNITS/KG/HR: 10000 INJECTION, SOLUTION INTRAVENOUS at 21:36

## 2022-10-16 RX ADMIN — PANTOPRAZOLE SODIUM 40 MG: 40 TABLET, DELAYED RELEASE ORAL at 09:39

## 2022-10-16 RX ADMIN — POLYETHYLENE GLYCOL 3350 17 G: 17 POWDER, FOR SOLUTION ORAL at 09:48

## 2022-10-16 RX ADMIN — DEXAMETHASONE SODIUM PHOSPHATE 6 MG: 4 INJECTION, SOLUTION INTRAMUSCULAR; INTRAVENOUS at 23:14

## 2022-10-16 RX ADMIN — REMDESIVIR 200 MG: 100 INJECTION, POWDER, LYOPHILIZED, FOR SOLUTION INTRAVENOUS at 00:42

## 2022-10-16 RX ADMIN — PRAVASTATIN SODIUM 20 MG: 20 TABLET ORAL at 16:53

## 2022-10-16 RX ADMIN — HEPARIN SODIUM 4000 UNITS: 1000 INJECTION INTRAVENOUS; SUBCUTANEOUS at 00:01

## 2022-10-16 RX ADMIN — FERROUS SULFATE TAB 325 MG (65 MG ELEMENTAL FE) 325 MG: 325 (65 FE) TAB at 09:39

## 2022-10-16 RX ADMIN — HEPARIN SODIUM 2000 UNITS: 1000 INJECTION INTRAVENOUS; SUBCUTANEOUS at 14:31

## 2022-10-16 RX ADMIN — SPIRONOLACTONE 12.5 MG: 25 TABLET ORAL at 09:39

## 2022-10-16 RX ADMIN — HEPARIN SODIUM 11.1 UNITS/KG/HR: 10000 INJECTION, SOLUTION INTRAVENOUS at 00:02

## 2022-10-16 RX ADMIN — MULTIPLE VITAMINS W/ MINERALS TAB 1 TABLET: TAB ORAL at 09:39

## 2022-10-16 NOTE — ASSESSMENT & PLAN NOTE
· 88% on room air    Currently on 2 L nasal cannula  · Diagnosed with COVID-19 on 10/15/2022  · IV dexamethasone  · Respiratory protocol  · Continue to monitor and wean oxygen as able

## 2022-10-16 NOTE — ASSESSMENT & PLAN NOTE
· Chronically on Eliquis 2 5 mg b i d   · Hold Eliquis    Start IV heparin drip due to COVID protocol

## 2022-10-16 NOTE — PLAN OF CARE
Problem: MOBILITY - ADULT  Goal: Maintain or return to baseline ADL function  Description: INTERVENTIONS:  -  Assess patient's ability to carry out ADLs; assess patient's baseline for ADL function and identify physical deficits which impact ability to perform ADLs (bathing, care of mouth/teeth, toileting, grooming, dressing, etc )  - Assess/evaluate cause of self-care deficits   - Assess range of motion  - Assess patient's mobility; develop plan if impaired  - Assess patient's need for assistive devices and provide as appropriate  - Encourage maximum independence but intervene and supervise when necessary  - Involve family in performance of ADLs  - Assess for home care needs following discharge   - Consider OT consult to assist with ADL evaluation and planning for discharge  - Provide patient education as appropriate  10/16/2022 0421 by Garrett Michelle RN  Outcome: Progressing  10/16/2022 0420 by Garrett Michelle RN  Outcome: Progressing  Goal: Maintains/Returns to pre admission functional level  Description: INTERVENTIONS:  - Perform BMAT or MOVE assessment daily    - Set and communicate daily mobility goal to care team and patient/family/caregiver  - Collaborate with rehabilitation services on mobility goals if consulted  - Perform Range of Motion 3 times a day  - Reposition patient every 2 hours    - Dangle patient 3 times a day  - Stand patient 3 times a day  - Ambulate patient 3 times a day  - Out of bed to chair 3 times a day   - Out of bed for meals 3 times a day  - Out of bed for toileting  - Record patient progress and toleration of activity level   10/16/2022 0421 by Garrett Michelle RN  Outcome: Progressing  10/16/2022 0420 by Garrett Michelle RN  Outcome: Progressing     Problem: INFECTION - ADULT  Goal: Absence or prevention of progression during hospitalization  Description: INTERVENTIONS:  - Assess and monitor for signs and symptoms of infection  - Monitor lab/diagnostic results  - Monitor all insertion sites, i e  indwelling lines, tubes, and drains  - Monitor endotracheal if appropriate and nasal secretions for changes in amount and color  - [place] appropriate cooling/warming therapies per order  - Administer medications as ordered  - Instruct and encourage patient and family to use good hand hygiene technique  - Identify and instruct in appropriate isolation precautions for identified infection/condition  10/16/2022 0421 by Sadie Rios RN  Outcome: Progressing  10/16/2022 0420 by Sadie Rios RN  Outcome: Progressing     Problem: DISCHARGE PLANNING  Goal: Discharge to home or other facility with appropriate resources  Description: INTERVENTIONS:  - Identify barriers to discharge w/patient and caregiver  - Arrange for needed discharge resources and transportation as appropriate  - Identify discharge learning needs (meds, wound care, etc )  - Arrange for interpretive services to assist at discharge as needed  - Refer to Case Management Department for coordinating discharge planning if the patient needs post-hospital services based on physician/advanced practitioner order or complex needs related to functional status, cognitive ability, or social support system  10/16/2022 0421 by Sadie Rios RN  Outcome: Progressing  10/16/2022 0420 by Sadie Rios RN  Outcome: Progressing     Problem: Knowledge Deficit  Goal: Patient/family/caregiver demonstrates understanding of disease process, treatment plan, medications, and discharge instructions  Description: Complete learning assessment and assess knowledge base    Interventions:  - Provide teaching at level of understanding  - Provide teaching via preferred learning methods  10/16/2022 0421 by Sadie Rios RN  Outcome: Progressing  10/16/2022 0420 by Sadie Rios RN  Outcome: Progressing     Problem: RESPIRATORY - ADULT  Goal: Achieves optimal ventilation and oxygenation  Description: INTERVENTIONS:  - Assess for changes in respiratory status  - Assess for changes in mentation and behavior  - Position to facilitate oxygenation and minimize respiratory effort  - Oxygen administered by appropriate delivery if ordered  - Initiate smoking cessation education as indicated  - Encourage broncho-pulmonary hygiene including cough, deep breathe, Incentive Spirometry  - Assess the need for suctioning and aspirate as needed  - Assess and instruct to report SOB or any respiratory difficulty  - Respiratory Therapy support as indicated  10/16/2022 0421 by Jemima Hinojosa RN  Outcome: Progressing  10/16/2022 0420 by Jemima Hinojosa RN  Outcome: Progressing

## 2022-10-16 NOTE — H&P
[facility]  H&P- Jul[facility] 1940, [de-identified] y o  male MRN: [id number]  Unit/Bed#: -Koby Encounter: [id number]  Primary Care Provider: Jose Corcoran MD   Date and time admitted to hospital: 10/15/2022  7:20 PM    * Sepsis due to COVID-19 [facility])  Assessment & Plan  · Presented due to shortness of breath, wet cough and sore throat that began Thursday 10/13   88% on room air in the ER  Currently on 3 L nasal cannula  · Denies fevers, chills, nausea, vomiting, diarrhea  · Diagnosed with COVID-19 on 10/15/2022  Received vaccine x3  · Admit under mild pathway  · COVID labs  · D-dimer:  0 69 (IV heparin drip)  · CK:  112  · BNP:  144  · CRP:  49 2  · Started on IV remdesivir and IV dexamethasone  · Due to elevated procalcitonin and meeting sepsis criteria patient was started on IV ceftriaxone has a precaution  Hypoxia  Assessment & Plan  · 88% on room air  Currently on 2 L nasal cannula  · Diagnosed with COVID-19 on 10/15/2022  · IV dexamethasone  · Respiratory protocol  · Continue to monitor and wean oxygen as able    Hypokalemia  Assessment & Plan  · K+ 2 9   · Denies N/V/D  · Ordered potassium replacement in the ER   · Continue to monitor and replete as needed     Obesity (BMI 30-39  9)  Assessment & Plan  · Body mass index is 35 43 kg/m²  · Encouraged lifestyle changes    Pure hypercholesterolemia  Assessment & Plan  · Home regimen simvastatin 10 mg daily  Non formulary  Pravastatin ordered    Chronic deep vein thrombosis (DVT) of proximal vein of both lower extremities (HCC)  Assessment & Plan  · Chronically on Eliquis 2 5 mg b i d   · Hold Eliquis  Start IV heparin drip due to COVID protocol    VTE Pharmacologic Prophylaxis: VTE Score: 4 Moderate Risk (Score 3-4) - Pharmacological DVT Prophylaxis Ordered: heparin drip  Code Status: Level 1 - Full Code   Discussion with family: Patient declined call to   Wife present in the ER and given update by ED staff  Anticipated Length of Stay: Patient will be admitted on an inpatient basis with an anticipated length of stay of greater than 2 midnights secondary to Sepsid due to covid 19  Total Time for Visit, including Counseling / Coordination of Care: 60 minutes Greater than 50% of this total time spent on direct patient counseling and coordination of care  Chief Complaint: SOB     History of Present Illness:  En Ziegler is a [de-identified] y o  male with a PMH of chronic DVT, hernia repairs, obesity, BPH who presents from home due to sob, wet cough and sore throat that began Thursday 10/13  At home had used a pulse ox and found his oxygen to be in the 80s with ambulation prompting him to come to the ER  In the ER 88% on RA at rest, placed on 3 Liters nasal canula  Diagnosed with Covid  Received vaccine x 3  Started on IV dexamethasone and remdesivir  Denies fevers, chills, N/V/D  Reports compliance with home medications  Uses a walker to ambulate  Review of Systems:  Review of Systems   Constitutional: Negative for fatigue and fever  HENT: Positive for sore throat  Respiratory: Positive for cough and shortness of breath  Negative for chest tightness  Cardiovascular: Negative for chest pain  Gastrointestinal: Negative for abdominal distention, abdominal pain, diarrhea, nausea and vomiting  Genitourinary: Negative for difficulty urinating  Musculoskeletal: Negative for arthralgias  Neurological: Negative for weakness and headaches  Psychiatric/Behavioral: Negative for agitation and behavioral problems  All other systems reviewed and are negative        Past Medical and Surgical History:   Past Medical History:   Diagnosis Date   • Basal cell carcinoma     nose    • Benign skin lesion    • BPH (benign prostatic hyperplasia)    • Diverticular disease of colon    • DVT (deep venous thrombosis) (HCC)    • Elevated PSA    • History of blood clots    • Hypotension    • Intestinal obstruction (HCC) • Social anxiety disorder     last assessed 1/12/17, resolved 10/17/17   • Ventral hernia        Past Surgical History:   Procedure Laterality Date   • APPENDECTOMY     • BASAL CELL CARCINOMA EXCISION      Nose    • HERNIA REPAIR      9 hernia repairs 1995 - 2012   • HIATAL HERNIA REPAIR  1995    with speenectomy   • REPLACEMENT TOTAL KNEE Bilateral    • SPLENECTOMY  1995   • TONSILLECTOMY         Meds/Allergies:  Prior to Admission medications    Medication Sig Start Date End Date Taking? Authorizing Provider   apixaban (Eliquis) 2 5 mg Take 1 tablet (2 5 mg total) by mouth 2 (two) times a day 3/29/22  Yes Silverio Duenas MD   dutasteride (AVODART) 0 5 mg capsule TAKE 1 CAPSULE BY MOUTH EVERY DAY 9/22/22  Yes Ramón Armenta MD   ferrous sulfate 325 (65 Fe) mg tablet Take 325 mg by mouth daily   Yes Historical Provider, MD   furosemide (LASIX) 80 mg tablet TAKE 1 TABLET BY MOUTH EVERY DAY 9/29/22  Yes Ramón Armenta MD   lansoprazole (PREVACID) 30 mg capsule TAKE 1 CAPSULE BY MOUTH EVERY DAY 1/3/22  Yes Silverio Duenas MD   Multiple Vitamins-Minerals (MULTIVITAMIN ADULT EXTRA C PO) 1 TABLET DAILY   Yes Historical Provider, MD   polyethylene glycol (MIRALAX) 17 g packet Take by mouth   Yes Historical Provider, MD   Probiotic Product (SOLUBLE FIBER/PROBIOTICS PO) Take by mouth   Yes Historical Provider, MD   simvastatin (ZOCOR) 10 mg tablet Take 1 tablet (10 mg total) by mouth daily 3/29/22  Yes Silverio Duenas MD   spironolactone (ALDACTONE) 25 mg tablet Take 0 5 tablets (12 5 mg total) by mouth daily 3/29/22  Yes Silverio Duenas MD   sulfamethoxazole-trimethoprim (BACTRIM DS) 800-160 mg per tablet TAKE 1 TABLET BY MOUTH DAILY 9/29/22 12/28/22 Yes Ramón Armenta MD   tamsulosin (FLOMAX) 0 4 mg TAKE 1 CAPSULE BY MOUTH EVERY DAY WITH DINNER 5/10/22  Yes Silverio Duenas MD     I have reviewed home medications with patient personally  Allergies:    Allergies   Allergen Reactions   • Ace Inhibitors Cough   • Fosinopril Cough     Cough after years of being on the medication       Social History:  Marital Status: /Civil Union   Occupation: Unknown   Patient Pre-hospital Living Situation: Home  Patient Pre-hospital Level of Mobility: walks with walker  Patient Pre-hospital Diet Restrictions: Regular   Substance Use History:   Social History     Substance and Sexual Activity   Alcohol Use Never     Social History     Tobacco Use   Smoking Status Never Smoker   Smokeless Tobacco Never Used     Social History     Substance and Sexual Activity   Drug Use No        Family History:  Family History   Problem Relation Age of Onset   • Other Mother         Bleeding disorder    • Stroke Mother         CVA   • Diabetes Mother    • Heart disease Mother    • Stroke Father         CVA   • Heart disease Father    • Heart attack Father    • Substance Abuse Neg Hx    • Mental illness Neg Hx        Physical Exam:     Vitals:   Blood Pressure: 117/63 (10/15/22 2158)  Pulse: 76 (10/15/22 2158)  Temperature: 97 7 °F (36 5 °C) (10/15/22 2158)  Temp Source: Oral (10/15/22 1934)  Respirations: 16 (10/15/22 2100)  Height: 5' 3" (160 cm) (10/15/22 1929)  Weight - Scale: 90 7 kg (200 lb) (10/15/22 1929)  SpO2: 94 % (10/15/22 2158)    Physical Exam  Vitals and nursing note reviewed  Constitutional:       Appearance: Normal appearance  He is obese  Interventions: Nasal cannula in place  Comments: 3L   HENT:      Head: Normocephalic  Eyes:      Extraocular Movements: Extraocular movements intact  Pupils: Pupils are equal, round, and reactive to light  Cardiovascular:      Rate and Rhythm: Normal rate and regular rhythm  Heart sounds: No murmur heard  No gallop  Pulmonary:      Effort: No respiratory distress  Breath sounds: Normal breath sounds  No wheezing  Abdominal:      General: Bowel sounds are normal  There is no distension  Tenderness: There is no abdominal tenderness     Musculoskeletal:         General: Normal range of motion  Cervical back: Normal range of motion  Right lower leg: No edema  Left lower leg: No edema  Skin:     General: Skin is warm  Neurological:      General: No focal deficit present  Mental Status: He is alert and oriented to person, place, and time  Mental status is at baseline  Psychiatric:         Mood and Affect: Mood normal          Behavior: Behavior normal          Thought Content: Thought content normal           Additional Data:     Lab Results:  Results from last 7 days   Lab Units 10/15/22  1946   WBC Thousand/uL 15 16*   HEMOGLOBIN g/dL 16 5   HEMATOCRIT % 49 2   PLATELETS Thousands/uL 350   NEUTROS PCT % 79*   LYMPHS PCT % 13*   MONOS PCT % 8   EOS PCT % 0     Results from last 7 days   Lab Units 10/15/22  1946   SODIUM mmol/L 141   POTASSIUM mmol/L 2 9*   CHLORIDE mmol/L 102   CO2 mmol/L 28   BUN mg/dL 21   CREATININE mg/dL 1 18   ANION GAP mmol/L 11   CALCIUM mg/dL 10 0   ALBUMIN g/dL 3 3*   TOTAL BILIRUBIN mg/dL 0 50   ALK PHOS U/L 140*   ALT U/L 28   AST U/L 18   GLUCOSE RANDOM mg/dL 122     Results from last 7 days   Lab Units 10/15/22  1947   INR  1 01             Results from last 7 days   Lab Units 10/15/22  2252   LACTIC ACID mmol/L 1 0   PROCALCITONIN ng/ml 0 32*       Imaging: Reviewed radiology reports from this admission including: chest xray  XR chest 2 views    (Results Pending)       ** Please Note: This note has been constructed using a voice recognition system   **

## 2022-10-16 NOTE — ASSESSMENT & PLAN NOTE
· Presented due to shortness of breath, wet cough and sore throat that began Thursday 10/13   88% on room air in the ER  Currently on 3 L nasal cannula  · Denies fevers, chills, nausea, vomiting, diarrhea  · Diagnosed with COVID-19 on 10/15/2022  Received vaccine x3  · Admit under mild pathway  · COVID labs  · D-dimer:  0 69 (IV heparin drip)  · CK:  112  · BNP:  144  · CRP:  49 2  · Started on IV remdesivir and IV dexamethasone  · Due to elevated procalcitonin and meeting sepsis criteria patient was started on IV ceftriaxone has a precaution

## 2022-10-16 NOTE — ASSESSMENT & PLAN NOTE
· K+ 2 9   · Denies N/V/D  · Ordered potassium replacement in the ER   · Continue to monitor and replete as needed

## 2022-10-16 NOTE — PLAN OF CARE
Patient reports that his breathing is improved from admission  Diminished lung bases  Patient remains on 3L nasal canula and at times is in the low 90s even when on 3L n/c  Did not attempt to wean at this point  Abdomen distended however patient states this is normal for him  He had bm today  Assist x1 to bedside commode  Voiding in urinal  Tachycardic with movement  But hr returned to 70s once resting in bed  Patient has needs and call bell in reach  Patient able to make needs known  Problem: MOBILITY - ADULT  Goal: Maintain or return to baseline ADL function  Description: INTERVENTIONS:  -  Assess patient's ability to carry out ADLs; assess patient's baseline for ADL function and identify physical deficits which impact ability to perform ADLs (bathing, care of mouth/teeth, toileting, grooming, dressing, etc )  - Assess/evaluate cause of self-care deficits   - Assess range of motion  - Assess patient's mobility; develop plan if impaired  - Assess patient's need for assistive devices and provide as appropriate  - Encourage maximum independence but intervene and supervise when necessary  - Involve family in performance of ADLs  - Assess for home care needs following discharge   - Consider OT consult to assist with ADL evaluation and planning for discharge  - Provide patient education as appropriate  Outcome: Progressing  Goal: Maintains/Returns to pre admission functional level  Description: INTERVENTIONS:  - Perform BMAT or MOVE assessment daily    - Set and communicate daily mobility goal to care team and patient/family/caregiver  - Collaborate with rehabilitation services on mobility goals if consulted  - Perform Range of Motion 3 times a day  - Reposition patient every 2 hours    - Dangle patient 3 times a day  - Stand patient 3 times a day  - Ambulate patient 3 times a day  - Out of bed to chair 3 times a day   - Out of bed for meals 3 times a day  - Out of bed for toileting  - Record patient progress and toleration of activity level   Outcome: Progressing     Problem: INFECTION - ADULT  Goal: Absence or prevention of progression during hospitalization  Description: INTERVENTIONS:  - Assess and monitor for signs and symptoms of infection  - Monitor lab/diagnostic results  - Monitor all insertion sites, i e  indwelling lines, tubes, and drains  - Monitor endotracheal if appropriate and nasal secretions for changes in amount and color  - [place] appropriate cooling/warming therapies per order  - Administer medications as ordered  - Instruct and encourage patient and family to use good hand hygiene technique  - Identify and instruct in appropriate isolation precautions for identified infection/condition  Outcome: Progressing     Problem: DISCHARGE PLANNING  Goal: Discharge to home or other facility with appropriate resources  Description: INTERVENTIONS:  - Identify barriers to discharge w/patient and caregiver  - Arrange for needed discharge resources and transportation as appropriate  - Identify discharge learning needs (meds, wound care, etc )  - Arrange for interpretive services to assist at discharge as needed  - Refer to Case Management Department for coordinating discharge planning if the patient needs post-hospital services based on physician/advanced practitioner order or complex needs related to functional status, cognitive ability, or social support system  Outcome: Progressing     Problem: Knowledge Deficit  Goal: Patient/family/caregiver demonstrates understanding of disease process, treatment plan, medications, and discharge instructions  Description: Complete learning assessment and assess knowledge base    Interventions:  - Provide teaching at level of understanding  - Provide teaching via preferred learning methods  Outcome: Progressing     Problem: RESPIRATORY - ADULT  Goal: Achieves optimal ventilation and oxygenation  Description: INTERVENTIONS:  - Assess for changes in respiratory status  - Assess for changes in mentation and behavior  - Position to facilitate oxygenation and minimize respiratory effort  - Oxygen administered by appropriate delivery if ordered  - Initiate smoking cessation education as indicated  - Encourage broncho-pulmonary hygiene including cough, deep breathe, Incentive Spirometry  - Assess the need for suctioning and aspirate as needed  - Assess and instruct to report SOB or any respiratory difficulty  - Respiratory Therapy support as indicated  Outcome: Progressing

## 2022-10-17 LAB
ALBUMIN SERPL BCP-MCNC: 2.4 G/DL (ref 3.5–5)
ALP SERPL-CCNC: 101 U/L (ref 46–116)
ALT SERPL W P-5'-P-CCNC: 22 U/L (ref 12–78)
ANION GAP SERPL CALCULATED.3IONS-SCNC: 5 MMOL/L (ref 4–13)
APTT PPP: 82 SECONDS (ref 23–37)
AST SERPL W P-5'-P-CCNC: 15 U/L (ref 5–45)
BASOPHILS # BLD AUTO: 0.02 THOUSANDS/ÂΜL (ref 0–0.1)
BASOPHILS NFR BLD AUTO: 0 % (ref 0–1)
BILIRUB SERPL-MCNC: 0.1 MG/DL (ref 0.2–1)
BUN SERPL-MCNC: 26 MG/DL (ref 5–25)
CALCIUM ALBUM COR SERPL-MCNC: 11.2 MG/DL (ref 8.3–10.1)
CALCIUM SERPL-MCNC: 9.9 MG/DL (ref 8.3–10.1)
CHLORIDE SERPL-SCNC: 107 MMOL/L (ref 96–108)
CO2 SERPL-SCNC: 28 MMOL/L (ref 21–32)
CREAT SERPL-MCNC: 0.97 MG/DL (ref 0.6–1.3)
D DIMER PPP FEU-MCNC: 0.3 UG/ML FEU
EOSINOPHIL # BLD AUTO: 0.01 THOUSAND/ÂΜL (ref 0–0.61)
EOSINOPHIL NFR BLD AUTO: 0 % (ref 0–6)
ERYTHROCYTE [DISTWIDTH] IN BLOOD BY AUTOMATED COUNT: 15.6 % (ref 11.6–15.1)
GFR SERPL CREATININE-BSD FRML MDRD: 73 ML/MIN/1.73SQ M
GLUCOSE SERPL-MCNC: 159 MG/DL (ref 65–140)
HCT VFR BLD AUTO: 44.3 % (ref 36.5–49.3)
HGB BLD-MCNC: 14.3 G/DL (ref 12–17)
IMM GRANULOCYTES # BLD AUTO: 0.03 THOUSAND/UL (ref 0–0.2)
IMM GRANULOCYTES NFR BLD AUTO: 0 % (ref 0–2)
LYMPHOCYTES # BLD AUTO: 1.58 THOUSANDS/ÂΜL (ref 0.6–4.47)
LYMPHOCYTES NFR BLD AUTO: 16 % (ref 14–44)
MCH RBC QN AUTO: 31 PG (ref 26.8–34.3)
MCHC RBC AUTO-ENTMCNC: 32.3 G/DL (ref 31.4–37.4)
MCV RBC AUTO: 96 FL (ref 82–98)
MONOCYTES # BLD AUTO: 0.35 THOUSAND/ÂΜL (ref 0.17–1.22)
MONOCYTES NFR BLD AUTO: 4 % (ref 4–12)
NEUTROPHILS # BLD AUTO: 7.94 THOUSANDS/ÂΜL (ref 1.85–7.62)
NEUTS SEG NFR BLD AUTO: 80 % (ref 43–75)
NRBC BLD AUTO-RTO: 0 /100 WBCS
PLATELET # BLD AUTO: 341 THOUSANDS/UL (ref 149–390)
PMV BLD AUTO: 11.1 FL (ref 8.9–12.7)
POTASSIUM SERPL-SCNC: 4 MMOL/L (ref 3.5–5.3)
PROT SERPL-MCNC: 5.6 G/DL (ref 6.4–8.4)
RBC # BLD AUTO: 4.61 MILLION/UL (ref 3.88–5.62)
SODIUM SERPL-SCNC: 140 MMOL/L (ref 135–147)
WBC # BLD AUTO: 9.93 THOUSAND/UL (ref 4.31–10.16)

## 2022-10-17 PROCEDURE — 85379 FIBRIN DEGRADATION QUANT: CPT | Performed by: INTERNAL MEDICINE

## 2022-10-17 PROCEDURE — 99232 SBSQ HOSP IP/OBS MODERATE 35: CPT

## 2022-10-17 PROCEDURE — 80053 COMPREHEN METABOLIC PANEL: CPT | Performed by: INTERNAL MEDICINE

## 2022-10-17 PROCEDURE — 85730 THROMBOPLASTIN TIME PARTIAL: CPT | Performed by: INTERNAL MEDICINE

## 2022-10-17 PROCEDURE — 87449 NOS EACH ORGANISM AG IA: CPT

## 2022-10-17 PROCEDURE — 87081 CULTURE SCREEN ONLY: CPT

## 2022-10-17 PROCEDURE — 85025 COMPLETE CBC W/AUTO DIFF WBC: CPT | Performed by: INTERNAL MEDICINE

## 2022-10-17 RX ORDER — GUAIFENESIN 600 MG/1
600 TABLET, EXTENDED RELEASE ORAL EVERY 12 HOURS SCHEDULED
Status: DISCONTINUED | OUTPATIENT
Start: 2022-10-17 | End: 2022-10-21 | Stop reason: HOSPADM

## 2022-10-17 RX ORDER — POLYETHYLENE GLYCOL 3350 17 G/17G
17 POWDER, FOR SOLUTION ORAL ONCE
Status: COMPLETED | OUTPATIENT
Start: 2022-10-18 | End: 2022-10-18

## 2022-10-17 RX ADMIN — PANTOPRAZOLE SODIUM 40 MG: 40 TABLET, DELAYED RELEASE ORAL at 08:15

## 2022-10-17 RX ADMIN — CEFTRIAXONE 1000 MG: 1 INJECTION, SOLUTION INTRAVENOUS at 22:22

## 2022-10-17 RX ADMIN — HEPARIN SODIUM 13.1 UNITS/KG/HR: 10000 INJECTION, SOLUTION INTRAVENOUS at 15:01

## 2022-10-17 RX ADMIN — DEXAMETHASONE SODIUM PHOSPHATE 6 MG: 4 INJECTION, SOLUTION INTRAMUSCULAR; INTRAVENOUS at 22:23

## 2022-10-17 RX ADMIN — MULTIPLE VITAMINS W/ MINERALS TAB 1 TABLET: TAB ORAL at 08:15

## 2022-10-17 RX ADMIN — REMDESIVIR 100 MG: 100 INJECTION, POWDER, LYOPHILIZED, FOR SOLUTION INTRAVENOUS at 23:11

## 2022-10-17 RX ADMIN — TAMSULOSIN HYDROCHLORIDE 0.4 MG: 0.4 CAPSULE ORAL at 17:42

## 2022-10-17 RX ADMIN — SPIRONOLACTONE 12.5 MG: 25 TABLET ORAL at 08:15

## 2022-10-17 RX ADMIN — FERROUS SULFATE TAB 325 MG (65 MG ELEMENTAL FE) 325 MG: 325 (65 FE) TAB at 08:15

## 2022-10-17 RX ADMIN — PRAVASTATIN SODIUM 20 MG: 20 TABLET ORAL at 17:42

## 2022-10-17 RX ADMIN — FINASTERIDE 5 MG: 5 TABLET, FILM COATED ORAL at 08:15

## 2022-10-17 RX ADMIN — FUROSEMIDE 80 MG: 80 TABLET ORAL at 08:15

## 2022-10-17 RX ADMIN — GUAIFENESIN 600 MG: 600 TABLET, EXTENDED RELEASE ORAL at 14:54

## 2022-10-17 RX ADMIN — GUAIFENESIN 600 MG: 600 TABLET, EXTENDED RELEASE ORAL at 20:40

## 2022-10-17 NOTE — ASSESSMENT & PLAN NOTE
· 88% on room air    Currently on 3 L nasal cannula (no home O2 previously)  · Attempted to wean today, desaturated at rest to mid 80s  · Diagnosed with COVID-19 on 10/15/2022  · IV dexamethasone  · Respiratory protocol  · Continue to monitor and wean oxygen as able  · PT/OT eval for weakness

## 2022-10-17 NOTE — CASE MANAGEMENT
Case Management Assessment & Discharge Planning Note    Patient name Roberto Proctor  Location /-94 MRN [id number]  : 1940 Date 10/17/2022       Current Admission Date: 10/15/2022  Current Admission Diagnosis:Sepsis due to COVID-19 [facility])   Patient Active Problem List    Diagnosis Date Noted   • Obesity (BMI 30-39 9) 10/16/2022   • Hypokalemia 10/16/2022   • Sepsis due to COVID-19 ([address] ) 10/15/2022   • Hypoxia 10/15/2022   • Immobility 2020   • Obesity, morbid ([address] ) 2020   • Mild hyperparathyroidism ([address] ) 2020   • Cervicalgia 01/10/2020   • Venous stasis 2018   • Infection of prosthetic right knee joint ([address] ) 2018   • Pure hypercholesterolemia 2018   • Benign prostatic hyperplasia without lower urinary tract symptoms 2017   • Chronic deep vein thrombosis (DVT) of proximal vein of both lower extremities ([address] ) 2015   • GERD without esophagitis 2014   • Incisional hernia, without obstruction or gangrene 2008      LOS (days): 2  Geometric Mean LOS (GMLOS) (days):   Days to GMLOS:     OBJECTIVE:    Risk of Unplanned Readmission Score: 13 1         Current admission status: Inpatient  Referral Reason: Other (Post acute needs)    Pref[facility]acy[facility] [address] [place] [address]  Phone: [phone] Fax: [phone]    Primary Care Provider: Grace Coreas MD    Primary Insurance: [facility]  Secondary Insurance:     ASSESSMENT:  [address] [facility]   Primary Phone: [phone] [facility]  Home Phone: [phone]  Work Phone: [phone]               Advance Directives  Does patient have a [address]?: Yes  Does patient have Advance Directives?: Yes  Advance Directives: Living will, Power of  for health care  Primary Contact: [employer]         Readmission Root [employer]  30 Day Readmission: No    Patient Information  Admitted from[de-identified] Home  Mental Status: Alert  During Assessment patient was accompanied by: Not accompanied during assessment  Assessment information provided by[de-identified] Spouse  Primary Caregiver: Self  Support Systems: Spouse/significant other, [address] of Residence: [address] [place] [address] do you live in?: [address] [facility] [address] [facility] entry access options   Select all that apply : No steps to enter home  Type of Current Residence: 2 story home  Upon entering residence, is there a bedroom on the main floor (no further steps)?: Yes  Upon entering residence, is there a bathroom on the main floor (no further steps)?: Yes  In the last 12 months, was there a time when you were not able to pay the mortgage or rent on time?: No  In the last 12 months, how many places have you lived?: 1  In the last 12 months, was there a time when you did not have a steady place to sleep or slept in a shelter (including now)?: No  Homeless/housing insecurity resource given?: N/A  Living Arrangements: Lives w/ Spouse/significant other  Is patient a ?: No    Activities of Daily Living Prior to Admission  Functional Status: Independent  Completes ADLs independently?: Yes  Ambulates independently?: Yes  Does patient use assisted devices?: Yes  Assisted Devices (DME) used: Colleen Gavi  Does patient currently own DME?: Yes  What DME does the patient currently own?: Colleen Gatcia, Wheelchair  Does patient have a history of Outpatient Therapy (PT/OT)?: Yes  Does the patient have a history of Short-Term Rehab?: Yes  Does patient have a history of HHC?: Yes  Does patient currently have [address]?: No         Patient Information Continued  Income Source: Pension/half-way  Does patient have prescription coverage?: Yes  Within the past 12 months, you worried that your food would run out before you got the money to buy more : Never true  Within the past 12 months, the food you bought just didn't last and you didn't have money to get more : Never true  Food insecurity resource given?: N/A  Does patient receive dialysis treatments?: No  Does patient have a history of substance abuse?: No  Does patient have a history of Mental Health Diagnosis?: No         Means of Transportation  Means of Transport to Appts[de-identified] Family transport  In the past 12 months, has lack of transportation kept you from medical appointments or from getting medications?: No  In the past 12 months, has lack of transportation kept you from meetings, work, or from getting things needed for daily living?: No  Was application for public transport provided?: N/A        DISCHARGE DETAILS:    Discharge planning discussed with[de-identified] Pt spouse Maribell Breath of Choice: Yes  Comments - Freedom of Choice: Discussed  CM contacted family/caregiver?: Yes  Were Treatment Team discharge recommendations reviewed with patient/caregiver?: Yes  Did patient/caregiver verbalize understanding of patient care needs?: Yes       Contacts  Patient Contacts: Itzel Castillo  Relationship to Patient[de-identified] Family  Contact Method: In Person  Reason/Outcome: Discharge [address]         Is the patient interested in [address] at discharge?: No (pending recommendations)    DME Referral Provided  Referral made for DME?: No    Other Referral/Resources/Interventions Provided:  Interventions: HHC, Short Term Rehab  Referral Comments: Pt needs TBD  Requiring O2 in [facility]  PT/OT to [facility]            Discharge Destination Plan[de-identified] Home, Home with [address], Short Term Rehab  Transport at Discharge : Family                             IMM Given (Date):: 10/17/22  IMM Given to[de-identified] Family  Family notified[de-identified] Itzel Castillo  Additional Comments: Cm met with pts wife  Pts wife is a staff member here at [employer]  Per spouse pt resides with her at home  Pt uses a walker but also has a wc at home  Pt has no O2 needs at baseline PTA but is requiring it here inpatient  Pt has had [address] with [facility] at [place] before/PT/OT with [facility]/No MH/No DA    Pts spouse is POA  Pt does nto drive anymore  PCP [facility] through [facility] [employer]  Cm following regarding O2 needs and PT/OT recommendations

## 2022-10-17 NOTE — UTILIZATION REVIEW
Initial Clinical Review    Admission: Date/Time/Statement:   Admission Orders (From admission, onward)     Ordered        10/15/22 2050  INPATIENT ADMISSION  Once                      Orders Placed This Encounter   Procedures   • INPATIENT ADMISSION     Standing Status:   Standing     Number of Occurrences:   1     Order Specific Question:   Level of Care     Answer:   Med Surg [16]     Order Specific Question:   Estimated length of stay     Answer:   More than 2 Midnights     Order Specific Question:   Certification     Answer:   I certify that inpatient services are medically necessary for this patient for a duration of greater than two midnights  See H&P and MD Progress Notes for additional information about the patient's course of treatment  ED Arrival Information     Expected   -    Arrival   10/15/2022 19:18    Acuity   Urgent            Means of arrival   Walk-In    Escorted by   Family Member    Service   Hospitalist    Admission type   Emergency            Arrival complaint   shortness of breath/weakness/sore throat           Chief Complaint   Patient presents with   • Shortness of Breath     SOB that started on Thursday  Reported oxygenation of 84  Denies fevers  Initial Presentation: [de-identified] y o  male with a PMH of chronic DVT, hernia repairs, obesity, BPH who presents from home due to sob, wet cough and sore throat that began Thursday 10/13  At home had used a pulse ox and found his oxygen to be in the 80s with ambulation prompting him to come to the ER  In the ER 88% on RA at rest, placed on 3 Liters nasal canula  Diagnosed with Covid  Received vaccine x 3   Plan: Inpatient admission for evaluation and treatment of sepsis due to Covid, hypoxia, hypokalemia: IV Remdesivir, IV dexamethasone, heparin drip, IV ceftriaxone, O2 2L NC, replete potassium    Date: 10/16   Day 2:     Internal medicine: continue supplemental O2, continue IV Remdesivir, IV dexamethasone, IV ceftriaxone, heparin drip, await blood cultures, continue potassium repletion  ED Triage Vitals   Temperature Pulse Respirations Blood Pressure SpO2   10/15/22 1934 10/15/22 1929 10/15/22 1929 10/15/22 1929 10/15/22 1929   98 2 °F (36 8 °C) 94 20 137/78 95 %      Temp Source Heart Rate Source Patient Position - Orthostatic VS BP Location FiO2 (%)   10/15/22 1934 10/15/22 1929 10/15/22 1929 10/15/22 1929 --   Oral Monitor Sitting Right arm       Pain Score       10/15/22 1929       No Pain          Wt Readings from Last 1 Encounters:   10/15/22 90 7 kg (200 lb)     Additional Vital Signs:     Date/Time Temp Pulse Resp BP MAP (mmHg) SpO2 Calculated FIO2 (%) - Nasal Cannula Nasal Cannula O2 Flow Rate (L/min) O2 Device   10/16/22 2130 -- -- -- -- -- -- 32 3 L/min Nasal cannula   10/16/22 20:03:27 97 3 °F (36 3 °C) Abnormal  65 17 115/64 81 95 % -- -- --   10/16/22 14:38:02 97 7 °F (36 5 °C) 64 24 Abnormal  114/67 83 94 % -- -- --   10/16/22 0950 -- -- -- -- -- -- 32 3 L/min Nasal cannula   10/16/22 08:22:33 97 6 °F (36 4 °C) 62 16 118/69 85 94 % -- -- --   10/15/22 2305 -- -- -- -- -- -- 32 3 L/min Nasal cannula   10/15/22 21:58:53 97 7 °F (36 5 °C) 76 -- 117/63 81 94 % -- -- --   10/15/22 21:56:15 97 7 °F (36 5 °C) 78 -- 117/63 81 95 % -- -- --   10/15/22 2130 -- 84 -- 119/53 76 94 % -- -- --   10/15/22 2100 -- 86 16 140/77 98 93 % -- -- --   10/15/22 2030 -- 89 -- 115/66 84 91 % -- -- --   10/15/22 2015 -- 84 -- 110/60 80 92 % -- -- --   10/15/22 2005 -- -- -- -- -- -- -- -- Nasal cannula    10/15/22 2001 -- 90 -- -- -- 93 % -- -- --   10/15/22 1950 -- 94 -- -- -- 88 % Abnormal  -- -- --   10/15/22 1934 98 2 °F (36 8 °C) -- -- -- -- -- -- -- --   10/15/22 1933 -- -- -- -- -- -- -- -- None (Room air)   10/15/22 1929 -- 94 20 137/78 102 95 % -- -- None (Room air)       Pertinent Labs/Diagnostic Test Results:   XR chest 2 views   Final Result by Danya Meneses MD (10/16 1014)      No acute cardiopulmonary disease                    Workstation performed: [id number]           10/15 EKG:  Sinus rhythm with Premature atrial complexes  Left anterior fascicular block  Abnormal ECG  No previous ECGs available    10/15 CXR:  IMPRESSION:     No acute cardiopulmonary disease  Results from last 7 days   Lab Units 10/15/22  1946   SARS-COV-2  Positive*     Results from last 7 days   Lab Units 10/17/22  0313 10/16/22  0514 10/15/22  1946   WBC Thousand/uL 9 93 14 15* 15 16*   HEMOGLOBIN g/dL 14 3 15 1 16 5   HEMATOCRIT % 44 3 48 4 49 2   PLATELETS Thousands/uL 341 308 350   NEUTROS ABS Thousands/µL 7 94* 11 68* 11 87*         Results from last 7 days   Lab Units 10/17/22  0313 10/16/22  0514 10/15/22  1946   SODIUM mmol/L 140 142 141   POTASSIUM mmol/L 4 0 3 4* 2 9*   CHLORIDE mmol/L 107 109* 102   CO2 mmol/L 28 25 28   ANION GAP mmol/L 5 8 11   BUN mg/dL 26* 22 21   CREATININE mg/dL 0 97 0 82 1 18   EGFR ml/min/1 73sq m 73 83 57   CALCIUM mg/dL 9 9 9 5 10 0     Results from last 7 days   Lab Units 10/17/22  0313 10/16/22  0514 10/15/22  1946   AST U/L 15 21 18   ALT U/L 22 21 28   ALK PHOS U/L 101 113 140*   TOTAL PROTEIN g/dL 5 6* 5 8* 7 3   ALBUMIN g/dL 2 4* 2 5* 3 3*   TOTAL BILIRUBIN mg/dL 0 10* 0 30 0 50         Results from last 7 days   Lab Units 10/17/22  0313 10/16/22  0514 10/15/22  1946   GLUCOSE RANDOM mg/dL 159* 143* 122         Results from last 7 days   Lab Units 10/15/22  1946   CK TOTAL U/L 112     Results from last 7 days   Lab Units 10/15/22  1946   HS TNI 0HR ng/L 12     Results from last 7 days   Lab Units 10/17/22  0313 10/15/22  1947   D-DIMER QUANTITATIVE ug/ml FEU 0 30 0 69*     Results from last 7 days   Lab Units 10/17/22  0313 10/16/22  2009 10/16/22  1243 10/16/22  0000 10/15/22  1947   PROTIME seconds  --   --   --   --  14 0   INR   --   --   --   --  1 01   PTT seconds 82* 82* 43*   < > 30    < > = values in this interval not displayed           Results from last 7 days   Lab Units 10/16/22  0514 10/15/22  8742   PROCALCITONIN ng/ml 0  25 0 32*     Results from last 7 days   Lab Units 10/15/22  2252   LACTIC ACID mmol/L 1 0             Results from last 7 days   Lab Units 10/15/22  1946   NT-PRO BNP pg/mL 144                 Results from last 7 days   Lab Units 10/15/22  1946   CRP mg/L 49 2*           Results from last 7 days   Lab Units 10/15/22  1946   INFLUENZA A PCR  Negative   INFLUENZA B PCR  Negative   RSV PCR  Negative         Results from last 7 days   Lab Units 10/15/22  2252 10/15/22  2251   BLOOD CULTURE  No Growth at 24 hrs  No Growth at 24 hrs                 ED Treatment:   Medication Administration from 10/15/2022 1918 to 10/15/2022 2152       Date/Time Order Dose Route Action     10/15/2022 2052 potassium chloride (K-DUR,KLOR-CON) CR tablet 40 mEq 40 mEq Oral Given     10/15/2022 2052 potassium chloride 20 mEq IVPB (premix) 20 mEq Intravenous New Bag     10/15/2022 2052 sodium chloride 0 9 % bolus 250 mL 250 mL Intravenous New Bag        Past Medical History:   Diagnosis Date   • Basal cell carcinoma     nose    • Benign skin lesion    • BPH (benign prostatic hyperplasia)    • Diverticular disease of colon    • DVT (deep venous thrombosis) (HCC)    • Elevated PSA    • History of blood clots    • Hypotension    • Intestinal obstruction (HCC)    • Social anxiety disorder     last assessed 1/12/17, resolved 10/17/17   • Ventral hernia      Present on Admission:  • Chronic deep vein thrombosis (DVT) of proximal vein of both lower extremities (HCC)  • Pure hypercholesterolemia      Admitting Diagnosis: Hypokalemia [E87 6]  Sore throat [J02 9]  Dyspnea [R06 00]  SOB (shortness of breath) [R06 02]  COVID-19 [U07 1]  Age/Sex: [de-identified] y o  male  Admission Orders:  Scheduled Medications:  cefTRIAXone, 1,000 mg, Intravenous, Q24H  dexamethasone, 6 mg, Intravenous, Q24H  ferrous sulfate, 325 mg, Oral, Daily  finasteride, 5 mg, Oral, Daily  furosemide, 80 mg, Oral, Daily  multivitamin-minerals, 1 tablet, Oral, Daily  pantoprazole, 40 mg, Oral, Early Morning  pravastatin, 20 mg, Oral, Daily With Dinner  remdesivir, 100 mg, Intravenous, Q24H  spironolactone, 12 5 mg, Oral, Daily  tamsulosin, 0 4 mg, Oral, Daily With Dinner      Continuous IV Infusions:  heparin (porcine), 3-20 Units/kg/hr (Order-Specific), Intravenous, Titrated      PRN Meds:  acetaminophen, 650 mg, Oral, Q6H PRN  heparin (porcine), 2,000 Units, Intravenous, Q1H PRN  heparin (porcine), 4,000 Units, Intravenous, Q1H PRN  ondansetron, 4 mg, Intravenous, Q6H PRN  polyethylene glycol, 17 g, Oral, Daily PRN        IP CONSULT TO CASE MANAGEMENT    Network Utilization Review Department  ATTENTION: Please call with any questions or concerns to [phone] and carefully listen to the prompts so that you are directed to the right person  All voicemails are confidential   Sue Rogel all requests for admission clinical reviews, approved or denied determinations and any other requests to dedicated fax number below belonging to the campus where the patient is receiving treatment   List of dedicated fax numbers for the Facilities:  [address] DENIALS (Administrative/Medical Necessity) [phone]   [address] (Maternity/NICU/Pediatrics) [phone]   9 Serena Thao [phone]   [facility] [phone]   [address] [address] Fiva Ari Scott [phone]   [facility] [place] Santi Grace [facility] 134 815 [facility] [phone]

## 2022-10-17 NOTE — ASSESSMENT & PLAN NOTE
· Presented due to SOB, wet cough and sore throat that began 10/13   88% on room air in the ER  Currently on 3 L nasal cannula  · Diagnosed with COVID-19 on 10/15/2022    Received vaccine x3  · Admit under mild pathway  · COVID labs  · D-dimer:  0 69 --> 0 3 (IV heparin drip initiated per protocol --> hold home eliquis) --> can DC hepatin gtt if no longer requiring O2 per protocol   · CK:  112, BNP:  144, CRP:  49 2  · IV remdesivir and IV dexamethasone initiated 10/16  · Due to elevated procalcitonin and sepsis criteria --> started on IV ceftriaxone  · Second procal negative --> repeat tomorrow,  if negative x 2 and PNA work up otherwise negative likely DC this

## 2022-10-17 NOTE — PROGRESS NOTES
[place]  Progress Note - Cindy Sullivan 1940, [de-identified] y o  male MRN: [id number]  Unit/Bed#: -01 Encounter: [id number]  Primary Care Provider: Oliva Muse MD   Date and time admitted to hospital: 10/15/2022  7:20 PM    * Sepsis due to COVID-19 [facility])  Assessment & Plan  · Presented due to SOB, wet cough and sore throat that began 10/13   88% on room air in the ER  Currently on 3 L nasal cannula  · Diagnosed with COVID-19 on 10/15/2022  Received vaccine x3  · Admit under mild pathway  · COVID labs  · D-dimer:  0 69 --> 0 3 (IV heparin drip initiated per protocol --> hold home eliquis) --> can DC hepatin gtt if no longer requiring O2 per protocol   · CK:  112, BNP:  144, CRP:  49 2  · IV remdesivir and IV dexamethasone initiated 10/16  · Due to elevated procalcitonin and sepsis criteria --> started on IV ceftriaxone  · Second procal negative --> repeat tomorrow,  if negative x 2 and PNA work up otherwise negative likely DC this    Hypokalemia  Assessment & Plan  · K+ 2 9 --> 4 0  · Continue to monitor and replete as needed     Obesity (BMI 30-39  9)  Assessment & Plan  Body mass index is 35 43 kg/m²  · Encouraged lifestyle changes    Hypoxia  Assessment & Plan  · 88% on room air  Currently on 3 L nasal cannula (no home O2 previously)  · Attempted to wean today, desaturated at rest to mid 80s  · Diagnosed with COVID-19 on 10/15/2022  · IV dexamethasone  · Respiratory protocol  · Continue to monitor and wean oxygen as able  · PT/OT eval for weakness    Mild hyperparathyroidism (HCC)  Assessment & Plan  · Corrected calcium is mildly elevated 10-11  · Check ionized Ca with am labs  · Hold vitamins for now     Pure hypercholesterolemia  Assessment & Plan  · Home regimen simvastatin 10 mg daily  Non formulary    Pravastatin ordered    Chronic deep vein thrombosis (DVT) of proximal vein of both lower extremities (HCC)  Assessment & Plan  · Chronically on Eliquis 2 5 mg b i d , also has IVC filter  · Hold Eliquis  Start IV heparin drip due to COVID protocol    VTE Pharmacologic Prophylaxis: VTE Score: 4 Moderate Risk (Score 3-4) - Pharmacological DVT Prophylaxis Ordered: heparin drip  Patient Centered Rounds: I performed bedside rounds with nursing staff today  Discussions with Specialists or Other Care Team Provider: None     Education and Discussions with Family / Patient: Updated  (wife) at bedside  Time Spent for Care: 30 minutes  More than 50% of total time spent on counseling and coordination of care as described above  Current Length of Stay: 2 day(s)  Current Patient Status: Inpatient   Certification Statement: The patient will continue to require additional inpatient hospital stay due to IV steroids and supportive care  Discharge Plan: Anticipate discharge in 24-48 hrs to discharge location to be determined pending rehab evaluations  Code Status: Level 1 - Full Code    Subjective:   Patient is eating well, he denies chest pain, SOB, dyspnea, etc  Otherwise denying complaints  Objective:     Vitals:   Temp (24hrs), Av 4 °F (36 3 °C), Min:97 2 °F (36 2 °C), Max:97 7 °F (36 5 °C)    Temp:  [97 2 °F (36 2 °C)-97 7 °F (36 5 °C)] 97 2 °F (36 2 °C)  HR:  [64-65] 65  Resp:  [17-24] 17  BP: (114-143)/(64-88) 143/88  SpO2:  [92 %-95 %] 94 %  Body mass index is 35 43 kg/m²  Input and Output Summary (last 24 hours): Intake/Output Summary (Last 24 hours) at 10/17/2022 1343  Last data filed at 10/17/2022 1000  Gross per 24 hour   Intake 1548 31 ml   Output 2650 ml   Net -1101 69 ml       Physical Exam:   Physical Exam  Vitals and nursing note reviewed  Constitutional:       General: He is not in acute distress  Appearance: He is well-developed  He is ill-appearing  HENT:      Head: Normocephalic and atraumatic  Eyes:      General:         Right eye: No discharge  Left eye: No discharge        Extraocular Movements: Extraocular movements intact  Conjunctiva/sclera: Conjunctivae normal    Cardiovascular:      Rate and Rhythm: Normal rate and regular rhythm  Heart sounds: No murmur heard  Pulmonary:      Effort: Pulmonary effort is normal  No respiratory distress  Breath sounds: Wheezing present  No rhonchi or rales  Comments: Very mild scattered wheezes  91% on 3L  Abdominal:      General: Bowel sounds are normal  There is no distension  Palpations: Abdomen is soft  Tenderness: There is no abdominal tenderness  Musculoskeletal:      Cervical back: Neck supple  Right lower leg: No edema  Left lower leg: No edema  Skin:     General: Skin is warm and dry  Neurological:      Mental Status: He is alert and oriented to person, place, and time     Psychiatric:         Mood and Affect: Mood normal          Behavior: Behavior normal           Additional Data:     Labs:  Results from last 7 days   Lab Units 10/17/22  0313   WBC Thousand/uL 9 93   HEMOGLOBIN g/dL 14 3   HEMATOCRIT % 44 3   PLATELETS Thousands/uL 341   NEUTROS PCT % 80*   LYMPHS PCT % 16   MONOS PCT % 4   EOS PCT % 0     Results from last 7 days   Lab Units 10/17/22  0313   SODIUM mmol/L 140   POTASSIUM mmol/L 4 0   CHLORIDE mmol/L 107   CO2 mmol/L 28   BUN mg/dL 26*   CREATININE mg/dL 0 97   ANION GAP mmol/L 5   CALCIUM mg/dL 9 9   ALBUMIN g/dL 2 4*   TOTAL BILIRUBIN mg/dL 0 10*   ALK PHOS U/L 101   ALT U/L 22   AST U/L 15   GLUCOSE RANDOM mg/dL 159*     Results from last 7 days   Lab Units 10/15/22  1947   INR  1 01             Results from last 7 days   Lab Units 10/16/22  0514 10/15/22  2252   LACTIC ACID mmol/L  --  1 0   PROCALCITONIN ng/ml 0 25 0 32*       Lines/Drains:  Invasive Devices  Report    Peripheral Intravenous Line  Duration           Peripheral IV 10/15/22 Left Antecubital 1 day    Peripheral IV 10/15/22 Left;Ventral (anterior) Forearm 1 day                Imaging: Reviewed radiology reports from this admission including: chest xray    Recent Cultures (last 7 days):   Results from last 7 days   Lab Units 10/15/22  2252 10/15/22  2251   BLOOD CULTURE  No Growth at 24 hrs  No Growth at 24 hrs  Last 24 Hours Medication List:   Current Facility-Administered Medications   Medication Dose Route Frequency Provider Last Rate   • acetaminophen  650 mg Oral Q6H PRN Xin El Nido, PA-C     • cefTRIAXone  1,000 mg Intravenous Q24H Xin Jazmin, PA-C 1,000 mg (10/16/22 2136)   • dexamethasone  6 mg Intravenous Q24H [place] Jazmin, PA-C     • ferrous sulfate  325 mg Oral Daily Xin El Nido, PA-C     • finasteride  5 mg Oral Daily Xin El Nido, PA-C     • furosemide  80 mg Oral Daily Xin El Nido, PA-C     • guaiFENesin  600 mg Oral Q12H [address] Kelly Aldridge PA-C     • heparin (porcine)  3-20 Units/kg/hr (Order-Specific) Intravenous Titrated Xin El Nido, PA-C 13 1 Units/kg/hr (10/16/22 2136)   • heparin (porcine)  2,000 Units Intravenous Q1H PRN [place] Jazmin, PA-C     • heparin (porcine)  4,000 Units Intravenous Q1H PRN [place] El Nido, PA-C     • ondansetron  4 mg Intravenous Q6H PRN Xin El Nido, PA-C     • pantoprazole  40 mg Oral Early Morning Meagan Gill PA-C     • polyethylene glycol  17 g Oral Daily PRN [place] JazminNICHOLE remyC     • [START ON 10/18/2022] polyethylene glycol  17 g Oral Once Ralph Paulino PA-C     • pravastatin  20 mg Oral Daily With Dinner [place] JazminFRANCIA     • remdesivir  100 mg Intravenous Q24H [place] El NidoFRANCIA     • spironolactone  12 5 mg Oral Daily [place] Jazmin, FRANCIA     • tamsulosin  0 4 mg Oral Daily With Dinner [place] JazminFRANCIA          Today, Patient Was Seen By: Ralph Paulino    **Please Note: This note may have been constructed using a voice recognition system  **

## 2022-10-17 NOTE — ASSESSMENT & PLAN NOTE
· Chronically on Eliquis 2 5 mg b i d , also has IVC filter  · Hold Eliquis    Start IV heparin drip due to COVID protocol

## 2022-10-17 NOTE — PLAN OF CARE
Problem: MOBILITY - ADULT  Goal: Maintain or return to baseline ADL function  Description: INTERVENTIONS:  -  Assess patient's ability to carry out ADLs; assess patient's baseline for ADL function and identify physical deficits which impact ability to perform ADLs (bathing, care of mouth/teeth, toileting, grooming, dressing, etc )  - Assess/evaluate cause of self-care deficits   - Assess range of motion  - Assess patient's mobility; develop plan if impaired  - Assess patient's need for assistive devices and provide as appropriate  - Encourage maximum independence but intervene and supervise when necessary  - Involve family in performance of ADLs  - Assess for home care needs following discharge   - Consider OT consult to assist with ADL evaluation and planning for discharge  - Provide patient education as appropriate  Outcome: Progressing  Goal: Maintains/Returns to pre admission functional level  Description: INTERVENTIONS:  - Perform BMAT or MOVE assessment daily    - Set and communicate daily mobility goal to care team and patient/family/caregiver  - Collaborate with rehabilitation services on mobility goals if consulted  - Perform Range of Motion 3 times a day  - Reposition patient every 2 hours    - Dangle patient 3 times a day  - Stand patient 3 times a day  - Ambulate patient 3 times a day  - Out of bed to chair 3 times a day   - Out of bed for meals 3 times a day  - Out of bed for toileting  - Record patient progress and toleration of activity level   Outcome: Progressing     Problem: INFECTION - ADULT  Goal: Absence or prevention of progression during hospitalization  Description: INTERVENTIONS:  - Assess and monitor for signs and symptoms of infection  - Monitor lab/diagnostic results  - Monitor all insertion sites, i e  indwelling lines, tubes, and drains  - Monitor endotracheal if appropriate and nasal secretions for changes in amount and color  - [place] appropriate cooling/warming therapies per order  - Administer medications as ordered  - Instruct and encourage patient and family to use good hand hygiene technique  - Identify and instruct in appropriate isolation precautions for identified infection/condition  Outcome: Progressing     Problem: DISCHARGE PLANNING  Goal: Discharge to home or other facility with appropriate resources  Description: INTERVENTIONS:  - Identify barriers to discharge w/patient and caregiver  - Arrange for needed discharge resources and transportation as appropriate  - Identify discharge learning needs (meds, wound care, etc )  - Arrange for interpretive services to assist at discharge as needed  - Refer to Case Management Department for coordinating discharge planning if the patient needs post-hospital services based on physician/advanced practitioner order or complex needs related to functional status, cognitive ability, or social support system  Outcome: Progressing     Problem: Knowledge Deficit  Goal: Patient/family/caregiver demonstrates understanding of disease process, treatment plan, medications, and discharge instructions  Description: Complete learning assessment and assess knowledge base    Interventions:  - Provide teaching at level of understanding  - Provide teaching via preferred learning methods  Outcome: Progressing     Problem: RESPIRATORY - ADULT  Goal: Achieves optimal ventilation and oxygenation  Description: INTERVENTIONS:  - Assess for changes in respiratory status  - Assess for changes in mentation and behavior  - Position to facilitate oxygenation and minimize respiratory effort  - Oxygen administered by appropriate delivery if ordered  - Initiate smoking cessation education as indicated  - Encourage broncho-pulmonary hygiene including cough, deep breathe, Incentive Spirometry  - Assess the need for suctioning and aspirate as needed  - Assess and instruct to report SOB or any respiratory difficulty  - Respiratory Therapy support as indicated  Outcome: Progressing

## 2022-10-17 NOTE — ASSESSMENT & PLAN NOTE
· Corrected calcium is mildly elevated 10-11  · Check ionized Ca with am labs  · Hold vitamins for now

## 2022-10-17 NOTE — PLAN OF CARE
Problem: MOBILITY - ADULT  Goal: Maintain or return to baseline ADL function  Description: INTERVENTIONS:  -  Assess patient's ability to carry out ADLs; assess patient's baseline for ADL function and identify physical deficits which impact ability to perform ADLs (bathing, care of mouth/teeth, toileting, grooming, dressing, etc )  - Assess/evaluate cause of self-care deficits   - Assess range of motion  - Assess patient's mobility; develop plan if impaired  - Assess patient's need for assistive devices and provide as appropriate  - Encourage maximum independence but intervene and supervise when necessary  - Involve family in performance of ADLs  - Assess for home care needs following discharge   - Consider OT consult to assist with ADL evaluation and planning for discharge  - Provide patient education as appropriate  Outcome: Progressing  Goal: Maintains/Returns to pre admission functional level  Description: INTERVENTIONS:  - Perform BMAT or MOVE assessment daily    - Set and communicate daily mobility goal to care team and patient/family/caregiver  - Collaborate with rehabilitation services on mobility goals if consulted  - Perform Range of Motion 3 times a day  - Reposition patient every 2 hours    - Dangle patient 3 times a day  - Stand patient 3 times a day  - Ambulate patient 3 times a day  - Out of bed to chair 3 times a day   - Out of bed for meals 3 times a day  - Out of bed for toileting  - Record patient progress and toleration of activity level   Outcome: Progressing     Problem: INFECTION - ADULT  Goal: Absence or prevention of progression during hospitalization  Description: INTERVENTIONS:  - Assess and monitor for signs and symptoms of infection  - Monitor lab/diagnostic results  - Monitor all insertion sites, i e  indwelling lines, tubes, and drains  - Monitor endotracheal if appropriate and nasal secretions for changes in amount and color  - [place] appropriate cooling/warming therapies per order  - Administer medications as ordered  - Instruct and encourage patient and family to use good hand hygiene technique  - Identify and instruct in appropriate isolation precautions for identified infection/condition  Outcome: Progressing     Problem: DISCHARGE PLANNING  Goal: Discharge to home or other facility with appropriate resources  Description: INTERVENTIONS:  - Identify barriers to discharge w/patient and caregiver  - Arrange for needed discharge resources and transportation as appropriate  - Identify discharge learning needs (meds, wound care, etc )  - Arrange for interpretive services to assist at discharge as needed  - Refer to Case Management Department for coordinating discharge planning if the patient needs post-hospital services based on physician/advanced practitioner order or complex needs related to functional status, cognitive ability, or social support system  Outcome: Progressing     Problem: Knowledge Deficit  Goal: Patient/family/caregiver demonstrates understanding of disease process, treatment plan, medications, and discharge instructions  Description: Complete learning assessment and assess knowledge base    Interventions:  - Provide teaching at level of understanding  - Provide teaching via preferred learning methods  Outcome: Progressing     Problem: RESPIRATORY - ADULT  Goal: Achieves optimal ventilation and oxygenation  Description: INTERVENTIONS:  - Assess for changes in respiratory status  - Assess for changes in mentation and behavior  - Position to facilitate oxygenation and minimize respiratory effort  - Oxygen administered by appropriate delivery if ordered  - Initiate smoking cessation education as indicated  - Encourage broncho-pulmonary hygiene including cough, deep breathe, Incentive Spirometry  - Assess the need for suctioning and aspirate as needed  - Assess and instruct to report SOB or any respiratory difficulty  - Respiratory Therapy support as indicated  Outcome: Progressing

## 2022-10-18 ENCOUNTER — HOME HEALTH ADMISSION (OUTPATIENT)
Dept: HOME HEALTH SERVICES | Facility: HOME HEALTHCARE | Age: 82
End: 2022-10-18

## 2022-10-18 LAB
ALBUMIN SERPL BCP-MCNC: 2.4 G/DL (ref 3.5–5)
ALP SERPL-CCNC: 101 U/L (ref 46–116)
ALT SERPL W P-5'-P-CCNC: 21 U/L (ref 12–78)
ANION GAP SERPL CALCULATED.3IONS-SCNC: 7 MMOL/L (ref 4–13)
APTT PPP: 45 SECONDS (ref 23–37)
APTT PPP: 66 SECONDS (ref 23–37)
APTT PPP: 69 SECONDS (ref 23–37)
AST SERPL W P-5'-P-CCNC: 21 U/L (ref 5–45)
BASOPHILS # BLD AUTO: 0.01 THOUSANDS/ÂΜL (ref 0–0.1)
BASOPHILS NFR BLD AUTO: 0 % (ref 0–1)
BILIRUB SERPL-MCNC: 0.2 MG/DL (ref 0.2–1)
BUN SERPL-MCNC: 26 MG/DL (ref 5–25)
CA-I BLD-SCNC: 1.34 MMOL/L (ref 1.12–1.32)
CALCIUM ALBUM COR SERPL-MCNC: 11.7 MG/DL (ref 8.3–10.1)
CALCIUM SERPL-MCNC: 10.4 MG/DL (ref 8.3–10.1)
CHLORIDE SERPL-SCNC: 105 MMOL/L (ref 96–108)
CO2 SERPL-SCNC: 26 MMOL/L (ref 21–32)
CREAT SERPL-MCNC: 0.87 MG/DL (ref 0.6–1.3)
EOSINOPHIL # BLD AUTO: 0 THOUSAND/ÂΜL (ref 0–0.61)
EOSINOPHIL NFR BLD AUTO: 0 % (ref 0–6)
ERYTHROCYTE [DISTWIDTH] IN BLOOD BY AUTOMATED COUNT: 15.2 % (ref 11.6–15.1)
GFR SERPL CREATININE-BSD FRML MDRD: 81 ML/MIN/1.73SQ M
GLUCOSE SERPL-MCNC: 159 MG/DL (ref 65–140)
HCT VFR BLD AUTO: 45.6 % (ref 36.5–49.3)
HGB BLD-MCNC: 15.1 G/DL (ref 12–17)
IMM GRANULOCYTES # BLD AUTO: 0.04 THOUSAND/UL (ref 0–0.2)
IMM GRANULOCYTES NFR BLD AUTO: 1 % (ref 0–2)
L PNEUMO1 AG UR QL IA.RAPID: NEGATIVE
LYMPHOCYTES # BLD AUTO: 1.73 THOUSANDS/ÂΜL (ref 0.6–4.47)
LYMPHOCYTES NFR BLD AUTO: 20 % (ref 14–44)
MAGNESIUM SERPL-MCNC: 1.6 MG/DL (ref 1.6–2.6)
MCH RBC QN AUTO: 31.3 PG (ref 26.8–34.3)
MCHC RBC AUTO-ENTMCNC: 33.1 G/DL (ref 31.4–37.4)
MCV RBC AUTO: 95 FL (ref 82–98)
MONOCYTES # BLD AUTO: 0.45 THOUSAND/ÂΜL (ref 0.17–1.22)
MONOCYTES NFR BLD AUTO: 5 % (ref 4–12)
NEUTROPHILS # BLD AUTO: 6.34 THOUSANDS/ÂΜL (ref 1.85–7.62)
NEUTS SEG NFR BLD AUTO: 74 % (ref 43–75)
NRBC BLD AUTO-RTO: 0 /100 WBCS
PHOSPHATE SERPL-MCNC: 2.9 MG/DL (ref 2.3–4.1)
PLATELET # BLD AUTO: 348 THOUSANDS/UL (ref 149–390)
PMV BLD AUTO: 10.5 FL (ref 8.9–12.7)
POTASSIUM SERPL-SCNC: 4.3 MMOL/L (ref 3.5–5.3)
PROCALCITONIN SERPL-MCNC: 0.36 NG/ML
PROT SERPL-MCNC: 5.9 G/DL (ref 6.4–8.4)
RBC # BLD AUTO: 4.82 MILLION/UL (ref 3.88–5.62)
S PNEUM AG UR QL: NEGATIVE
SODIUM SERPL-SCNC: 138 MMOL/L (ref 135–147)
WBC # BLD AUTO: 8.57 THOUSAND/UL (ref 4.31–10.16)

## 2022-10-18 PROCEDURE — 97167 OT EVAL HIGH COMPLEX 60 MIN: CPT

## 2022-10-18 PROCEDURE — 84100 ASSAY OF PHOSPHORUS: CPT

## 2022-10-18 PROCEDURE — 85730 THROMBOPLASTIN TIME PARTIAL: CPT | Performed by: PHYSICIAN ASSISTANT

## 2022-10-18 PROCEDURE — 97530 THERAPEUTIC ACTIVITIES: CPT

## 2022-10-18 PROCEDURE — 85025 COMPLETE CBC W/AUTO DIFF WBC: CPT

## 2022-10-18 PROCEDURE — 85730 THROMBOPLASTIN TIME PARTIAL: CPT | Performed by: INTERNAL MEDICINE

## 2022-10-18 PROCEDURE — 97163 PT EVAL HIGH COMPLEX 45 MIN: CPT

## 2022-10-18 PROCEDURE — 84145 PROCALCITONIN (PCT): CPT

## 2022-10-18 PROCEDURE — 80053 COMPREHEN METABOLIC PANEL: CPT

## 2022-10-18 PROCEDURE — 99232 SBSQ HOSP IP/OBS MODERATE 35: CPT | Performed by: PHYSICIAN ASSISTANT

## 2022-10-18 PROCEDURE — 97535 SELF CARE MNGMENT TRAINING: CPT

## 2022-10-18 PROCEDURE — 97110 THERAPEUTIC EXERCISES: CPT

## 2022-10-18 PROCEDURE — 83735 ASSAY OF MAGNESIUM: CPT

## 2022-10-18 PROCEDURE — 82330 ASSAY OF CALCIUM: CPT

## 2022-10-18 RX ORDER — ECHINACEA PURPUREA EXTRACT 125 MG
1 TABLET ORAL
Status: DISCONTINUED | OUTPATIENT
Start: 2022-10-18 | End: 2022-10-21 | Stop reason: HOSPADM

## 2022-10-18 RX ADMIN — HEPARIN SODIUM 15.1 UNITS/KG/HR: 10000 INJECTION, SOLUTION INTRAVENOUS at 09:29

## 2022-10-18 RX ADMIN — FUROSEMIDE 80 MG: 80 TABLET ORAL at 09:23

## 2022-10-18 RX ADMIN — DEXAMETHASONE SODIUM PHOSPHATE 6 MG: 4 INJECTION, SOLUTION INTRAMUSCULAR; INTRAVENOUS at 22:21

## 2022-10-18 RX ADMIN — SPIRONOLACTONE 12.5 MG: 25 TABLET ORAL at 09:24

## 2022-10-18 RX ADMIN — PRAVASTATIN SODIUM 20 MG: 20 TABLET ORAL at 18:24

## 2022-10-18 RX ADMIN — GUAIFENESIN 600 MG: 600 TABLET, EXTENDED RELEASE ORAL at 09:23

## 2022-10-18 RX ADMIN — FINASTERIDE 5 MG: 5 TABLET, FILM COATED ORAL at 09:23

## 2022-10-18 RX ADMIN — POLYETHYLENE GLYCOL 3350 17 G: 17 POWDER, FOR SOLUTION ORAL at 09:24

## 2022-10-18 RX ADMIN — HEPARIN SODIUM 2000 UNITS: 1000 INJECTION INTRAVENOUS; SUBCUTANEOUS at 06:18

## 2022-10-18 RX ADMIN — PANTOPRAZOLE SODIUM 40 MG: 40 TABLET, DELAYED RELEASE ORAL at 09:23

## 2022-10-18 RX ADMIN — REMDESIVIR 100 MG: 100 INJECTION, POWDER, LYOPHILIZED, FOR SOLUTION INTRAVENOUS at 22:26

## 2022-10-18 RX ADMIN — TAMSULOSIN HYDROCHLORIDE 0.4 MG: 0.4 CAPSULE ORAL at 18:24

## 2022-10-18 RX ADMIN — CEFTRIAXONE 1000 MG: 1 INJECTION, SOLUTION INTRAVENOUS at 21:21

## 2022-10-18 RX ADMIN — FERROUS SULFATE TAB 325 MG (65 MG ELEMENTAL FE) 325 MG: 325 (65 FE) TAB at 09:23

## 2022-10-18 RX ADMIN — GUAIFENESIN 600 MG: 600 TABLET, EXTENDED RELEASE ORAL at 21:24

## 2022-10-18 NOTE — PLAN OF CARE
Problem: OCCUPATIONAL THERAPY ADULT  Goal: Performs self-care activities at highest level of function for planned discharge setting  See evaluation for individualized goals  Description: Treatment Interventions: ADL retraining, Functional transfer training, UE strengthening/ROM, Patient/family training, Equipment evaluation/education, Compensatory technique education, Continued evaluation  Equipment Recommended: Bedside commode       See flowsheet documentation for full assessment, interventions and recommendations  Note: Limitation: Decreased ADL status, Decreased UE strength, Decreased endurance, Decreased self-care trans, Decreased high-level ADLs  Prognosis: Good  Assessment: Pt is a [de-identified] y o  male seen for OT evaluation at [facility], admitted 10/15/2022 w/ Sepsis due to COVID-19 [facility])  OT completed extensive review of pt's medical and social history  Comorbidities affecting pt's functional performance at time of assessment include: chronic DVT of BLE, hypoxia, obesity, hypokalemia, hypercholesterolemia, BPH, venous stasis, BL TKR hx  Personal factors affecting pt at time of IE include: steps to enter environment, limited home support, difficulty performing ADLS and difficulty performing IADLS   Prior to admission, pt was living with his wife in a [facility] with 1STE with a FF s/u  Pt was I w/  ADLS and A w/ IADLS, (-) drove, & required use of sock aide, shoe horn and RW PTA  Upon evaluation: Pt requires overall Min A for functional mobility/transfers, S for UB ADLs and min-Mod A for LB ADLS 2* the following deficits impacting occupational performance: weakness, decreased balance and decreased tolerance  Full objective findings from OT assessment regarding body systems outlined above  Pt to benefit from continued skilled OT tx while in the hospital to address deficits as defined above and maximize level of functional independence w/ ADL's and functional mobility   Occupational Performance areas to address include: bathing/shower, toilet hygiene, dressing, health maintenance, functional mobility and clothing management  Based on findings, pt is of high complexity  The patient's raw score on the AM-PAC Daily Activity inpatient short form is 20, standardized score is 42 03, greater than 39 4  Patients at this level are likely to benefit from DC to home, which does coincide with current above OT recommendations  However, please refer to therapist recommendation for discharge planning given other factors that may influence destination  At this time, OT recommendations at time of discharge are home OT       OT Discharge Recommendation: Home with home health rehabilitation

## 2022-10-18 NOTE — PLAN OF CARE
Problem: PHYSICAL THERAPY ADULT  Goal: Performs mobility at highest level of function for planned discharge setting  See evaluation for individualized goals  Description: Treatment/Interventions: Functional transfer training, ADL retraining, Elevations, Therapeutic exercise, LE strengthening/ROM, Endurance training, Patient/family training, Equipment eval/education, Bed mobility, Gait training, Continued evaluation, Compensatory technique education, Spoke to nursing, Spoke to case management, OT, Spoke to advanced practitioner          See flowsheet documentation for full assessment, interventions and recommendations  Note: Prognosis: Fair  Problem List: Decreased endurance, Impaired balance, Decreased strength, Decreased mobility, Decreased coordination, Obesity  Assessment: Pt is an [de-identified] y o  male who presented to ED 10/15/22 with c/o SOB  Dx:  COVID, sepsis, dyspnea, hypokalemia  Comorbidities affecting pt's physical performance at time of assessment include: DVT, IVC filter  Personal factors affecting pt at time of IE include: obesity, IV, O2  PLOF and home set up listed above; Upon evaluation: Pt requires S for bed mobility, S for sit to stand, and S for ambulation with RW  Full objective findings from PT assessment regarding body systems outlined above  Current limitations include impaired balance, decreased endurance/activity tolerance, gait deviations  The following objective measures performed on IE also reveal limitations: SpO2>90% throughout ambulation on 2L  Pt's clinical presentation is currently unstable/unpredictable seen in pt's presentation of continuous monitoring in hospital, O2 needs  Pt would benefit from continued PT while in hospital and follow up with [facility] at D/C to increase strength, balance, endurance, independence with funcitonal mobility to return to PLOF, maximize independence, decrease caregiver burden and improve quality of life  Therapeutic exercises listed above;  The patient's AM-PAC Basic Mobility Inpatient Short Form Raw Score is 23  A Raw score of greater than 17 suggests the patient may benefit from discharge to home  Please also refer to the recommendation of the Physical Therapist for safe discharge planning  PT Discharge Recommendation: Home with home health rehabilitation    See flowsheet documentation for full assessment

## 2022-10-18 NOTE — ASSESSMENT & PLAN NOTE
· 88% on room air on presentation    Initially requiring 3 L nasal cannula  · Weaned to 2 L nasal cannula this morning  · Diagnosed with COVID-19 on 10/15/2022  · IV dexamethasone, remdesivir day 4  · Respiratory protocol  · Continue to monitor and wean oxygen as able  · PT/OT eval for weakness- recommending home with VNA services

## 2022-10-18 NOTE — ASSESSMENT & PLAN NOTE
· Presented due to SOB, wet cough and sore throat that began 10/13   88% on room air in the ER  Currently on 3 L nasal cannula  · Diagnosed with COVID-19 on 10/15/2022    Received vaccine x3  · Admit under mild pathway  · COVID labs  · D-dimer:  0 69 --> 0 3 (IV heparin drip initiated per protocol --> hold home eliquis) --> can DC hepatin gtt if no longer requiring O2 per protocol   · CK:  112, BNP:  144, CRP:  49 2  · IV remdesivir and IV dexamethasone initiated 10/15 - day 4/5  · Due to elevated procalcitonin and sepsis criteria --> started on IV ceftriaxone  · Procalcitonin minimally elevated, will continue Rocephin for now  · Blood cultures negative x2 after 48 hours

## 2022-10-18 NOTE — OCCUPATIONAL THERAPY NOTE
Occupational Therapy Evaluation (4155-1620) & Treat (8375-1705)     Patient Name: Ruben LYMAN Date: 10/18/2022  Problem List  Principal Problem:    Sepsis due to COVID-19 [facility])  Active Problems:    Chronic deep vein thrombosis (DVT) of proximal vein of both lower extremities (HCC)    Pure hypercholesterolemia    Mild hyperparathyroidism (HCC)    Hypoxia    Obesity (BMI 30-39  9)    Hypokalemia    Past Medical History  Past Medical History:   Diagnosis Date   • Basal cell carcinoma     nose    • Benign skin lesion    • BPH (benign prostatic hyperplasia)    • Diverticular disease of colon    • DVT (deep venous thrombosis) (HCC)    • Elevated PSA    • History of blood clots    • Hypotension    • Intestinal obstruction (HCC)    • Social anxiety disorder     last assessed 1/12/17, resolved 10/17/17   • Ventral hernia      Past Surgical History  Past Surgical History:   Procedure Laterality Date   • APPENDECTOMY     • BASAL CELL CARCINOMA EXCISION      Nose    • HERNIA REPAIR      9 hernia repairs 1995 - 2012   • [place] [address]  20     with speenectomy   • REPLACEMENT TOTAL KNEE Bilateral    • SPLENECTOMY  1995   • TONSILLECTOMY               10/18/22 1503   OT Last Visit   OT Visit Date 10/18/22   Note Type   Note type Evaluation   Pain Assessment   Pain Assessment Tool 0-10   Pain Score No Pain   Restrictions/Precautions   Weight Bearing Precautions Per Order No   Other Precautions Airborne/isolation;Contact/isolation;O2  (2L - >90% throughout session, average 92-93%)   Home Living   Type of [address] [place] [address] Two level; Able to live on main level with bedroom/bathroom;1/2 bath on main level;Performs ADLs on one level  (1STE, 1 step from living room to kitchen/BR  Standard walker to BR, RW in living room   Sleeps in recliner)   Bathroom Shower/Tub Walk-in shower   Bathroom Toilet Standard   Bathroom Equipment Grab bars in shower;Built-in shower seat   Bathroom Accessibility Accessible via walker   Home Equipment Walker;Life alert; Sock aid;Reacher   Additional Comments Pt takes self <> BR at Mod I level during day with RW   Prior Function   Level of [place] Independent with functional mobility; Independent with ADLs; Needs assistance with IADLS   Lives With Spouse   Receives Help From Family   IADLs Family/Friend/Other provides transportation; Independent with meal prep   Falls in the last 6 months 0   Comments Pt alone during day, cooks all meals for house  Mod I with LB dressing with LHAD  Goes upstairs to shower 2x/wk with S from wife  Wife assists in drying off after shower   Lifestyle   Autonomy Mod I with ADLs, A with IADLs   Reciprocal Relationships Supportive wife   Service to Others Retired    Intrinsic Gratification Watching TV   General   Family/Caregiver Present No   Subjective   Subjective "I need to be able to go to the bathroom by myself"   ADL   Eating Assistance 7  [place] 5  Supervision/Setup    [facility] 5  Supervision/Setup    [facility] 3  Moderate Assistance   LB Dressing Deficit   (Pt utilizes sock aid & [place] [address] at home)   [address]  5  Supervision/Setup   Toileting Deficit Perineal hygiene;Grab bar use;Supervison/safety; Increased time to complete   Bed Mobility   Additional Comments Received OOB to recliner, sleeps in recliner at baseline   Transfers   Sit to Stand 5  Supervision  (Pt independently initiated performing BLE standing marches, 1x10 with S)   Additional items Armrests; Increased time required   Stand to Sit 5  Supervision   Additional items Increased time required;Armrests   Functional Mobility   Functional Mobility 5  Supervision   Additional Comments Pt mobilized 110 ft with S  Excessively slow & flexed posture  Pt states he had to have a BM   Please see treatment session for additional mobility/functional transfers   Additional items Rolling walker   Balance   Static Sitting Fair   Dynamic Sitting Fair   Static Standing Fair   Dynamic Standing Fair   Ambulatory Fair   Activity Tolerance   Activity Tolerance Patient tolerated treatment well   Nurse Made Aware ABRAHAN [place]   RUE Assessment   RUE Assessment WFL   LUE Assessment   LUE Assessment WFL   Hand Function   Gross Motor Coordination Functional   Fine Motor Coordination Functional   Vision-Basic Assessment   Current Vision Wears glasses all the time   Cognition   Overall Cognitive Status WFL   Arousal/Participation Alert   Attention Within functional limits   Orientation Level Oriented X4   Memory Within functional limits   Following Commands Follows all commands and directions without difficulty   Assessment   Limitation Decreased ADL status; Decreased UE strength;Decreased endurance;Decreased self-care trans;Decreased high-level ADLs   Prognosis Good   Assessment Pt is a [de-identified] y o  male seen for OT evaluation at [facility], admitted 10/15/2022 w/ Sepsis due to COVID-19 [facility])  OT completed extensive review of pt's medical and social history  Comorbidities affecting pt's functional performance at time of assessment include: chronic DVT of BLE, hypoxia, obesity, hypokalemia, hypercholesterolemia, BPH, venous stasis, BL TKR hx  Personal factors affecting pt at time of IE include: steps to enter environment, limited home support, difficulty performing ADLS and difficulty performing IADLS   Prior to admission, pt was living with his wife in a [facility] with 1STE with a FF s/u  Pt was I w/  ADLS and A w/ IADLS, (-) drove, & required use of sock aide, shoe horn and RW PTA  Upon evaluation: Pt requires overall Min A for functional mobility/transfers, S for UB ADLs and min-Mod A for LB ADLS 2* the following deficits impacting occupational performance: weakness, decreased balance and decreased tolerance   Full objective findings from OT assessment regarding body systems outlined above  Pt to benefit from continued skilled OT tx while in the hospital to address deficits as defined above and maximize level of functional independence w/ ADL's and functional mobility  Occupational Performance areas to address include: bathing/shower, toilet hygiene, dressing, health maintenance, functional mobility and clothing management  Based on findings, pt is of high complexity  The patient's raw score on the AM-PAC Daily Activity inpatient short form is 20, standardized score is 42 03, greater than 39 4  Patients at this level are likely to benefit from DC to home, which does coincide with current above OT recommendations  However, please refer to therapist recommendation for discharge planning given other factors that may influence destination  At this time, OT recommendations at time of discharge are home OT  Goals   Patient Goals Pt wants to go home & get back to baseline   Plan   Treatment Interventions ADL retraining;Functional transfer training;UE strengthening/ROM; Patient/family training;Equipment evaluation/education; Compensatory technique education;Continued evaluation   Goal Expiration Date 10/28/22   OT Treatment Day 0   OT Frequency 2-3x/wk   Recommendation   OT Discharge Recommendation Home with home health rehabilitation   Equipment Recommended Bedside commode   Commode Type Standard   Additional Comments  Discussed use of BSC over toilet to assist with Mod I transfers at home   AM-PAC Daily Activity Inpatient   Lower Body Dressing 2   Bathing 3   Toileting 3   Upper Body Dressing 4   Grooming 4   Eating 4   Daily Activity Raw Score 20   Daily Activity Standardized Score (Calc for Raw Score >=11) 42 03   AM-PAC Applied Cognition Inpatient   Following a Speech/Presentation 4   Understanding Ordinary Conversation 4   Taking Medications 4   Remembering Where Things Are Placed or Put Away 4   Remembering List of 4-5 Errands 4   Taking Care of Complicated Tasks 4   Applied Cognition Raw Score 24   Applied Cognition Standardized Score 62 21   Additional Treatment Session   Start Time 1440   End Time 1503   Treatment Assessment Pt performed stand > sit on toilet with CGA & GB  Pt required Mod Ax1 for sit > stand from toilet  Pt completed anil-care with S & set-up s/p BM  Engaged in discussion with pt about placing a commode over toilet at home to promote independence in toilet transfers at home with benefits of increased toilet height & use of armrests  Pt mobilized an additional 50 ft with RW & S  Pt performed stand > sit with S & VC's for RW management  SpO2 > 90% throughout while on 2L NC  Pt performed seated BLE knee extensions upon sitting  Pt left seated in recliner with chair alarm on, tray table & call bell in place, needs met, RN informed  End of Consult   Education Provided Yes   Patient Position at End of Consult Bedside chair;Bed/Chair alarm activated; All needs within reach   Nurse Communication Nurse aware of consult     Pt will achieve the following goals within 10 days  *Pt will complete grooming with independence  *Pt will complete UB bathing and dressing with independence  *Pt will complete LB bathing and dressing with Mod I & DME PRN  *Pt will complete toileting w/ Mod I w/ G hygiene/thoroughness using DME PRN    *Pt will perform functional transfers with on/off all surfaces with Mod I using DME as needed w/ G balance/safety  *Pt will improve functional mobility during ADL/IADL/leisure tasks to Mod I using DME as needed w/ G balance/safety       *Pt will demonstrate G carryover of pt/caregiver education and training as appropriate w/ Mod I w/o cues w/ good tolerance    *Assess DME needs     Shari Torres OTR/L

## 2022-10-18 NOTE — PROGRESS NOTES
[place]     Progress Note - Megan Astorga 1940, [de-identified] y o  male MRN: [id number]  Unit/Bed#: -01 Encounter: [id number]  Primary Care Provider: Fransisco Carvalho MD   Date and time admitted to hospital: 10/15/2022  7:20 PM    * Sepsis due to COVID-19 [facility])  Assessment & Plan  · Presented due to SOB, wet cough and sore throat that began 10/13   88% on room air in the ER  Currently on 3 L nasal cannula  · Diagnosed with COVID-19 on 10/15/2022  Received vaccine x3  · Admit under mild pathway  · COVID labs  · D-dimer:  0 69 --> 0 3 (IV heparin drip initiated per protocol --> hold home eliquis) --> can DC hepatin gtt if no longer requiring O2 per protocol   · CK:  112, BNP:  144, CRP:  49 2  · IV remdesivir and IV dexamethasone initiated 10/15 - day 4/5  · Due to elevated procalcitonin and sepsis criteria --> started on IV ceftriaxone  · Procalcitonin minimally elevated, will continue Rocephin for now  · Blood cultures negative x2 after 48 hours    Hypokalemia  Assessment & Plan  · K+ 2 9 --> 4 3  · Continue to monitor and replete as needed   · Resolved    Obesity (BMI 30-39  9)  Assessment & Plan  Body mass index is 35 43 kg/m²  · Encouraged lifestyle changes    Hypoxia  Assessment & Plan  · 88% on room air on presentation  Initially requiring 3 L nasal cannula  · Weaned to 2 L nasal cannula this morning  · Diagnosed with COVID-19 on 10/15/2022  · IV dexamethasone, remdesivir day 4  · Respiratory protocol  · Continue to monitor and wean oxygen as able  · PT/OT eval for weakness- recommending home with VNA services    Mild hyperparathyroidism ([address] )  Assessment & Plan  · Corrected calcium is mildly elevated 10-11  · Check ionized Ca with am labs  · Hold vitamins for now     Pure hypercholesterolemia  Assessment & Plan  · Home regimen simvastatin 10 mg daily  Non formulary    Pravastatin ordered    Chronic deep vein thrombosis (DVT) of proximal vein of both lower extremities [facility])  Assessment & Plan  · Chronically on Eliquis 2 5 mg b i d , also has IVC filter  · Hold Eliquis  Continue IV heparin drip due to COVID protocol    VTE Pharmacologic Prophylaxis: VTE Score: 4 Moderate Risk (Score 3-4) - Pharmacological DVT Prophylaxis Ordered: heparin drip  Patient Centered Rounds: I performed bedside rounds with nursing staff today  Discussions with Specialists or Other Care Team Provider: CM    Education and Discussions with Family / Patient: Updated  (wife) at bedside  Time Spent for Care: 30 minutes  More than 50% of total time spent on counseling and coordination of care as described above  Current Length of Stay: 3 day(s)  Current Patient Status: Inpatient   Certification Statement: The patient will continue to require additional inpatient hospital stay due to acute hypoxia due to COVID  Discharge Plan: Anticipate discharge in 24-48 hrs to home with home services  Code Status: Level 1 - Full Code    Subjective:   Overall feels well  Occasional dry cough  Feels that his nose is getting dry from the nasal cannula  Denies chest pain/palpitations, nausea vomiting, abdominal pain, fever/chills  Objective:     Vitals:   Temp (24hrs), Av 6 °F (36 4 °C), Min:97 2 °F (36 2 °C), Max:98 °F (36 7 °C)    Temp:  [97 2 °F (36 2 °C)-98 °F (36 7 °C)] 97 2 °F (36 2 °C)  HR:  [57-68] 68  Resp:  [16] 16  BP: (119-153)/(73-84) 143/79  SpO2:  [91 %-92 %] 91 %  Body mass index is 35 43 kg/m²  Input and Output Summary (last 24 hours): Intake/Output Summary (Last 24 hours) at 10/18/2022 1132  Last data filed at 10/18/2022 0835  Gross per 24 hour   Intake 480 ml   Output 1500 ml   Net -1020 ml       Physical Exam:   Physical Exam  Vitals and nursing note reviewed  Constitutional:       Appearance: He is well-developed  Interventions: Nasal cannula in place  Comments: No acute distress   HENT:      Head: Normocephalic and atraumatic     Eyes:      General: No scleral icterus  Extraocular Movements: Extraocular movements intact  Conjunctiva/sclera: Conjunctivae normal    Cardiovascular:      Rate and Rhythm: Normal rate and regular rhythm  Heart sounds: S1 normal and S2 normal  No murmur heard  Pulmonary:      Effort: Pulmonary effort is normal  No respiratory distress  Breath sounds: Normal breath sounds  No wheezing, rhonchi or rales  Abdominal:      General: Bowel sounds are normal       Palpations: Abdomen is soft  Tenderness: There is no abdominal tenderness  There is no guarding or rebound  Musculoskeletal:      Cervical back: Normal range of motion  Comments: Able to move upper/lower extremities bilaterally, no edema   Skin:     General: Skin is warm and dry  Neurological:      Mental Status: He is alert and oriented to person, place, and time     Psychiatric:         Mood and Affect: Mood normal          Speech: Speech normal          Behavior: Behavior normal           Additional Data:     Labs:  Results from last 7 days   Lab Units 10/18/22  0452   WBC Thousand/uL 8 57   HEMOGLOBIN g/dL 15 1   HEMATOCRIT % 45 6   PLATELETS Thousands/uL 348   NEUTROS PCT % 74   LYMPHS PCT % 20   MONOS PCT % 5   EOS PCT % 0     Results from last 7 days   Lab Units 10/18/22  0452   SODIUM mmol/L 138   POTASSIUM mmol/L 4 3   CHLORIDE mmol/L 105   CO2 mmol/L 26   BUN mg/dL 26*   CREATININE mg/dL 0 87   ANION GAP mmol/L 7   CALCIUM mg/dL 10 4*   ALBUMIN g/dL 2 4*   TOTAL BILIRUBIN mg/dL 0 20   ALK PHOS U/L 101   ALT U/L 21   AST U/L 21   GLUCOSE RANDOM mg/dL 159*     Results from last 7 days   Lab Units 10/15/22  1947   INR  1 01             Results from last 7 days   Lab Units 10/18/22  0452 10/16/22  0514 10/15/22  2252   LACTIC ACID mmol/L  --   --  1 0   PROCALCITONIN ng/ml 0 36* 0 25 0 32*       Lines/Drains:  Invasive Devices  Report    Peripheral Intravenous Line  Duration           Peripheral IV 10/15/22 Left Antecubital 2 days    Peripheral IV 10/15/22 Left;Ventral (anterior) Forearm 2 days                      Imaging: Reviewed radiology reports from this admission including: chest xray    Recent Cultures (last 7 days):   Results from last 7 days   Lab Units 10/17/22  1504 10/15/22  2252 10/15/22  2251   BLOOD CULTURE   --  No Growth at 48 hrs  No Growth at 48 hrs  LEGIONELLA URINARY ANTIGEN  Negative  --   --        Last 24 Hours Medication List:   Current Facility-Administered Medications   Medication Dose Route Frequency Provider Last Rate   • acetaminophen  650 mg Oral Q6H PRN Juve Kemp PA-C     • cefTRIAXone  1,000 mg Intravenous Q24H Juve Kemp PA-C 1,000 mg (10/17/22 2222)   • dexamethasone  6 mg Intravenous Q24H Juve Kemp PA-C     • ferrous sulfate  325 mg Oral Daily Juve Kemp PA-C     • finasteride  5 mg Oral Daily Juve Kemp PA-C     • furosemide  80 mg Oral Daily Juve Kemp PA-C     • guaiFENesin  600 mg Oral Q12H [address] Kelly Briseyda Aldridge PA-C     • heparin (porcine)  3-20 Units/kg/hr (Order-Specific) Intravenous Titrated Juve Kemp PA-C 15 1 Units/kg/hr (10/18/22 0929)   • heparin (porcine)  2,000 Units Intravenous Q1H PRN Juve Kemp PA-C     • heparin (porcine)  4,000 Units Intravenous Q1H PRN Juve Kemp PA-C     • ondansetron  4 mg Intravenous Q6H PRN Juve Kemp PA-C     • pantoprazole  40 mg Oral Early Morning Meagan Gill PA-C     • polyethylene glycol  17 g Oral Daily PRN Juve Kemp PA-C     • pravastatin  20 mg Oral Daily With Dinner Juev Kemp PA-C     • remdesivir  100 mg Intravenous Q24H Juve Kemp PA-C     • sodium chloride  1 spray Each Nare Q1H PRN Chetna Walker PA-C     • spironolactone  12 5 mg Oral Daily Juve Kemp PA-C     • tamsulosin  0 4 mg Oral Daily With Dinner Juve Kemp PA-C          Today, Patient Was Seen By: Chetna Walker    **Please Note: This note may have been constructed using a voice recognition system  **

## 2022-10-18 NOTE — PLAN OF CARE
Problem: MOBILITY - ADULT  Goal: Maintain or return to baseline ADL function  Description: INTERVENTIONS:  -  Assess patient's ability to carry out ADLs; assess patient's baseline for ADL function and identify physical deficits which impact ability to perform ADLs (bathing, care of mouth/teeth, toileting, grooming, dressing, etc )  - Assess/evaluate cause of self-care deficits   - Assess range of motion  - Assess patient's mobility; develop plan if impaired  - Assess patient's need for assistive devices and provide as appropriate  - Encourage maximum independence but intervene and supervise when necessary  - Involve family in performance of ADLs  - Assess for home care needs following discharge   - Consider OT consult to assist with ADL evaluation and planning for discharge  - Provide patient education as appropriate  Outcome: Progressing  Goal: Maintains/Returns to pre admission functional level  Description: INTERVENTIONS:  - Perform BMAT or MOVE assessment daily    - Set and communicate daily mobility goal to care team and patient/family/caregiver  - Collaborate with rehabilitation services on mobility goals if consulted  - Perform Range of Motion 3 times a day  - Reposition patient every 2 hours    - Dangle patient 3 times a day  - Stand patient 3 times a day  - Ambulate patient 3 times a day  - Out of bed to chair 3 times a day   - Out of bed for meals 3 times a day  - Out of bed for toileting  - Record patient progress and toleration of activity level   Outcome: Progressing     Problem: INFECTION - ADULT  Goal: Absence or prevention of progression during hospitalization  Description: INTERVENTIONS:  - Assess and monitor for signs and symptoms of infection  - Monitor lab/diagnostic results  - Monitor all insertion sites, i e  indwelling lines, tubes, and drains  - Monitor endotracheal if appropriate and nasal secretions for changes in amount and color  - [place] appropriate cooling/warming therapies per order  - Administer medications as ordered  - Instruct and encourage patient and family to use good hand hygiene technique  - Identify and instruct in appropriate isolation precautions for identified infection/condition  Outcome: Progressing     Problem: DISCHARGE PLANNING  Goal: Discharge to home or other facility with appropriate resources  Description: INTERVENTIONS:  - Identify barriers to discharge w/patient and caregiver  - Arrange for needed discharge resources and transportation as appropriate  - Identify discharge learning needs (meds, wound care, etc )  - Arrange for interpretive services to assist at discharge as needed  - Refer to Case Management Department for coordinating discharge planning if the patient needs post-hospital services based on physician/advanced practitioner order or complex needs related to functional status, cognitive ability, or social support system  Outcome: Progressing     Problem: Knowledge Deficit  Goal: Patient/family/caregiver demonstrates understanding of disease process, treatment plan, medications, and discharge instructions  Description: Complete learning assessment and assess knowledge base    Interventions:  - Provide teaching at level of understanding  - Provide teaching via preferred learning methods  Outcome: Progressing     Problem: RESPIRATORY - ADULT  Goal: Achieves optimal ventilation and oxygenation  Description: INTERVENTIONS:  - Assess for changes in respiratory status  - Assess for changes in mentation and behavior  - Position to facilitate oxygenation and minimize respiratory effort  - Oxygen administered by appropriate delivery if ordered  - Initiate smoking cessation education as indicated  - Encourage broncho-pulmonary hygiene including cough, deep breathe, Incentive Spirometry  - Assess the need for suctioning and aspirate as needed  - Assess and instruct to report SOB or any respiratory difficulty  - Respiratory Therapy support as indicated  Outcome: Progressing     Problem: Prexisting or High Potential for Compromised Skin Integrity  Goal: Skin integrity is maintained or improved  Description: INTERVENTIONS:  - Identify patients at risk for skin breakdown  - Assess and monitor skin integrity  - Assess and monitor nutrition and hydration status  - Monitor labs   - Assess for incontinence   - Turn and reposition patient  - Assist with mobility/ambulation  - Relieve pressure over bony prominences  - Avoid friction and shearing  - Provide appropriate hygiene as needed including keeping skin clean and dry  - Evaluate need for skin moisturizer/barrier cream  - Collaborate with interdisciplinary team   - Patient/family teaching  - Consider wound care consult   Outcome: Progressing     Problem: Potential for Falls  Goal: Patient will remain free of falls  Description: INTERVENTIONS:  - Educate patient/family on patient safety including physical limitations  - Instruct patient to call for assistance with activity   - Consult OT/PT to assist with strengthening/mobility   - Keep Call bell within reach  - Keep bed low and locked with side rails adjusted as appropriate  - Keep care items and personal belongings within reach  - Initiate and maintain comfort rounds  - Make Fall Risk Sign visible to staff  - Offer Toileting every x Hours, in advance of need  - Initiate/Maintain xalarm  - Obtain necessary fall risk management equipment: xx  - Apply yellow socks and bracelet for high fall risk patients  - Consider moving patient to room near nurses station  Outcome: Progressing   x

## 2022-10-18 NOTE — PLAN OF CARE
Problem: MOBILITY - ADULT  Goal: Maintain or return to baseline ADL function  Description: INTERVENTIONS:  -  Assess patient's ability to carry out ADLs; assess patient's baseline for ADL function and identify physical deficits which impact ability to perform ADLs (bathing, care of mouth/teeth, toileting, grooming, dressing, etc )  - Assess/evaluate cause of self-care deficits   - Assess range of motion  - Assess patient's mobility; develop plan if impaired  - Assess patient's need for assistive devices and provide as appropriate  - Encourage maximum independence but intervene and supervise when necessary  - Involve family in performance of ADLs  - Assess for home care needs following discharge   - Consider OT consult to assist with ADL evaluation and planning for discharge  - Provide patient education as appropriate  10/18/2022 0931 by Avelina Argueta RN  Outcome: Progressing  10/18/2022 0931 by Avelina Argueta RN  Outcome: Progressing  Goal: Maintains/Returns to pre admission functional level  Description: INTERVENTIONS:  - Perform BMAT or MOVE assessment daily    - Set and communicate daily mobility goal to care team and patient/family/caregiver  - Collaborate with rehabilitation services on mobility goals if consulted  - Perform Range of Motion 3 times a day  - Reposition patient every 2 hours    - Dangle patient 3 times a day  - Stand patient 3 times a day  - Ambulate patient 3 times a day  - Out of bed to chair 3 times a day   - Out of bed for meals 3 times a day  - Out of bed for toileting  - Record patient progress and toleration of activity level   10/18/2022 0931 by Avelina Argueta RN  Outcome: Progressing  10/18/2022 0931 by Avelina Argueta RN  Outcome: Progressing     Problem: INFECTION - ADULT  Goal: Absence or prevention of progression during hospitalization  Description: INTERVENTIONS:  - Assess and monitor for signs and symptoms of infection  - Monitor lab/diagnostic results  - Monitor all insertion sites, i e  indwelling lines, tubes, and drains  - Monitor endotracheal if appropriate and nasal secretions for changes in amount and color  - [place] appropriate cooling/warming therapies per order  - Administer medications as ordered  - Instruct and encourage patient and family to use good hand hygiene technique  - Identify and instruct in appropriate isolation precautions for identified infection/condition  10/18/2022 0931 by Kang Downs RN  Outcome: Progressing  10/18/2022 0931 by Kang Downs RN  Outcome: Progressing     Problem: DISCHARGE PLANNING  Goal: Discharge to home or other facility with appropriate resources  Description: INTERVENTIONS:  - Identify barriers to discharge w/patient and caregiver  - Arrange for needed discharge resources and transportation as appropriate  - Identify discharge learning needs (meds, wound care, etc )  - Arrange for interpretive services to assist at discharge as needed  - Refer to Case Management Department for coordinating discharge planning if the patient needs post-hospital services based on physician/advanced practitioner order or complex needs related to functional status, cognitive ability, or social support system  10/18/2022 0931 by Kang Downs RN  Outcome: Progressing  10/18/2022 0931 by Kang Downs RN  Outcome: Progressing     Problem: Knowledge Deficit  Goal: Patient/family/caregiver demonstrates understanding of disease process, treatment plan, medications, and discharge instructions  Description: Complete learning assessment and assess knowledge base    Interventions:  - Provide teaching at level of understanding  - Provide teaching via preferred learning methods  10/18/2022 0931 by Kang Downs RN  Outcome: Progressing  10/18/2022 0931 by Kang Downs RN  Outcome: Progressing     Problem: RESPIRATORY - ADULT  Goal: Achieves optimal ventilation and oxygenation  Description: INTERVENTIONS:  - Assess for changes in respiratory status  - Assess for changes in mentation and behavior  - Position to facilitate oxygenation and minimize respiratory effort  - Oxygen administered by appropriate delivery if ordered  - Initiate smoking cessation education as indicated  - Encourage broncho-pulmonary hygiene including cough, deep breathe, Incentive Spirometry  - Assess the need for suctioning and aspirate as needed  - Assess and instruct to report SOB or any respiratory difficulty  - Respiratory Therapy support as indicated  10/18/2022 0931 by Kathrine Castaneda RN  Outcome: Progressing  10/18/2022 0931 by Kathrine Castaneda RN  Outcome: Progressing     Problem: Prexisting or High Potential for Compromised Skin Integrity  Goal: Skin integrity is maintained or improved  Description: INTERVENTIONS:  - Identify patients at risk for skin breakdown  - Assess and monitor skin integrity  - Assess and monitor nutrition and hydration status  - Monitor labs   - Assess for incontinence   - Turn and reposition patient  - Assist with mobility/ambulation  - Relieve pressure over bony prominences  - Avoid friction and shearing  - Provide appropriate hygiene as needed including keeping skin clean and dry  - Evaluate need for skin moisturizer/barrier cream  - Collaborate with interdisciplinary team   - Patient/family teaching  - Consider wound care consult   10/18/2022 0931 by Kathrine Castaneda RN  Outcome: Progressing  10/18/2022 0931 by Kathrine Castaneda RN  Outcome: Progressing     Problem: Potential for Falls  Goal: Patient will remain free of falls  Description: INTERVENTIONS:  - Educate patient/family on patient safety including physical limitations  - Instruct patient to call for assistance with activity   - Consult OT/PT to assist with strengthening/mobility   - Keep Call bell within reach  - Keep bed low and locked with side rails adjusted as appropriate  - Keep care items and personal belongings within reach  - Initiate and maintain comfort rounds  - Make Fall Risk Sign visible to staff  - Offer Toileting every 2 Hours, in advance of need  - Initiate/Maintain bed/chair alarm  - Obtain necessary fall risk management equipment:   - Apply yellow socks and bracelet for high fall risk patients  - Consider moving patient to room near nurses station  10/18/2022 0931 by Avelina Argueta, RN  Outcome: Progressing  10/18/2022 [facility] by Avelina Argueta, RN  Outcome: Progressing

## 2022-10-18 NOTE — CASE MANAGEMENT
Case Management Discharge Planning Note    Patient name Cecy Escoto  Location /-33 MRN [id number]  : 1940 Date 10/18/2022       Current Admission Date: 10/15/2022  Current Admission Diagnosis:Sepsis due to COVID-19 [facility])   Patient Active Problem List    Diagnosis Date Noted   • Obesity (BMI 30-39 9) 10/16/2022   • Hypokalemia 10/16/2022   • Sepsis due to COVID-19 ([address] ) 10/15/2022   • Hypoxia 10/15/2022   • Immobility 2020   • Obesity, morbid ([address] ) 2020   • Mild hyperparathyroidism ([address] ) 2020   • Cervicalgia 01/10/2020   • Venous stasis 2018   • Infection of prosthetic right knee joint ([address] ) 2018   • Pure hypercholesterolemia 2018   • Benign prostatic hyperplasia without lower urinary tract symptoms 2017   • Chronic deep vein thrombosis (DVT) of proximal vein of both lower extremities ([address] ) 2015   • GERD without esophagitis 2014   • Incisional hernia, without obstruction or gangrene 2008      LOS (days): 3  Geometric Mean LOS (GMLOS) (days): 5 00  Days to GMLOS:2 3     OBJECTIVE:  Risk of Unplanned Readmission Score: 14 76         Current admission status: Inpatient   Preferred Pharmacy:   [address] [facility] [address]  [address] [address]  Phone: [phone] Fax: [phone]    Primary Care Provider: Emily Spaulding MD    Primary Insurance: [facility] REP  Secondary Insurance:     DISCHARGE DETAILS:    [address]         Is the patient interested in [facility] at discharge?: Yes  [address] requested[de-identified] Occupational Therapy, Physical [address] Name[de-identified] [facility] Provider[de-identified] PCP  Home Health Services Needed[de-identified] Evaluate Functional Status and Safety, Gait/ADL Training, Strengthening/Theraputic Exercises to Improve Function  Homebound Criteria Met[de-identified] Requires the Assistance of Another Person for Safe Ambulation or to Leave the Home  Supporting Clincal Findings[de-identified] Limited Endurance    Other Referral/Resources/Interventions Provided:  Interventions: HHC  Referral Comments: Referral to [facility]    Treatment Team Recommendation: Home with [employer]  Discharge Destination Plan[de-identified] Home with [employer]  Transport at Discharge : Family    Additional Comments: [facility] accepted  CM added CHET to pt's dc instructions

## 2022-10-18 NOTE — PHYSICAL THERAPY NOTE
PHYSICAL THERAPY Evaluation    Performed at least 2 patient identifiers during session:  Patient Active Problem List   Diagnosis    Benign prostatic hyperplasia without lower urinary tract symptoms    Chronic deep vein thrombosis (DVT) of proximal vein of both lower extremities (HCC)    GERD without esophagitis    Incisional hernia, without obstruction or gangrene    Venous stasis    Infection of prosthetic right knee joint ([address] )    Pure hypercholesterolemia    Cervicalgia    Mild hyperparathyroidism (HCC)    Immobility    Obesity, morbid ([address] )    Sepsis due to COVID-19 ([address] )    Hypoxia    Obesity (BMI 30-39  9)    Hypokalemia       Past Medical History:   Diagnosis Date    Basal cell carcinoma     nose     Benign skin lesion     BPH (benign prostatic hyperplasia)     Diverticular disease of colon     DVT (deep venous thrombosis) (HCC)     Elevated PSA     History of blood clots     Hypotension     Intestinal obstruction (HCC)     Social anxiety disorder     last assessed 1/12/17, resolved 10/17/17    Ventral hernia        Past Surgical History:   Procedure Laterality Date    APPENDECTOMY      BASAL CELL CARCINOMA EXCISION      Nose     HERNIA REPAIR      9 hernia repairs 1995 - 2012    [address]  18     with speenectomy    REPLACEMENT TOTAL KNEE Bilateral     SPLENECTOMY  1995    TONSILLECTOMY          10/18/22 0947   PT Last Visit   PT Visit Date 10/18/22   Note Type   Note type Evaluation   Pain Assessment   Pain Assessment Tool 0-10   Pain Score No Pain  (pt states 6/10 "skin" pain when sleeping on back from x-ray board a few days ago)   Home Living   Type of [address] [place] [address] Two level;Performs ADLs on one level; Able to live on main level with bedroom/bathroom;1/2 bath on main level  (1 NASH, 1 step from living room to kitchen; pt sleeps in recliner, pt only goes upstairs only with wife assistance 2x/wk)   Home Equipment Walker;Long-handled shoehorn   Additional Comments pt takes self to/from bathroom at mod I level with RW   Prior Function   Level of [place] Independent with functional mobility; Needs assistance with IADLS  (wife assists with shoes;  pt able to dress/bathe self;  wife assists with drying body and hair, and applying creams )   Lives With Spouse   Receives Help From Family   IADLs Independent with meal prep; Family/Friend/Other provides transportation   Falls in the last 6 months 0   Cognition   Overall Cognitive Status WFL   Arousal/Participation Alert   Orientation Level Oriented X4   Memory Within functional limits   Following Commands Follows all commands and directions without difficulty   RUE Assessment   RUE Assessment WFL   LUE Assessment   LUE Assessment WFL   RLE Assessment   RLE Assessment WFL   LLE Assessment   LLE Assessment WFL  (WFL at hip and knee;  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;  pt states he used to wear a brace but does not wear one anymore)   Bed Mobility   Supine to Sit 5  Supervision   Additional items Assist x 1;HOB elevated; Bedrails; Increased time required   Additional Comments Pt sleeps in recliner at baseline; pt remains reclined in chair at end of session   Transfers   Sit to Stand 5  Supervision   Additional items Assist x 1; Increased time required  (RW)   Stand to Sit 5  Supervision   Additional items Assist x 1; Increased time required   Ambulation/Elevation   Gait pattern Forward Flexion;Decreased foot clearance; Wide TIFFANIE; Excessively slow  (forward head)   Gait Assistance 5  Supervision   Additional items Assist x 1   Assistive Device Rolling walker   Distance 50ft with RW, no LOB, slow sariah  Ambulated on 2L O2 and SpO2 remains >90% throughout     Stair Management Assistance Not tested  (pt states he will have commode next to recliner at home)   Balance   Static Sitting Fair   Dynamic Sitting [address])   Dynamic Standing Fair -  (RW)   Ambulatory Fair -  (RW)   Activity Tolerance   Activity Tolerance   (no adverse effects to PT noted)   Medical Staff Made Aware TT to LNIWOOD Ross   Nurse Made Aware Michelle   Assessment   Prognosis Fair   Problem List Decreased endurance; Impaired balance;Decreased strength;Decreased mobility; Decreased coordination;Obesity   Assessment Pt is an [de-identified] y o  male who presented to ED 10/15/22 with c/o SOB  Dx:  COVID, sepsis, dyspnea, hypokalemia  Comorbidities affecting pt's physical performance at time of assessment include: DVT, IVC filter  Personal factors affecting pt at time of IE include: obesity, IV, O2  PLOF and home set up listed above; Upon evaluation: Pt requires S for bed mobility, S for sit to stand, and S for ambulation with RW  Full objective findings from PT assessment regarding body systems outlined above  Current limitations include impaired balance, decreased endurance/activity tolerance, gait deviations  The following objective measures performed on IE also reveal limitations: SpO2>90% throughout ambulation on 2L  Pt's clinical presentation is currently unstable/unpredictable seen in pt's presentation of continuous monitoring in hospital, O2 needs  Pt would benefit from continued PT while in hospital and follow up with HHCPT at D/C to increase strength, balance, endurance, independence with funcitonal mobility to return to PLOF, maximize independence, decrease caregiver burden and improve quality of life  Therapeutic exercises listed above; The patient's AM-PAC Basic Mobility Inpatient Short Form Raw Score is 23  A Raw score of greater than 17 suggests the patient may benefit from discharge to home  Please also refer to the recommendation of the Physical Therapist for safe discharge planning     Goals   Patient Goals to go home   STG Expiration Date 10/25/22   Short Term Goal #1 Pt will be able to demo:  mod I sit to/from standing with RW, mod I to ambulate 100ft with RW, mod I to ascend/descend 1 step; to return to PLOF  (Pt sleeps in recliner chair at baseline, pt states he will have commode next to recliner chair at home so he does not have to do step initially, can work on that with [address])   Plan   Treatment/Interventions Functional transfer training;ADL retraining;Elevations; Therapeutic exercise;LE strengthening/ROM; Endurance training;Patient/family training;Equipment eval/education; Bed mobility;Gait training;Continued evaluation; Compensatory technique education;Spoke to nursing;Spoke to case management;OT;Spoke to advanced practitioner   PT Frequency 3-5x/wk   Recommendation   PT Discharge Recommendation Home with home health rehabilitation   [address] [place] [address] Mobility Inpatient   Turning in Bed Without Bedrails 4   Lying on Back to Sitting on Edge of Flat Bed 4   Moving Bed to Chair 4   Standing Up From Chair 4   Walk in Room 4   Climb 3-5 Stairs 3   Basic Mobility Inpatient Raw Score 23   Basic Mobility Standardized Score 50 88   Highest Level Of Mobility   JH-HLM Goal 7: Walk 25 feet or more   JH-HLM Achieved 7: Walk 25 feet or more   Exercises   Knee AROM Long Arc Quad Sitting;10 reps;AROM; Bilateral   Ankle Pumps Sitting;10 reps;AROM; Bilateral   Marching Standing;10 reps;AROM; Bilateral  (RW prior to ambulation)   Leatha Pollard        Patient Name: Evi Amin  [id number] Date: 10/18/2022

## 2022-10-19 LAB
ANION GAP SERPL CALCULATED.3IONS-SCNC: 8 MMOL/L (ref 4–13)
APTT PPP: 128 SECONDS (ref 23–37)
APTT PPP: 26 SECONDS (ref 23–37)
APTT PPP: 51 SECONDS (ref 23–37)
BASOPHILS # BLD AUTO: 0.01 THOUSANDS/ÂΜL (ref 0–0.1)
BASOPHILS NFR BLD AUTO: 0 % (ref 0–1)
BUN SERPL-MCNC: 27 MG/DL (ref 5–25)
CALCIUM SERPL-MCNC: 10.9 MG/DL (ref 8.3–10.1)
CHLORIDE SERPL-SCNC: 104 MMOL/L (ref 96–108)
CO2 SERPL-SCNC: 26 MMOL/L (ref 21–32)
CREAT SERPL-MCNC: 1.06 MG/DL (ref 0.6–1.3)
EOSINOPHIL # BLD AUTO: 0 THOUSAND/ÂΜL (ref 0–0.61)
EOSINOPHIL NFR BLD AUTO: 0 % (ref 0–6)
ERYTHROCYTE [DISTWIDTH] IN BLOOD BY AUTOMATED COUNT: 15 % (ref 11.6–15.1)
GFR SERPL CREATININE-BSD FRML MDRD: 65 ML/MIN/1.73SQ M
GLUCOSE SERPL-MCNC: 179 MG/DL (ref 65–140)
HCT VFR BLD AUTO: 47.4 % (ref 36.5–49.3)
HGB BLD-MCNC: 15.5 G/DL (ref 12–17)
IMM GRANULOCYTES # BLD AUTO: 0.03 THOUSAND/UL (ref 0–0.2)
IMM GRANULOCYTES NFR BLD AUTO: 0 % (ref 0–2)
LYMPHOCYTES # BLD AUTO: 1.8 THOUSANDS/ÂΜL (ref 0.6–4.47)
LYMPHOCYTES NFR BLD AUTO: 24 % (ref 14–44)
MCH RBC QN AUTO: 31 PG (ref 26.8–34.3)
MCHC RBC AUTO-ENTMCNC: 32.7 G/DL (ref 31.4–37.4)
MCV RBC AUTO: 95 FL (ref 82–98)
MONOCYTES # BLD AUTO: 0.41 THOUSAND/ÂΜL (ref 0.17–1.22)
MONOCYTES NFR BLD AUTO: 6 % (ref 4–12)
MRSA NOSE QL CULT: NORMAL
NEUTROPHILS # BLD AUTO: 5.26 THOUSANDS/ÂΜL (ref 1.85–7.62)
NEUTS SEG NFR BLD AUTO: 70 % (ref 43–75)
NRBC BLD AUTO-RTO: 0 /100 WBCS
PLATELET # BLD AUTO: 404 THOUSANDS/UL (ref 149–390)
PMV BLD AUTO: 10.8 FL (ref 8.9–12.7)
POTASSIUM SERPL-SCNC: 3.7 MMOL/L (ref 3.5–5.3)
RBC # BLD AUTO: 5 MILLION/UL (ref 3.88–5.62)
SODIUM SERPL-SCNC: 138 MMOL/L (ref 135–147)
WBC # BLD AUTO: 7.51 THOUSAND/UL (ref 4.31–10.16)

## 2022-10-19 PROCEDURE — 85730 THROMBOPLASTIN TIME PARTIAL: CPT | Performed by: INTERNAL MEDICINE

## 2022-10-19 PROCEDURE — 85025 COMPLETE CBC W/AUTO DIFF WBC: CPT | Performed by: PHYSICIAN ASSISTANT

## 2022-10-19 PROCEDURE — 99232 SBSQ HOSP IP/OBS MODERATE 35: CPT | Performed by: PHYSICIAN ASSISTANT

## 2022-10-19 PROCEDURE — 80048 BASIC METABOLIC PNL TOTAL CA: CPT | Performed by: PHYSICIAN ASSISTANT

## 2022-10-19 RX ADMIN — PANTOPRAZOLE SODIUM 40 MG: 40 TABLET, DELAYED RELEASE ORAL at 09:52

## 2022-10-19 RX ADMIN — FINASTERIDE 5 MG: 5 TABLET, FILM COATED ORAL at 09:52

## 2022-10-19 RX ADMIN — GUAIFENESIN 600 MG: 600 TABLET, EXTENDED RELEASE ORAL at 20:14

## 2022-10-19 RX ADMIN — HEPARIN SODIUM 21.1 UNITS/KG/HR: 10000 INJECTION, SOLUTION INTRAVENOUS at 15:08

## 2022-10-19 RX ADMIN — GUAIFENESIN 600 MG: 600 TABLET, EXTENDED RELEASE ORAL at 09:52

## 2022-10-19 RX ADMIN — PRAVASTATIN SODIUM 20 MG: 20 TABLET ORAL at 17:25

## 2022-10-19 RX ADMIN — FUROSEMIDE 80 MG: 80 TABLET ORAL at 10:00

## 2022-10-19 RX ADMIN — HEPARIN SODIUM 21.1 UNITS/KG/HR: 10000 INJECTION, SOLUTION INTRAVENOUS at 20:14

## 2022-10-19 RX ADMIN — REMDESIVIR 100 MG: 100 INJECTION, POWDER, LYOPHILIZED, FOR SOLUTION INTRAVENOUS at 22:48

## 2022-10-19 RX ADMIN — POLYETHYLENE GLYCOL 3350 17 G: 17 POWDER, FOR SOLUTION ORAL at 10:02

## 2022-10-19 RX ADMIN — TAMSULOSIN HYDROCHLORIDE 0.4 MG: 0.4 CAPSULE ORAL at 17:25

## 2022-10-19 RX ADMIN — HEPARIN SODIUM 15.1 UNITS/KG/HR: 10000 INJECTION, SOLUTION INTRAVENOUS at 04:53

## 2022-10-19 RX ADMIN — CEFTRIAXONE 1000 MG: 1 INJECTION, SOLUTION INTRAVENOUS at 21:24

## 2022-10-19 RX ADMIN — HEPARIN SODIUM 2000 UNITS: 1000 INJECTION INTRAVENOUS; SUBCUTANEOUS at 06:38

## 2022-10-19 RX ADMIN — HEPARIN SODIUM 4000 UNITS: 1000 INJECTION INTRAVENOUS; SUBCUTANEOUS at 14:49

## 2022-10-19 RX ADMIN — FERROUS SULFATE TAB 325 MG (65 MG ELEMENTAL FE) 325 MG: 325 (65 FE) TAB at 09:52

## 2022-10-19 RX ADMIN — SPIRONOLACTONE 12.5 MG: 25 TABLET ORAL at 10:00

## 2022-10-19 RX ADMIN — DEXAMETHASONE SODIUM PHOSPHATE 6 MG: 4 INJECTION, SOLUTION INTRAMUSCULAR; INTRAVENOUS at 22:40

## 2022-10-19 NOTE — PLAN OF CARE
Problem: MOBILITY - ADULT  Goal: Maintain or return to baseline ADL function  Description: INTERVENTIONS:  -  Assess patient's ability to carry out ADLs; assess patient's baseline for ADL function and identify physical deficits which impact ability to perform ADLs (bathing, care of mouth/teeth, toileting, grooming, dressing, etc )  - Assess/evaluate cause of self-care deficits   - Assess range of motion  - Assess patient's mobility; develop plan if impaired  - Assess patient's need for assistive devices and provide as appropriate  - Encourage maximum independence but intervene and supervise when necessary  - Involve family in performance of ADLs  - Assess for home care needs following discharge   - Consider OT consult to assist with ADL evaluation and planning for discharge  - Provide patient education as appropriate  Outcome: Progressing  Goal: Maintains/Returns to pre admission functional level  Description: INTERVENTIONS:  - Perform BMAT or MOVE assessment daily    - Set and communicate daily mobility goal to care team and patient/family/caregiver  - Collaborate with rehabilitation services on mobility goals if consulted  - Perform Range of Motion 3 times a day  - Reposition patient every 2 hours    - Dangle patient 3 times a day  - Stand patient 3 times a day  - Ambulate patient 3 times a day  - Out of bed to chair 3 times a day   - Out of bed for meals 3 times a day  - Out of bed for toileting  - Record patient progress and toleration of activity level   Outcome: Progressing     Problem: INFECTION - ADULT  Goal: Absence or prevention of progression during hospitalization  Description: INTERVENTIONS:  - Assess and monitor for signs and symptoms of infection  - Monitor lab/diagnostic results  - Monitor all insertion sites, i e  indwelling lines, tubes, and drains  - Monitor endotracheal if appropriate and nasal secretions for changes in amount and color  - [place] appropriate cooling/warming therapies per order  - Administer medications as ordered  - Instruct and encourage patient and family to use good hand hygiene technique  - Identify and instruct in appropriate isolation precautions for identified infection/condition  Outcome: Progressing     Problem: DISCHARGE PLANNING  Goal: Discharge to home or other facility with appropriate resources  Description: INTERVENTIONS:  - Identify barriers to discharge w/patient and caregiver  - Arrange for needed discharge resources and transportation as appropriate  - Identify discharge learning needs (meds, wound care, etc )  - Arrange for interpretive services to assist at discharge as needed  - Refer to Case Management Department for coordinating discharge planning if the patient needs post-hospital services based on physician/advanced practitioner order or complex needs related to functional status, cognitive ability, or social support system  Outcome: Progressing     Problem: Knowledge Deficit  Goal: Patient/family/caregiver demonstrates understanding of disease process, treatment plan, medications, and discharge instructions  Description: Complete learning assessment and assess knowledge base    Interventions:  - Provide teaching at level of understanding  - Provide teaching via preferred learning methods  Outcome: Progressing     Problem: RESPIRATORY - ADULT  Goal: Achieves optimal ventilation and oxygenation  Description: INTERVENTIONS:  - Assess for changes in respiratory status  - Assess for changes in mentation and behavior  - Position to facilitate oxygenation and minimize respiratory effort  - Oxygen administered by appropriate delivery if ordered  - Initiate smoking cessation education as indicated  - Encourage broncho-pulmonary hygiene including cough, deep breathe, Incentive Spirometry  - Assess the need for suctioning and aspirate as needed  - Assess and instruct to report SOB or any respiratory difficulty  - Respiratory Therapy support as indicated  Outcome: Progressing     Problem: Prexisting or High Potential for Compromised Skin Integrity  Goal: Skin integrity is maintained or improved  Description: INTERVENTIONS:  - Identify patients at risk for skin breakdown  - Assess and monitor skin integrity  - Assess and monitor nutrition and hydration status  - Monitor labs   - Assess for incontinence   - Turn and reposition patient  - Assist with mobility/ambulation  - Relieve pressure over bony prominences  - Avoid friction and shearing  - Provide appropriate hygiene as needed including keeping skin clean and dry  - Evaluate need for skin moisturizer/barrier cream  - Collaborate with interdisciplinary team   - Patient/family teaching  - Consider wound care consult   Outcome: Progressing     Problem: Potential for Falls  Goal: Patient will remain free of falls  Description: INTERVENTIONS:  - Educate patient/family on patient safety including physical limitations  - Instruct patient to call for assistance with activity   - Consult OT/PT to assist with strengthening/mobility   - Keep Call bell within reach  - Keep bed low and locked with side rails adjusted as appropriate  - Keep care items and personal belongings within reach  - Initiate and maintain comfort rounds  - Make Fall Risk Sign visible to staff  - Offer Toileting every 2 Hours, in advance of need  - Initiate/Maintain bed/chair alarm  - Obtain necessary fall risk management equipment:   - Apply yellow socks and bracelet for high fall risk patients  - Consider moving patient to room near nurses station  Outcome: Progressing

## 2022-10-19 NOTE — ASSESSMENT & PLAN NOTE
· Chronically on Eliquis 2 5 mg b i d , also has IVC filter  · Hold Eliquis    Continue IV heparin drip due to COVID protocol

## 2022-10-19 NOTE — PROGRESS NOTES
[facility]     [facility] - Robert Crowe 1940, [de-identified] y o  male MRN: [id number]  Unit/Bed#: -01 Encounter: [id number]  Primary Care Provider: Charli Thacker MD   Date and time admitted to hospital: 10/15/2022  7:20 PM    * Sepsis due to COVID-19 [facility])  Assessment & Plan  · Presented due to SOB, wet cough and sore throat that began 10/13   88% on room air in the ER  Currently on 3 L nasal cannula  · Diagnosed with COVID-19 on 10/15/2022  Received vaccine x3  · Admit under mild pathway  · COVID labs  · D-dimer:  0 69 --> 0 3 (IV heparin drip initiated per protocol --> hold home eliquis) --> can DC hepatin gtt if no longer requiring O2 per protocol   · CK:  112, BNP:  144, CRP:  49 2  · IV remdesivir and IV dexamethasone initiated 10/15 - day 5/5 later this evening  · Due to elevated procalcitonin and sepsis criteria --> started on IV ceftriaxone  · Procalcitonin minimally elevated, will continue Rocephin for now  · Blood cultures negative x2 after 72 hours    Hypokalemia  Assessment & Plan  · K+ 2 9 --> 4 3  · Continue to monitor and replete as needed   · AM BMP pending, requires re-draw    Obesity (BMI 30-39  9)  Assessment & Plan  Body mass index is 35 43 kg/m²  · Encouraged lifestyle changes    Hypoxia  Assessment & Plan  · 88% on room air on presentation  Initially requiring 3 L nasal cannula  · Weaned to 2 L nasal cannula 10/18  · Diagnosed with COVID-19 on 10/15/2022  · IV dexamethasone, remdesivir day 5 later this evening  · Respiratory protocol  · Continue to monitor and wean oxygen as able  · PT/OT eval for weakness- recommending home with VNA services  · Will need home O2 eval prior to discharge    Pure hypercholesterolemia  Assessment & Plan  · Home regimen simvastatin 10 mg daily  Non formulary    Pravastatin ordered    Chronic deep vein thrombosis (DVT) of proximal vein of both lower extremities (HCC)  Assessment & Plan  · Chronically on Eliquis 2 5 mg b i d , also has IVC filter  · Hold Eliquis  Continue IV heparin drip due to COVID protocol      VTE Pharmacologic Prophylaxis: VTE Score: 4 Moderate Risk (Score 3-4) - Pharmacological DVT Prophylaxis Ordered: heparin drip  Patient Centered Rounds: I performed bedside rounds with nursing staff today  Discussions with Specialists or Other Care Team Provider: ASA    Education and Discussions with Family / Patient: Updated  (wife) at bedside  Time Spent for Care: 30 minutes  More than 50% of total time spent on counseling and coordination of care as described above  Current Length of Stay: 4 day(s)  Current Patient Status: Inpatient   Certification Statement: The patient will continue to require additional inpatient hospital stay due to hypoxia, COVID 19 treatment  Discharge Plan: Anticipate discharge in 24-48 hrs to home with home services  Code Status: Level 1 - Full Code    Subjective:   Patient feels well  Denies any chest pain/palpitations, shortness of breath, nausea/vomiting, abdominal pain  Objective:     Vitals:   Temp (24hrs), Av 3 °F (36 3 °C), Min:97 2 °F (36 2 °C), Max:97 5 °F (36 4 °C)    Temp:  [97 2 °F (36 2 °C)-97 5 °F (36 4 °C)] 97 2 °F (36 2 °C)  HR:  [61-74] 69  Resp:  [18] 18  BP: (118-123)/(70-76) 123/76  SpO2:  [88 %-94 %] 91 %  Body mass index is 35 43 kg/m²  Input and Output Summary (last 24 hours): Intake/Output Summary (Last 24 hours) at 10/19/2022 1001  Last data filed at 10/19/2022 0601  Gross per 24 hour   Intake 240 ml   Output 925 ml   Net -685 ml       Physical Exam:   Physical Exam  Vitals and nursing note reviewed  Constitutional:       Appearance: He is well-developed  Interventions: Nasal cannula in place  Comments: No acute distress   HENT:      Head: Normocephalic and atraumatic  Eyes:      General: No scleral icterus  Extraocular Movements: Extraocular movements intact        Conjunctiva/sclera: Conjunctivae normal  Cardiovascular:      Rate and Rhythm: Normal rate and regular rhythm  Heart sounds: S1 normal and S2 normal  No murmur heard  Pulmonary:      Effort: Pulmonary effort is normal  No respiratory distress  Breath sounds: Normal breath sounds  No wheezing, rhonchi or rales  Abdominal:      General: Bowel sounds are normal       Palpations: Abdomen is soft  Tenderness: There is no abdominal tenderness  There is no guarding or rebound  Musculoskeletal:      Cervical back: Normal range of motion  Comments: Able to move upper/lower ext bilaterally, no edema   Skin:     General: Skin is warm and dry  Neurological:      Mental Status: He is alert and oriented to person, place, and time     Psychiatric:         Mood and Affect: Mood normal          Speech: Speech normal          Behavior: Behavior normal           Additional Data:     Labs:  Results from last 7 days   Lab Units 10/19/22  0558   WBC Thousand/uL 7 51   HEMOGLOBIN g/dL 15 5   HEMATOCRIT % 47 4   PLATELETS Thousands/uL 404*   NEUTROS PCT % 70   LYMPHS PCT % 24   MONOS PCT % 6   EOS PCT % 0     Results from last 7 days   Lab Units 10/18/22  0452   SODIUM mmol/L 138   POTASSIUM mmol/L 4 3   CHLORIDE mmol/L 105   CO2 mmol/L 26   BUN mg/dL 26*   CREATININE mg/dL 0 87   ANION GAP mmol/L 7   CALCIUM mg/dL 10 4*   ALBUMIN g/dL 2 4*   TOTAL BILIRUBIN mg/dL 0 20   ALK PHOS U/L 101   ALT U/L 21   AST U/L 21   GLUCOSE RANDOM mg/dL 159*     Results from last 7 days   Lab Units 10/15/22  1947   INR  1 01             Results from last 7 days   Lab Units 10/18/22  0452 10/16/22  0514 10/15/22  2252   LACTIC ACID mmol/L  --   --  1 0   PROCALCITONIN ng/ml 0 36* 0 25 0 32*       Lines/Drains:  Invasive Devices  Report    Peripheral Intravenous Line  Duration           Peripheral IV 10/15/22 Left Antecubital 3 days    Peripheral IV 10/19/22 Left;Ventral (anterior) Wrist <1 day                      Imaging: Reviewed radiology reports from this admission including: chest xray    Recent Cultures (last 7 days):   Results from last 7 days   Lab Units 10/17/22  1504 10/15/22  2252 10/15/22  2251   BLOOD CULTURE   --  No Growth at 72 hrs  No Growth at 72 hrs  LEGIONELLA URINARY ANTIGEN  Negative  --   --        Last 24 Hours Medication List:   Current Facility-Administered Medications   Medication Dose Route Frequency Provider Last Rate   • acetaminophen  650 mg Oral Q6H PRN Kelly Villalta PA-C     • cefTRIAXone  1,000 mg Intravenous Q24H Kelly Villalta PA-C 1,000 mg (10/18/22 2121)   • dexamethasone  6 mg Intravenous Q24H Kelly Villalta PA-C     • ferrous sulfate  325 mg Oral Daily Kelly Villalta PA-C     • finasteride  5 mg Oral Daily Kelly Villalta PA-C     • furosemide  80 mg Oral Daily Kelly Villalta PA-C     • guaiFENesin  600 mg Oral Q12H [address] Kelly Aldridge PA-C     • heparin (porcine)  3-20 Units/kg/hr (Order-Specific) Intravenous Titrated Kelly Villalta PA-C 17 1 Units/kg/hr (10/19/22 0641)   • heparin (porcine)  2,000 Units Intravenous Q1H PRN Kelly Villalta PA-C     • heparin (porcine)  4,000 Units Intravenous Q1H PRN Kelly Villalta PA-C     • ondansetron  4 mg Intravenous Q6H PRN Kelly Villalta PA-C     • pantoprazole  40 mg Oral Early Morning Meagan Gill PA-C     • polyethylene glycol  17 g Oral Daily PRN Kelly Villalta PA-C     • pravastatin  20 mg Oral Daily With Dinner Kelly Villalta PA-C     • remdesivir  100 mg Intravenous Q24H Kelly Villalta PA-C     • sodium chloride  1 spray Each Nare Q1H PRN Jayesh Grissom PA-C     • spironolactone  12 5 mg Oral Daily Kelly Villalta PA-C     • tamsulosin  0 4 mg Oral Daily With Dinner Kelly Villalta PA-C          Today, Patient Was Seen By: Jayesh Grissom    **Please Note: This note may have been constructed using a voice recognition system  **

## 2022-10-19 NOTE — PLAN OF CARE
Problem: MOBILITY - ADULT  Goal: Maintain or return to baseline ADL function  Description: INTERVENTIONS:  -  Assess patient's ability to carry out ADLs; assess patient's baseline for ADL function and identify physical deficits which impact ability to perform ADLs (bathing, care of mouth/teeth, toileting, grooming, dressing, etc )  - Assess/evaluate cause of self-care deficits   - Assess range of motion  - Assess patient's mobility; develop plan if impaired  - Assess patient's need for assistive devices and provide as appropriate  - Encourage maximum independence but intervene and supervise when necessary  - Involve family in performance of ADLs  - Assess for home care needs following discharge   - Consider OT consult to assist with ADL evaluation and planning for discharge  - Provide patient education as appropriate  Outcome: Progressing  Goal: Maintains/Returns to pre admission functional level  Description: INTERVENTIONS:  - Perform BMAT or MOVE assessment daily    - Set and communicate daily mobility goal to care team and patient/family/caregiver  - Collaborate with rehabilitation services on mobility goals if consulted  - Perform Range of Motion 3 times a day  - Reposition patient every 2 hours    - Dangle patient 3 times a day  - Stand patient 3 times a day  - Ambulate patient 3 times a day  - Out of bed to chair 3 times a day   - Out of bed for meals 3 times a day  - Out of bed for toileting  - Record patient progress and toleration of activity level   Outcome: Progressing     Problem: INFECTION - ADULT  Goal: Absence or prevention of progression during hospitalization  Description: INTERVENTIONS:  - Assess and monitor for signs and symptoms of infection  - Monitor lab/diagnostic results  - Monitor all insertion sites, i e  indwelling lines, tubes, and drains  - Monitor endotracheal if appropriate and nasal secretions for changes in amount and color  - [place] appropriate cooling/warming therapies per order  - Administer medications as ordered  - Instruct and encourage patient and family to use good hand hygiene technique  - Identify and instruct in appropriate isolation precautions for identified infection/condition  Outcome: Progressing     Problem: DISCHARGE PLANNING  Goal: Discharge to home or other facility with appropriate resources  Description: INTERVENTIONS:  - Identify barriers to discharge w/patient and caregiver  - Arrange for needed discharge resources and transportation as appropriate  - Identify discharge learning needs (meds, wound care, etc )  - Arrange for interpretive services to assist at discharge as needed  - Refer to Case Management Department for coordinating discharge planning if the patient needs post-hospital services based on physician/advanced practitioner order or complex needs related to functional status, cognitive ability, or social support system  Outcome: Progressing     Problem: Knowledge Deficit  Goal: Patient/family/caregiver demonstrates understanding of disease process, treatment plan, medications, and discharge instructions  Description: Complete learning assessment and assess knowledge base    Interventions:  - Provide teaching at level of understanding  - Provide teaching via preferred learning methods  Outcome: Progressing     Problem: RESPIRATORY - ADULT  Goal: Achieves optimal ventilation and oxygenation  Description: INTERVENTIONS:  - Assess for changes in respiratory status  - Assess for changes in mentation and behavior  - Position to facilitate oxygenation and minimize respiratory effort  - Oxygen administered by appropriate delivery if ordered  - Initiate smoking cessation education as indicated  - Encourage broncho-pulmonary hygiene including cough, deep breathe, Incentive Spirometry  - Assess the need for suctioning and aspirate as needed  - Assess and instruct to report SOB or any respiratory difficulty  - Respiratory Therapy support as indicated  Outcome: Progressing     Problem: Prexisting or High Potential for Compromised Skin Integrity  Goal: Skin integrity is maintained or improved  Description: INTERVENTIONS:  - Identify patients at risk for skin breakdown  - Assess and monitor skin integrity  - Assess and monitor nutrition and hydration status  - Monitor labs   - Assess for incontinence   - Turn and reposition patient  - Assist with mobility/ambulation  - Relieve pressure over bony prominences  - Avoid friction and shearing  - Provide appropriate hygiene as needed including keeping skin clean and dry  - Evaluate need for skin moisturizer/barrier cream  - Collaborate with interdisciplinary team   - Patient/family teaching  - Consider wound care consult   Outcome: Progressing     Problem: Potential for Falls  Goal: Patient will remain free of falls  Description: INTERVENTIONS:  - Educate patient/family on patient safety including physical limitations  - Instruct patient to call for assistance with activity   - Consult OT/PT to assist with strengthening/mobility   - Keep Call bell within reach  - Keep bed low and locked with side rails adjusted as appropriate  - Keep care items and personal belongings within reach  - Initiate and maintain comfort rounds  - Make Fall Risk Sign visible to staff  - Offer Toileting every 2 Hours, in advance of need  - Initiate/Maintain bed/chair alarm  - Obtain necessary fall risk management equipment:   - Apply yellow socks and bracelet for high fall risk patients  - Consider moving patient to room near nurses station  Outcome: Progressing

## 2022-10-19 NOTE — ASSESSMENT & PLAN NOTE
· 88% on room air on presentation    Initially requiring 3 L nasal cannula  · Weaned to 2 L nasal cannula 10/18  · Diagnosed with COVID-19 on 10/15/2022  · IV dexamethasone, remdesivir day 5 later this evening  · Respiratory protocol  · Continue to monitor and wean oxygen as able  · PT/OT eval for weakness- recommending home with VNA services  · Will need home O2 eval prior to discharge

## 2022-10-19 NOTE — ASSESSMENT & PLAN NOTE
· Presented due to SOB, wet cough and sore throat that began 10/13   88% on room air in the ER  Currently on 3 L nasal cannula  · Diagnosed with COVID-19 on 10/15/2022    Received vaccine x3  · Admit under mild pathway  · COVID labs  · D-dimer:  0 69 --> 0 3 (IV heparin drip initiated per protocol --> hold home eliquis) --> can DC hepatin gtt if no longer requiring O2 per protocol   · CK:  112, BNP:  144, CRP:  49 2  · IV remdesivir and IV dexamethasone initiated 10/15 - day 5/5 later this evening  · Due to elevated procalcitonin and sepsis criteria --> started on IV ceftriaxone  · Procalcitonin minimally elevated, will continue Rocephin for now  · Blood cultures negative x2 after 72 hours

## 2022-10-19 NOTE — PLAN OF CARE
Problem: MOBILITY - ADULT  Goal: Maintain or return to baseline ADL function  Description: INTERVENTIONS:  -  Assess patient's ability to carry out ADLs; assess patient's baseline for ADL function and identify physical deficits which impact ability to perform ADLs (bathing, care of mouth/teeth, toileting, grooming, dressing, etc )  - Assess/evaluate cause of self-care deficits   - Assess range of motion  - Assess patient's mobility; develop plan if impaired  - Assess patient's need for assistive devices and provide as appropriate  - Encourage maximum independence but intervene and supervise when necessary  - Involve family in performance of ADLs  - Assess for home care needs following discharge   - Consider OT consult to assist with ADL evaluation and planning for discharge  - Provide patient education as appropriate  Outcome: Progressing  Goal: Maintains/Returns to pre admission functional level  Description: INTERVENTIONS:  - Perform BMAT or MOVE assessment daily    - Set and communicate daily mobility goal to care team and patient/family/caregiver  - Collaborate with rehabilitation services on mobility goals if consulted  - Perform Range of Motion 3 times a day  - Reposition patient every 2 hours    - Dangle patient 3 times a day  - Stand patient 3 times a day  - Ambulate patient 3 times a day  - Out of bed to chair 3 times a day   - Out of bed for meals 3 times a day  - Out of bed for toileting  - Record patient progress and toleration of activity level   Outcome: Progressing     Problem: INFECTION - ADULT  Goal: Absence or prevention of progression during hospitalization  Description: INTERVENTIONS:  - Assess and monitor for signs and symptoms of infection  - Monitor lab/diagnostic results  - Monitor all insertion sites, i e  indwelling lines, tubes, and drains  - Monitor endotracheal if appropriate and nasal secretions for changes in amount and color  - [place] appropriate cooling/warming therapies per order  - Administer medications as ordered  - Instruct and encourage patient and family to use good hand hygiene technique  - Identify and instruct in appropriate isolation precautions for identified infection/condition  Outcome: Progressing     Problem: DISCHARGE PLANNING  Goal: Discharge to home or other facility with appropriate resources  Description: INTERVENTIONS:  - Identify barriers to discharge w/patient and caregiver  - Arrange for needed discharge resources and transportation as appropriate  - Identify discharge learning needs (meds, wound care, etc )  - Arrange for interpretive services to assist at discharge as needed  - Refer to Case Management Department for coordinating discharge planning if the patient needs post-hospital services based on physician/advanced practitioner order or complex needs related to functional status, cognitive ability, or social support system  Outcome: Progressing     Problem: Knowledge Deficit  Goal: Patient/family/caregiver demonstrates understanding of disease process, treatment plan, medications, and discharge instructions  Description: Complete learning assessment and assess knowledge base    Interventions:  - Provide teaching at level of understanding  - Provide teaching via preferred learning methods  Outcome: Progressing     Problem: RESPIRATORY - ADULT  Goal: Achieves optimal ventilation and oxygenation  Description: INTERVENTIONS:  - Assess for changes in respiratory status  - Assess for changes in mentation and behavior  - Position to facilitate oxygenation and minimize respiratory effort  - Oxygen administered by appropriate delivery if ordered  - Initiate smoking cessation education as indicated  - Encourage broncho-pulmonary hygiene including cough, deep breathe, Incentive Spirometry  - Assess the need for suctioning and aspirate as needed  - Assess and instruct to report SOB or any respiratory difficulty  - Respiratory Therapy support as indicated  Outcome: Progressing     Problem: Prexisting or High Potential for Compromised Skin Integrity  Goal: Skin integrity is maintained or improved  Description: INTERVENTIONS:  - Identify patients at risk for skin breakdown  - Assess and monitor skin integrity  - Assess and monitor nutrition and hydration status  - Monitor labs   - Assess for incontinence   - Turn and reposition patient  - Assist with mobility/ambulation  - Relieve pressure over bony prominences  - Avoid friction and shearing  - Provide appropriate hygiene as needed including keeping skin clean and dry  - Evaluate need for skin moisturizer/barrier cream  - Collaborate with interdisciplinary team   - Patient/family teaching  - Consider wound care consult   Outcome: Progressing     Problem: Potential for Falls  Goal: Patient will remain free of falls  Description: INTERVENTIONS:  - Educate patient/family on patient safety including physical limitations  - Instruct patient to call for assistance with activity   - Consult OT/PT to assist with strengthening/mobility   - Keep Call bell within reach  - Keep bed low and locked with side rails adjusted as appropriate  - Keep care items and personal belongings within reach  - Initiate and maintain comfort rounds  - Make Fall Risk Sign visible to staff  - Offer Toileting every 2 Hours, in advance of need  - Initiate/Maintain bed/chair alarm  - Obtain necessary fall risk management equipment:   - Apply yellow socks and bracelet for high fall risk patients  - Consider moving patient to room near nurses station  Outcome: Progressing

## 2022-10-20 LAB
ALBUMIN SERPL BCP-MCNC: 2.5 G/DL (ref 3.5–5)
ALP SERPL-CCNC: 104 U/L (ref 46–116)
ALT SERPL W P-5'-P-CCNC: 106 U/L (ref 12–78)
ANION GAP SERPL CALCULATED.3IONS-SCNC: 9 MMOL/L (ref 4–13)
APTT PPP: 151 SECONDS (ref 23–37)
APTT PPP: 42 SECONDS (ref 23–37)
APTT PPP: 59 SECONDS (ref 23–37)
AST SERPL W P-5'-P-CCNC: 50 U/L (ref 5–45)
BASOPHILS # BLD MANUAL: 0 THOUSAND/UL (ref 0–0.1)
BASOPHILS NFR MAR MANUAL: 0 % (ref 0–1)
BILIRUB SERPL-MCNC: 0.2 MG/DL (ref 0.2–1)
BUN SERPL-MCNC: 29 MG/DL (ref 5–25)
CA-I BLD-SCNC: 1.38 MMOL/L (ref 1.12–1.32)
CALCIUM ALBUM COR SERPL-MCNC: 12.2 MG/DL (ref 8.3–10.1)
CALCIUM SERPL-MCNC: 11 MG/DL (ref 8.3–10.1)
CHLORIDE SERPL-SCNC: 103 MMOL/L (ref 96–108)
CO2 SERPL-SCNC: 26 MMOL/L (ref 21–32)
CREAT SERPL-MCNC: 1 MG/DL (ref 0.6–1.3)
DME PARACHUTE DELIVERY DATE REQUESTED: NORMAL
DME PARACHUTE ITEM DESCRIPTION: NORMAL
DME PARACHUTE ORDER STATUS: NORMAL
DME PARACHUTE SUPPLIER NAME: NORMAL
DME PARACHUTE SUPPLIER PHONE: NORMAL
EOSINOPHIL # BLD MANUAL: 0.1 THOUSAND/UL (ref 0–0.4)
EOSINOPHIL NFR BLD MANUAL: 1 % (ref 0–6)
ERYTHROCYTE [DISTWIDTH] IN BLOOD BY AUTOMATED COUNT: 14.8 % (ref 11.6–15.1)
GFR SERPL CREATININE-BSD FRML MDRD: 70 ML/MIN/1.73SQ M
GLUCOSE SERPL-MCNC: 168 MG/DL (ref 65–140)
HCT VFR BLD AUTO: 45.7 % (ref 36.5–49.3)
HGB BLD-MCNC: 15.2 G/DL (ref 12–17)
LG PLATELETS BLD QL SMEAR: PRESENT
LYMPHOCYTES # BLD AUTO: 1.56 THOUSAND/UL (ref 0.6–4.47)
LYMPHOCYTES # BLD AUTO: 16 % (ref 14–44)
MCH RBC QN AUTO: 30.8 PG (ref 26.8–34.3)
MCHC RBC AUTO-ENTMCNC: 33.3 G/DL (ref 31.4–37.4)
MCV RBC AUTO: 93 FL (ref 82–98)
MONOCYTES # BLD AUTO: 0.2 THOUSAND/UL (ref 0–1.22)
MONOCYTES NFR BLD: 2 % (ref 4–12)
NEUTROPHILS # BLD MANUAL: 7.61 THOUSAND/UL (ref 1.85–7.62)
NEUTS SEG NFR BLD AUTO: 78 % (ref 43–75)
PLATELET # BLD AUTO: 402 THOUSANDS/UL (ref 149–390)
PLATELET BLD QL SMEAR: ADEQUATE
PMV BLD AUTO: 10.4 FL (ref 8.9–12.7)
POTASSIUM SERPL-SCNC: 4 MMOL/L (ref 3.5–5.3)
PROT SERPL-MCNC: 6 G/DL (ref 6.4–8.4)
RBC # BLD AUTO: 4.93 MILLION/UL (ref 3.88–5.62)
SODIUM SERPL-SCNC: 138 MMOL/L (ref 135–147)
VARIANT LYMPHS # BLD AUTO: 3 %
WBC # BLD AUTO: 9.76 THOUSAND/UL (ref 4.31–10.16)

## 2022-10-20 PROCEDURE — 85007 BL SMEAR W/DIFF WBC COUNT: CPT | Performed by: PHYSICIAN ASSISTANT

## 2022-10-20 PROCEDURE — 80053 COMPREHEN METABOLIC PANEL: CPT | Performed by: PHYSICIAN ASSISTANT

## 2022-10-20 PROCEDURE — 85730 THROMBOPLASTIN TIME PARTIAL: CPT | Performed by: PHYSICIAN ASSISTANT

## 2022-10-20 PROCEDURE — 85027 COMPLETE CBC AUTOMATED: CPT | Performed by: PHYSICIAN ASSISTANT

## 2022-10-20 PROCEDURE — 85730 THROMBOPLASTIN TIME PARTIAL: CPT | Performed by: INTERNAL MEDICINE

## 2022-10-20 PROCEDURE — 82330 ASSAY OF CALCIUM: CPT | Performed by: PHYSICIAN ASSISTANT

## 2022-10-20 PROCEDURE — 99232 SBSQ HOSP IP/OBS MODERATE 35: CPT | Performed by: INTERNAL MEDICINE

## 2022-10-20 RX ORDER — SODIUM CHLORIDE 9 MG/ML
75 INJECTION, SOLUTION INTRAVENOUS CONTINUOUS
Status: DISPENSED | OUTPATIENT
Start: 2022-10-20 | End: 2022-10-20

## 2022-10-20 RX ADMIN — GUAIFENESIN 600 MG: 600 TABLET, EXTENDED RELEASE ORAL at 21:18

## 2022-10-20 RX ADMIN — CEFTRIAXONE 1000 MG: 1 INJECTION, SOLUTION INTRAVENOUS at 21:18

## 2022-10-20 RX ADMIN — FERROUS SULFATE TAB 325 MG (65 MG ELEMENTAL FE) 325 MG: 325 (65 FE) TAB at 08:05

## 2022-10-20 RX ADMIN — HEPARIN SODIUM 4000 UNITS: 1000 INJECTION INTRAVENOUS; SUBCUTANEOUS at 15:39

## 2022-10-20 RX ADMIN — TAMSULOSIN HYDROCHLORIDE 0.4 MG: 0.4 CAPSULE ORAL at 17:53

## 2022-10-20 RX ADMIN — FINASTERIDE 5 MG: 5 TABLET, FILM COATED ORAL at 08:05

## 2022-10-20 RX ADMIN — FUROSEMIDE 80 MG: 80 TABLET ORAL at 08:05

## 2022-10-20 RX ADMIN — PANTOPRAZOLE SODIUM 40 MG: 40 TABLET, DELAYED RELEASE ORAL at 08:05

## 2022-10-20 RX ADMIN — HEPARIN SODIUM 2000 UNITS: 1000 INJECTION INTRAVENOUS; SUBCUTANEOUS at 23:48

## 2022-10-20 RX ADMIN — HEPARIN SODIUM 15.1 UNITS/KG/HR: 10000 INJECTION, SOLUTION INTRAVENOUS at 11:41

## 2022-10-20 RX ADMIN — HEPARIN SODIUM 21 UNITS/KG/HR: 10000 INJECTION, SOLUTION INTRAVENOUS at 23:52

## 2022-10-20 RX ADMIN — SPIRONOLACTONE 12.5 MG: 25 TABLET ORAL at 08:05

## 2022-10-20 RX ADMIN — GUAIFENESIN 600 MG: 600 TABLET, EXTENDED RELEASE ORAL at 08:05

## 2022-10-20 RX ADMIN — DEXAMETHASONE SODIUM PHOSPHATE 6 MG: 4 INJECTION, SOLUTION INTRAMUSCULAR; INTRAVENOUS at 22:12

## 2022-10-20 RX ADMIN — SODIUM CHLORIDE 75 ML/HR: 0.9 INJECTION, SOLUTION INTRAVENOUS at 11:40

## 2022-10-20 RX ADMIN — PRAVASTATIN SODIUM 20 MG: 20 TABLET ORAL at 17:53

## 2022-10-20 NOTE — CASE MANAGEMENT
Case Management Progress Note    Patient name Vanadna Solis  Location /-51 MRN [id number]  : 1940 Date 10/20/2022       LOS (days): 5  Geometric Mean LOS (GMLOS) (days): 5 00  Days to GMLOS:0 2        OBJECTIVE:        Current admission status: Inpatient  Preferred Pharmacy:   [address]  Phone: [phone] Fax: [phone]    Primary Care Provider: Willy Randolph MD    Primary Insurance: [facility]  Secondary Insurance:     PROGRESS NOTE:    Met with pt's wife Jackson Lyons to discuss dc planning  Jackson Lyons is requesting a bedside commode  Order [facility]; order approved with no copay  Bedside commode delivered  Delivery ticket signed

## 2022-10-20 NOTE — ASSESSMENT & PLAN NOTE
· Corrected calcium is mildly elevated 10-11  · Corrected calcium today was 12 2   Will give patient some IV fluids to help with hydration and improve calcium  · Check ionized Ca with am labs  · Hold vitamins for now

## 2022-10-20 NOTE — PLAN OF CARE
Problem: INFECTION - ADULT  Goal: Absence or prevention of progression during hospitalization  Description: INTERVENTIONS:  - Assess and monitor for signs and symptoms of infection  - Monitor lab/diagnostic results  - Monitor all insertion sites, i e  indwelling lines, tubes, and drains  - Monitor endotracheal if appropriate and nasal secretions for changes in amount and color  - [place] appropriate cooling/warming therapies per order  - Administer medications as ordered  - Instruct and encourage patient and family to use good hand hygiene technique  - Identify and instruct in appropriate isolation precautions for identified infection/condition  Outcome: Progressing

## 2022-10-20 NOTE — ASSESSMENT & PLAN NOTE
Patient has a video appt tomorrow with NP MALIA Mclaughlin tomorrow for medication refill and f/u. Medication will be filled at this time.  · 88% on room air on presentation  Initially requiring 3 L nasal cannula  · Weaned to 2 L nasal cannula 10/18  · Diagnosed with COVID-19 on 10/15/2022  · IV dexamethasone, remdesivir day 5 later this evening  · Respiratory protocol  · Continue to monitor and wean oxygen as able  · PT/OT eval for weakness- recommending home with VNA services on room air  · Weaned patient off oxygen as he was on 3 L this morning  Currently he is on room air without exertion  · Will need home O2 eval prior to discharge  · Can potentially plan discharge for tomorrow and transition IV dexamethasone to p o

## 2022-10-20 NOTE — ASSESSMENT & PLAN NOTE
· Presented due to SOB, wet cough and sore throat that began 10/13   88% on room air in the ER  Currently on 3 L nasal cannula  · Diagnosed with COVID-19 on 10/15/2022  Received vaccine x3  · Admit under mild pathway  · COVID labs  · D-dimer:  0 69 --> 0 3 (IV heparin drip initiated per protocol --> hold home eliquis) --> can DC hepatin gtt if no longer requiring O2 per protocol   · CK:  112, BNP:  144, CRP:  49 2  · IV remdesivir and IV dexamethasone initiated 10/15    Completed 5 doses of remdesivir  · Due to elevated procalcitonin and sepsis criteria --> started on IV ceftriaxone  · Procalcitonin minimally elevated, will continue Rocephin for now  · Blood cultures negative x2 after 72 hours

## 2022-10-20 NOTE — PROGRESS NOTES
[place]  Progress Note - Manuel Blake 1940, [de-identified] y o  male MRN: [id number]  Unit/Bed#: -01 Encounter: [id number]  Primary Care Provider: Siomara Galeano MD   Date and time admitted to hospital: 10/15/2022  7:20 PM    Hypokalemia  Assessment & Plan  · K+ 2 9 --> 4 3  · Continue to monitor and replete as needed     Obesity (BMI 30-39  9)  Assessment & Plan  Body mass index is 35 43 kg/m²  · Encouraged lifestyle changes    Hypoxia  Assessment & Plan  · 88% on room air on presentation  Initially requiring 3 L nasal cannula  · Weaned to 2 L nasal cannula 10/18  · Diagnosed with COVID-19 on 10/15/2022  · IV dexamethasone, remdesivir day 5 later this evening  · Respiratory protocol  · Continue to monitor and wean oxygen as able  · PT/OT eval for weakness- recommending home with VNA services on room air  · Weaned patient off oxygen as he was on 3 L this morning  Currently he is on room air without exertion  · Will need home O2 eval prior to discharge  · Can potentially plan discharge for tomorrow and transition IV dexamethasone to p o     Mild hyperparathyroidism ([address] )  Assessment & Plan  · Corrected calcium is mildly elevated 10-11  · Corrected calcium today was 12 2  Will give patient some IV fluids to help with hydration and improve calcium  · Check ionized Ca with am labs  · Hold vitamins for now     Pure hypercholesterolemia  Assessment & Plan  · Home regimen simvastatin 10 mg daily  Non formulary  Pravastatin ordered    Chronic deep vein thrombosis (DVT) of proximal vein of both lower extremities (HCC)  Assessment & Plan  · Chronically on Eliquis 2 5 mg b i d , also has IVC filter  · Hold Eliquis  Continue IV heparin drip due to COVID protocol    * Sepsis due to COVID-19 [facility])  Assessment & Plan  · Presented due to SOB, wet cough and sore throat that began 10/13   88% on room air in the ER  Currently on 3 L nasal cannula  · Diagnosed with COVID-19 on 10/15/2022  Received vaccine x3  · Admit under mild pathway  · COVID labs  · D-dimer:  0 69 --> 0 3 (IV heparin drip initiated per protocol --> hold home eliquis) --> can DC hepatin gtt if no longer requiring O2 per protocol   · CK:  112, BNP:  144, CRP:  49 2  · IV remdesivir and IV dexamethasone initiated 10/15   Completed 5 doses of remdesivir  · Due to elevated procalcitonin and sepsis criteria --> started on IV ceftriaxone  · Procalcitonin minimally elevated, will continue Rocephin for now  · Blood cultures negative x2 after 72 hours          VTE Pharmacologic Prophylaxis: VTE Score: 4 Moderate Risk (Score 3-4) - Pharmacological DVT Prophylaxis Ordered: heparin drip  Education and Discussions with Family / Patient: Attempted to update  (wife) via phone  Left voicemail  Time Spent for Care: 30 minutes  More than 50% of total time spent on counseling and coordination of care as described above  Current Length of Stay: 5 day(s)  Current Patient Status: Inpatient   Certification Statement: The patient will continue to require additional inpatient hospital stay due to Covid  Discharge Plan: Anticipate discharge in 24-48 hrs to home  Code Status: Level 1 - Full Code    Subjective:   Patient was seen this morning  He had no fresh complaints  He still feels short of breath requiring oxygen    Objective:   He looks well; not in any obvious respiratory distress  Currently on 3 L of oxygen    Vitals:   Temp (24hrs), Av 6 °F (36 4 °C), Min:97 6 °F (36 4 °C), Max:97 6 °F (36 4 °C)    Temp:  [97 6 °F (36 4 °C)] 97 6 °F (36 4 °C)  HR:  [59-74] 59  Resp:  [18] 18  BP: ()/(63-87) 144/85  SpO2:  [92 %-95 %] 95 %  Body mass index is 35 43 kg/m²  Input and Output Summary (last 24 hours):      Intake/Output Summary (Last 24 hours) at 10/20/2022 1407  Last data filed at 10/20/2022 0221  Gross per 24 hour   Intake 680 ml   Output 2150 ml   Net -1470 ml       Physical Exam:   Physical Exam  Vitals and nursing note reviewed  Constitutional:       Appearance: He is well-developed  HENT:      Head: Normocephalic and atraumatic  Comments: Intranasal oxygen in situ  Eyes:      Conjunctiva/sclera: Conjunctivae normal    Cardiovascular:      Rate and Rhythm: Normal rate and regular rhythm  Heart sounds: No murmur heard  Pulmonary:      Effort: Pulmonary effort is normal  No respiratory distress  Breath sounds: Normal breath sounds  Abdominal:      Palpations: Abdomen is soft  Tenderness: There is no abdominal tenderness  Musculoskeletal:      Cervical back: Neck supple  Skin:     General: Skin is warm and dry  Neurological:      Mental Status: He is alert  Additional Data:     Labs:  Results from last 7 days   Lab Units 10/20/22  0541 10/19/22  0558   WBC Thousand/uL 9 76 7 51   HEMOGLOBIN g/dL 15 2 15 5   HEMATOCRIT % 45 7 47 4   PLATELETS Thousands/uL 402* 404*   NEUTROS PCT %  --  70   LYMPHS PCT %  --  24   LYMPHO PCT % 16  --    MONOS PCT %  --  6   MONO PCT % 2*  --    EOS PCT % 1 0     Results from last 7 days   Lab Units 10/20/22  0541   SODIUM mmol/L 138   POTASSIUM mmol/L 4 0   CHLORIDE mmol/L 103   CO2 mmol/L 26   BUN mg/dL 29*   CREATININE mg/dL 1 00   ANION GAP mmol/L 9   CALCIUM mg/dL 11 0*   ALBUMIN g/dL 2 5*   TOTAL BILIRUBIN mg/dL 0 20   ALK PHOS U/L 104   ALT U/L 106*   AST U/L 50*   GLUCOSE RANDOM mg/dL 168*     Results from last 7 days   Lab Units 10/15/22  1947   INR  1 01             Results from last 7 days   Lab Units 10/18/22  0452 10/16/22  0514 10/15/22  2252   LACTIC ACID mmol/L  --   --  1 0   PROCALCITONIN ng/ml 0 36* 0 25 0 32*       Lines/Drains:  Invasive Devices  Report    Peripheral Intravenous Line  Duration           Peripheral IV 10/19/22 Dorsal (posterior); Right Forearm 1 day    Peripheral IV 10/19/22 Left;Ventral (anterior) Wrist 1 day                      Recent Cultures (last 7 days):   Results from last 7 days   Lab Units 10/17/22  1504 10/15/22  2252 10/15/22  2251   BLOOD CULTURE   --  No Growth After 4 Days  No Growth After 4 Days  LEGIONELLA URINARY ANTIGEN  Negative  --   --        Last 24 Hours Medication List:   Current Facility-Administered Medications   Medication Dose Route Frequency Provider Last Rate   • acetaminophen  650 mg Oral Q6H PRN Sanjeev Cervantes PA-C     • cefTRIAXone  1,000 mg Intravenous Q24H Sanjeev Cervantes PA-C 1,000 mg (10/19/22 2124)   • dexamethasone  6 mg Intravenous Q24H Sanjeev Cervantes PA-C     • ferrous sulfate  325 mg Oral Daily Sanjeev Cervantes PA-C     • finasteride  5 mg Oral Daily Sanjeev Cervantes PA-C     • furosemide  80 mg Oral Daily Sanjeev Cervantes PA-C     • guaiFENesin  600 mg Oral Q12H [facility] Kelly Aldridge PA-C     • heparin (porcine)  3-20 Units/kg/hr (Order-Specific) Intravenous Titrated Sanjeev Cervantes PA-C 15 1 Units/kg/hr (10/20/22 1141)   • heparin (porcine)  2,000 Units Intravenous Q1H PRN Sanjeev Cervantes PA-C     • heparin (porcine)  4,000 Units Intravenous Q1H PRN Sanjeev Cervantes PA-C     • ondansetron  4 mg Intravenous Q6H PRN Sanjeev Cervantes PA-C     • pantoprazole  40 mg Oral Early Morning Meagan Gill PA-C     • polyethylene glycol  17 g Oral Daily PRN Sanjeev Cervantes PA-C     • pravastatin  20 mg Oral Daily With Dinner Sanjeev Cervantes PA-C     • sodium chloride  1 spray Each Nare Q1H PRN Pina Harden PA-C     • sodium chloride  75 mL/hr Intravenous Continuous Olmaxwellunmilakleber Pedersen MD 75 mL/hr (10/20/22 1140)   • spironolactone  12 5 mg Oral Daily Sanjeev Cervantes PA-C     • tamsulosin  0 4 mg Oral Daily With Dinner Sanjeev Cervantes PA-C          Today, Patient Was Seen By: Jairon Ivey    **Please Note: This note may have been constructed using a voice recognition system  **

## 2022-10-20 NOTE — NURSING NOTE
Ambulated patient in room  Room air POX 80-82%  Slightly short of breath  Patient placed back on O2 2LNC   POX on O2 now 91-92%

## 2022-10-21 ENCOUNTER — HOME CARE VISIT (OUTPATIENT)
Dept: HOME HEALTH SERVICES | Facility: HOME HEALTHCARE | Age: 82
End: 2022-10-21

## 2022-10-21 ENCOUNTER — TELEPHONE (OUTPATIENT)
Dept: FAMILY MEDICINE CLINIC | Facility: HOSPITAL | Age: 82
End: 2022-10-21

## 2022-10-21 VITALS
OXYGEN SATURATION: 91 % | TEMPERATURE: 97.5 F | RESPIRATION RATE: 18 BRPM | BODY MASS INDEX: 35.44 KG/M2 | WEIGHT: 200 LBS | SYSTOLIC BLOOD PRESSURE: 119 MMHG | DIASTOLIC BLOOD PRESSURE: 68 MMHG | HEART RATE: 56 BPM | HEIGHT: 63 IN

## 2022-10-21 LAB
ALBUMIN SERPL BCP-MCNC: 2.7 G/DL (ref 3.5–5)
ALP SERPL-CCNC: 107 U/L (ref 46–116)
ALT SERPL W P-5'-P-CCNC: 123 U/L (ref 12–78)
ANION GAP SERPL CALCULATED.3IONS-SCNC: 7 MMOL/L (ref 4–13)
APTT PPP: >210 SECONDS (ref 23–37)
AST SERPL W P-5'-P-CCNC: 53 U/L (ref 5–45)
BACTERIA BLD CULT: NORMAL
BACTERIA BLD CULT: NORMAL
BILIRUB SERPL-MCNC: 0.4 MG/DL (ref 0.2–1)
BUN SERPL-MCNC: 31 MG/DL (ref 5–25)
CA-I BLD-SCNC: 1.49 MMOL/L (ref 1.12–1.32)
CALCIUM ALBUM COR SERPL-MCNC: 12.6 MG/DL (ref 8.3–10.1)
CALCIUM SERPL-MCNC: 11.6 MG/DL (ref 8.3–10.1)
CHLORIDE SERPL-SCNC: 104 MMOL/L (ref 96–108)
CO2 SERPL-SCNC: 28 MMOL/L (ref 21–32)
CREAT SERPL-MCNC: 1.08 MG/DL (ref 0.6–1.3)
ERYTHROCYTE [DISTWIDTH] IN BLOOD BY AUTOMATED COUNT: 14.7 % (ref 11.6–15.1)
GFR SERPL CREATININE-BSD FRML MDRD: 64 ML/MIN/1.73SQ M
GLUCOSE SERPL-MCNC: 197 MG/DL (ref 65–140)
HCT VFR BLD AUTO: 48.3 % (ref 36.5–49.3)
HGB BLD-MCNC: 15.9 G/DL (ref 12–17)
MCH RBC QN AUTO: 31 PG (ref 26.8–34.3)
MCHC RBC AUTO-ENTMCNC: 32.9 G/DL (ref 31.4–37.4)
MCV RBC AUTO: 94 FL (ref 82–98)
PLATELET # BLD AUTO: 471 THOUSANDS/UL (ref 149–390)
PMV BLD AUTO: 10.6 FL (ref 8.9–12.7)
POTASSIUM SERPL-SCNC: 3.8 MMOL/L (ref 3.5–5.3)
PROT SERPL-MCNC: 6.2 G/DL (ref 6.4–8.4)
PTH-INTACT SERPL-MCNC: 176.7 PG/ML (ref 18.4–80.1)
RBC # BLD AUTO: 5.13 MILLION/UL (ref 3.88–5.62)
SODIUM SERPL-SCNC: 139 MMOL/L (ref 135–147)
WBC # BLD AUTO: 13.85 THOUSAND/UL (ref 4.31–10.16)

## 2022-10-21 PROCEDURE — 82397 CHEMILUMINESCENT ASSAY: CPT | Performed by: INTERNAL MEDICINE

## 2022-10-21 PROCEDURE — 99239 HOSP IP/OBS DSCHRG MGMT >30: CPT | Performed by: INTERNAL MEDICINE

## 2022-10-21 PROCEDURE — 82330 ASSAY OF CALCIUM: CPT | Performed by: INTERNAL MEDICINE

## 2022-10-21 PROCEDURE — 80053 COMPREHEN METABOLIC PANEL: CPT | Performed by: INTERNAL MEDICINE

## 2022-10-21 PROCEDURE — 97116 GAIT TRAINING THERAPY: CPT

## 2022-10-21 PROCEDURE — 83970 ASSAY OF PARATHORMONE: CPT | Performed by: INTERNAL MEDICINE

## 2022-10-21 PROCEDURE — 85027 COMPLETE CBC AUTOMATED: CPT | Performed by: INTERNAL MEDICINE

## 2022-10-21 PROCEDURE — 85730 THROMBOPLASTIN TIME PARTIAL: CPT | Performed by: STUDENT IN AN ORGANIZED HEALTH CARE EDUCATION/TRAINING PROGRAM

## 2022-10-21 PROCEDURE — 94761 N-INVAS EAR/PLS OXIMETRY MLT: CPT

## 2022-10-21 PROCEDURE — 82652 VIT D 1 25-DIHYDROXY: CPT | Performed by: INTERNAL MEDICINE

## 2022-10-21 RX ORDER — DEXAMETHASONE 1 MG
6 TABLET ORAL 2 TIMES DAILY WITH MEALS
Qty: 36 TABLET | Refills: 0 | Status: SHIPPED | OUTPATIENT
Start: 2022-10-21 | End: 2022-10-24

## 2022-10-21 RX ADMIN — GUAIFENESIN 600 MG: 600 TABLET, EXTENDED RELEASE ORAL at 08:45

## 2022-10-21 RX ADMIN — SPIRONOLACTONE 12.5 MG: 25 TABLET ORAL at 08:44

## 2022-10-21 RX ADMIN — HEPARIN SODIUM 21 UNITS/KG/HR: 10000 INJECTION, SOLUTION INTRAVENOUS at 06:00

## 2022-10-21 RX ADMIN — FUROSEMIDE 80 MG: 80 TABLET ORAL at 08:45

## 2022-10-21 RX ADMIN — FINASTERIDE 5 MG: 5 TABLET, FILM COATED ORAL at 08:45

## 2022-10-21 RX ADMIN — PANTOPRAZOLE SODIUM 40 MG: 40 TABLET, DELAYED RELEASE ORAL at 08:44

## 2022-10-21 RX ADMIN — FERROUS SULFATE TAB 325 MG (65 MG ELEMENTAL FE) 325 MG: 325 (65 FE) TAB at 08:45

## 2022-10-21 RX ADMIN — APIXABAN 2.5 MG: 2.5 TABLET, FILM COATED ORAL at 13:33

## 2022-10-21 NOTE — DISCHARGE INSTR - AVS FIRST PAGE
Dear Cate Soares,     It was our pleasure to care for you here at [facility], [address] [place] [address]  It is our hope that we were always able to exceed the expected standards for your care during your stay  You were hospitalized due to COVID-19 infection  You were cared for on the third floor by Amara Weiss with the [facility] who covers for your primary care physician (PCP), Santy Plascencia MD, while you were hospitalized  If you have any questions or concerns related to this hospitalization, you may contact us at [phone]  For follow up as well as any medication refills, we recommend that you follow up with your primary care physician  A registered nurse will reach out to you by phone within a few days after your discharge to answer any additional questions that you may have after going home  However, at this time we provide for you here, the most important instructions / recommendations at discharge:       Notable Medication Adjustments -   Take dexamethasone 6 mg daily for 3 more days  Important follow up information -   Please follow-up with your primary care physician within one week of discharge to discuss your recent hospitalization  Your calcium was high and you would need to have some test done for further workup by your primary care doctor  Please review this entire after visit summary as additional general instructions including medication list, appointments, activity, diet, any pertinent wound care, and other additional recommendations from your care team that may be provided for you        Sincerely,     Amara Weiss

## 2022-10-21 NOTE — CASE COMMUNICATION
Left a message for the patient regarding the home therapy referral  Explained that the therapist will call to schedule a visit for Monday  Left the office number for any questions or concerns  New SOC date will be 10/24/22   Thank you

## 2022-10-21 NOTE — PLAN OF CARE
Problem: MOBILITY - ADULT  Goal: Maintain or return to baseline ADL function  Description: INTERVENTIONS:  -  Assess patient's ability to carry out ADLs; assess patient's baseline for ADL function and identify physical deficits which impact ability to perform ADLs (bathing, care of mouth/teeth, toileting, grooming, dressing, etc )  - Assess/evaluate cause of self-care deficits   - Assess range of motion  - Assess patient's mobility; develop plan if impaired  - Assess patient's need for assistive devices and provide as appropriate  - Encourage maximum independence but intervene and supervise when necessary  - Involve family in performance of ADLs  - Assess for home care needs following discharge   - Consider OT consult to assist with ADL evaluation and planning for discharge  - Provide patient education as appropriate  Outcome: Progressing  Goal: Maintains/Returns to pre admission functional level  Description: INTERVENTIONS:  - Perform BMAT or MOVE assessment daily    - Set and communicate daily mobility goal to care team and patient/family/caregiver  - Collaborate with rehabilitation services on mobility goals if consulted  - Perform Range of Motion 3 times a day  - Reposition patient every 2 hours    - Dangle patient 3 times a day  - Stand patient 3 times a day  - Ambulate patient 3 times a day  - Out of bed to chair 3 times a day   - Out of bed for meals 3 times a day  - Out of bed for toileting  - Record patient progress and toleration of activity level   Outcome: Progressing     Problem: INFECTION - ADULT  Goal: Absence or prevention of progression during hospitalization  Description: INTERVENTIONS:  - Assess and monitor for signs and symptoms of infection  - Monitor lab/diagnostic results  - Monitor all insertion sites, i e  indwelling lines, tubes, and drains  - Monitor endotracheal if appropriate and nasal secretions for changes in amount and color  - [place] appropriate cooling/warming therapies per order  - Administer medications as ordered  - Instruct and encourage patient and family to use good hand hygiene technique  - Identify and instruct in appropriate isolation precautions for identified infection/condition  Outcome: Progressing     Problem: DISCHARGE PLANNING  Goal: Discharge to home or other facility with appropriate resources  Description: INTERVENTIONS:  - Identify barriers to discharge w/patient and caregiver  - Arrange for needed discharge resources and transportation as appropriate  - Identify discharge learning needs (meds, wound care, etc )  - Arrange for interpretive services to assist at discharge as needed  - Refer to Case Management Department for coordinating discharge planning if the patient needs post-hospital services based on physician/advanced practitioner order or complex needs related to functional status, cognitive ability, or social support system  Outcome: Progressing     Problem: Knowledge Deficit  Goal: Patient/family/caregiver demonstrates understanding of disease process, treatment plan, medications, and discharge instructions  Description: Complete learning assessment and assess knowledge base    Interventions:  - Provide teaching at level of understanding  - Provide teaching via preferred learning methods  Outcome: Progressing     Problem: RESPIRATORY - ADULT  Goal: Achieves optimal ventilation and oxygenation  Description: INTERVENTIONS:  - Assess for changes in respiratory status  - Assess for changes in mentation and behavior  - Position to facilitate oxygenation and minimize respiratory effort  - Oxygen administered by appropriate delivery if ordered  - Initiate smoking cessation education as indicated  - Encourage broncho-pulmonary hygiene including cough, deep breathe, Incentive Spirometry  - Assess the need for suctioning and aspirate as needed  - Assess and instruct to report SOB or any respiratory difficulty  - Respiratory Therapy support as indicated  Outcome: Progressing     Problem: Prexisting or High Potential for Compromised Skin Integrity  Goal: Skin integrity is maintained or improved  Description: INTERVENTIONS:  - Identify patients at risk for skin breakdown  - Assess and monitor skin integrity  - Assess and monitor nutrition and hydration status  - Monitor labs   - Assess for incontinence   - Turn and reposition patient  - Assist with mobility/ambulation  - Relieve pressure over bony prominences  - Avoid friction and shearing  - Provide appropriate hygiene as needed including keeping skin clean and dry  - Evaluate need for skin moisturizer/barrier cream  - Collaborate with interdisciplinary team   - Patient/family teaching  - Consider wound care consult   Outcome: Progressing     Problem: Potential for Falls  Goal: Patient will remain free of falls  Description: INTERVENTIONS:  - Educate patient/family on patient safety including physical limitations  - Instruct patient to call for assistance with activity   - Consult OT/PT to assist with strengthening/mobility   - Keep Call bell within reach  - Keep bed low and locked with side rails adjusted as appropriate  - Keep care items and personal belongings within reach  - Initiate and maintain comfort rounds  - Make Fall Risk Sign visible to staff  - Offer Toileting every 2 Hours, in advance of need  - Initiate/Maintain bed/chair alarm  - Obtain necessary fall risk management equipment:   - Apply yellow socks and bracelet for high fall risk patients  - Consider moving patient to room near nurses station  Outcome: Progressing

## 2022-10-21 NOTE — ASSESSMENT & PLAN NOTE
· 88% on room air on presentation  Initially requiring 3 L nasal cannula  · Weaned to 2 L nasal cannula 10/18  · Diagnosed with COVID-19 on 10/15/2022  · IV dexamethasone, remdesivir day 5 later this evening  · Respiratory protocol  · Continue to monitor and wean oxygen as able  · PT/OT eval for weakness- recommending home with VNA services on room air  · Weaned patient off oxygen as he was on 3 L this morning  Currently he is on room air without exertion  · Home O2 eval prior to discharge was ordered and patient did great and would not need oxygen at home  · He will be discharged home on p o  Dexamethasone 6 mg for 3 more days

## 2022-10-21 NOTE — RESPIRATORY THERAPY NOTE
Home Oxygen Qualifying Test     Patient name: Timothy Connolly        : 1940   Date of Test:  2022  Diagnosis:    Home Oxygen Test:    **Medicare Guidelines require item(s) 1-5 on all ambulatory patients or 1 and 2 on non-ambulatory patients  1  Baseline SPO2 on Room Air at rest 93 %   a  If <= 88% on Room Air add O2 via NC to obtain SpO2 >=88%  If LPM needed, document LPM  needed to reach =>88%    2  SPO2 during exertion on Room Air 94  %  a  During exertion monitor SPO2  If SPO2 increases >=89%, do not add supplemental oxygen    3  SPO2 on Oxygen at Rest  : N/A    4  SPO2 during exertion on Oxygen : N/A    5  Test performed during exertion activity  []  Supplemental Home Oxygen is indicated  [x]  Client does not qualify for home oxygen  Respiratory Additional Notes- Patient used a walker for ambulation  Patient ambulated for 6 minutes without a shortness of breath and desaturation       Bobo Walker 105, RT

## 2022-10-21 NOTE — DISCHARGE INSTRUCTIONS
Dear Jessica Jung,     It was our pleasure to care for you here at [facility], [address] [place] [address]  It is our hope that we were always able to exceed the expected standards for your care during your stay  You were hospitalized due to COVID-19 infection  You were cared for on the third floor by Natalie Brooks with the [facility] who covers for your primary care physician (PCP), Hetal Barron MD, while you were hospitalized  If you have any questions or concerns related to this hospitalization, you may contact us at [phone]  For follow up as well as any medication refills, we recommend that you follow up with your primary care physician  A registered nurse will reach out to you by phone within a few days after your discharge to answer any additional questions that you may have after going home  However, at this time we provide for you here, the most important instructions / recommendations at discharge:       Notable Medication Adjustments -   Take dexamethasone 6 mg daily for 3 more days  Important follow up information -   Please follow-up with your primary care physician within one week of discharge to discuss your recent hospitalization  Your calcium was high and you would need to have some test done for further workup by your primary care doctor  Please review this entire after visit summary as additional general instructions including medication list, appointments, activity, diet, any pertinent wound care, and other additional recommendations from your care team that may be provided for you        Sincerely,     Natalie Brooks

## 2022-10-21 NOTE — PLAN OF CARE
Problem: PHYSICAL THERAPY ADULT  Goal: Performs mobility at highest level of function for planned discharge setting  See evaluation for individualized goals  Description: Treatment/Interventions: Functional transfer training, ADL retraining, Elevations, Therapeutic exercise, LE strengthening/ROM, Endurance training, Patient/family training, Equipment eval/education, Bed mobility, Gait training, Continued evaluation, Compensatory technique education, Spoke to nursing, Spoke to case management, OT, Spoke to advanced practitioner          See flowsheet documentation for full assessment, interventions and recommendations  Note: Prognosis: Fair  Problem List: Decreased strength, Decreased range of motion, Decreased endurance, Impaired balance, Decreased mobility, Obesity  Assessment: Treatment consisted of balance training, ambulation training, safety awareness, fall prevention, endurance training to increase upright positions and functional mobility  Worked on standing activities to increase hip flexion, knee flexion, hip extension and knee extension for strengthening to initiate stair training; Pt attempted stair this session however unable to complete, pt states it is higher than his step at home;  Pt to use commode next to recliner chair until cleared for step by HHCPT  Pt verbalizes understanding and in agreement  The patient's AM-PAC Basic Mobility Inpatient Short Form Raw Score is 23  A Raw score of greater than 17 suggests the patient may benefit from discharge to home  Please also refer to the recommendation of the Physical Therapist for safe discharge planning  PT Discharge Recommendation: Home with home health rehabilitation (use of RW for all mobility)    See flowsheet documentation for full assessment

## 2022-10-21 NOTE — PLAN OF CARE
Problem: MOBILITY - ADULT  Goal: Maintain or return to baseline ADL function  Description: INTERVENTIONS:  -  Assess patient's ability to carry out ADLs; assess patient's baseline for ADL function and identify physical deficits which impact ability to perform ADLs (bathing, care of mouth/teeth, toileting, grooming, dressing, etc )  - Assess/evaluate cause of self-care deficits   - Assess range of motion  - Assess patient's mobility; develop plan if impaired  - Assess patient's need for assistive devices and provide as appropriate  - Encourage maximum independence but intervene and supervise when necessary  - Involve family in performance of ADLs  - Assess for home care needs following discharge   - Consider OT consult to assist with ADL evaluation and planning for discharge  - Provide patient education as appropriate  Outcome: Progressing  Goal: Maintains/Returns to pre admission functional level  Description: INTERVENTIONS:  - Perform BMAT or MOVE assessment daily    - Set and communicate daily mobility goal to care team and patient/family/caregiver  - Collaborate with rehabilitation services on mobility goals if consulted  - Perform Range of Motion 3 times a day  - Reposition patient every 2 hours    - Dangle patient 3 times a day  - Stand patient 3 times a day  - Ambulate patient 3 times a day  - Out of bed to chair 3 times a day   - Out of bed for meals 3 times a day  - Out of bed for toileting  - Record patient progress and toleration of activity level   Outcome: Progressing     Problem: INFECTION - ADULT  Goal: Absence or prevention of progression during hospitalization  Description: INTERVENTIONS:  - Assess and monitor for signs and symptoms of infection  - Monitor lab/diagnostic results  - Monitor all insertion sites, i e  indwelling lines, tubes, and drains  - Monitor endotracheal if appropriate and nasal secretions for changes in amount and color  - [place] appropriate cooling/warming therapies per order  - Administer medications as ordered  - Instruct and encourage patient and family to use good hand hygiene technique  - Identify and instruct in appropriate isolation precautions for identified infection/condition  Outcome: Progressing     Problem: DISCHARGE PLANNING  Goal: Discharge to home or other facility with appropriate resources  Description: INTERVENTIONS:  - Identify barriers to discharge w/patient and caregiver  - Arrange for needed discharge resources and transportation as appropriate  - Identify discharge learning needs (meds, wound care, etc )  - Arrange for interpretive services to assist at discharge as needed  - Refer to Case Management Department for coordinating discharge planning if the patient needs post-hospital services based on physician/advanced practitioner order or complex needs related to functional status, cognitive ability, or social support system  Outcome: Progressing     Problem: Knowledge Deficit  Goal: Patient/family/caregiver demonstrates understanding of disease process, treatment plan, medications, and discharge instructions  Description: Complete learning assessment and assess knowledge base    Interventions:  - Provide teaching at level of understanding  - Provide teaching via preferred learning methods  Outcome: Progressing     Problem: RESPIRATORY - ADULT  Goal: Achieves optimal ventilation and oxygenation  Description: INTERVENTIONS:  - Assess for changes in respiratory status  - Assess for changes in mentation and behavior  - Position to facilitate oxygenation and minimize respiratory effort  - Oxygen administered by appropriate delivery if ordered  - Initiate smoking cessation education as indicated  - Encourage broncho-pulmonary hygiene including cough, deep breathe, Incentive Spirometry  - Assess the need for suctioning and aspirate as needed  - Assess and instruct to report SOB or any respiratory difficulty  - Respiratory Therapy support as indicated  Outcome: Progressing     Problem: Prexisting or High Potential for Compromised Skin Integrity  Goal: Skin integrity is maintained or improved  Description: INTERVENTIONS:  - Identify patients at risk for skin breakdown  - Assess and monitor skin integrity  - Assess and monitor nutrition and hydration status  - Monitor labs   - Assess for incontinence   - Turn and reposition patient  - Assist with mobility/ambulation  - Relieve pressure over bony prominences  - Avoid friction and shearing  - Provide appropriate hygiene as needed including keeping skin clean and dry  - Evaluate need for skin moisturizer/barrier cream  - Collaborate with interdisciplinary team   - Patient/family teaching  - Consider wound care consult   Outcome: Progressing     Problem: Potential for Falls  Goal: Patient will remain free of falls  Description: INTERVENTIONS:  - Educate patient/family on patient safety including physical limitations  - Instruct patient to call for assistance with activity   - Consult OT/PT to assist with strengthening/mobility   - Keep Call bell within reach  - Keep bed low and locked with side rails adjusted as appropriate  - Keep care items and personal belongings within reach  - Initiate and maintain comfort rounds  - Make Fall Risk Sign visible to staff  - Offer Toileting every 2 Hours, in advance of need  - Initiate/Maintain bed/chair alarm  - Obtain necessary fall risk management equipment:   - Apply yellow socks and bracelet for high fall risk patients  - Consider moving patient to room near nurses station  Outcome: Progressing

## 2022-10-21 NOTE — PHYSICAL THERAPY NOTE
PHYSICAL THERAPY NOTE       10/21/22 1054   PT Last Visit   PT Visit Date 10/21/22   Note Type   Note Type Treatment   Pain Assessment   Pain Assessment Tool 0-10   Pain Score No Pain   Restrictions/Precautions   Weight Bearing Precautions Per Order No   Other Precautions Contact/isolation; Airborne/isolation;Droplet precautions  (ROOM AIR, >90% throughout session)   General   Chart Reviewed Yes   Family/Caregiver Present No   Cognition   Overall Cognitive Status WFL   Arousal/Participation Alert; Cooperative   Attention Within functional limits   Orientation Level Oriented X4   Memory Within functional limits   Following Commands Follows all commands and directions without difficulty   Bed Mobility   Additional Comments Pt OOB in chair at start and end of PT session, pt sleeps in recliner chair at baseline     Transfers   Sit to Stand 6  Modified independent   Additional items Armrests  (RW;  5xSTS: 11 seconds)   Stand to Sit 6  Modified independent   Additional items Armrests   Ambulation/Elevation   Gait pattern Forward Flexion;Decreased foot clearance  (forward head)   Gait Assistance 6  Modified independent   Assistive Device Rolling walker   Distance 100ft with RW, no LOB, slow steady sariah   Stair Management Assistance   (attempted however pt unable to pull up on 8inch step;  pt states this is much higher than his at home;  pt states he will have commode next to his recliner chair so he will not have to perform steps at home until cleared by HHCPT)   Ambulation/Elevation Additional Comments pt to use commode next to recliner chair until cleared by HHCPT for step   Balance   Static Sitting Fair   Dynamic Sitting Fair   Static Standing Fair   Dynamic Standing Fair   Ambulatory Fair   Activity Tolerance   Activity Tolerance   (no adverse effects to PT noted)   Nurse Made Aware Wilma Ornelasatman   Exercises   Knee AROM Standing;10 reps;AROM; Bilateral  (hamstring curls)   Marching Standing;10 reps;AROM; Bilateral  (10 marches bilaterally, 10x hip flexion tapping toe to target bilaterally)   Balance training  heel raises, 10x with RW   Assessment;  Treatment consisted of balance training, ambulation training, safety awareness, fall prevention, endurance training to increase upright positions and functional mobility  Worked on standing activities to increase hip flexion, knee flexion, hip extension and knee extension for strengthening to initiate stair training; Pt attempted stair this session however unable to complete, pt states it is higher than his step at home;  Pt to use commode next to recliner chair until cleared for step by HHCPT  Pt verbalizes understanding and in agreement  The patient's AM-PAC Basic Mobility Inpatient Short Form Raw Score is 23  A Raw score of greater than 17 suggests the patient may benefit from discharge to home  Please also refer to the recommendation of the Physical Therapist for safe discharge planning  Prognosis Fair   Problem List Decreased strength;Decreased range of motion;Decreased endurance; Impaired balance;Decreased mobility;Obesity   Goals   Patient Goals to go home   STG Expiration Date 10/25/22   Plan   Treatment/Interventions ADL retraining;Functional transfer training;LE strengthening/ROM; Elevations; Therapeutic exercise; Endurance training;Patient/family training;Equipment eval/education; Bed mobility;Gait training; Compensatory technique education;Continued evaluation;Spoke to nursing;OT   PT Frequency 3-5x/wk   Recommendation   PT Discharge Recommendation Home with home health rehabilitation  (use of RW for all mobility)   Additional Comments Pt sleeps in recliner chair at baseline, pt states he will have commode next to recliner chair at home so he does not have to do step initially, can work on that with [address] in Bed Without Bedrails 4   Lying on Back to Sitting on Edge of Flat Bed 4   Moving Bed to Chair 4   Standing Up From Chair 4   Walk in Room 4   Climb 3-5 Stairs 3   Basic Mobility Inpatient Raw Score 23   Basic Mobility Standardized Score 50 88   Highest Level Of Mobility   -HLM Goal 7: Walk 25 feet or more   -HLM Achieved 7: Walk 25 feet or more   Eduardo Watkins        Patient Name: Vanessa Monge  TWUQU'O Date: 10/21/2022

## 2022-10-21 NOTE — DISCHARGE SUMMARY
[place]  Discharge- En Nab 1940, [de-identified] y o  male MRN: [id number]  Unit/Bed#: -K[facility] Encounter: [id number]  Primary Care Provider: Blanco Estevez MD   Date and time admitted to hospital: 10/15/2022  7:20 PM    Hypokalemia  Assessment & Plan  · K+ 2 9 --> 4 3  · Continue to monitor and replete as needed     Obesity (BMI 30-39  9)  Assessment & Plan  Body mass index is 35 43 kg/m²  · Encouraged lifestyle changes    Hypoxia  Assessment & Plan  · 88% on room air on presentation  Initially requiring 3 L nasal cannula  · Weaned to 2 L nasal cannula 10/18  · Diagnosed with COVID-19 on 10/15/2022  · IV dexamethasone, remdesivir day 5 later this evening  · Respiratory protocol  · Continue to monitor and wean oxygen as able  · PT/OT eval for weakness- recommending home with VNA services on room air  · Weaned patient off oxygen as he was on 3 L this morning  Currently he is on room air without exertion  · Home O2 eval prior to discharge was ordered and patient did great and would not need oxygen at home  · He will be discharged home on p o  Dexamethasone 6 mg for 3 more days  Mild hyperparathyroidism (HCC)  Assessment & Plan  · Corrected calcium is mildly elevated 10-11  · Corrected calcium today was 12 6  Patient received some IV fluids to help with hydration and improve calcium  · Hold vitamins for now   · PTH, PTH-related are peptide, vitamin-D are pending  · Will have patient follow-up with his primary care physician to further evaluate the results of hypercalcemia workup and possibly refer to endocrinology  Hypercalcemia could also be related to COVID-19 infection    Pure hypercholesterolemia  Assessment & Plan  · Home regimen simvastatin 10 mg daily  Non formulary    Pravastatin ordered    Chronic deep vein thrombosis (DVT) of proximal vein of both lower extremities (HCC)  Assessment & Plan  · Chronically on Eliquis 2 5 mg b i d , also has IVC filter  · His Eliquis was held  He received heparin drip due to COVID protocol     * Sepsis due to COVID-19 [facility])  Assessment & Plan  · Presented due to SOB, wet cough and sore throat that began 10/13   88% on room air in the ER  Currently on 3 L nasal cannula  · Diagnosed with COVID-19 on 10/15/2022  Received vaccine x3  · Admit under mild pathway  · COVID labs  · D-dimer:  0 69 --> 0 3 (IV heparin drip initiated per protocol --> hold home eliquis) --> can DC hepatin gtt if no longer requiring O2 per protocol   · CK:  112, BNP:  144, CRP:  49 2  · IV remdesivir and IV dexamethasone initiated 10/15   Completed 5 doses of remdesivir  Received 7 days of IV dexamethasone  He will complete 3 more days of p o  Dexamethasone at home  · Due to elevated procalcitonin and sepsis criteria --> started on IV ceftriaxone for 7 days  · Blood cultures negative x2 after 72 hours      Medical Problems             Resolved Problems  Date Reviewed: 10/19/2022   None               Discharging Physician / Practitioner: Jeanette Denson  PCP: Radha Palm MD  Admission Date:   Admission Orders (From admission, onward)     Ordered        10/15/22 2050  INPATIENT ADMISSION  Once                      Discharge Date: 10/21/22      Reason for Admission:  Shortness of breath    Hospital Course:   Jeff Melendez is a [de-identified] y o  male patient who originally presented to the hospital on 10/15/2022 due to worsening shortness of breath, cough, sore throat  Patient was found to have COVID 19 from testing done 10/15/2022  Patient was placed on intranasal cannula at 3 L  Patient was saturating well  He received IV remdesivir and IV dexamethasone  He will need to complete dexamethasone p o  At discharge  Patient also received IV ceftriaxone for seven days while on admission for Covid 19 infection due to elevated procalcitonin and leucocytosis   His white cell count was slightly elevated this morning but this could be attributed to the steroid medication  Patient had a corrected calcium of 12 6 this morning  Patient had elevated calcium in the past  He had some hypercalcemia evaluation ordered  These will need to be followed up by his primary care physician as outpatient  Patient would also need to follow up with his primary care physician within one week of discharge to discuss recent hospitalization and also review his blood test   He is clinically stable for discharge  Please see above list of diagnoses and related plan for additional information  Condition at Discharge: stable    Discharge Day Visit / Exam:   Subjective:  Patient feels much better this morning    Vitals: Blood Pressure: 119/68 (10/21/22 0623)  Pulse: 56 (10/21/22 0623)  Temperature: 97 5 °F (36 4 °C) (10/21/22 0623)  Temp Source: Oral (10/19/22 2122)  Respirations: 18 (10/20/22 0003)  Height: 5' 3" (160 cm) (10/15/22 1929)  Weight - Scale: 90 7 kg (200 lb) (10/15/22 1929)  SpO2: 91 % (10/21/22 8231)  Exam:   Physical Exam  Vitals and nursing note reviewed  Constitutional:       Appearance: He is well-developed  HENT:      Head: Normocephalic and atraumatic  Eyes:      Conjunctiva/sclera: Conjunctivae normal    Cardiovascular:      Rate and Rhythm: Normal rate and regular rhythm  Heart sounds: No murmur heard  Pulmonary:      Effort: Pulmonary effort is normal  No respiratory distress  Breath sounds: Normal breath sounds  Abdominal:      Palpations: Abdomen is soft  Tenderness: There is no abdominal tenderness  Musculoskeletal:      Cervical back: Neck supple  Skin:     General: Skin is warm and dry  Neurological:      General: No focal deficit present  Mental Status: He is alert and oriented to person, place, and time  Psychiatric:         Mood and Affect: Mood normal             Discussion with Family: Updated  (wife) at bedside      Discharge instructions/Information to patient and family:   See after visit summary for information provided to patient and family  Provisions for Follow-Up Care:  See after visit summary for information related to follow-up care and any pertinent home health orders  Disposition:   Home       Discharge Statement:  I spent  42 minutes discharging the patient  This time was spent on the day of discharge  I had direct contact with the patient on the day of discharge  Greater than 50% of the total time was spent examining patient, answering all patient questions, arranging and discussing plan of care with patient as well as directly providing post-discharge instructions  Additional time then spent on discharge activities  Discharge Medications:  See after visit summary for reconciled discharge medications provided to patient and/or family        **Please Note: This note may have been constructed using a voice recognition system**

## 2022-10-21 NOTE — CASE MANAGEMENT
Case Management Discharge Planning Note    Patient name En Ziegler  Location /-22 MRN [id number]  : 1940 Date 10/21/2022       Current Admission Date: 10/15/2022  Current Admission Diagnosis:Sepsis due to COVID-19 [facility])   Patient Active Problem List    Diagnosis Date Noted   • Obesity (BMI 30-39 9) 10/16/2022   • Hypokalemia 10/16/2022   • Sepsis due to COVID-19 ([address] ) 10/15/2022   • Hypoxia 10/15/2022   • Immobility 2020   • Obesity, morbid ([address] ) 2020   • Mild hyperparathyroidism ([address] ) 2020   • Cervicalgia 01/10/2020   • Venous stasis 2018   • Infection of prosthetic right knee joint ([address] ) 2018   • Pure hypercholesterolemia 2018   • Benign prostatic hyperplasia without lower urinary tract symptoms 2017   • Chronic deep vein thrombosis (DVT) of proximal vein of both lower extremities ([address] ) 2015   • GERD without esophagitis 2014   • Incisional hernia, without obstruction or gangrene 2008      LOS (days): 6  Geometric Mean LOS (GMLOS) (days): 5 00  Days to GMLOS:-0 7     OBJECTIVE:  Risk of Unplanned Readmission Score: 13 47         Current admission status: Inpatient   Preferred Pharmacy:   [address]  Phone: [phone] Fax: [phone]    Primary Care Provider: Blanco Estevez MD    Primary Insurance: [address] [place] [address],[address] REP  Secondary Insurance:     DISCHARGE DETAILS:      IMM Given (Date):: 10/21/22  IMM Given to[de-identified] Patient     Additional Comments: CM was informed that pt does not require home 02  CM informed -[facility]HC via [facility] of dc today   Pt's wife to transport home at [facility]

## 2022-10-21 NOTE — TELEPHONE ENCOUNTER
Pt was in hospital for covid, was d/c today  Needs TCM  Also has questions about upcoming flu shot   [facility]

## 2022-10-21 NOTE — ASSESSMENT & PLAN NOTE
· Chronically on Eliquis 2 5 mg b i d , also has IVC filter  · His Eliquis was held   He received heparin drip due to COVID protocol

## 2022-10-21 NOTE — ASSESSMENT & PLAN NOTE
· Corrected calcium is mildly elevated 10-11  · Corrected calcium today was 12 6  Patient received some IV fluids to help with hydration and improve calcium  · Hold vitamins for now   · PTH, PTH-related are peptide, vitamin-D are pending  · Will have patient follow-up with his primary care physician to further evaluate the results of hypercalcemia workup and possibly refer to endocrinology    Hypercalcemia could also be related to COVID-19 infection

## 2022-10-21 NOTE — ASSESSMENT & PLAN NOTE
· Presented due to SOB, wet cough and sore throat that began 10/13   88% on room air in the ER  Currently on 3 L nasal cannula  · Diagnosed with COVID-19 on 10/15/2022  Received vaccine x3  · Admit under mild pathway  · COVID labs  · D-dimer:  0 69 --> 0 3 (IV heparin drip initiated per protocol --> hold home eliquis) --> can DC hepatin gtt if no longer requiring O2 per protocol   · CK:  112, BNP:  144, CRP:  49 2  · IV remdesivir and IV dexamethasone initiated 10/15   Completed 5 doses of remdesivir  Received 7 days of IV dexamethasone  He will complete 3 more days of p o   Dexamethasone at home  · Due to elevated procalcitonin and sepsis criteria --> started on IV ceftriaxone for 7 days  · Blood cultures negative x2 after 72 hours

## 2022-10-24 ENCOUNTER — TELEPHONE (OUTPATIENT)
Dept: FAMILY MEDICINE CLINIC | Facility: HOSPITAL | Age: 82
End: 2022-10-24

## 2022-10-24 ENCOUNTER — HOME CARE VISIT (OUTPATIENT)
Dept: HOME HEALTH SERVICES | Facility: HOME HEALTHCARE | Age: 82
End: 2022-10-24
Payer: COMMERCIAL

## 2022-10-24 ENCOUNTER — TRANSITIONAL CARE MANAGEMENT (OUTPATIENT)
Dept: FAMILY MEDICINE CLINIC | Facility: HOSPITAL | Age: 82
End: 2022-10-24

## 2022-10-24 VITALS — OXYGEN SATURATION: 92 % | SYSTOLIC BLOOD PRESSURE: 118 MMHG | DIASTOLIC BLOOD PRESSURE: 72 MMHG | HEART RATE: 70 BPM

## 2022-10-24 LAB — 1,25(OH)2D3 SERPL-MCNC: 71.6 PG/ML (ref 24.8–81.5)

## 2022-10-24 PROCEDURE — G0151 HHCP-SERV OF PT,EA 15 MIN: HCPCS

## 2022-10-24 PROCEDURE — 400013 VN SOC

## 2022-10-24 NOTE — UTILIZATION REVIEW
NOTIFICATION OF ADMISSION DISCHARGE   This is a Notification of Discharge from [address]  Please be advised that this patient has been discharge from our facility  Below you will find the admission and discharge date and time including the patient’s disposition  UTILIZATION REVIEW CONTACT:  Leighton Castillo  Utilization   Network Utilization Review Department  Phone: [phone] carefully listen to the prompts  All voicemails are confidential   Email: [email or web address]     ADMISSION INFORMATION  PRESENTATION DATE: 10/15/2022  7:20 PM  OBERVATION ADMISSION DATE:   INPATIENT ADMISSION DATE: 10/15/22  8:50 PM   DISCHARGE DATE: 10/21/2022  2:30 PM  DISPOSITION: Home with [facility] with [address] INFORMATION:  Send all requests for admission clinical reviews, approved or denied determinations and any other requests to dedicated fax number below belonging to the campus where the patient is receiving treatment   List of dedicated fax numbers:  [address] DENIALS (Administrative/Medical Necessity) [phone]   [address] (Maternity/NICU/Pediatrics) [phone]   [facility] [phone]   [facility] 87 [phone]   [facility] 134 [phone]   [address] [facility] [phone] 90 [facility] [phone]   [address] [place], [place] [address] 119 [phone]   [facility]  [phone]   [facility] [phone]   412 [facility] 850 [facility] [phone]

## 2022-10-24 NOTE — TELEPHONE ENCOUNTER
PT IS SUPPOSED TO GET FLU SHOT THIS COMING FRI 10/28/22  HE JUST GOT OUT OF HOSP DUE TO COVID WITH SEPSIS  WIFE WANTS TO KNOW IF HE CAN STILL GET FLU SHOT ON FRI 10/28 OR SHOULD HE WAIT????    WILL PRINT FOR CB ALSO  WIFE SAID TO LM ON VM

## 2022-10-24 NOTE — CASE COMMUNICATION
[facility] has admitted your patient to [address] [place] [address] service with the following disciplines:      PT  This report is informational only, no responses is needed  Primary focus of home health care is fatigue from Covid 19  Patient stated goals of care to be able to go upstairs to shower  Anticipated visit pattern and next visit date 2 w 3  Next visit 10/28/2022  No medication issues noted  Significant clinical findings decreased endur ance, decreased strength BLEs, difficulty with amb and transfers  Potential barriers to goal achievement none    Thank you for allowing us to participate in the care of your patient        Emma Collazo PT

## 2022-10-26 ENCOUNTER — HOME CARE VISIT (OUTPATIENT)
Dept: HOME HEALTH SERVICES | Facility: HOME HEALTHCARE | Age: 82
End: 2022-10-26
Payer: COMMERCIAL

## 2022-10-26 NOTE — CASE COMMUNICATION
TC to the patient for med review  No answer  Message left to call in to [phone] when able  If this has not been completed please remind the patient to call in for review at next therapy session  [employer]

## 2022-10-28 ENCOUNTER — IMMUNIZATIONS (OUTPATIENT)
Dept: FAMILY MEDICINE CLINIC | Facility: HOSPITAL | Age: 82
End: 2022-10-28
Payer: COMMERCIAL

## 2022-10-28 ENCOUNTER — HOME CARE VISIT (OUTPATIENT)
Dept: HOME HEALTH SERVICES | Facility: HOME HEALTHCARE | Age: 82
End: 2022-10-28
Payer: COMMERCIAL

## 2022-10-28 VITALS — SYSTOLIC BLOOD PRESSURE: 120 MMHG | DIASTOLIC BLOOD PRESSURE: 70 MMHG

## 2022-10-28 DIAGNOSIS — Z23 ENCOUNTER FOR IMMUNIZATION: Primary | ICD-10-CM

## 2022-10-28 LAB — PTH RELATED PROT SERPL-SCNC: <2 PMOL/L

## 2022-10-28 PROCEDURE — G0151 HHCP-SERV OF PT,EA 15 MIN: HCPCS

## 2022-10-28 PROCEDURE — G0008 ADMIN INFLUENZA VIRUS VAC: HCPCS

## 2022-10-28 PROCEDURE — 90662 IIV NO PRSV INCREASED AG IM: CPT

## 2022-10-31 ENCOUNTER — HOME CARE VISIT (OUTPATIENT)
Dept: HOME HEALTH SERVICES | Facility: HOME HEALTHCARE | Age: 82
End: 2022-10-31

## 2022-10-31 VITALS — SYSTOLIC BLOOD PRESSURE: 120 MMHG | DIASTOLIC BLOOD PRESSURE: 72 MMHG

## 2022-11-01 ENCOUNTER — HOME CARE VISIT (OUTPATIENT)
Dept: HOME HEALTH SERVICES | Facility: HOME HEALTHCARE | Age: 82
End: 2022-11-01

## 2022-11-01 ENCOUNTER — RA CDI HCC (OUTPATIENT)
Dept: OTHER | Facility: HOSPITAL | Age: 82
End: 2022-11-01

## 2022-11-01 NOTE — PROGRESS NOTES
[facility] 75  coding opportunities       Chart reviewed, no opportunity found:   [address]        Patients Insurance     Medicare Insurance: [employer]

## 2022-11-02 ENCOUNTER — TELEPHONE (OUTPATIENT)
Dept: FAMILY MEDICINE CLINIC | Facility: HOSPITAL | Age: 82
End: 2022-11-02

## 2022-11-02 ENCOUNTER — HOME CARE VISIT (OUTPATIENT)
Dept: HOME HEALTH SERVICES | Facility: HOME HEALTHCARE | Age: 82
End: 2022-11-02

## 2022-11-02 NOTE — TELEPHONE ENCOUNTER
Pt asking if he still needs to keep appt Friday, is coming tomorrow for TCM, but also has appt on Friday for AWV  Pt would like to know if he needs both   Please advise

## 2022-11-03 ENCOUNTER — OFFICE VISIT (OUTPATIENT)
Dept: FAMILY MEDICINE CLINIC | Facility: HOSPITAL | Age: 82
End: 2022-11-03

## 2022-11-03 ENCOUNTER — HOSPITAL ENCOUNTER (OUTPATIENT)
Dept: RADIOLOGY | Facility: HOSPITAL | Age: 82
Discharge: HOME | End: 2022-11-03
Attending: INTERNAL MEDICINE

## 2022-11-03 ENCOUNTER — LAB (OUTPATIENT)
Dept: LAB | Facility: HOSPITAL | Age: 82
End: 2022-11-03
Attending: INTERNAL MEDICINE

## 2022-11-03 VITALS
HEART RATE: 77 BPM | HEIGHT: 63 IN | RESPIRATION RATE: 16 BRPM | DIASTOLIC BLOOD PRESSURE: 72 MMHG | SYSTOLIC BLOOD PRESSURE: 118 MMHG | WEIGHT: 196 LBS | BODY MASS INDEX: 34.73 KG/M2 | OXYGEN SATURATION: 96 %

## 2022-11-03 DIAGNOSIS — E21.3 HYPERPARATHYROIDISM (HCC): ICD-10-CM

## 2022-11-03 DIAGNOSIS — I48.0 PAF (PAROXYSMAL ATRIAL FIBRILLATION) (HCC): ICD-10-CM

## 2022-11-03 DIAGNOSIS — U07.1 COVID-19 VIRUS INFECTION: Primary | ICD-10-CM

## 2022-11-03 DIAGNOSIS — I82.5Y3 CHRONIC DEEP VEIN THROMBOSIS (DVT) OF PROXIMAL VEIN OF BOTH LOWER EXTREMITIES (HCC): ICD-10-CM

## 2022-11-03 PROBLEM — E87.6 HYPOKALEMIA: Status: RESOLVED | Noted: 2022-10-16 | Resolved: 2022-11-03

## 2022-11-03 PROBLEM — R09.02 HYPOXIA: Status: RESOLVED | Noted: 2022-10-15 | Resolved: 2022-11-03

## 2022-11-03 PROBLEM — E66.01 OBESITY, MORBID (HCC): Status: RESOLVED | Noted: 2020-12-11 | Resolved: 2022-11-03

## 2022-11-03 PROBLEM — E66.9 OBESITY (BMI 30-39.9): Status: RESOLVED | Noted: 2022-10-16 | Resolved: 2022-11-03

## 2022-11-03 PROBLEM — A41.89 SEPSIS DUE TO COVID-19 (HCC): Status: RESOLVED | Noted: 2022-10-15 | Resolved: 2022-11-03

## 2022-11-03 LAB
25(OH)D3 SERPL-MCNC: 32.3 NG/ML (ref 30–100)
ALBUMIN SERPL BCP-MCNC: 3 G/DL (ref 3.5–5)
ALP SERPL-CCNC: 138 U/L (ref 46–116)
ALT SERPL W P-5'-P-CCNC: 31 U/L (ref 12–78)
ANION GAP SERPL CALCULATED.3IONS-SCNC: 4 MMOL/L (ref 4–13)
AST SERPL W P-5'-P-CCNC: 15 U/L (ref 5–45)
BILIRUB SERPL-MCNC: 0.29 MG/DL (ref 0.2–1)
BUN SERPL-MCNC: 24 MG/DL (ref 5–25)
CA-I BLD-SCNC: 1.34 MMOL/L (ref 1.12–1.32)
CALCIUM ALBUM COR SERPL-MCNC: 11.2 MG/DL (ref 8.3–10.1)
CALCIUM SERPL-MCNC: 10.4 MG/DL (ref 8.3–10.1)
CHLORIDE SERPL-SCNC: 107 MMOL/L (ref 96–108)
CO2 SERPL-SCNC: 28 MMOL/L (ref 21–32)
CREAT SERPL-MCNC: 1.34 MG/DL (ref 0.6–1.3)
GFR SERPL CREATININE-BSD FRML MDRD: 49 ML/MIN/1.73SQ M
GLUCOSE SERPL-MCNC: 110 MG/DL (ref 65–140)
PHOSPHATE SERPL-MCNC: 2.4 MG/DL (ref 2.3–4.1)
POTASSIUM SERPL-SCNC: 3.6 MMOL/L (ref 3.5–5.3)
PROT SERPL-MCNC: 6.5 G/DL (ref 6.4–8.4)
SODIUM SERPL-SCNC: 139 MMOL/L (ref 135–147)

## 2022-11-03 NOTE — PROGRESS NOTES
Assessment/Plan:     Hyperparathyroidism [facility])  Patient had COVID-19 infection and presents today for [place] management  He still has shortness of breath with walking up steps and he is undergoing physical therapy at home  Otherwise he denies shortness of breath when walking with a walker on level surface  He has had no fevers or cough  Patient has hyperparathyroidism and his calcium levels have been between 10-11 the last couple of years  His corrected calcium was 12 6 on day of discharge October 21st from the hospital   PTH was more than 170! Patient has no history of nephrolithiasis, his EGFR has been more than 60  I ordered a KUB, repeat renal function test, 25 hydroxyvitamin D and DEXA scan  Will reassess patient in 4 weeks  I will recheck patient's calcium today     Chronic deep vein thrombosis (DVT) of proximal vein of both lower extremities (HCC)  Patient's history of DVT  He is taking Eliquis without signs of GI or  bleeding  Continue Eliquis    PAF (paroxysmal atrial fibrillation) ([address] )  Patient has history of PAF that occurs usually after surgeries  Denies palpitations  Heart rate is regular today  Continue Eliquis  Diagnoses and all orders for this visit:    COVID-19 virus infection    Hyperparathyroidism ([facility] )  -     Comprehensive metabolic panel; Future  -     Calcium, ionized; Future  -     Phosphorus; Future  -     Vitamin D 25 hydroxy; Future  -     XR abdomen 1 view kub; Future  -     DXA bone density spine hip and pelvis; Future  -     Calcium, urine, 24 hour; Future  -     Creatinine Clearance 24 Hr; Future    Chronic deep vein thrombosis (DVT) of proximal vein of both lower extremities (HCC)    PAF (paroxysmal atrial fibrillation) ([facility])         Subjective:     Patient ID: Scott Rodriguez is a [de-identified] y o  male      Patient presents for [place] management after hospital stay on October 21st   Patient had COVID-19 infection causing severe sepsis and was hypoxic on admission  He was discharged on room air  He is undergoing home physical therapy rehab  He is weak and fell once  He complains of shortness of breath when walking up steps  Otherwise he uses a walker when he walks on level surface and he denies shortness of breath when walking on level surface  Review of Systems   Constitutional: Negative for fever  Respiratory: Negative for cough and wheezing  Cardiovascular: Negative for chest pain, palpitations and leg swelling  Gastrointestinal: Negative for constipation (Patient is taking laxatives)  Genitourinary: Negative for difficulty urinating  Musculoskeletal: Negative for arthralgias and myalgias  Skin: Negative for rash  Objective:     Physical Exam  Constitutional:       General: He is not in acute distress  Appearance: He is not toxic-appearing  HENT:      Head: Normocephalic  Eyes:      Conjunctiva/sclera: Conjunctivae normal    Cardiovascular:      Rate and Rhythm: Normal rate and regular rhythm  Heart sounds: No murmur heard  Pulmonary:      Effort: No respiratory distress  Breath sounds: No wheezing or rales  Abdominal:      General: Bowel sounds are normal       Palpations: Abdomen is soft  Tenderness: There is no abdominal tenderness  Skin:     General: Skin is warm and dry  Neurological:      Mental Status: He is alert and oriented to person, place, and time  Cranial Nerves: No cranial nerve deficit  Psychiatric:         Mood and Affect: Mood normal            Vitals:    11/03/22 1316   BP: 118/72   Pulse: 77   Resp: 16   SpO2: 96%   Weight: 88 9 kg (196 lb)   Height: 5' 3" (1 6 m)       Transitional Care Management Review:  Tonie Alexis is a [de-identified] y o  male here for TCM follow up       During the TCM phone call patient stated:    TCM Call     Date and time call was made  10/24/2022 10:41 AM    Patient was hospitialized at  [address] [place] [address]    Date of Admission  10/15/22    Date of discharge  10/21/22    Diagnosis  ADMITTING DX--COVID DUE TO SEPSIS, D/C DX--ACTIVE PROBLEMS    Disposition  Home    Were the patients medications reviewed and updated  Yes      TCM Call     I have advised the patient to call PCP with any new or worsening symptoms  [address] [facility]  [address]    Comments  SPOKE TO WIFE  PT DOING GOOD  MEDS CURRENT IN CHART  SENT TO FRONT TO SCHED TCM CALL    Getachew Shook MD

## 2022-11-03 NOTE — ASSESSMENT & PLAN NOTE
Patient has history of PAF that occurs usually after surgeries  Denies palpitations  Heart rate is regular today  Continue Eliquis

## 2022-11-03 NOTE — ASSESSMENT & PLAN NOTE
Patient had COVID-19 infection and presents today for [facility] management  He still has shortness of breath with walking up steps and he is undergoing physical therapy at home  Otherwise he denies shortness of breath when walking with a walker on level surface  He has had no fevers or cough  Patient has hyperparathyroidism and his calcium levels have been between 10-11 the last couple of years  His corrected calcium was 12 6 on day of discharge October 21st from the hospital   PTH was more than 170! Patient has no history of nephrolithiasis, his EGFR has been more than 60  I ordered a KUB, repeat renal function test, 25 hydroxyvitamin D and DEXA scan  Will reassess patient in 4 weeks        I will recheck patient's calcium today

## 2022-11-04 ENCOUNTER — HOME CARE VISIT (OUTPATIENT)
Dept: HOME HEALTH SERVICES | Facility: HOME HEALTHCARE | Age: 82
End: 2022-11-04

## 2022-11-04 VITALS — DIASTOLIC BLOOD PRESSURE: 70 MMHG | SYSTOLIC BLOOD PRESSURE: 120 MMHG

## 2022-11-04 NOTE — RESULT ENCOUNTER NOTE
Call patient: Louis Pablo that his calcium levels have come down, are at a safer level!  Next step is the kub and dexa I ordered

## 2022-11-04 NOTE — CASE COMMUNICATION
Please contact patient's wife to discuss meds  May have to leave a message, but she will call you back  [phone]

## 2022-11-07 ENCOUNTER — HOME CARE VISIT (OUTPATIENT)
Dept: HOME HEALTH SERVICES | Facility: HOME HEALTHCARE | Age: 82
End: 2022-11-07

## 2022-11-07 ENCOUNTER — TELEPHONE (OUTPATIENT)
Dept: FAMILY MEDICINE CLINIC | Facility: HOSPITAL | Age: 82
End: 2022-11-07

## 2022-11-07 VITALS — DIASTOLIC BLOOD PRESSURE: 72 MMHG | SYSTOLIC BLOOD PRESSURE: 120 MMHG

## 2022-11-09 ENCOUNTER — HOME CARE VISIT (OUTPATIENT)
Dept: HOME HEALTH SERVICES | Facility: HOME HEALTHCARE | Age: 82
End: 2022-11-09

## 2022-11-09 NOTE — CASE COMMUNICATION
Spoke with patients wife Veronika Cameron  Reviewed medications as to name dose side effects and frequency indications   wife verbalized understanding of all medications  Denied any current issues or concerns at this time

## 2022-11-10 LAB
DME PARACHUTE DELIVERY DATE ACTUAL: NORMAL
DME PARACHUTE DELIVERY DATE REQUESTED: NORMAL
DME PARACHUTE ITEM DESCRIPTION: NORMAL
DME PARACHUTE ORDER STATUS: NORMAL
DME PARACHUTE SUPPLIER NAME: NORMAL
DME PARACHUTE SUPPLIER PHONE: NORMAL

## 2022-11-11 ENCOUNTER — HOME CARE VISIT (OUTPATIENT)
Dept: HOME HEALTH SERVICES | Facility: HOME HEALTHCARE | Age: 82
End: 2022-11-11

## 2022-11-13 VITALS — DIASTOLIC BLOOD PRESSURE: 72 MMHG | SYSTOLIC BLOOD PRESSURE: 120 MMHG

## 2022-11-13 NOTE — CASE COMMUNICATION
Will continue home based Physical therapy for one more week for gait training on stairs, ther exercises and HEP

## 2022-11-15 ENCOUNTER — HOME CARE VISIT (OUTPATIENT)
Dept: HOME HEALTH SERVICES | Facility: HOME HEALTHCARE | Age: 82
End: 2022-11-15

## 2022-11-15 VITALS — SYSTOLIC BLOOD PRESSURE: 120 MMHG | DIASTOLIC BLOOD PRESSURE: 74 MMHG

## 2022-11-17 ENCOUNTER — HOSPITAL ENCOUNTER (OUTPATIENT)
Dept: BONE DENSITY | Facility: IMAGING CENTER | Age: 82
Discharge: HOME | End: 2022-11-17

## 2022-11-17 ENCOUNTER — HOME CARE VISIT (OUTPATIENT)
Dept: HOME HEALTH SERVICES | Facility: HOME HEALTHCARE | Age: 82
End: 2022-11-17

## 2022-11-17 VITALS — SYSTOLIC BLOOD PRESSURE: 120 MMHG | DIASTOLIC BLOOD PRESSURE: 74 MMHG

## 2022-11-17 VITALS — BODY MASS INDEX: 34.87 KG/M2 | HEIGHT: 63 IN | WEIGHT: 196.8 LBS

## 2022-11-17 DIAGNOSIS — E21.3 HYPERPARATHYROIDISM (HCC): ICD-10-CM

## 2022-11-18 DIAGNOSIS — E21.3 HYPERPARATHYROIDISM (HCC): Primary | ICD-10-CM

## 2022-11-18 NOTE — PROGRESS NOTES
I spoke with pt's wife on the phone: dexa shows osteoporosis, pt has hyperparathyroidism, will refer to endocrinology for evaluation for bisphosphonates vs surgery?

## 2022-12-08 ENCOUNTER — CONSULT (OUTPATIENT)
Dept: ENDOCRINOLOGY | Facility: HOSPITAL | Age: 82
End: 2022-12-08

## 2022-12-08 VITALS
DIASTOLIC BLOOD PRESSURE: 80 MMHG | HEIGHT: 63 IN | BODY MASS INDEX: 36.32 KG/M2 | WEIGHT: 205 LBS | SYSTOLIC BLOOD PRESSURE: 120 MMHG | HEART RATE: 95 BPM

## 2022-12-08 DIAGNOSIS — E83.52 HYPERCALCEMIA: ICD-10-CM

## 2022-12-08 DIAGNOSIS — E21.3 HYPERPARATHYROIDISM (HCC): Primary | ICD-10-CM

## 2022-12-08 DIAGNOSIS — M81.0 AGE-RELATED OSTEOPOROSIS WITHOUT CURRENT PATHOLOGICAL FRACTURE: ICD-10-CM

## 2022-12-08 DIAGNOSIS — N18.31 STAGE 3A CHRONIC KIDNEY DISEASE (HCC): ICD-10-CM

## 2022-12-08 RX ORDER — ZOLEDRONIC ACID 5 MG/100ML
5 INJECTION, SOLUTION INTRAVENOUS ONCE
Status: CANCELLED | OUTPATIENT
Start: 2022-12-08

## 2022-12-08 RX ORDER — SODIUM CHLORIDE 9 MG/ML
20 INJECTION, SOLUTION INTRAVENOUS ONCE
OUTPATIENT
Start: 2022-12-08

## 2022-12-08 RX ORDER — ZOLEDRONIC ACID 5 MG/100ML
5 INJECTION, SOLUTION INTRAVENOUS ONCE
OUTPATIENT
Start: 2022-12-08

## 2022-12-08 NOTE — PATIENT INSTRUCTIONS
- 1000mg calcium intake, 800IU VitD  Keep well hydrated  Avoid prolonged period on sedentary lifestyle  Stick to low salt diet  - Reclast infusion   - Repeat labs in 3 months

## 2022-12-08 NOTE — PROGRESS NOTES
Chief Complaint   Patient presents with   • Abnormal Calcium      Referring Provider  Nessa Monsalve Md  [facility]  [facility]  [place],  [address]     History of Present Illness:   Saintclair Gosselin is a [de-identified] y o  male with history of hyperparathyroidism with hypercalcemia, osteoporosis currently not on Tx, pA  Fib, DVT, CKD3, BPH and immobility who presents today to discuss further Tx option for his hyperparathyroidism with osteoporosis/hypercalcemia  Patient presents with wife who reports that they would not want to consider surgery for parathyroid given very poor surgical outcomes for patient in the past with his gall bladder surgery that resulted in ICU stay and causing immobility issues from chronic hospitalization  Patient was noted to have hypercalcemia on routine labs checked in 2020 with etiology found to be secondary to hyperparathyroidism  Calcium levels in high 10's initially with PTH levels 117 back in 2020  Over these years his calcium level started to increase upto 12 6 on recent labs 10/22 with   And therefore PCP obtained DXA scan which showed osteoporosis and also Xray abdomen neg for nephrocalcinosis  Patient also had 24hr urine calcium checked which showed urine calcium 220  VitD 32 2  Patient has not been on any therapy for osteoporosis in the past  Does not have any history of falls/fractures or kidney stones  Used to take calcium/vitD supplement through [facility] in the past but stopped this  Does live a very sedentary lifestyle for the most part  Onset:- 10/2020  Bones- DXA scan done 11/2022 shows osteoporosis with lowest T score of -3 6 at left femur neck     History of fragility fractures:- None  History of Renal Stones:- None  History of Abdominal Groans - constipation, GERD, abdominal pain:- None  Psych moans - mood changes: None  History of increased thirst, increased urination, nocturia or fatigue:- Neg, does drink water to keep well hydrated  Dietary Calcium intake (Quantify milk, cheese, yogurt, etc):- Eats cheese daily, and mild with cereal every 3rd day but does not like yogrut  Calcium/Vitamin D supplementation (including MVI):- used to take calcium/Vit supplement through multivitamins but stopped few years ago when was told his calcium levels were high  Fhx: high calcium, renal stones:- neg  Social Hx:- lives with wife who is a nurse at the [address] [place] [address]  Is immobile for the most part, denies smoking, alcohol use or any other recreational drugs       Patient Active Problem List   Diagnosis   • Benign prostatic hyperplasia without lower urinary tract symptoms   • Chronic deep vein thrombosis (DVT) of proximal vein of both lower extremities (HCC)   • GERD without esophagitis   • Incisional hernia, without obstruction or gangrene   • Venous stasis   • 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)   • Pure hypercholesterolemia   • Cervicalgia   • Immobility   • Hyperparathyroidism ([facility] )   • PAF (paroxysmal atrial fibrillation) ([facility] )   • Stage 3a chronic kidney disease ([facility] )      Past Medical History:   Diagnosis Date   • Basal cell carcinoma     nose    • Benign skin lesion    • BPH (benign prostatic hyperplasia)    • Diverticular disease of colon    • DVT (deep venous thrombosis) ([facility])    • Elevated PSA    • History of blood clots    • Hypotension    • Intestinal obstruction ([facility] )    • Social anxiety disorder     last assessed 1/12/17, resolved 10/17/17   • Ventral hernia       Past Surgical History:   Procedure Laterality Date   • APPENDECTOMY     • BASAL CELL CARCINOMA EXCISION      Nose    • HERNIA REPAIR      9 hernia repairs 1995 - 2012   • [address]  20     with speenectomy   • REPLACEMENT TOTAL KNEE Bilateral    • SPLENECTOMY  1995   • TONSILLECTOMY        Family History   Problem Relation Age of Onset   • Other Mother         Bleeding disorder    • Stroke Mother         CVA   • Diabetes Mother    • Heart disease Mother    • Osteoporosis Mother    • Hypothyroidism Mother    • Stroke Father         CVA   • Heart disease Father    • Heart attack Father    • Substance Abuse Neg Hx    • Mental illness Neg Hx      Social History     Tobacco Use   • Smoking status: Never   • Smokeless tobacco: Never   Substance Use Topics   • Alcohol use: Never     Allergies   Allergen Reactions   • Ace Inhibitors Cough   • Fosinopril Cough     Cough after years of being on the medication         Current Outpatient Medications:   •  apixaban (Eliquis) 2 5 mg, Take 1 tablet (2 5 mg total) by mouth 2 (two) times a day, Disp: 180 tablet, Rfl: 3  •  dutasteride (AVODART) 0 5 mg capsule, TAKE 1 CAPSULE BY MOUTH EVERY DAY, Disp: 90 capsule, Rfl: 3  •  ferrous sulfate 325 (65 Fe) mg tablet, Take 325 mg by mouth daily, Disp: , Rfl:   •  furosemide (LASIX) 80 mg tablet, TAKE 1 TABLET BY MOUTH EVERY DAY, Disp: 90 tablet, Rfl: 3  •  lansoprazole (PREVACID) 30 mg capsule, TAKE 1 CAPSULE BY MOUTH EVERY DAY, Disp: 90 capsule, Rfl: 3  •  polyethylene glycol (MIRALAX) 17 g packet, Take by mouth 1 packet daily, Disp: , Rfl:   •  Probiotic Product (SOLUBLE FIBER/PROBIOTICS PO), Take by mouth wife reports the patient takes this daily, Disp: , Rfl:   •  simvastatin (ZOCOR) 10 mg tablet, Take 1 tablet (10 mg total) by mouth daily, Disp: 90 tablet, Rfl: 3  •  spironolactone (ALDACTONE) 25 mg tablet, Take 0 5 tablets (12 5 mg total) by mouth daily, Disp: 45 tablet, Rfl: 3  •  sulfamethoxazole-trimethoprim (BACTRIM DS) 800-160 mg per tablet, TAKE 1 TABLET BY MOUTH DAILY, Disp: 90 tablet, Rfl: 1  •  tamsulosin (FLOMAX) 0 4 mg, TAKE 1 CAPSULE BY MOUTH EVERY DAY WITH DINNER, Disp: 90 capsule, Rfl: 3  •  Multiple Vitamins-Minerals (MULTIVITAMIN ADULT EXTRA C PO), 1 TABLET DAILY (Patient not taking: Reported on 12/8/2022), Disp: , Rfl:   Review of Systems   Constitutional: Negative for activity change, appetite change, fatigue and unexpected weight change     HENT: Negative for trouble swallowing and voice change  Gastrointestinal: Negative for constipation and diarrhea  Endocrine: Negative for polydipsia and polyuria  Musculoskeletal: Negative for arthralgias and myalgias  Physical Exam:  Body mass index is 36 31 kg/m²  /80   Pulse 95   Ht 5' 3" (1 6 m)   Wt 93 kg (205 lb)   BMI 36 31 kg/m²    Wt Readings from Last 3 Encounters:   12/08/22 93 kg (205 lb)   11/17/22 89 3 kg (196 lb 12 8 oz)   11/03/22 88 9 kg (196 lb)       GEN: NAD, Sitting in wheelchair  CV; heart reg rate s1s2 nl, no m/r/g appreciated, no EMILIO  Resp: CTAB, good effort  Ab+BS  Skin: warm and dry, no palmar erythema  Psych: nl mood and affect, no gross lapses in memory    DATA:  Labs:      Latest Reference Range & Units 06/09/20 08:29 12/08/20 08:49 04/06/21 08:14 04/05/22 07:41   Calcium 8 3 - 10 1 mg/dL 10 8 (H) 11 3 (H) 10 5 (H) 10 6 (H)   (H): Data is abnormally high     Latest Reference Range & Units 10/17/22 03:13 10/18/22 04:52 10/20/22 05:41 10/21/22 09:53 11/03/22 14:19   CORRECTED CALCIUM 8 3 - 10 1 mg/dL 11 2 (H) 11 7 (H) 12 2 (HH) 12 6 (HH) 11 2 (H)   (HH): Data is critically high  (H): Data is abnormally high     Latest Reference Range & Units 06/11/20 09:27 12/08/20 08:49 05/24/22 07:29 10/21/22 12:53   PARATHYROID HORMONE 18 4 - 80 1 pg/mL 117 1 (H) 132 0 (H) 147 3 (H) 176 7 (H)   (H): Data is abnormally high     Latest Reference Range & Units 11/03/22 14:19   Vit D, 25-Hydroxy 30 0 - 100 0 ng/mL 32 3      Latest Reference Range & Units 10/21/22 12:53   VITAMIN D 1,25-DIHYDROXY 24 8 - 81 5 pg/mL 71 6        Latest Reference Range & Units 11/09/22 11:03   CALCIUM 24 HOUR URINE 42 - 353 mg/24 hrs 220 8   EXT Creatinine Urine mg/dL 30 3   CREATININE 24 HOUR UR 0 8 - 1 8 g/24Hr 0 7 (L)   CREATININE CLEARANCE 80 00 - 125 00 mL/min 41 03 (L)   TOTAL URINE VOLUME ml 2,400   (L): Data is abnormally low    GFRm 49  64  70  65  81  73  83          Radiology    DXA SCAN     CLINICAL HISTORY:  77-year-old male     OTHER RISK FACTORS:  Hyperparathyroidism, Limited mobility, Lasix therapy      PHARMACOLOGIC THERAPY FOR OSTEOPOROSIS:  None      TECHNIQUE: Bone densitometry was performed using a Hologic Horizon A  bone densitometer  Regions of interest appear properly placed          COMPARISON: There are no prior DXA studies performed on this unit for comparison      RESULTS:      LUMBAR SPINE  Level: L1, L3, L4  (L2 vertebra excluded from analysis due to local structural abnormalities or artifact) :   BMD:  0 854  gm/cm2   T-score: -2 1      LEFT  TOTAL HIP:   BMD:  0 601  gm/cm2   T-score:  -2 9     LEFT  FEMORAL NECK:   BMD:  0 445  gm/cm2   T score: -3 6      LEFT  FOREARM:    33% RADIUS BMD:  0 701  gm/cm2  T-score:  -2 2      IMPRESSION:     1  Osteoporosis  [Based on the total left hip]     2  The 10 year risk of hip fracture is 9 1% with the 10 year risk of major osteoporotic fracture being 17% as calculated by the [facility] fracture risk assessment tool (FRAX, which is based on data generated by the [facility]   for Metabolic Bone Diseases)  3   The current NOF guidelines recommend treating patients with a T-score of -2 5 or less in the lumbar spine or hips, or in post-menopausal women and men over the age of 48 with low bone mass (osteopenia) and a FRAX 10 year risk score of >3% for hip   fracture and/or >20% for major osteoporotic fracture      4  The NOF recommends follow-up DXA in 1-2 years after initiating therapy for osteoporosis and every 2 years thereafter  More frequent evaluation is appropriate for patients with conditions associated with rapid bone loss, such as glucocorticoid   therapy   The interval between DXA screenings may be longer for individuals without major risk factors and initial T-score in the normal or upper low bone mass range         The FRAX algorithm has certain limitations:  -FRAX has not been validated in patients currently or previously treated with pharmacotherapy for osteoporosis  In such patients, clinical judgment must be exercised in interpreting FRAX scores  -Prior hip, vertebral and humeral fragility fractures appear to confer greater risk of subsequent fracture than fractures at other sites (this is especially true for individuals with severe vertebral fractures), but quantification of this incremental   risk is not possible with FRAX  -FRAX underestimates fracture risk in patients with history of multiple fragility fractures  -FRAX may underestimate fracture risk in patients with history of frequent falls   -It is not appropriate to use FRAX to monitor treatment response    ABDOMEN     INDICATION:   E21 3: Hyperparathyroidism, unspecified      COMPARISON:  Comparison is made to prior studies, most recent CT scan dated March 16, 2013      VIEWS:  AP supine        FINDINGS:     There is a large amount of stool throughout the colon but no evidence of bowel obstruction  Stool decreases the sensitivity for the detection of calculi  However, within this limitation no calculi are identified      IVC filter projects over the lower lumbar spine  Multiple clips projected over the left upper and right lower quadrant  No pathologic calcifications or soft tissue masses       Visualized lung bases are clear      Visualized osseous structures are unremarkable for the patient's age      IMPRESSION:     No radiopaque calculi identified  However, large amount of stool throughout the colon obscures visualization of the kidneys and decreases the sensitivity of the exam for calculi  Impression:  1  Hyperparathyroidism ([address] )    2  Stage 3a chronic kidney disease ([address] )    3  Age-related osteoporosis without current pathological fracture    4  Hypercalcemia           Plan:    Gunnar Roel was seen today for abnormal calcium      Diagnoses and all orders for this visit:    Hyperparathyroidism [facility])  -     Ambulatory referral to Endocrinology  -     Comprehensive metabolic panel Lab Collect; Standing  -     PTH, intact- Lab Collect; Standing    Stage 3a chronic kidney disease ([facility] )    Age-related osteoporosis without current pathological fracture    Hypercalcemia    Other orders  -     alteplase (CATHFLO) injection 2 mg  -     sodium chloride 0 9 % infusion  -     Cancel: zoledronic acid (RECLAST) IV infusion (premix) 5 mg  -     zoledronic acid (RECLAST) IV infusion (premix) 5 mg      1  Hyperparathyroidism ([facility] )  - Ambulatory referral to Endocrinology    1) hyperparathyroidism:- Hypercalcemia with hyperparathyroidism most likely d/t primary hyperparathyroidism  Although patient does have CKD3 this is stable and given significant hypercalcemia with elevated PTH and given patients age most certainly has primary hyperparathyroidism d/t parathyroid adenoma  Patient/wife however would not like to consider surgical option  Therefore discussed medical management options  2) Osteoporosis:- Given his osteoporosis with hypercalcemia:- discussed used of either Reclast or prolia to Tx his OP and also help reduce hypercalcemia  Preferred reclast over prolia since infusion given yearly, no risk of worsening VF if gaps in Tx arises and since patient's GFR is >30, can consider use of bisphophonates for now  Better data with OP Tx on patient with hyperparathyroidism compared to Prolia  Given lowest T score <3 0, would not consider oral bisphosphonate and prefer IV therapy  Did discuss side effects of ONJ and AFF  Also discussed s e of flu like symptoms during infusion and therefore recommended to keep well hydrated  Repeat DXA scan in 1year   Recommend calcium intake of 1000mg daily through diet and if unable to achieve this, follow with supplementation  VitD intake 800IU daily- through supplementation  - Check VitD every 6 months, take 800IuVitD daily to maintain bone health       3) Hypercalcemia:- Repeat Calcium/PTH in 3 months post bisphosphonate Tx, since no other symptoms of hypercalcemia and calcium elevation mild-moderate, ok to wait for 3 months prior to seeing improvement in calcium levels  If calcium level remains elevated despite this, will consider sensipar use at 30mg BID dosing to to help reduce calcium level to goal    -  also annual 24hr urine calcium to assess nephroliathiasis risk  Recommend to keep well hydrated and to aim for urine output >2L per day ]  - Avoid high salt food and stick to low salt diet to reduce risk of kidney stones     RTC in 3 months     Discussed with the patient and all questioned fully answered  He will call me if any problems arise          Rex Yoo MD

## 2022-12-16 ENCOUNTER — OFFICE VISIT (OUTPATIENT)
Dept: FAMILY MEDICINE CLINIC | Facility: HOSPITAL | Age: 82
End: 2022-12-16

## 2022-12-16 ENCOUNTER — TELEPHONE (OUTPATIENT)
Dept: ENDOCRINOLOGY | Facility: HOSPITAL | Age: 82
End: 2022-12-16

## 2022-12-16 VITALS
BODY MASS INDEX: 36.14 KG/M2 | HEART RATE: 65 BPM | HEIGHT: 63 IN | RESPIRATION RATE: 16 BRPM | SYSTOLIC BLOOD PRESSURE: 120 MMHG | OXYGEN SATURATION: 93 % | DIASTOLIC BLOOD PRESSURE: 70 MMHG | WEIGHT: 204 LBS

## 2022-12-16 DIAGNOSIS — Z00.00 MEDICARE ANNUAL WELLNESS VISIT, SUBSEQUENT: Primary | ICD-10-CM

## 2022-12-16 DIAGNOSIS — R21 RASH: ICD-10-CM

## 2022-12-16 DIAGNOSIS — Z23 ENCOUNTER FOR IMMUNIZATION: ICD-10-CM

## 2022-12-16 RX ORDER — ZOSTER VACCINE RECOMBINANT, ADJUVANTED 50 MCG/0.5
KIT INTRAMUSCULAR
Qty: 1 EACH | Refills: 1 | Status: SHIPPED | OUTPATIENT
Start: 2022-12-16

## 2022-12-16 RX ORDER — NYSTATIN 100000 [USP'U]/G
POWDER TOPICAL 2 TIMES DAILY
Qty: 30 G | Refills: 5 | Status: SHIPPED | OUTPATIENT
Start: 2022-12-16

## 2022-12-16 NOTE — PROGRESS NOTES
Assessment and Plan:     Problem List Items Addressed This Visit    None  Visit Diagnoses     Medicare annual wellness visit, subsequent    -  Primary    Encounter for immunization        Relevant Medications    Zoster Vac Recomb Adjuvanted (Shingrix) 50 MCG/0 5ML SUSR    Rash        Relevant Medications    nystatin (MYCOSTATIN) powder        BMI Counseling: Body mass index is 36 14 kg/m²  The BMI is above normal  Nutrition recommendations include decreasing portion sizes  Rationale for BMI follow-up plan is due to patient being overweight or obese  Depression Screening and Follow-up Plan: Patient was screened for depression during today's encounter  They screened negative with a PHQ-2 score of 0  Falls Plan of Care: balance, strength, and gait training instructions were provided  Preventive health issues were discussed with patient, and age appropriate screening tests were ordered as noted in patient's After Visit Summary  Personalized health advice and appropriate referrals for health education or preventive services given if needed, as noted in patient's After Visit Summary  History of Present Illness:     Patient presents for a Medicare Wellness Visit    HPI   Patient Care Team:  Vaishali Carrillo MD as PCP - General (Internal Medicine)  Laura Jung MD as PCP - Endocrinology (Endocrinology)  MD Kendall Ward MD     Review of Systems:     Review of Systems   Constitutional: Negative for fever  HENT: Negative for ear pain  Eyes: Negative for visual disturbance  Respiratory: Negative for cough and shortness of breath  Cardiovascular: Negative for chest pain and palpitations  Gastrointestinal: Positive for constipation (Uses MiraLAX)  Negative for abdominal pain and blood in stool  Genitourinary: Negative for difficulty urinating and hematuria  Musculoskeletal: Positive for gait problem (Walks with walker)  Neurological: Negative for weakness     Psychiatric/Behavioral: Negative for dysphoric mood          Problem List:     Patient Active Problem List   Diagnosis   • Benign prostatic hyperplasia without lower urinary tract symptoms   • Chronic deep vein thrombosis (DVT) of proximal vein of both lower extremities ([facility])   • GERD without esophagitis   • Incisional hernia, without obstruction or gangrene   • Venous stasis   • 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)   • Pure hypercholesterolemia   • Cervicalgia   • Immobility   • Hyperparathyroidism ([facility] [address] )   • PAF (paroxysmal atrial fibrillation) ([facility] )   • Stage 3a chronic kidney disease ([facility] )      Past Medical and Surgical History:     Past Medical History:   Diagnosis Date   • Basal cell carcinoma     nose    • Benign skin lesion    • BPH (benign prostatic hyperplasia)    • Diverticular disease of colon    • DVT (deep venous thrombosis) ([facility])    • Elevated PSA    • History of blood clots    • Hypotension    • Intestinal obstruction ([facility])    • Social anxiety disorder     last assessed 1/12/17, resolved 10/17/17   • Ventral hernia      Past Surgical History:   Procedure Laterality Date   • APPENDECTOMY     • BASAL CELL CARCINOMA EXCISION      Nose    • HERNIA REPAIR      9 hernia repairs 1995 - 2012   • HIATAL HERNIA REPAIR  1995    with speenectomy   • REPLACEMENT TOTAL KNEE Bilateral    • SPLENECTOMY  1995   • TONSILLECTOMY        Family History:     Family History   Problem Relation Age of Onset   • Other Mother         Bleeding disorder    • Stroke Mother         CVA   • Diabetes Mother    • Heart disease Mother    • Osteoporosis Mother    • Hypothyroidism Mother    • Stroke Father         CVA   • Heart disease Father    • Heart attack Father    • Substance Abuse Neg Hx    • Mental illness Neg Hx       Social History:     Social History     Socioeconomic History   • Marital status: /Civil Union     Spouse name: None   • Number of children: None   • Years of education: None   • Highest education level: None   Occupational History   • None   Tobacco Use   • Smoking status: Never   • Smokeless tobacco: Never   Vaping Use   • Vaping Use: Never used   Substance and Sexual Activity   • Alcohol use: Never   • Drug use: No   • Sexual activity: None   Other Topics Concern   • None   Social History Narrative    Active advance directive     Dental care, regularly     Living situation, supportive and safe      Social Determinants of Health     Financial Resource Strain: Low Risk    • Difficulty of Paying Living Expenses: Not hard at all   Food Insecurity: No Food Insecurity   • Worried About Running Out of Food in the Last Year: Never true   • Ran Out of Food in the Last Year: Never true   Transportation Needs: No Transportation Needs   • Lack of Transportation (Medical): No   • Lack of Transportation (Non-Medical):  No   Physical Activity: Not on file   Stress: Not on file   Social Connections: Not on file   Intimate Partner Violence: Not on file   Housing Stability: Low Risk    • Unable to Pay for Housing in the Last Year: No   • Number of Places Lived in the Last Year: 1   • Unstable Housing in the Last Year: No      Medications and Allergies:     Current Outpatient Medications   Medication Sig Dispense Refill   • apixaban (Eliquis) 2 5 mg Take 1 tablet (2 5 mg total) by mouth 2 (two) times a day 180 tablet 3   • dutasteride (AVODART) 0 5 mg capsule TAKE 1 CAPSULE BY MOUTH EVERY DAY 90 capsule 3   • ferrous sulfate 325 (65 Fe) mg tablet Take 325 mg by mouth daily     • furosemide (LASIX) 80 mg tablet TAKE 1 TABLET BY MOUTH EVERY DAY 90 tablet 3   • lansoprazole (PREVACID) 30 mg capsule TAKE 1 CAPSULE BY MOUTH EVERY DAY 90 capsule 3   • Multiple Vitamins-Minerals (MULTIVITAMIN ADULT EXTRA C PO)      • nystatin (MYCOSTATIN) powder Apply topically 2 (two) times a day 30 g 5   • polyethylene glycol (MIRALAX) 17 g packet Take by mouth 1 packet daily     • Probiotic Product (SOLUBLE FIBER/PROBIOTICS PO) Take by mouth wife reports the patient takes this daily     • simvastatin (ZOCOR) 10 mg tablet Take 1 tablet (10 mg total) by mouth daily 90 tablet 3   • spironolactone (ALDACTONE) 25 mg tablet Take 0 5 tablets (12 5 mg total) by mouth daily 45 tablet 3   • sulfamethoxazole-trimethoprim (BACTRIM DS) 800-160 mg per tablet TAKE 1 TABLET BY MOUTH DAILY 90 tablet 1   • tamsulosin (FLOMAX) 0 4 mg TAKE 1 CAPSULE BY MOUTH EVERY DAY WITH DINNER 90 capsule 3   • Zoster Vac Recomb Adjuvanted (Shingrix) 50 MCG/0 5ML SUSR 0 5mL IM for one dose, followed by 0 5mL IM 2-6 months after first dose 1 each 1     No current facility-administered medications for this visit  Allergies   Allergen Reactions   • Ace Inhibitors Cough   • Fosinopril Cough     Cough after years of being on the medication      Immunizations:     Immunization History   Administered Date(s) Administered   • COVID-19 MODERNA VACC 0 25 ML IM BOOSTER 10/23/2021   • COVID-19 MODERNA VACC 0 5 ML IM 01/22/2021, 02/17/2021, 10/23/2021   • INFLUENZA 09/15/2018, 10/28/2022   • Influenza Split High Dose Preservative Free IM 10/02/2015, 10/17/2017, 09/21/2019   • Influenza, high dose seasonal 0 7 mL 08/30/2020, 10/28/2022   • Influenza, seasonal, injectable 09/15/2010, 09/03/2011, 10/01/2012, 08/23/2013   • Pneumococcal Conjugate 13-Valent 10/17/2017   • Pneumococcal Polysaccharide PPV23 01/01/2006, 11/10/2016      Health Maintenance: There are no preventive care reminders to display for this patient  Topic Date Due   • Hepatitis B Vaccine (1 of 3 - 3-dose series) Never done   • Meningococcal ACWY Vaccine (1 - Risk start 2-23 months series) Never done   • HIB Vaccine (1 of 1 - Risk 1-dose series) Never done   • COVID-19 Vaccine (4 - Booster for Moderna series) 02/23/2022      Medicare Screening Tests and Risk Assessments:     Sadi Tan is here for his Subsequent Wellness visit  Health Risk Assessment:   Patient rates overall health as good   Patient feels that their physical health rating is slightly worse  Patient is satisfied with their life  Eyesight was rated as same  Hearing was rated as same  Patient feels that their emotional and mental health rating is same  Patients states they are never, rarely angry  Patient states they are sometimes unusually tired/fatigued  Pain experienced in the last 7 days has been some  Patient's pain rating has been 5/10  Patient states that he has experienced no weight loss or gain in last 6 months  Depression Screening:   PHQ-2 Score: 0      Fall Risk Screening: In the past year, patient has experienced: history of falling in past year    Number of falls: 2 or more  Injured during fall?: Yes    Feels unsteady when standing or walking?: Yes    Worried about falling?: Yes      Home Safety:  Patient has trouble with stairs inside or outside of their home  Patient has working smoke alarms and has working carbon monoxide detector  Home safety hazards include: none  Nutrition:   Current diet is Low Cholesterol, No Added Salt, Low Carb, Low Saturated Fat and Limited junk food  Medications:   Patient is currently taking over-the-counter supplements  OTC medications include: see medication list  Patient is able to manage medications  Wife helps pt  Activities of Daily Living (ADLs)/Instrumental Activities of Daily Living (IADLs):   Walk and transfer into and out of bed and chair?: No  Dress and groom yourself?: No    Bathe or shower yourself?: No    Feed yourself? No  Do your laundry/housekeeping?: Yes  Manage your money, pay your bills and track your expenses?: Yes  Make your own meals?: Yes    Do your own shopping?: No    ADL comments: Wife assists pt  Previous Hospitalizations:   Any hospitalizations or ED visits within the last 12 months?: Yes    How many hospitalizations have you had in the last year?: 1-2    Hospitalization Comments: Oct 2022 for covid       Advance Care Planning:   Living will: Yes    Advanced directive: Yes    Advanced directive counseling given: Yes    End of Life Decisions reviewed with patient: Yes      Comments: I asked patient to bring in Living Will for next appointment    Cognitive Screening:   Provider or family/friend/caregiver concerned regarding cognition?: No    PREVENTIVE SCREENINGS      Cardiovascular Screening:    General: Screening Not Indicated, History Lipid Disorder, Risks and Benefits Discussed and Screening Current      Diabetes Screening:     General: Screening Current and Risks and Benefits Discussed      Colorectal Cancer Screening:     General: Risks and Benefits Discussed and Screening Current      Prostate Cancer Screening:    General: Screening Not Indicated      Osteoporosis Screening:    General: Screening Not Indicated and History Osteoporosis      Lung Cancer Screening:     General: Screening Not Indicated    Screening, Brief Intervention, and Referral to Treatment (SBIRT)    Screening  Typical number of drinks in a day: 0  Typical number of drinks in a week: 0  Interpretation: Low risk drinking behavior  Single Item Drug Screening:  How often have you used an illegal drug (including marijuana) or a prescription medication for non-medical reasons in the past year? never    Single Item Drug Screen Score: 0  Interpretation: Negative screen for possible drug use disorder    Brief Intervention  Alcohol & drug use screenings were reviewed  No concerns regarding substance use disorder identified  Other Counseling Topics:   Regular weightbearing exercise  Patient will continue to walk with a walker as tolerated    No results found  Physical Exam:     /70   Pulse 65   Resp 16   Ht 5' 3" (1 6 m)   Wt 92 5 kg (204 lb)   SpO2 93%   BMI 36 14 kg/m²     Physical Exam  Constitutional:       General: He is not in acute distress  Appearance: He is not toxic-appearing  Eyes:      Conjunctiva/sclera: Conjunctivae normal    Cardiovascular:      Rate and Rhythm: Normal rate and regular rhythm        Heart sounds: No murmur heard  Pulmonary:      Effort: No respiratory distress  Breath sounds: No wheezing or rales  Abdominal:      General: Bowel sounds are normal       Palpations: Abdomen is soft  Tenderness: There is no abdominal tenderness  Skin:     General: Skin is warm and dry  Findings: Rash (Patient has a start of candidal rash under the abdominal fold) present  Neurological:      Mental Status: He is alert and oriented to person, place, and time  Motor: No weakness  Psychiatric:         Mood and Affect: Mood normal          Thought Content:  Thought content normal           Theresa Farris MD

## 2022-12-16 NOTE — PATIENT INSTRUCTIONS
Medicare Preventive Visit Patient Instructions  Thank you for completing your Welcome to Medicare Visit or Medicare Annual Wellness Visit today  Your next wellness visit will be due in one year (12/17/2023)  The screening/preventive services that you may require over the next 5-10 years are detailed below  Some tests may not apply to you based off risk factors and/or age  Screening tests ordered at today's visit but not completed yet may show as past due  Also, please note that scanned in results may not display below  Preventive Screenings:  Service Recommendations Previous Testing/Comments   Colorectal Cancer Screening  · Colonoscopy    · Fecal Occult Blood Test (FOBT)/Fecal Immunochemical Test (FIT)  · Fecal DNA/Cologuard Test  · Flexible Sigmoidoscopy Age: 39-70 years old   Colonoscopy: every 10 years (May be performed more frequently if at higher risk)  OR  FOBT/FIT: every 1 year  OR  Cologuard: every 3 years  OR  Sigmoidoscopy: every 5 years  Screening may be recommended earlier than age 39 if at higher risk for colorectal cancer  Also, an individualized decision between you and your healthcare provider will decide whether screening between the ages of 74-80 would be appropriate   Colonoscopy: 07/20/2017  FOBT/FIT: Not on file  Cologuard: Not on file  Sigmoidoscopy: Not on file          Prostate Cancer Screening Individualized decision between patient and health care provider in men between ages of 53-78   Medicare will cover every 12 months beginning on the day after your 50th birthday PSA: 1 2 ng/mL     Screening Not Indicated     Hepatitis C Screening Once for adults born between 1945 and 1965  More frequently in patients at high risk for Hepatitis C Hep C Antibody: Not on file        Diabetes Screening 1-2 times per year if you're at risk for diabetes or have pre-diabetes Fasting glucose: 123 mg/dL (4/5/2022)  A1C: 5 7 % (5/24/2022)  Screening Current   Cholesterol Screening Once every 5 years if you don't have a lipid disorder  May order more often based on risk factors  Lipid panel: 05/24/2022  Screening Not Indicated  History Lipid Disorder      Other Preventive Screenings Covered by Medicare:  1  Abdominal Aortic Aneurysm (AAA) Screening: covered once if your at risk  You're considered to be at risk if you have a family history of AAA or a male between the age of 73-68 who smoking at least 100 cigarettes in your lifetime  2  Lung Cancer Screening: covers low dose CT scan once per year if you meet all of the following conditions: (1) Age 50-69; (2) No signs or symptoms of lung cancer; (3) Current smoker or have quit smoking within the last 15 years; (4) You have a tobacco smoking history of at least 20 pack years (packs per day x number of years you smoked); (5) You get a written order from a healthcare provider  3  Glaucoma Screening: covered annually if you're considered high risk: (1) You have diabetes OR (2) Family history of glaucoma OR (3)  aged 48 and older OR (3)  American aged 72 and older  3  Osteoporosis Screening: covered every 2 years if you meet one of the following conditions: (1) Have a vertebral abnormality; (2) On glucocorticoid therapy for more than 3 months; (3) Have primary hyperparathyroidism; (4) On osteoporosis medications and need to assess response to drug therapy  5  HIV Screening: covered annually if you're between the age of 12-76  Also covered annually if you are younger than 13 and older than 72 with risk factors for HIV infection  For pregnant patients, it is covered up to 3 times per pregnancy      Immunizations:  Immunization Recommendations   Influenza Vaccine Annual influenza vaccination during flu season is recommended for all persons aged >= 6 months who do not have contraindications   Pneumococcal Vaccine   * Pneumococcal conjugate vaccine = PCV13 (Prevnar 13), PCV15 (Vaxneuvance), PCV20 (Prevnar 20)  * Pneumococcal polysaccharide vaccine = PPSV23 (Pneumovax) Adults 2364 years old: 1-3 doses may be recommended based on certain risk factors  Adults 72 years old: 1-2 doses may be recommended based off what pneumonia vaccine you previously received   Hepatitis B Vaccine 3 dose series if at intermediate or high risk (ex: diabetes, end stage renal disease, liver disease)   Tetanus (Td) Vaccine - COST NOT COVERED BY MEDICARE PART B Following completion of primary series, a booster dose should be given every 10 years to maintain immunity against tetanus  Td may also be given as tetanus wound prophylaxis  Tdap Vaccine - COST NOT COVERED BY MEDICARE PART B Recommended at least once for all adults  For pregnant patients, recommended with each pregnancy  Shingles Vaccine (Shingrix) - COST NOT COVERED BY MEDICARE PART B  2 shot series recommended in those aged 48 and above     Health Maintenance Due:  There are no preventive care reminders to display for this patient  Immunizations Due:      Topic Date Due   • Hepatitis B Vaccine (1 of 3 - 3-dose series) Never done   • Meningococcal ACWY Vaccine (1 - Risk start 2-23 months series) Never done   • HIB Vaccine (1 of 1 - Risk 1-dose series) Never done   • COVID-19 Vaccine (4 - Booster for Moderna series) 02/23/2022     Advance Directives   What are advance directives? Advance directives are legal documents that state your wishes and plans for medical care  These plans are made ahead of time in case you lose your ability to make decisions for yourself  Advance directives can apply to any medical decision, such as the treatments you want, and if you want to donate organs  What are the types of advance directives? There are many types of advance directives, and each state has rules about how to use them  You may choose a combination of any of the following:  · Living will: This is a written record of the treatment you want   You can also choose which treatments you do not want, which to limit, and which to stop at a certain time  This includes surgery, medicine, IV fluid, and tube feedings  · Durable power of  for healthcare [facility]): This is a written record that states who you want to make healthcare choices for you when you are unable to make them for yourself  This person, called a proxy, is usually a family member or a friend  You may choose more than 1 proxy  · Do not resuscitate (DNR) order:  A DNR order is used in case your heart stops beating or you stop breathing  It is a request not to have certain forms of treatment, such as CPR  A DNR order may be included in other types of advance directives  · Medical directive: This covers the care that you want if you are in a coma, near death, or unable to make decisions for yourself  You can list the treatments you want for each condition  Treatment may include pain medicine, surgery, blood transfusions, dialysis, IV or tube feedings, and a ventilator (breathing machine)  · Values history: This document has questions about your views, beliefs, and how you feel and think about life  This information can help others choose the care that you would choose  Why are advance directives important? An advance directive helps you control your care  Although spoken wishes may be used, it is better to have your wishes written down  Spoken wishes can be misunderstood, or not followed  Treatments may be given even if you do not want them  An advance directive may make it easier for your family to make difficult choices about your care  Fall Prevention    Fall prevention  includes ways to make your home and other areas safer  It also includes ways you can move more carefully to prevent a fall  Health conditions that cause changes in your blood pressure, vision, or muscle strength and coordination may increase your risk for falls  Medicines may also increase your risk for falls if they make you dizzy, weak, or sleepy     Fall prevention tips:   · Stand or sit up slowly  · Use assistive devices as directed  · Wear shoes that fit well and have soles that   · Wear a personal alarm  · Stay active  · Manage your medical conditions  Home Safety Tips:  · Add items to prevent falls in the bathroom  · Keep paths clear  · Install bright lights in your home  · Keep items you use often on shelves within reach  · Paint or place reflective tape on the edges of your stairs  Weight Management   Why it is important to manage your weight:  Being overweight increases your risk of health conditions such as heart disease, high blood pressure, type 2 diabetes, and certain types of cancer  It can also increase your risk for osteoarthritis, sleep apnea, and other respiratory problems  Aim for a slow, steady weight loss  Even a small amount of weight loss can lower your risk of health problems  How to lose weight safely:  A safe and healthy way to lose weight is to eat fewer calories and get regular exercise  You can lose up about 1 pound a week by decreasing the number of calories you eat by 500 calories each day  Healthy meal plan for weight management:  A healthy meal plan includes a variety of foods, contains fewer calories, and helps you stay healthy  A healthy meal plan includes the following:  · Eat whole-grain foods more often  A healthy meal plan should contain fiber  Fiber is the part of grains, fruits, and vegetables that is not broken down by your body  Whole-grain foods are healthy and provide extra fiber in your diet  Some examples of whole-grain foods are whole-wheat breads and pastas, oatmeal, brown rice, and bulgur  · Eat a variety of vegetables every day  Include dark, leafy greens such as spinach, kale, troy greens, and mustard greens  Eat yellow and orange vegetables such as carrots, sweet potatoes, and winter squash  · Eat a variety of fruits every day    Choose fresh or canned fruit (canned in its own juice or light syrup) instead of juice  Fruit juice has very little or no fiber  · Eat low-fat dairy foods  Drink fat-free (skim) milk or 1% milk  Eat fat-free yogurt and low-fat cottage cheese  Try low-fat cheeses such as mozzarella and other reduced-fat cheeses  · Choose meat and other protein foods that are low in fat  Choose beans or other legumes such as split peas or lentils  Choose fish, skinless poultry (chicken or turkey), or lean cuts of red meat (beef or pork)  Before you cook meat or poultry, cut off any visible fat  · Use less fat and oil  Try baking foods instead of frying them  Add less fat, such as margarine, sour cream, regular salad dressing and mayonnaise to foods  Eat fewer high-fat foods  Some examples of high-fat foods include french fries, doughnuts, ice cream, and cakes  · Eat fewer sweets  Limit foods and drinks that are high in sugar  This includes candy, cookies, regular soda, and sweetened drinks  Exercise:  Exercise at least 30 minutes per day on most days of the week  Some examples of exercise include walking, biking, dancing, and swimming  You can also fit in more physical activity by taking the stairs instead of the elevator or parking farther away from stores  Ask your healthcare provider about the best exercise plan for you  © Copyright [employer] 2018 Information is for End User's use only and may not be sold, redistributed or otherwise used for commercial purposes  All illustrations and images included in CareNotes® are the copyrighted property of [employer]  or [facility] Preventive Visit Patient Instructions  Thank you for completing your Welcome to Medicare Visit or Medicare Annual Wellness Visit today  Your next wellness visit will be due in one year (12/17/2023)  The screening/preventive services that you may require over the next 5-10 years are detailed below  Some tests may not apply to you based off risk factors and/or age   Screening tests ordered at today's visit but not completed yet may show as past due  Also, please note that scanned in results may not display below  Preventive Screenings:  Service Recommendations Previous Testing/Comments   Colorectal Cancer Screening  · Colonoscopy    · Fecal Occult Blood Test (FOBT)/Fecal Immunochemical Test (FIT)  · Fecal DNA/Cologuard Test  · Flexible Sigmoidoscopy Age: 39-70 years old   Colonoscopy: every 10 years (May be performed more frequently if at higher risk)  OR  FOBT/FIT: every 1 year  OR  Cologuard: every 3 years  OR  Sigmoidoscopy: every 5 years  Screening may be recommended earlier than age 39 if at higher risk for colorectal cancer  Also, an individualized decision between you and your healthcare provider will decide whether screening between the ages of 74-80 would be appropriate  Colonoscopy: 07/20/2017  FOBT/FIT: Not on file  Cologuard: Not on file  Sigmoidoscopy: Not on file          Prostate Cancer Screening Individualized decision between patient and health care provider in men between ages of 53-78   Medicare will cover every 12 months beginning on the day after your 50th birthday PSA: 1 2 ng/mL     Screening Not Indicated     Hepatitis C Screening Once for adults born between 1945 and 1965  More frequently in patients at high risk for Hepatitis C Hep C Antibody: Not on file        Diabetes Screening 1-2 times per year if you're at risk for diabetes or have pre-diabetes Fasting glucose: 123 mg/dL (4/5/2022)  A1C: 5 7 % (5/24/2022)  Screening Current   Cholesterol Screening Once every 5 years if you don't have a lipid disorder  May order more often based on risk factors  Lipid panel: 05/24/2022  Screening Not Indicated  History Lipid Disorder      Other Preventive Screenings Covered by Medicare:  6  Abdominal Aortic Aneurysm (AAA) Screening: covered once if your at risk   You're considered to be at risk if you have a family history of AAA or a male between the age of 73-68 who smoking at least 100 cigarettes in your lifetime  7  Lung Cancer Screening: covers low dose CT scan once per year if you meet all of the following conditions: (1) Age 50-69; (2) No signs or symptoms of lung cancer; (3) Current smoker or have quit smoking within the last 15 years; (4) You have a tobacco smoking history of at least 20 pack years (packs per day x number of years you smoked); (5) You get a written order from a healthcare provider  8  Glaucoma Screening: covered annually if you're considered high risk: (1) You have diabetes OR (2) Family history of glaucoma OR (3)  aged 48 and older OR (3)  American aged 72 and older  5  Osteoporosis Screening: covered every 2 years if you meet one of the following conditions: (1) Have a vertebral abnormality; (2) On glucocorticoid therapy for more than 3 months; (3) Have primary hyperparathyroidism; (4) On osteoporosis medications and need to assess response to drug therapy  10  HIV Screening: covered annually if you're between the age of 12-76  Also covered annually if you are younger than 13 and older than 72 with risk factors for HIV infection  For pregnant patients, it is covered up to 3 times per pregnancy      Immunizations:  Immunization Recommendations   Influenza Vaccine Annual influenza vaccination during flu season is recommended for all persons aged >= 6 months who do not have contraindications   Pneumococcal Vaccine   * Pneumococcal conjugate vaccine = PCV13 (Prevnar 13), PCV15 (Vaxneuvance), PCV20 (Prevnar 20)  * Pneumococcal polysaccharide vaccine = PPSV23 (Pneumovax) Adults 25-60 years old: 1-3 doses may be recommended based on certain risk factors  Adults 72 years old: 1-2 doses may be recommended based off what pneumonia vaccine you previously received   Hepatitis B Vaccine 3 dose series if at intermediate or high risk (ex: diabetes, end stage renal disease, liver disease)   Tetanus (Td) Vaccine - COST NOT COVERED BY MEDICARE PART B Following completion of primary series, a booster dose should be given every 10 years to maintain immunity against tetanus  Td may also be given as tetanus wound prophylaxis  Tdap Vaccine - COST NOT COVERED BY MEDICARE PART B Recommended at least once for all adults  For pregnant patients, recommended with each pregnancy  Shingles Vaccine (Shingrix) - COST NOT COVERED BY MEDICARE PART B  2 shot series recommended in those aged 48 and above     Health Maintenance Due:  There are no preventive care reminders to display for this patient  Immunizations Due:      Topic Date Due   • Hepatitis B Vaccine (1 of 3 - 3-dose series) Never done   • Meningococcal ACWY Vaccine (1 - Risk start 2-23 months series) Never done   • HIB Vaccine (1 of 1 - Risk 1-dose series) Never done   • COVID-19 Vaccine (4 - Booster for Moderna series) 02/23/2022     Advance Directives   What are advance directives? Advance directives are legal documents that state your wishes and plans for medical care  These plans are made ahead of time in case you lose your ability to make decisions for yourself  Advance directives can apply to any medical decision, such as the treatments you want, and if you want to donate organs  What are the types of advance directives? There are many types of advance directives, and each state has rules about how to use them  You may choose a combination of any of the following:  · Living will: This is a written record of the treatment you want  You can also choose which treatments you do not want, which to limit, and which to stop at a certain time  This includes surgery, medicine, IV fluid, and tube feedings  · Durable power of  for healthcare [facility]): This is a written record that states who you want to make healthcare choices for you when you are unable to make them for yourself  This person, called a proxy, is usually a family member or a friend  You may choose more than 1 proxy    · Do not resuscitate (DNR) order:  A DNR order is used in case your heart stops beating or you stop breathing  It is a request not to have certain forms of treatment, such as CPR  A DNR order may be included in other types of advance directives  · Medical directive: This covers the care that you want if you are in a coma, near death, or unable to make decisions for yourself  You can list the treatments you want for each condition  Treatment may include pain medicine, surgery, blood transfusions, dialysis, IV or tube feedings, and a ventilator (breathing machine)  · Values history: This document has questions about your views, beliefs, and how you feel and think about life  This information can help others choose the care that you would choose  Why are advance directives important? An advance directive helps you control your care  Although spoken wishes may be used, it is better to have your wishes written down  Spoken wishes can be misunderstood, or not followed  Treatments may be given even if you do not want them  An advance directive may make it easier for your family to make difficult choices about your care  Fall Prevention    Fall prevention  includes ways to make your home and other areas safer  It also includes ways you can move more carefully to prevent a fall  Health conditions that cause changes in your blood pressure, vision, or muscle strength and coordination may increase your risk for falls  Medicines may also increase your risk for falls if they make you dizzy, weak, or sleepy  Fall prevention tips:   · Stand or sit up slowly  · Use assistive devices as directed  · Wear shoes that fit well and have soles that   · Wear a personal alarm  · Stay active  · Manage your medical conditions  Home Safety Tips:  · Add items to prevent falls in the bathroom  · Keep paths clear  · Install bright lights in your home  · Keep items you use often on shelves within reach      · Paint or place reflective tape on the edges of your stairs  Weight Management   Why it is important to manage your weight:  Being overweight increases your risk of health conditions such as heart disease, high blood pressure, type 2 diabetes, and certain types of cancer  It can also increase your risk for osteoarthritis, sleep apnea, and other respiratory problems  Aim for a slow, steady weight loss  Even a small amount of weight loss can lower your risk of health problems  How to lose weight safely:  A safe and healthy way to lose weight is to eat fewer calories and get regular exercise  You can lose up about 1 pound a week by decreasing the number of calories you eat by 500 calories each day  Healthy meal plan for weight management:  A healthy meal plan includes a variety of foods, contains fewer calories, and helps you stay healthy  A healthy meal plan includes the following:  · Eat whole-grain foods more often  A healthy meal plan should contain fiber  Fiber is the part of grains, fruits, and vegetables that is not broken down by your body  Whole-grain foods are healthy and provide extra fiber in your diet  Some examples of whole-grain foods are whole-wheat breads and pastas, oatmeal, brown rice, and bulgur  · Eat a variety of vegetables every day  Include dark, leafy greens such as spinach, kale, troy greens, and mustard greens  Eat yellow and orange vegetables such as carrots, sweet potatoes, and winter squash  · Eat a variety of fruits every day  Choose fresh or canned fruit (canned in its own juice or light syrup) instead of juice  Fruit juice has very little or no fiber  · Eat low-fat dairy foods  Drink fat-free (skim) milk or 1% milk  Eat fat-free yogurt and low-fat cottage cheese  Try low-fat cheeses such as mozzarella and other reduced-fat cheeses  · Choose meat and other protein foods that are low in fat  Choose beans or other legumes such as split peas or lentils   Choose fish, skinless poultry (chicken or turkey), or lean cuts of red meat (beef or pork)  Before you cook meat or poultry, cut off any visible fat  · Use less fat and oil  Try baking foods instead of frying them  Add less fat, such as margarine, sour cream, regular salad dressing and mayonnaise to foods  Eat fewer high-fat foods  Some examples of high-fat foods include french fries, doughnuts, ice cream, and cakes  · Eat fewer sweets  Limit foods and drinks that are high in sugar  This includes candy, cookies, regular soda, and sweetened drinks  Exercise:  Exercise at least 30 minutes per day on most days of the week  Some examples of exercise include walking, biking, dancing, and swimming  You can also fit in more physical activity by taking the stairs instead of the elevator or parking farther away from stores  Ask your healthcare provider about the best exercise plan for you  © Copyright [employer] 2018 Information is for End User's use only and may not be sold, redistributed or otherwise used for commercial purposes   All illustrations and images included in CareNotes® are the copyrighted property of [employer]  or [address] [place] [address]

## 2022-12-16 NOTE — TELEPHONE ENCOUNTER
Left message for patient to call back and let us know which infusion center he wants to go to for his reclast and what day and time frame he needs

## 2022-12-19 ENCOUNTER — TELEPHONE (OUTPATIENT)
Dept: ENDOCRINOLOGY | Facility: HOSPITAL | Age: 82
End: 2022-12-19

## 2022-12-19 NOTE — TELEPHONE ENCOUNTER
I was able to get a hold of the patient and have him schedule for his Reclast Infusion  I spoke with Arlen Malone at the [facility] and the patient is scheduled for 12/27/2022 at 1:30 pm     The patient was then called and made aware of the appointment time

## 2022-12-23 ENCOUNTER — TELEPHONE (OUTPATIENT)
Dept: ENDOCRINOLOGY | Facility: HOSPITAL | Age: 82
End: 2022-12-23

## 2022-12-23 NOTE — PROGRESS NOTES
Per Dr Rory Ramírez ok to use labs from 11/3 for reclast infusion on 12/17    And patient to have labs redrawn in 3 months prior to office visit

## 2022-12-27 ENCOUNTER — HOSPITAL ENCOUNTER (OUTPATIENT)
Dept: INFUSION CENTER | Facility: HOSPITAL | Age: 82
Discharge: HOME | End: 2022-12-27
Attending: STUDENT IN AN ORGANIZED HEALTH CARE EDUCATION/TRAINING PROGRAM

## 2022-12-27 VITALS
OXYGEN SATURATION: 94 % | BODY MASS INDEX: 35.47 KG/M2 | HEIGHT: 63 IN | SYSTOLIC BLOOD PRESSURE: 130 MMHG | DIASTOLIC BLOOD PRESSURE: 83 MMHG | HEART RATE: 80 BPM | TEMPERATURE: 97 F | RESPIRATION RATE: 16 BRPM | WEIGHT: 200.18 LBS

## 2022-12-27 DIAGNOSIS — E21.3 HYPERPARATHYROIDISM (HCC): Primary | ICD-10-CM

## 2022-12-27 RX ORDER — ZOLEDRONIC ACID 5 MG/100ML
5 INJECTION, SOLUTION INTRAVENOUS ONCE
OUTPATIENT
Start: 2023-12-27

## 2022-12-27 RX ORDER — SODIUM CHLORIDE 9 MG/ML
20 INJECTION, SOLUTION INTRAVENOUS ONCE
OUTPATIENT
Start: 2023-12-27

## 2022-12-27 RX ORDER — SODIUM CHLORIDE 9 MG/ML
20 INJECTION, SOLUTION INTRAVENOUS ONCE
Status: COMPLETED | OUTPATIENT
Start: 2022-12-27 | End: 2022-12-27

## 2022-12-27 RX ORDER — ZOLEDRONIC ACID 5 MG/100ML
5 INJECTION, SOLUTION INTRAVENOUS ONCE
Status: COMPLETED | OUTPATIENT
Start: 2022-12-27 | End: 2022-12-27

## 2022-12-27 RX ADMIN — ZOLEDRONIC ACID 5 MG: 0.05 INJECTION, SOLUTION INTRAVENOUS at 14:00

## 2022-12-27 RX ADMIN — SODIUM CHLORIDE 20 ML/HR: 9 INJECTION, SOLUTION INTRAVENOUS at 13:59

## 2022-12-28 ENCOUNTER — TELEPHONE (OUTPATIENT)
Dept: FAMILY MEDICINE CLINIC | Facility: HOSPITAL | Age: 82
End: 2022-12-28

## 2022-12-28 DIAGNOSIS — R11.2 NAUSEA AND VOMITING, UNSPECIFIED VOMITING TYPE: Primary | ICD-10-CM

## 2022-12-28 RX ORDER — ONDANSETRON 4 MG/1
4 TABLET, ORALLY DISINTEGRATING ORAL EVERY 6 HOURS PRN
Qty: 20 TABLET | Refills: 0 | Status: SHIPPED | OUTPATIENT
Start: 2022-12-28 | End: 2023-04-21 | Stop reason: SDUPTHER

## 2022-12-28 NOTE — TELEPHONE ENCOUNTER
Patient had "reclass infusion" yesterday and has severe nausea  Can doctor call in a dissolvable anti nausea medication? Patient is unable to eat or drink  Please call son back with information    [phone]

## 2023-01-13 DIAGNOSIS — T84.53XA INFECTION ASSOCIATED WITH INTERNAL RIGHT KNEE PROSTHESIS, INITIAL ENCOUNTER (HCC): ICD-10-CM

## 2023-01-13 RX ORDER — SULFAMETHOXAZOLE AND TRIMETHOPRIM 800; 160 MG/1; MG/1
TABLET ORAL
Qty: 90 TABLET | Refills: 1 | Status: SHIPPED | OUTPATIENT
Start: 2023-01-13 | End: 2023-04-13

## 2023-03-15 DIAGNOSIS — N40.0 BENIGN PROSTATIC HYPERPLASIA WITHOUT LOWER URINARY TRACT SYMPTOMS: ICD-10-CM

## 2023-03-15 RX ORDER — TAMSULOSIN HYDROCHLORIDE 0.4 MG/1
CAPSULE ORAL
Qty: 90 CAPSULE | Refills: 3 | Status: SHIPPED | OUTPATIENT
Start: 2023-03-15

## 2023-03-28 DIAGNOSIS — I10 ESSENTIAL HYPERTENSION: ICD-10-CM

## 2023-03-28 DIAGNOSIS — T84.53XA INFECTION ASSOCIATED WITH INTERNAL RIGHT KNEE PROSTHESIS, INITIAL ENCOUNTER (HCC): ICD-10-CM

## 2023-03-28 DIAGNOSIS — I82.5Y3 CHRONIC DEEP VEIN THROMBOSIS (DVT) OF PROXIMAL VEIN OF BOTH LOWER EXTREMITIES (HCC): ICD-10-CM

## 2023-03-28 DIAGNOSIS — E78.00 PURE HYPERCHOLESTEROLEMIA: ICD-10-CM

## 2023-03-28 RX ORDER — SIMVASTATIN 10 MG
10 TABLET ORAL DAILY
Qty: 90 TABLET | Refills: 3 | Status: SHIPPED | OUTPATIENT
Start: 2023-03-28

## 2023-03-28 RX ORDER — SULFAMETHOXAZOLE AND TRIMETHOPRIM 800; 160 MG/1; MG/1
1 TABLET ORAL DAILY
Qty: 90 TABLET | Refills: 1 | Status: SHIPPED | OUTPATIENT
Start: 2023-03-28 | End: 2023-06-26

## 2023-03-28 RX ORDER — SPIRONOLACTONE 25 MG/1
12.5 TABLET ORAL DAILY
Qty: 45 TABLET | Refills: 3 | Status: SHIPPED | OUTPATIENT
Start: 2023-03-28

## 2023-03-28 NOTE — TELEPHONE ENCOUNTER
spironolactone (ALDACTONE) 25 mg tablet\    Eliquis 2 5 MG    simvastatin (ZOCOR) 10 mg tablet      sulfamethoxazole-trimethoprim (BACTRIM DS) 800-160 mg per tablet    90 days for each  To [facility] [place]

## 2023-04-07 ENCOUNTER — APPOINTMENT (OUTPATIENT)
Dept: LAB | Facility: HOSPITAL | Age: 83
End: 2023-04-07
Attending: STUDENT IN AN ORGANIZED HEALTH CARE EDUCATION/TRAINING PROGRAM

## 2023-04-07 DIAGNOSIS — E21.3 HYPERPARATHYROIDISM (HCC): ICD-10-CM

## 2023-04-07 LAB
ALBUMIN SERPL BCP-MCNC: 3.2 G/DL (ref 3.5–5)
ALP SERPL-CCNC: 84 U/L (ref 46–116)
ALT SERPL W P-5'-P-CCNC: 26 U/L (ref 12–78)
ANION GAP SERPL CALCULATED.3IONS-SCNC: 1 MMOL/L (ref 4–13)
AST SERPL W P-5'-P-CCNC: 18 U/L (ref 5–45)
BILIRUB SERPL-MCNC: 0.48 MG/DL (ref 0.2–1)
BUN SERPL-MCNC: 24 MG/DL (ref 5–25)
CALCIUM ALBUM COR SERPL-MCNC: 10.5 MG/DL (ref 8.3–10.1)
CALCIUM SERPL-MCNC: 9.9 MG/DL (ref 8.3–10.1)
CHLORIDE SERPL-SCNC: 108 MMOL/L (ref 96–108)
CO2 SERPL-SCNC: 29 MMOL/L (ref 21–32)
CREAT SERPL-MCNC: 0.92 MG/DL (ref 0.6–1.3)
GFR SERPL CREATININE-BSD FRML MDRD: 78 ML/MIN/1.73SQ M
GLUCOSE P FAST SERPL-MCNC: 109 MG/DL (ref 65–99)
POTASSIUM SERPL-SCNC: 4.1 MMOL/L (ref 3.5–5.3)
PROT SERPL-MCNC: 6.1 G/DL (ref 6.4–8.4)
PTH-INTACT SERPL-MCNC: 177.8 PG/ML (ref 18.4–80.1)
SODIUM SERPL-SCNC: 138 MMOL/L (ref 135–147)

## 2023-04-14 PROBLEM — M81.0 AGE-RELATED OSTEOPOROSIS WITHOUT CURRENT PATHOLOGICAL FRACTURE: Status: ACTIVE | Noted: 2023-04-14

## 2023-05-03 ENCOUNTER — OFFICE VISIT (OUTPATIENT)
Dept: FAMILY MEDICINE CLINIC | Facility: HOSPITAL | Age: 83
End: 2023-05-03

## 2023-05-03 VITALS
HEIGHT: 63 IN | WEIGHT: 203 LBS | BODY MASS INDEX: 35.97 KG/M2 | DIASTOLIC BLOOD PRESSURE: 74 MMHG | SYSTOLIC BLOOD PRESSURE: 118 MMHG | RESPIRATION RATE: 16 BRPM | TEMPERATURE: 97.5 F | OXYGEN SATURATION: 97 % | HEART RATE: 75 BPM

## 2023-05-03 DIAGNOSIS — S81.801A WOUND OF RIGHT LEG, INITIAL ENCOUNTER: Primary | ICD-10-CM

## 2023-05-03 RX ORDER — AMOXICILLIN AND CLAVULANATE POTASSIUM 875; 125 MG/1; MG/1
1 TABLET, FILM COATED ORAL EVERY 12 HOURS SCHEDULED
Qty: 14 TABLET | Refills: 0 | Status: SHIPPED | OUTPATIENT
Start: 2023-05-03 | End: 2023-05-10

## 2023-05-03 NOTE — PROGRESS NOTES
"Assessment/Plan:    Wound of right leg, initial encounter  Pt's cat jumped on his right foot yesterday and scratched the skin  He had plenty of bleeding but eventually the bleeding was stopped  He is taking eliquis  He presented today for evaluation of the wound    The wound is clean without signs of cellulitis  I ordered tdap and augmentin should the wound become infected  Diagnoses and all orders for this visit:    Wound of right leg, initial encounter  -     amoxicillin-clavulanate (AUGMENTIN) 875-125 mg per tablet; Take 1 tablet by mouth every 12 (twelve) hours for 7 days  -     TDAP VACCINE GREATER THAN OR EQUAL TO 8YO IM          Subjective:      Patient ID: Rome Cotto is a [de-identified] y o  male  HPI    Patient presents for follow-up of chronic conditions as detailed in the assessment and plan  The following portions of the patient's history were reviewed and updated as appropriate: current medications, past family history, past medical history, past social history, past surgical history and problem list     Review of Systems      Objective:    /74   Pulse 75   Temp 97 5 °F (36 4 °C) (Tympanic)   Resp 16   Ht 5' 3\" (1 6 m)   Wt 92 1 kg (203 lb)   SpO2 97%   BMI 35 96 kg/m²      Physical Exam  Constitutional:       General: He is not in acute distress  Appearance: He is not toxic-appearing  Cardiovascular:      Rate and Rhythm: Normal rate and regular rhythm  Pulmonary:      Effort: No respiratory distress  Breath sounds: No wheezing or rales  Skin:     Comments: Please review the image of the right foot I took, he has no cellulitis on exam   Neurological:      Mental Status: He is alert               Tor De Paz MD  "

## 2023-05-03 NOTE — PATIENT INSTRUCTIONS
You may wash your foot today,  If the right foot wound becomes red, warm, swollen, painfull please fill the prescription I gave you and take it along with the bactrim  You don't need to start taking it now!

## 2023-05-03 NOTE — ASSESSMENT & PLAN NOTE
Pt's cat jumped on his right foot yesterday and scratched the skin  He had plenty of bleeding but eventually the bleeding was stopped  He is taking eliquis  He presented today for evaluation of the wound    The wound is clean without signs of cellulitis  I ordered tdap and augmentin should the wound become infected

## 2023-07-07 ENCOUNTER — APPOINTMENT (OUTPATIENT)
Dept: LAB | Facility: HOSPITAL | Age: 83
End: 2023-07-07
Payer: COMMERCIAL

## 2023-07-07 DIAGNOSIS — E21.3 HYPERPARATHYROIDISM (HCC): ICD-10-CM

## 2023-07-07 LAB
25(OH)D3 SERPL-MCNC: 28 NG/ML (ref 30–100)
ALBUMIN SERPL BCP-MCNC: 3.3 G/DL (ref 3.5–5)
ALP SERPL-CCNC: 105 U/L (ref 46–116)
ALT SERPL W P-5'-P-CCNC: 25 U/L (ref 12–78)
ANION GAP SERPL CALCULATED.3IONS-SCNC: 2 MMOL/L
AST SERPL W P-5'-P-CCNC: 17 U/L (ref 5–45)
BILIRUB SERPL-MCNC: 0.42 MG/DL (ref 0.2–1)
BUN SERPL-MCNC: 30 MG/DL (ref 5–25)
CALCIUM ALBUM COR SERPL-MCNC: 11.3 MG/DL (ref 8.3–10.1)
CALCIUM SERPL-MCNC: 10.7 MG/DL (ref 8.3–10.1)
CHLORIDE SERPL-SCNC: 107 MMOL/L (ref 96–108)
CO2 SERPL-SCNC: 31 MMOL/L (ref 21–32)
CREAT SERPL-MCNC: 0.97 MG/DL (ref 0.6–1.3)
GFR SERPL CREATININE-BSD FRML MDRD: 73 ML/MIN/1.73SQ M
GLUCOSE P FAST SERPL-MCNC: 112 MG/DL (ref 65–99)
MAGNESIUM SERPL-MCNC: 2.1 MG/DL (ref 1.6–2.6)
PHOSPHATE SERPL-MCNC: 3.3 MG/DL (ref 2.3–4.1)
POTASSIUM SERPL-SCNC: 4 MMOL/L (ref 3.5–5.3)
PROT SERPL-MCNC: 6.4 G/DL (ref 6.4–8.4)
PTH-INTACT SERPL-MCNC: 125.5 PG/ML (ref 12–88)
SODIUM SERPL-SCNC: 140 MMOL/L (ref 135–147)

## 2023-07-07 PROCEDURE — 36415 COLL VENOUS BLD VENIPUNCTURE: CPT

## 2023-07-07 PROCEDURE — 82306 VITAMIN D 25 HYDROXY: CPT

## 2023-07-07 PROCEDURE — 83735 ASSAY OF MAGNESIUM: CPT

## 2023-07-07 PROCEDURE — 80053 COMPREHEN METABOLIC PANEL: CPT

## 2023-07-07 PROCEDURE — 84100 ASSAY OF PHOSPHORUS: CPT

## 2023-07-07 PROCEDURE — 83970 ASSAY OF PARATHORMONE: CPT

## 2023-07-09 DIAGNOSIS — N40.0 BENIGN PROSTATIC HYPERPLASIA WITHOUT LOWER URINARY TRACT SYMPTOMS: ICD-10-CM

## 2023-07-09 RX ORDER — DUTASTERIDE 0.5 MG/1
CAPSULE, LIQUID FILLED ORAL
Qty: 90 CAPSULE | Refills: 3 | Status: SHIPPED | OUTPATIENT
Start: 2023-07-09

## 2023-07-10 ENCOUNTER — TELEPHONE (OUTPATIENT)
Dept: ENDOCRINOLOGY | Facility: HOSPITAL | Age: 83
End: 2023-07-10

## 2023-07-10 DIAGNOSIS — E21.3 HYPERPARATHYROIDISM (HCC): Primary | ICD-10-CM

## 2023-07-10 RX ORDER — CINACALCET 30 MG/1
30 TABLET, FILM COATED ORAL DAILY
Qty: 90 TABLET | Refills: 1 | Status: SHIPPED | OUTPATIENT
Start: 2023-07-10

## 2023-07-17 ENCOUNTER — TELEPHONE (OUTPATIENT)
Dept: ENDOCRINOLOGY | Facility: HOSPITAL | Age: 83
End: 2023-07-17

## 2023-07-17 NOTE — TELEPHONE ENCOUNTER
Patient called to let you know that he is goning to start the generic form of sensibar This Friday and if there is anything you  Can comment on he said that would be great.     Accidentally sent to [facility] for doctor NORSHOLM

## 2023-08-09 DIAGNOSIS — T84.53XA INFECTION ASSOCIATED WITH INTERNAL RIGHT KNEE PROSTHESIS, INITIAL ENCOUNTER (HCC): ICD-10-CM

## 2023-08-09 RX ORDER — SULFAMETHOXAZOLE AND TRIMETHOPRIM 800; 160 MG/1; MG/1
1 TABLET ORAL DAILY
Qty: 90 TABLET | Refills: 1 | Status: SHIPPED | OUTPATIENT
Start: 2023-08-09 | End: 2023-11-07

## 2023-08-25 ENCOUNTER — OFFICE VISIT (OUTPATIENT)
Dept: FAMILY MEDICINE CLINIC | Facility: HOSPITAL | Age: 83
End: 2023-08-25
Payer: COMMERCIAL

## 2023-08-25 VITALS
DIASTOLIC BLOOD PRESSURE: 74 MMHG | WEIGHT: 201.8 LBS | HEART RATE: 80 BPM | SYSTOLIC BLOOD PRESSURE: 106 MMHG | BODY MASS INDEX: 35.75 KG/M2 | OXYGEN SATURATION: 92 %

## 2023-08-25 DIAGNOSIS — D50.9 IRON DEFICIENCY ANEMIA, UNSPECIFIED IRON DEFICIENCY ANEMIA TYPE: ICD-10-CM

## 2023-08-25 DIAGNOSIS — I10 ESSENTIAL HYPERTENSION: ICD-10-CM

## 2023-08-25 DIAGNOSIS — E21.3 HYPERPARATHYROIDISM (HCC): Primary | ICD-10-CM

## 2023-08-25 PROCEDURE — 99214 OFFICE O/P EST MOD 30 MIN: CPT | Performed by: INTERNAL MEDICINE

## 2023-08-25 RX ORDER — FERROUS SULFATE 325(65) MG
325 TABLET ORAL
Qty: 30 TABLET | Refills: 5
Start: 2023-08-25

## 2023-08-25 NOTE — ASSESSMENT & PLAN NOTE
Pt has no symptoms of hypercalcemia.   We discussed prevention of complications of hyperparathyroidism with senispar and will check renal fxn before appt with endo

## 2023-08-25 NOTE — ASSESSMENT & PLAN NOTE
Has hx of iron def anemia  Denies signs of bleeding  He's on ferrous sulfate  Check iron levels and hgb

## 2023-08-25 NOTE — PROGRESS NOTES
Assessment/Plan:    Hyperparathyroidism ([address])  Pt has no symptoms of hypercalcemia. We discussed prevention of complications of hyperparathyroidism with senispar and will check renal fxn before appt with endo    Essential hypertension  htn is controlled  Continue lasix 80mg qd and aldactone  Check electrolytes    PAF (paroxysmal atrial fibrillation) (HCC)  Denies palpitations  Heart rhythm is regular today  Denies signs of bleeding  Continue eliquis    Iron deficiency anemia  Has hx of iron def anemia  Denies signs of bleeding  He's on ferrous sulfate  Check iron levels and hgb       Diagnoses and all orders for this visit:    Hyperparathyroidism ([address])  -     Comprehensive metabolic panel; Future  -     PTH, intact; Future  -     Vitamin D 25 hydroxy; Future    Iron deficiency anemia, unspecified iron deficiency anemia type  -     ferrous sulfate 325 (65 Fe) mg tablet; Take 1 tablet (325 mg total) by mouth daily with breakfast  -     CBC; Future  -     Iron Panel (Includes Ferritin, Iron Sat%, Iron, and TIBC); Future    Essential hypertension          Subjective:      Patient ID: Neo Calzada is a 82 y.o. male. HPI    Patient presents for follow-up of chronic conditions as detailed in the assessment and plan. The following portions of the patient's history were reviewed and updated as appropriate: current medications, past family history, past medical history, past social history, past surgical history and problem list.    Review of Systems   Constitutional: Negative for fatigue and fever. Respiratory: Negative for shortness of breath. Cardiovascular: Negative for chest pain and palpitations. Gastrointestinal: Negative for abdominal pain and constipation. Endocrine: Negative for polydipsia and polyuria. Genitourinary: Negative for hematuria.          Objective:    /74   Pulse 80   Wt 91.5 kg (201 lb 12.8 oz)   SpO2 92%   BMI 35.75 kg/m²      Physical Exam  Constitutional: General: He is not in acute distress. Eyes:      Conjunctiva/sclera: Conjunctivae normal.   Cardiovascular:      Rate and Rhythm: Normal rate and regular rhythm. Heart sounds: No murmur heard. Pulmonary:      Effort: No respiratory distress. Breath sounds: No wheezing or rales. Abdominal:      General: Bowel sounds are normal. There is no distension. Palpations: Abdomen is soft. Tenderness: There is no abdominal tenderness. Neurological:      Mental Status: He is alert.              Murali Carlos MD

## 2023-09-25 DIAGNOSIS — E78.00 PURE HYPERCHOLESTEROLEMIA: ICD-10-CM

## 2023-09-25 DIAGNOSIS — I10 ESSENTIAL HYPERTENSION: ICD-10-CM

## 2023-09-25 DIAGNOSIS — N40.0 BENIGN PROSTATIC HYPERPLASIA WITHOUT LOWER URINARY TRACT SYMPTOMS: ICD-10-CM

## 2023-09-25 RX ORDER — SPIRONOLACTONE 25 MG/1
12.5 TABLET ORAL DAILY
Qty: 45 TABLET | Refills: 3 | Status: SHIPPED | OUTPATIENT
Start: 2023-09-25

## 2023-09-26 RX ORDER — SIMVASTATIN 10 MG
10 TABLET ORAL DAILY
Qty: 90 TABLET | Refills: 3 | Status: SHIPPED | OUTPATIENT
Start: 2023-09-26

## 2023-09-26 RX ORDER — DUTASTERIDE 0.5 MG/1
0.5 CAPSULE, LIQUID FILLED ORAL DAILY
Qty: 90 CAPSULE | Refills: 3 | Status: SHIPPED | OUTPATIENT
Start: 2023-09-26

## 2023-10-02 DIAGNOSIS — I82.5Y3 CHRONIC DEEP VEIN THROMBOSIS (DVT) OF PROXIMAL VEIN OF BOTH LOWER EXTREMITIES (HCC): ICD-10-CM

## 2023-10-02 DIAGNOSIS — T84.53XA INFECTION ASSOCIATED WITH INTERNAL RIGHT KNEE PROSTHESIS, INITIAL ENCOUNTER (HCC): ICD-10-CM

## 2023-10-02 RX ORDER — SULFAMETHOXAZOLE AND TRIMETHOPRIM 800; 160 MG/1; MG/1
1 TABLET ORAL DAILY
Qty: 90 TABLET | Refills: 1 | Status: SHIPPED | OUTPATIENT
Start: 2023-10-02 | End: 2024-03-30

## 2023-10-13 ENCOUNTER — APPOINTMENT (OUTPATIENT)
Dept: LAB | Facility: HOSPITAL | Age: 83
End: 2023-10-13
Payer: COMMERCIAL

## 2023-10-13 DIAGNOSIS — E21.3 HYPERPARATHYROIDISM (HCC): Primary | ICD-10-CM

## 2023-10-13 DIAGNOSIS — E21.3 HYPERPARATHYROIDISM (HCC): ICD-10-CM

## 2023-10-13 LAB
25(OH)D3 SERPL-MCNC: 23.8 NG/ML (ref 30–100)
ALBUMIN SERPL BCP-MCNC: 3.7 G/DL (ref 3.5–5)
ALP SERPL-CCNC: 89 U/L (ref 34–104)
ALT SERPL W P-5'-P-CCNC: 17 U/L (ref 7–52)
ANION GAP SERPL CALCULATED.3IONS-SCNC: 5 MMOL/L
AST SERPL W P-5'-P-CCNC: 17 U/L (ref 13–39)
BILIRUB SERPL-MCNC: 0.46 MG/DL (ref 0.2–1)
BUN SERPL-MCNC: 31 MG/DL (ref 5–25)
CALCIUM SERPL-MCNC: 9.8 MG/DL (ref 8.4–10.2)
CHLORIDE SERPL-SCNC: 103 MMOL/L (ref 96–108)
CO2 SERPL-SCNC: 33 MMOL/L (ref 21–32)
CREAT SERPL-MCNC: 0.9 MG/DL (ref 0.6–1.3)
GFR SERPL CREATININE-BSD FRML MDRD: 79 ML/MIN/1.73SQ M
GLUCOSE P FAST SERPL-MCNC: 109 MG/DL (ref 65–99)
POTASSIUM SERPL-SCNC: 3.4 MMOL/L (ref 3.5–5.3)
PROT SERPL-MCNC: 6.2 G/DL (ref 6.4–8.4)
PTH-INTACT SERPL-MCNC: 234.7 PG/ML (ref 12–88)
SODIUM SERPL-SCNC: 141 MMOL/L (ref 135–147)

## 2023-10-13 PROCEDURE — 83970 ASSAY OF PARATHORMONE: CPT

## 2023-10-13 PROCEDURE — 80053 COMPREHEN METABOLIC PANEL: CPT

## 2023-10-13 PROCEDURE — 36415 COLL VENOUS BLD VENIPUNCTURE: CPT

## 2023-10-13 PROCEDURE — 82306 VITAMIN D 25 HYDROXY: CPT

## 2023-10-20 ENCOUNTER — OFFICE VISIT (OUTPATIENT)
Dept: ENDOCRINOLOGY | Facility: HOSPITAL | Age: 83
End: 2023-10-20
Payer: COMMERCIAL

## 2023-10-20 VITALS
HEART RATE: 71 BPM | BODY MASS INDEX: 35.61 KG/M2 | DIASTOLIC BLOOD PRESSURE: 70 MMHG | WEIGHT: 201 LBS | OXYGEN SATURATION: 95 % | SYSTOLIC BLOOD PRESSURE: 118 MMHG | HEIGHT: 63 IN

## 2023-10-20 DIAGNOSIS — M81.0 AGE-RELATED OSTEOPOROSIS WITHOUT CURRENT PATHOLOGICAL FRACTURE: Primary | ICD-10-CM

## 2023-10-20 DIAGNOSIS — E21.3 HYPERPARATHYROIDISM (HCC): ICD-10-CM

## 2023-10-20 DIAGNOSIS — E66.01 SEVERE OBESITY (BMI 35.0-39.9) WITH COMORBIDITY (HCC): ICD-10-CM

## 2023-10-20 PROCEDURE — 99214 OFFICE O/P EST MOD 30 MIN: CPT | Performed by: STUDENT IN AN ORGANIZED HEALTH CARE EDUCATION/TRAINING PROGRAM

## 2023-10-20 RX ORDER — CINACALCET 30 MG/1
30 TABLET, FILM COATED ORAL 2 TIMES DAILY
Qty: 180 TABLET | Refills: 1 | Status: SHIPPED | OUTPATIENT
Start: 2023-10-20

## 2023-10-20 RX ORDER — ZOLEDRONIC ACID 5 MG/100ML
5 INJECTION, SOLUTION INTRAVENOUS ONCE
OUTPATIENT
Start: 2023-12-22

## 2023-10-20 RX ORDER — SODIUM CHLORIDE 9 MG/ML
20 INJECTION, SOLUTION INTRAVENOUS ONCE
OUTPATIENT
Start: 2023-12-22

## 2023-10-20 NOTE — PROGRESS NOTES
Established Patient Progress Note       Chief Complaint   Patient presents with    Hyperparathyroidism      History of Present Illness:     Elly Casper is a 82 y.o. male with a history of primary hyperparathyroidism, osteoporosis- s/p 1 x reclast 12/22, pA. Fib, DVT, CKD3, BPH and immobility who was initially seen by me on 12/22 for hyperparathyroid management. Hx of elevated calcium- upto 12.6 with  confirming most likely has primary hyperparathyroidism. DXA scan done 11/22 showed Osteoporosis with lowest t score of -3.6 at left femur. Neg Xray abdomen for Nephrocalcinosis/no hx of renal stones. Patient declined parathyroid surgery therefore discussed conservative management. Osteopororis- Started on Reclast 12/22, did have some side effects of feeling flu like after infusion but recovered after. Denies any recent falls/fractures. Last DXA scan 12/22. No change in height, takes VitD supplement- 800IU daily, Calcium supplement- recommended 1000mg through diet, Weight baring exercises- still limited but is trying to walk more and be more active. Does report sometimes is too fast with bending. Hyperparathyroidism:- Most recent calcium remains within normal range and mildly elevated at 9.8-10.7 range. PTH still remains elevated at 234.7. VitD low at 23 but acceptable. Patient denies any kidney stones, polyuria, polydipsia. Constipation, GERD. Drinks water every few hours and urinated 150cc every few hours. Improved calcium level since starting sensipar but PTH still elevated. Currently on sensipar 30mg daily started on 07/23. Patient is tolerating sensipar ok. Social Hx:- lives with wife who is a nurse at the [address] [facility] [place] [employer] [facility]. Is immobile for the most part, denies smoking, alcohol use or any other recreational drugs.     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)    Pure hypercholesterolemia    Iron deficiency anemia    Cervicalgia    Immobility    Hyperparathyroidism (HCC)    PAF (paroxysmal atrial fibrillation) (HCC)    Stage 3a chronic kidney disease (HCC)    Age-related osteoporosis without current pathological fracture    Wound of right leg, initial encounter      Past Medical History:   Diagnosis Date    Basal cell carcinoma     nose     Benign skin lesion     BPH (benign prostatic hyperplasia)     Diverticular disease of colon     DVT (deep venous thrombosis) (HCC)     Elevated PSA     History of blood clots     Hypotension     Intestinal obstruction (HCC)     Social anxiety disorder     last assessed 1/12/17, resolved 10/17/17    Ventral hernia       Past Surgical History:   Procedure Laterality Date    APPENDECTOMY      BASAL CELL CARCINOMA EXCISION      Nose     HERNIA REPAIR      9 hernia repairs 1995 - 2012    [address]    with speenectomy    REPLACEMENT TOTAL KNEE Bilateral     SPLENECTOMY  1995    TONSILLECTOMY        Family History   Problem Relation Age of Onset    Other Mother         Bleeding disorder     Stroke Mother         CVA    Diabetes Mother     Heart disease Mother     Osteoporosis Mother     Hypothyroidism Mother     Stroke Father         CVA    Heart disease Father     Heart attack Father     Substance Abuse Neg Hx     Mental illness Neg Hx      Social History     Tobacco Use    Smoking status: Never    Smokeless tobacco: Never   Substance Use Topics    Alcohol use: Never     Allergies   Allergen Reactions    Ace Inhibitors Cough    Fosinopril Cough     Cough after years of being on the medication       Current Outpatient Medications:     amoxicillin (AMOXIL) 500 mg capsule, , Disp: , Rfl:     apixaban (Eliquis) 2.5 mg, Take 1 tablet (2.5 mg total) by mouth 2 (two) times a day, Disp: 180 tablet, Rfl: 3    cinacalcet (SENSIPAR) 30 mg tablet, Take 1 tablet (30 mg total) by mouth daily, Disp: 90 tablet, Rfl: 1    dutasteride (AVODART) 0.5 mg capsule, Take 1 capsule (0.5 mg total) by mouth daily, Disp: 90 capsule, Rfl: 3    ferrous sulfate 325 (65 Fe) mg tablet, Take 1 tablet (325 mg total) by mouth daily with breakfast, Disp: 30 tablet, Rfl: 5    furosemide (LASIX) 80 mg tablet, Take 1 tablet (80 mg total) by mouth every morning, Disp: 90 tablet, Rfl: 3    lansoprazole (PREVACID) 30 mg capsule, TAKE 1 CAPSULE BY MOUTH EVERY DAY, Disp: 90 capsule, Rfl: 3    Multiple Vitamins-Minerals (MULTIVITAMIN ADULT EXTRA C PO), , Disp: , Rfl:     nystatin (MYCOSTATIN) powder, Apply topically 2 (two) times a day, Disp: 30 g, Rfl: 5    ondansetron (ZOFRAN-ODT) 4 mg disintegrating tablet, Take 1 tablet (4 mg total) by mouth every 6 (six) hours as needed for nausea or vomiting, Disp: 20 tablet, Rfl: 5    polyethylene glycol (MIRALAX) 17 g packet, Take by mouth 1 packet daily, Disp: , Rfl:     Probiotic Product (SOLUBLE FIBER/PROBIOTICS PO), Take by mouth wife reports the patient takes this daily, Disp: , Rfl:     simvastatin (ZOCOR) 10 mg tablet, Take 1 tablet (10 mg total) by mouth daily, Disp: 90 tablet, Rfl: 3    spironolactone (ALDACTONE) 25 mg tablet, Take 0.5 tablets (12.5 mg total) by mouth daily, Disp: 45 tablet, Rfl: 3    sulfamethoxazole-trimethoprim (BACTRIM DS) 800-160 mg per tablet, Take 1 tablet by mouth daily, Disp: 90 tablet, Rfl: 1    tamsulosin (FLOMAX) 0.4 mg, TAKE 1 CAPSULE BY MOUTH EVERY DAY WITH DINNER, Disp: 90 capsule, Rfl: 3    Review of Systems   Constitutional:  Negative for activity change, appetite change, fatigue and unexpected weight change. HENT:  Negative for trouble swallowing and voice change. Gastrointestinal:  Negative for constipation and diarrhea. Endocrine: Negative for polydipsia and polyuria.    Musculoskeletal: Negative for arthralgias and myalgias. Physical Exam:  Body mass index is 35.61 kg/m². /70   Pulse 71   Ht 5' 3" (1.6 m)   Wt 91.2 kg (201 lb)   SpO2 95%   BMI 35.61 kg/m²    Wt Readings from Last 3 Encounters:   10/20/23 91.2 kg (201 lb)   08/25/23 91.5 kg (201 lb 12.8 oz)   05/03/23 92.1 kg (203 lb)       Physical Exam  Constitutional:       Appearance: Normal appearance. He is obese. Comments: On wheelchair   Cardiovascular:      Rate and Rhythm: Normal rate. Pulmonary:      Effort: Pulmonary effort is normal.   Abdominal:      General: Bowel sounds are normal.      Palpations: Abdomen is soft. Neurological:      Mental Status: He is alert. Mental status is at baseline. Psychiatric:         Mood and Affect: Mood normal.         Radiology     DXA SCAN     CLINICAL HISTORY:  42-year-old male. OTHER RISK FACTORS:  Hyperparathyroidism, Limited mobility, Lasix therapy. PHARMACOLOGIC THERAPY FOR OSTEOPOROSIS:  None. TECHNIQUE: Bone densitometry was performed using a Holo[employer] Horizon A  bone densitometer. Regions of interest appear properly placed. COMPARISON: There are no prior DXA studies performed on this unit for comparison. RESULTS:      LUMBAR SPINE  Level: L1, L3, L4  (L2 vertebra excluded from analysis due to local structural abnormalities or artifact) :   BMD:  0.854  gm/cm2   T-score: -2.1      LEFT  TOTAL HIP:   BMD:  0.601  gm/cm2   T-score:  -2.9     LEFT  FEMORAL NECK:   BMD:  0.445  gm/cm2   T score: -3.6      LEFT  FOREARM:    33% RADIUS BMD:  0.701  gm/cm2  T-score:  -2.2      IMPRESSION:     1. Osteoporosis. [Based on the total left hip]     2. The 10 year risk of hip fracture is 9.1% with the 10 year risk of major osteoporotic fracture being 17% as calculated by the [facility] fracture risk assessment tool (FRAX, which is based on data generated by the [facility]   for Metabolic Bone Diseases).   3.  The current NOF guidelines recommend treating patients with a T-score of -2.5 or less in the lumbar spine or hips, or in post-menopausal women and men over the age of 48 with low bone mass (osteopenia) and a FRAX 10 year risk score of >3% for hip   fracture and/or >20% for major osteoporotic fracture. 4.  The NOF recommends follow-up DXA in 1-2 years after initiating therapy for osteoporosis and every 2 years thereafter. More frequent evaluation is appropriate for patients with conditions associated with rapid bone loss, such as glucocorticoid   therapy. The interval between DXA screenings may be longer for individuals without major risk factors and initial T-score in the normal or upper low bone mass range. The FRAX algorithm has certain limitations:  -FRAX has not been validated in patients currently or previously treated with pharmacotherapy for osteoporosis. In such patients, clinical judgment must be exercised in interpreting FRAX scores. -Prior hip, vertebral and humeral fragility fractures appear to confer greater risk of subsequent fracture than fractures at other sites (this is especially true for individuals with severe vertebral fractures), but quantification of this incremental   risk is not possible with FRAX. -FRAX underestimates fracture risk in patients with history of multiple fragility fractures. -FRAX may underestimate fracture risk in patients with history of frequent falls.  -It is not appropriate to use FRAX to monitor treatment response     ABDOMEN     INDICATION:   E21.3: Hyperparathyroidism, unspecified. COMPARISON:  Comparison is made to prior studies, most recent CT scan dated March 16, 2013. VIEWS:  AP supine        FINDINGS:     There is a large amount of stool throughout the colon but no evidence of bowel obstruction. Stool decreases the sensitivity for the detection of calculi. However, within this limitation no calculi are identified.      IVC filter projects over the lower lumbar spine. Multiple clips projected over the left upper and right lower quadrant. No pathologic calcifications or soft tissue masses. Visualized lung bases are clear. Visualized osseous structures are unremarkable for the patient's age. IMPRESSION:     No radiopaque calculi identified. However, large amount of stool throughout the colon obscures visualization of the kidneys and decreases the sensitivity of the exam for calculi. Latest Reference Range & Units 04/07/23 08:20 07/07/23 07:04 10/13/23 07:09   Sodium 135 - 147 mmol/L 138 140 141   Potassium 3.5 - 5.3 mmol/L 4.1 4.0 3.4 (L)   Chloride 96 - 108 mmol/L 108 107 103   CO2 21 - 32 mmol/L 29 31 33 (H)   Anion Gap mmol/L 1 (L) 2 5   BUN 5 - 25 mg/dL 24 30 (H) 31 (H)   Creatinine 0.60 - 1.30 mg/dL 0.92 0.97 0.90   GLUCOSE FASTING 65 - 99 mg/dL 109 (H) 112 (H) 109 (H)   Calcium 8.4 - 10.2 mg/dL 9.9 10.7 (H) 9.8   CORRECTED CALCIUM 8.3 - 10.1 mg/dL 10.5 (H) 11.3 (H)    AST 13 - 39 U/L 18 17 17   ALT 7 - 52 U/L 26 25 17   Alkaline Phosphatase 34 - 104 U/L 84 105 89   Total Protein 6.4 - 8.4 g/dL 6.1 (L) 6.4 6.2 (L)   Albumin 3.5 - 5.0 g/dL 3.2 (L) 3.3 (L) 3.7   TOTAL BILIRUBIN 0.20 - 1.00 mg/dL 0.48 0.42 0.46   eGFR ml/min/1.73sq m 78 73 79   Phosphorus 2.3 - 4.1 mg/dL  3.3    Magnesium 1.6 - 2.6 mg/dL  2.1    (L): Data is abnormally low  (H): Data is abnormally high     Latest Reference Range & Units 10/21/22 12:53 04/07/23 08:20 07/07/23 07:04 10/13/23 07:09   PARATHYROID HORMONE 12.0 - 88.0 pg/mL 176.7 (H) 177.8 (H) 125.5 (H) 234.7 (H)   PTH-Related Protein pmol/L <2.0      (H): Data is abnormally high     Latest Reference Range & Units 11/03/22 14:19 07/07/23 07:04 10/13/23 07:09   Vit D, 25-Hydroxy 30.0 - 100.0 ng/mL 32.3 28.0 (L) 23.8 (L)   (L): Data is abnormally low     Ref Range & Units 4/7/23  8:20 AM 10/21/22 12:53 PM 5/24/22  7:29 AM 12/8/20  8:49 AM 6/11/20  9:27 AM   PTH 18.4 - 80.1 pg/mL 177.8 High   176.7 High   147. 3 High   132.0 High   117.1 High          Ref Range & Units 4/7/23  8:20 AM 11/9/22 11:03 AM 11/3/22  2:19 PM 10/21/22  9:53 AM 10/20/22  5:41 AM 10/19/22 10:38 AM 10/18/22  4:52 AM   Sodium 135 - 147 mmol/L 138   139  139  138  138  138    Potassium 3.5 - 5.3 mmol/L 4.1   3.6  3.8  4.0  3.7  4.3 CM    Chloride 96 - 108 mmol/L 108   107  104  103  104  105    CO2 21 - 32 mmol/L 29   28  28  26  26  26    ANION GAP 4 - 13 mmol/L 1 Low    4  7  9  8  7    BUN 5 - 25 mg/dL 24   24  31 High   29 High   27 High   26 High     Creatinine 0.60 - 1.30 mg/dL 0.92  1.07 CM  1.34 High  CM  1.08 CM  1.00 CM  1.06 CM  0.87 CM    Comment: Standardized to IDMS reference method   Glucose, Fasting 65 - 99 mg/dL 109 High           Comment: Specimen collection should occur prior to Sulfasalazine administration due to the potential for falsely depressed results. Specimen collection should occur prior to Sulfapyridine administration due to the potential for falsely elevated results. Calcium 8.3 - 10.1 mg/dL 9.9   10.4 High   11.6 High   11.0 High   10.9 High   10.4 High     Corrected Calcium 8.3 - 10.1 mg/dL 10.5 High    11.2 High   12.6 High Panic   12.2 High Panic    11.7 High      Component Ref Range & Units 11/9/22 11:03 AM    Creatinine, Ur mg/dL 30.3    Creatinine Clearance 80.00 - 125.00 mL/min 41.03 Low     Creatinine, 24H Ur 0.8 - 1.8 g/24Hr 0.7 Low     TOTAL URINE VOLUME ml 2,400          Ref Range & Units 11/9/22 11:03 AM    24H Urine Volume mL 2,400    Calcium, 24H Urine 42 - 353 mg/24 hrs 220.8       Ref Range & Units 11/3/22  2:19 PM    Vit D, 25-Hydroxy 30.0 - 100.0 ng/mL 32.3        Impression & Plan:    Problem List Items Addressed This Visit    None    No orders of the defined types were placed in this encounter. There are no Patient Instructions on file for this visit. 1) Primary hyperparathyroidism w Hypercalcemia:- Not a surgical candidate by patient choice. Managed conservatively as listed below.       2) Osteoporosis:- Started on reclast 12/22, with lowest BMD at left femur at T score -3.8. Risk factors includes hyperparathyroidism, immobility, age. No recent falls/fractures. Recommend repeat DXA scan 12/24 2yr after reclast infusion. Repeat reclast 12/23- ordered. Recommend c/w calcium intake of 1000mg daily through diet and VitD intake 800IU daily- through supplementation     3) Hypercalcemia:- Most recent calcium level has improved to 9.8 s/p reclast infusion and also now on sensipar, although PTH still therefore will increase sensipar to 30mg BID, Repeat Calcium/PTH/VitD in 3 months. Annual 24hr urine calcium to assess nephroliathiasis risk- last check 11/22- repeat next visit. Recommend to keep well hydrated and to aim for urine output >2L per day. Avoid high salt food and stick to low salt diet to reduce risk of kidney stones      RTC in 6 months     Discussed with the patient and all questioned fully answered. He will call me if any problems arise.     Counseled patient on diagnostic results, prognosis, risk and benefit of treatment options, instruction for management, importance of treatment compliance, Risk  factor reduction and impressions      Srini Lyons MD

## 2023-10-23 DIAGNOSIS — K21.9 GASTROESOPHAGEAL REFLUX DISEASE WITHOUT ESOPHAGITIS: ICD-10-CM

## 2023-10-23 RX ORDER — LANSOPRAZOLE 30 MG/1
30 CAPSULE, DELAYED RELEASE ORAL DAILY
Qty: 90 CAPSULE | Refills: 3 | Status: SHIPPED | OUTPATIENT
Start: 2023-10-23

## 2023-11-03 ENCOUNTER — EVALUATION (OUTPATIENT)
Facility: CLINIC | Age: 83
End: 2023-11-03
Payer: COMMERCIAL

## 2023-11-03 DIAGNOSIS — E21.3 HYPERPARATHYROIDISM (HCC): ICD-10-CM

## 2023-11-03 DIAGNOSIS — Z74.09 IMPAIRED FUNCTIONAL MOBILITY, BALANCE, GAIT, AND ENDURANCE: Primary | ICD-10-CM

## 2023-11-03 DIAGNOSIS — E66.01 SEVERE OBESITY (BMI 35.0-39.9) WITH COMORBIDITY (HCC): ICD-10-CM

## 2023-11-03 PROCEDURE — 97110 THERAPEUTIC EXERCISES: CPT

## 2023-11-03 PROCEDURE — 97530 THERAPEUTIC ACTIVITIES: CPT

## 2023-11-03 PROCEDURE — 97162 PT EVAL MOD COMPLEX 30 MIN: CPT

## 2023-11-03 PROCEDURE — 97112 NEUROMUSCULAR REEDUCATION: CPT

## 2023-11-03 NOTE — PROGRESS NOTES
PT Evaluation     Today's date: 11/3/2023  Patient name: Leo Salazar  : 1940  MRN: [id number]  Referring provider: Tammy Villa MD  Dx:   Encounter Diagnosis     ICD-10-CM    1. Impaired functional mobility, balance, gait, and endurance  Z74.09       2. Hyperparathyroidism ([facility])  E21.3 Ambulatory Referral to Physical Therapy      3. Severe obesity (BMI 35.0-39. 9) with comorbidity ([facility])  E66.01 Ambulatory Referral to Physical Therapy          Start Time: 930  Stop Time:   Total time in clinic (min): 70 minutes    Assessment  Assessment details: Geri Pink is an 82year old patient presenting to [address] [place] [address] for evaluation regarding gait and balance deficits in adjunction to recent diagnosis of osteoporosis. Upon evaluation, Geri Pink demonstrates significant deficits and increased fall risk. Proximal BLE strength below 4/5 throughout aside from L hip flexion. Decreased endurance evidenced by ability to only complete 2MWT with distance traversed 130 feet. TUG and 5x STS required UE support to rise from chair, as well as scores being greater than fall risk cut of score indicative of increased fall risk and poor power generation. Gait speed classifies patient as increased fall risk, as well as non-community ambulator. These impairments limit their ability to perform daily activities as well as their previous level of function causing decreased quality of life. HEP delivered with emphasis on BLE strengthening with UE support. Education provided regarding HEP, POC, prognosis, goals for therapy, and functional outcome measure results in relation to patient's fall risk. Also worked on sit <> stand transfers to improve overall safety to reduce fall risk and working with walker to improve facilitation of increased safety during these activities. Cuing for increased anterior weight shift.      Patient requires continued skilled PT services in order to improve aforementioned impairments so that they are able to return to highest level of function possible. Without initiation of skilled PT services, patient will continue to have largely decreased functional endurance and mobility leading to increased caregiver burden and likelihood of falls and subsequent hospitalization. Impairments: abnormal or restricted ROM, activity intolerance, impaired balance, impaired physical strength and lacks appropriate home exercise program  Understanding of Dx/Px/POC: good   Prognosis: good    Goals  STG (4 weeks)   Improve (reduce) TUG score by 4 seconds indicating meaningful improvement in fall risk   Improve AMEZCUA score by 6 indicating meaningful improvement in fall risk   Improve/Reduce 5x STS score by 5 seconds indicating meaningful improvement in fall risk and power generation   Improve generalized strength by 1/5 to demonstrate improvement in facilitation of efficiency of functional activities and reduced fall risk   Improve 2MWT score by 50 feet improving endurance and community ambulation.    Improve gait speed by 0.05 m/s with LRAD     LTG (8 weeks)   Improve (reduce) TUG score by 4 seconds compared to PN indicating meaningful improvement in fall risk   Improve AMEZCUA score to > 45/56 indicating meaningful improvement in fall risk   Improve/Reduce 5x STS score by 4 seconds compared to PN seconds indicating meaningful improvement in fall risk and power generation   Will be able to complete full 6MWT  Improve gait speed by 0.05 m/s from PN with LRAD         Plan  Planned modality interventions: cryotherapy and thermotherapy: hydrocollator packs  Planned therapy interventions: manual therapy, neuromuscular re-education, patient education, postural training, self care, strengthening, stretching, therapeutic activities, therapeutic exercise, home exercise program, graded exercise, gait training, flexibility and balance  Frequency: 2x week  Duration in weeks: 8  Plan of Care beginning date: 11/3/2023  Plan of Care expiration date: 1/3/2023  Treatment plan discussed with: patient        Subjective Evaluation    History of Present Illness  Mechanism of injury: Nabil Vieira is an 82year old patient presenting to [facility] for evaluation regarding gait and balance deficits. Recent diagnosis of osteoporosis via last dexa scan. Reports that he is here to work on his strength. Last fall was around 7 months ago. Does exercises at home - already. Standing marching and squat at the counter are the two that he is doing currently. Wife reports that he has good follow through. "Everything we do we want to be able to do at home". Main goal is to get stronger to be able to reduce fall risk going forward. One dizzy spell in the last two weeks. Reports that he was standing at the counter and noted that he was lightheaded and room spinning. Felt like he was going to fall. Got PT at home after COVID - but wasn't able to go up the stairs. Doesn't go up currently, wife handles the caretaking duties. Home setup: Walkers throughout the home - theres no rugs. Anything that could possibly help. "We set it up as safe as we can be". Patient Goals  Patient goals for therapy: improved balance, increased motion and increased strength    Pain  No pain reported    Social Support  Steps to enter house: yes (4" step, 6" step from the living room to the kitchen)  1  Stairs in house: yes   12  Lives in: one-story house  Lives with: spouse    Employment status: not working    Diagnostic Tests  Bone scan: abnormal    FCE comments: Narrative & Impression  DXA SCAN     CLINICAL HISTORY:  51-year-old male. OTHER RISK FACTORS:  Hyperparathyroidism, Limited mobility, Lasix therapy. PHARMACOLOGIC THERAPY FOR OSTEOPOROSIS:  None. TECHNIQUE: Bone densitometry was performed using a [employer] Horizon A  bone densitometer. Regions of interest appear properly placed. COMPARISON: There are no prior DXA studies performed on this unit for comparison.      RESULTS: LUMBAR SPINE  Level: L1, L3, L4  (L2 vertebra excluded from analysis due to local structural abnormalities or artifact) :   BMD:  0.854  gm/cm2   T-score: -2.1         LEFT  TOTAL HIP:   BMD:  0.601  gm/cm2   T-score:  -2.9     LEFT  FEMORAL NECK:   BMD:  0.445  gm/cm2   T score: -3.6      LEFT  FOREARM:    33% RADIUS BMD:  0.701  gm/cm2  T-score:  -2.2         IMPRESSION:     1. Osteoporosis. [Based on the total left hip]     2. The 10 year risk of hip fracture is 9.1% with the 10 year risk of major osteoporotic fracture being 17% as calculated by the [facility] fracture risk assessment tool (FRAX, which is based on data generated by the [facility]   for Metabolic Bone Diseases). 3.  The current NOF guidelines recommend treating patients with a T-score of -2.5 or less in the lumbar spine or hips, or in post-menopausal women and men over the age of 48 with low bone mass (osteopenia) and a FRAX 10 year risk score of >3% for hip   fracture and/or >20% for major osteoporotic fracture. 4.  The NOF recommends follow-up DXA in 1-2 years after initiating therapy for osteoporosis and every 2 years thereafter. More frequent evaluation is appropriate for patients with conditions associated with rapid bone loss, such as glucocorticoid   therapy. The interval between DXA screenings may be longer for individuals without major risk factors and initial T-score in the normal or upper low bone mass range.        Treatments  Previous treatment: physical therapy        Objective      Balance Test 11/3    6 Minute Walk Test (ft):    2 Minute Walk Test (ft): 125 feet with RW    Gait Speed (ft/s): 0.31 m/s    5x Sit To Stand (s): 43.72 seconds BUE push to RW    ABC 41.25%   AMEZCUA NV    TU.98 seconds       Manual Muscle Testing - Hip Left Right   Flexion 4- 3+   Extension 3 3   Abduction 3+ 3+   External Rotation 3+ 3+     Manual Muscle Testing - Knee Left Right   Flexion 3+ 3+   Extension 4- 3+ Manual Muscle Testing - Ankle Left Right   Doriflexion 3+ 3-   Plantarflexion 3 3        Transfers    Sit To Stand Contact Guard     Does not perform bed mobility at home - uses a recliner to sleep in. Vitals 104/70, 94% Spo2, 73 bpm     94% SPO2 post 6MWT     Gait Assessment: Slow cautious shuffling pattern with rolling walker. Relatively steady, tendency to have RW get out in front of him too far. Largely forward head and rounded shoulders with thoracic kyphosis        Precautions: HTN, Chronic DVT,  Severe Osteoporosis, Hx of cervicalgia, hx of hernias       Manuals 11/3       Vitals **  AMEZCUA NV                                Neuro Re-Ed        Static Balance         Dynamic Balance         Compliant Surface         Vorx1        Head turns         Multidirectional Stepping         HKM         Side stepping  //bar 2x laps                Education  POC, HEP, prognosis, balance systems, home set up, goals for therapy, outcome measure results x10 mins       Ther Ex        SLRx3 Hip abd ext 2x10       Leg Press        Leg extension         Mini Squats  X10        Step ups fwd and lat         Hr/Tr X10 ea       Sit <> Stand  BUE 2x5               Ther Activity        Circuit Training         Transfer Training  Safety with stand <> sit transfer, increased weight shift. Gait Training                        Modalities                           Access Code: [email or web address]  URL: [email or web address]  Date: 11/03/2023  Prepared by: Hawa Ortega    Exercises  - Sit to Stand with Armchair  - 1 x daily - 7 x weekly - 3 sets - 10 reps  - Standing Hip Abduction with Counter Support  - 1 x daily - 7 x weekly - 3 sets - 10 reps  - Heel Raises with Counter Support  - 1 x daily - 7 x weekly - 3 sets - 10 reps  - Standing Hip Extension with Counter Support  - 1 x daily - 7 x weekly - 3 sets - 10 reps  - Side Stepping with Counter Support  - 1 x daily - 7 x weekly - 3 sets - 10 reps

## 2023-11-10 ENCOUNTER — OFFICE VISIT (OUTPATIENT)
Facility: CLINIC | Age: 83
End: 2023-11-10
Payer: COMMERCIAL

## 2023-11-10 DIAGNOSIS — E21.3 HYPERPARATHYROIDISM (HCC): Primary | ICD-10-CM

## 2023-11-10 DIAGNOSIS — E66.01 SEVERE OBESITY (BMI 35.0-39.9) WITH COMORBIDITY (HCC): ICD-10-CM

## 2023-11-10 DIAGNOSIS — Z74.09 IMPAIRED FUNCTIONAL MOBILITY, BALANCE, GAIT, AND ENDURANCE: ICD-10-CM

## 2023-11-10 PROCEDURE — 97110 THERAPEUTIC EXERCISES: CPT

## 2023-11-10 PROCEDURE — 97112 NEUROMUSCULAR REEDUCATION: CPT

## 2023-11-10 NOTE — PROGRESS NOTES
Daily Note     Today's date: 11/10/2023  Patient name: Elly Casper  : 1940  MRN: [id number]  Referring provider: Gladys Carcamo MD  Dx:   Encounter Diagnosis     ICD-10-CM    1. Hyperparathyroidism ([address])  E21.3       2. Severe obesity (BMI 35.0-39. 9) with comorbidity ([address])  E66.01       3. Impaired functional mobility, balance, gait, and endurance  Z74.09           Start Time: 08  Stop Time: 0850  Total time in clinic (min): 45 minutes    Subjective: Pt states that he did the exercises and felt pretty good after last time, no soreness just fatigue. Objective: See treatment diary below      Assessment: Pt participated in a skilled PT session focused on balance, gait, and strengthening intervention. AMEZCUA was assessed today and pt scored <45 and <42, which indicates high falls risk and is predictive of future falls. Strengthening intervention was progressed with addition of AW for different tasks and pt reported moderate challenge with performance. Cueing was performed to maintain upright stance throughout, however pt required frequent reminders. Mod tandem stance added to HEP for home. Raya navigation trialed and pt exhibited increased errors in final repetitions due to elevated fatigue. Pt will continue to benefit from skilled PT intervention focused on balance, gait, and strengthening intervention so that he may be able to navigate his environment with increased safety and stability. Plan: Continue per plan of care. Precautions: HTN, Chronic DVT,  Severe Osteoporosis, Hx of cervicalgia, hx of hernias     POC Expires: 1/3/2024      Objective Measures 11/3 11/10      Vitals **  AMEZCUA NV  36                              Neuro Re-Ed        Static Balance   -mod tandem stance attempted 2x10s sophie, requiring intermittent UE support    -stance EC 30s increased postural sway     -FT EO 1 min minimal postural sway.       Dynamic Balance   -raya navigation 4 hurdles lowest setting 2.5lb AW B UE support // 4x sideways & forwards      Compliant Surface         Vorx1        Head turns         Multidirectional Stepping         HKM         Side stepping  //bar 2x laps                Education  POC, HEP, prognosis, balance systems, home set up, goals for therapy, outcome measure results x10 mins Tests & measures: AMEZCUA, results, predicative of future falls      Ther Ex        SLRx3 Hip abd ext 2x10 Hip abduction and extension 2x10 2.5lb AW in //      Leg Press        Leg extension         Mini Squats  X10        Step ups fwd and lat         Hr/Tr X10 ea       Sit <> Stand  BUE 2x5 Two foams on chair, 3x5 no UE support      Standing marches  2x10 2.5lb AW in // B UE support      Ther Activity        Circuit Training         Transfer Training  Safety with stand <> sit transfer, increased weight shift.         Gait Training                        Modalities

## 2023-11-13 DIAGNOSIS — N40.0 BENIGN PROSTATIC HYPERPLASIA WITHOUT LOWER URINARY TRACT SYMPTOMS: ICD-10-CM

## 2023-11-13 RX ORDER — TAMSULOSIN HYDROCHLORIDE 0.4 MG/1
0.4 CAPSULE ORAL
Qty: 90 CAPSULE | Refills: 3 | Status: SHIPPED | OUTPATIENT
Start: 2023-11-13

## 2023-11-13 NOTE — TELEPHONE ENCOUNTER
Pt states he needs a refill for tamsulosin (FLOMAX) 0.4 mg 90 day supply sent to [facility]

## 2023-11-15 ENCOUNTER — APPOINTMENT (OUTPATIENT)
Facility: CLINIC | Age: 83
End: 2023-11-15
Payer: COMMERCIAL

## 2023-11-17 ENCOUNTER — OFFICE VISIT (OUTPATIENT)
Facility: CLINIC | Age: 83
End: 2023-11-17
Payer: COMMERCIAL

## 2023-11-17 DIAGNOSIS — E21.3 HYPERPARATHYROIDISM (HCC): Primary | ICD-10-CM

## 2023-11-17 DIAGNOSIS — Z74.09 IMPAIRED FUNCTIONAL MOBILITY, BALANCE, GAIT, AND ENDURANCE: ICD-10-CM

## 2023-11-17 DIAGNOSIS — E66.01 SEVERE OBESITY (BMI 35.0-39.9) WITH COMORBIDITY (HCC): ICD-10-CM

## 2023-11-17 PROCEDURE — 97110 THERAPEUTIC EXERCISES: CPT

## 2023-11-17 PROCEDURE — 97112 NEUROMUSCULAR REEDUCATION: CPT

## 2023-11-17 NOTE — PROGRESS NOTES
Daily Note     Today's date: 2023  Patient name: Caroline Kim  : 1940  MRN: [id number]  Referring provider: Ariel Higgins MD  Dx:   Encounter Diagnosis     ICD-10-CM    1. Hyperparathyroidism ([address])  E21.3       2. Severe obesity (BMI 35.0-39. 9) with comorbidity ([address])  E66.01       3. Impaired functional mobility, balance, gait, and endurance  Z74.09           Start Time: 1330  Stop Time: 1415  Total time in clinic (min): 45 minutes    Subjective: Pt has been performing his HEP regularly at home. Reports he does not want to do STS w/out UE support because "I won't ever use it in life."       Objective: See treatment diary below      Assessment: Pt participated in a skilled PT session focused on balance, gait, and strengthening intervention. Height progressed for raya navigation and pt able to complete multiple repetitions with intermittent rest breaks. Foam EO performed and pt exhibited significant postural sway with first repetition however reduced with multiple trials. Despite pt's reservations about performing STS without UE support, pt consented to performing exercise today, and was able to perform 2x10 w/out UE support. Pt will continue to benefit from skilled PT intervention focused on balance, gait, and strengthening intervention so that he may be able to navigate his environment with increased safety and stability. Plan: Continue per plan of care. Precautions: HTN, Chronic DVT,  Severe Osteoporosis, Hx of cervicalgia, hx of hernias     POC Expires: 1/3/2024      Objective Measures 11/3 11/10 11/17     Vitals **  AMEZCUA NV  36                              Neuro Re-Ed        Static Balance   -mod tandem stance attempted 2x10s sophie, requiring intermittent UE support    -stance EC 30s increased postural sway     -FT EO 1 min minimal postural sway.  -mod tandem stance attempted 2x10s sophie, requiring intermittent UE support    -stance EC on foam 3x30s increased postural sway       Dynamic Balance   -raya navigation 4 hurdles lowest setting 2.5lb AW B UE support // 4x sideways & forwards -raya navigation 4x forwards 4x sideways middle setting. 2.5lb AW in // B UE support     Compliant Surface         Vorx1        Head turns         Multidirectional Stepping         HKM         Side stepping  //bar 2x laps                Education  POC, HEP, prognosis, balance systems, home set up, goals for therapy, outcome measure results x10 mins Tests & measures: AMEZCUA, results, predicative of future falls      Ther Ex        SLRx3 Hip abd ext 2x10 Hip abduction and extension 2x10 2.5lb AW in // Hip abd, ext, 2x10 2.5lbs     Leg Press        Leg extension         Mini Squats  X10        Step ups fwd and lat         Hr/Tr X10 ea       Sit <> Stand  BUE 2x5 Two foams on chair, 3x5 no UE support Two foams on chair, 1x10 no UE support. 1x5 no UE support but required Karen     Standing marches  2x10 2.5lb AW in // B UE support 2x10 2.5lb AW in // B UE support     Ther Activity        Circuit Training         Transfer Training  Safety with stand <> sit transfer, increased weight shift.         Gait Training                        Modalities

## 2023-11-20 ENCOUNTER — OFFICE VISIT (OUTPATIENT)
Facility: CLINIC | Age: 83
End: 2023-11-20
Payer: COMMERCIAL

## 2023-11-20 DIAGNOSIS — E66.01 SEVERE OBESITY (BMI 35.0-39.9) WITH COMORBIDITY (HCC): ICD-10-CM

## 2023-11-20 DIAGNOSIS — E21.3 HYPERPARATHYROIDISM (HCC): Primary | ICD-10-CM

## 2023-11-20 DIAGNOSIS — Z74.09 IMPAIRED FUNCTIONAL MOBILITY, BALANCE, GAIT, AND ENDURANCE: ICD-10-CM

## 2023-11-20 PROCEDURE — 97112 NEUROMUSCULAR REEDUCATION: CPT

## 2023-11-20 NOTE — PROGRESS NOTES
Daily Note     Today's date: 2023  Patient name: Medardo Barnes  : 1940  MRN: [id number]  Referring provider: Kalee Richardson MD  Dx:   Encounter Diagnosis     ICD-10-CM    1. Hyperparathyroidism ([address])  E21.3       2. Severe obesity (BMI 35.0-39. 9) with comorbidity ([address])  E66.01       3. Impaired functional mobility, balance, gait, and endurance  Z74.09           Start Time: 1715  Stop Time: 1803  Total time in clinic (min): 48 minutes    Subjective: Pt states that his knees felt a little weak after last session, reports no lightheadedness and no falls. As a whole feels pretty good today. Objective: See treatment diary below      Assessment: Pt participated in a skilled PT session focused on balance, gait, and strengthening. Stepping over hurdles performed with UE support and pt exhibited improved clearance today as compared to last visit. Gait with head turns added to encourage visual scanning of environment for obstacles and challenge balance, pt exhibited reduced stride length and "something before dizziness" with performance. Standing ball slam performed and pt exhibited increased ability to stand up tall and reach overhead with each repetition. Pt will continue to benefit from skilled PT intervention so that he may be able to navigate his environment with increased safety and stability. Plan: Continue per plan of care. Precautions: HTN, Chronic DVT,  Severe Osteoporosis, Hx of cervicalgia, hx of hernias     POC Expires: 1/3/2024      Objective Measures 11/3 11/10 11/17 11/20    Vitals **  AMEZCUA NV  36                              Neuro Re-Ed        Static Balance   -mod tandem stance attempted 2x10s sophie, requiring intermittent UE support    -stance EC 30s increased postural sway     -FT EO 1 min minimal postural sway.  -mod tandem stance attempted 2x10s sophie, requiring intermittent UE support    -stance EC on foam 3x30s increased postural sway   -stance EC on foam 3x30"     Dynamic Balance   -raya navigation 4 hurdles lowest setting 2.5lb AW B UE support // 4x sideways & forwards -raya navigation 4x forwards 4x sideways middle setting. 2.5lb AW in // B UE support -raya navigation 4x forwards, 4x sideways, middle setting. 2.5lb AW in // B UE support    Compliant Surface     -Foam EC 3x30"    Vorx1        Head turns     -gait with HT 6x in // supervision no UE support    Multidirectional Stepping         HKM         Side stepping  //bar 2x laps        Overhead ball slam    Reaching overhead and slamming down 5lb ball 10x    Education  POC, HEP, prognosis, balance systems, home set up, goals for therapy, outcome measure results x10 mins Tests & measures: AMEZCUA, results, predicative of future falls      Ther Ex        SLRx3 Hip abd ext 2x10 Hip abduction and extension 2x10 2.5lb AW in // Hip abd, ext, 2x10 2.5lbs     Leg Press        Leg extension         Mini Squats  X10        Step ups fwd and lat         Hr/Tr X10 ea       Sit <> Stand  BUE 2x5 Two foams on chair, 3x5 no UE support Two foams on chair, 1x10 no UE support. 1x5 no UE support but required Karen Two foams on 1x10 no UE support, 1x5 no UE support but reqiured Karen    Standing marches  2x10 2.5lb AW in // B UE support 2x10 2.5lb AW in // B UE support     Ther Activity        Circuit Training         Transfer Training  Safety with stand <> sit transfer, increased weight shift.         Gait Training                        Modalities

## 2023-11-30 ENCOUNTER — RA CDI HCC (OUTPATIENT)
Dept: OTHER | Facility: HOSPITAL | Age: 83
End: 2023-11-30

## 2023-11-30 ENCOUNTER — LAB (OUTPATIENT)
Dept: LAB | Facility: HOSPITAL | Age: 83
End: 2023-11-30
Payer: COMMERCIAL

## 2023-11-30 DIAGNOSIS — D50.9 IRON DEFICIENCY ANEMIA, UNSPECIFIED IRON DEFICIENCY ANEMIA TYPE: ICD-10-CM

## 2023-11-30 DIAGNOSIS — E21.3 HYPERPARATHYROIDISM (HCC): ICD-10-CM

## 2023-11-30 LAB
ALBUMIN SERPL BCP-MCNC: 3.5 G/DL (ref 3.5–5)
ALP SERPL-CCNC: 99 U/L (ref 34–104)
ALT SERPL W P-5'-P-CCNC: 21 U/L (ref 7–52)
ANION GAP SERPL CALCULATED.3IONS-SCNC: 7 MMOL/L
AST SERPL W P-5'-P-CCNC: 19 U/L (ref 13–39)
BILIRUB SERPL-MCNC: 0.41 MG/DL (ref 0.2–1)
BUN SERPL-MCNC: 27 MG/DL (ref 5–25)
CALCIUM SERPL-MCNC: 8.9 MG/DL (ref 8.4–10.2)
CHLORIDE SERPL-SCNC: 103 MMOL/L (ref 96–108)
CO2 SERPL-SCNC: 32 MMOL/L (ref 21–32)
CREAT SERPL-MCNC: 0.9 MG/DL (ref 0.6–1.3)
ERYTHROCYTE [DISTWIDTH] IN BLOOD BY AUTOMATED COUNT: 15.2 % (ref 11.6–15.1)
FERRITIN SERPL-MCNC: 44 NG/ML (ref 24–336)
GFR SERPL CREATININE-BSD FRML MDRD: 79 ML/MIN/1.73SQ M
GLUCOSE P FAST SERPL-MCNC: 114 MG/DL (ref 65–99)
HCT VFR BLD AUTO: 47.8 % (ref 36.5–49.3)
HGB BLD-MCNC: 15.5 G/DL (ref 12–17)
IRON SATN MFR SERPL: 24 % (ref 15–50)
IRON SERPL-MCNC: 81 UG/DL (ref 50–212)
MCH RBC QN AUTO: 30.3 PG (ref 26.8–34.3)
MCHC RBC AUTO-ENTMCNC: 32.4 G/DL (ref 31.4–37.4)
MCV RBC AUTO: 94 FL (ref 82–98)
PLATELET # BLD AUTO: 400 THOUSANDS/UL (ref 149–390)
PMV BLD AUTO: 10.3 FL (ref 8.9–12.7)
POTASSIUM SERPL-SCNC: 3.7 MMOL/L (ref 3.5–5.3)
PROT SERPL-MCNC: 5.9 G/DL (ref 6.4–8.4)
RBC # BLD AUTO: 5.11 MILLION/UL (ref 3.88–5.62)
SODIUM SERPL-SCNC: 142 MMOL/L (ref 135–147)
TIBC SERPL-MCNC: 342 UG/DL (ref 250–450)
UIBC SERPL-MCNC: 261 UG/DL (ref 155–355)
WBC # BLD AUTO: 9.36 THOUSAND/UL (ref 4.31–10.16)

## 2023-11-30 PROCEDURE — 85027 COMPLETE CBC AUTOMATED: CPT

## 2023-11-30 PROCEDURE — 80053 COMPREHEN METABOLIC PANEL: CPT

## 2023-11-30 PROCEDURE — 83550 IRON BINDING TEST: CPT

## 2023-11-30 PROCEDURE — 36415 COLL VENOUS BLD VENIPUNCTURE: CPT

## 2023-11-30 PROCEDURE — 83540 ASSAY OF IRON: CPT

## 2023-11-30 PROCEDURE — 82728 ASSAY OF FERRITIN: CPT

## 2023-11-30 NOTE — PROGRESS NOTES
Previous suggestion used  [facility] [address] [facility] coding opportunities       Chart reviewed, no opportunity found: [address]        Patients Insurance     Medicare Insurance: [employer]

## 2023-12-01 ENCOUNTER — OFFICE VISIT (OUTPATIENT)
Facility: CLINIC | Age: 83
End: 2023-12-01
Payer: COMMERCIAL

## 2023-12-01 DIAGNOSIS — E66.01 SEVERE OBESITY (BMI 35.0-39.9) WITH COMORBIDITY (HCC): ICD-10-CM

## 2023-12-01 DIAGNOSIS — E21.3 HYPERPARATHYROIDISM (HCC): Primary | ICD-10-CM

## 2023-12-01 DIAGNOSIS — Z74.09 IMPAIRED FUNCTIONAL MOBILITY, BALANCE, GAIT, AND ENDURANCE: ICD-10-CM

## 2023-12-01 PROCEDURE — 97112 NEUROMUSCULAR REEDUCATION: CPT

## 2023-12-01 NOTE — PROGRESS NOTES
Daily Note     Today's date: 2023  Patient name: Darrell Aragon  : 1940  MRN: [id number]  Referring provider: Supa Esparza MD  Dx:   Encounter Diagnosis     ICD-10-CM    1. Hyperparathyroidism ([address])  E21.3       2. Severe obesity (BMI 35.0-39. 9) with comorbidity ([address])  E66.01       3. Impaired functional mobility, balance, gait, and endurance  Z74.09           Start Time: 853  Stop Time: 930  Total time in clinic (min): 37 minutes    Subjective: Pt arrived to appointment 8 minutes late, but cannot stay late due to breakfast plans. He did not have soreness after last session. He states that he does not think the overhead ball slam will work because he has twisted ligaments in his shoulders, however, he did not have any pain or soreness after last session. Objective: See treatment diary below      Assessment: Pt participated in a skilled PT session focused on balance, strength, and gait training. Pt exhibited improved success with foot clearance over hurdles today. Upright boom whacker taps were performed in place of ball slam to challenge upright posture and pt exhibited reduced stride length and use of frequent VC to encourage upright position, reflecting ongoing weakness of the lumbar & thoracic extensors. Pt will continue to benefit from skilled PT intervention focused on balance, strengthening, and gait training so that he may be able to navigate his environment safely and with increased stability. Plan: Continue per plan of care. Precautions: HTN, Chronic DVT,  Severe Osteoporosis, Hx of cervicalgia, hx of hernias     POC Expires: 1/3/2024      Objective Measures 11/3 11/10 11/17 11/20 12/1/23   Vitals **  AMEZCUA NV  36                              Neuro Re-Ed        Static Balance   -mod tandem stance attempted 2x10s sophie, requiring intermittent UE support    -stance EC 30s increased postural sway     -FT EO 1 min minimal postural sway.  -mod tandem stance attempted 2x10s sophie, requiring intermittent UE support    -stance EC on foam 3x30s increased postural sway   -stance EC on foam 3x30"  Stance EC on foam 3x30"   Dynamic Balance   -raya navigation 4 hurdles lowest setting 2.5lb AW B UE support // 4x sideways & forwards -raya navigation 4x forwards 4x sideways middle setting. 2.5lb AW in // B UE support -raya navigation 4x forwards, 4x sideways, middle setting. 2.5lb AW in // B UE support -raya navigation 4x fwds middle setting 3lb AW in // B UE support    -boomwhacker taps walking in // no UE support to challenge upright posture, 3# AW on feet   Vorx1        Head turns     -gait with HT 6x in // supervision no UE support -gait with HT 6x in // supervision no UE support V & H   Multidirectional Stepping         HKM         Side stepping  //bar 2x laps        Overhead ball slam    Reaching overhead and slamming down 5lb ball 10x Reaching overhead and slamming down 5lb ball 10x   Education  POC, HEP, prognosis, balance systems, home set up, goals for therapy, outcome measure results x10 mins Tests & measures: AMEZCUA, results, predicative of future falls      Ther Ex        SLRx3 Hip abd ext 2x10 Hip abduction and extension 2x10 2.5lb AW in // Hip abd, ext, 2x10 2.5lbs     Leg Press        Leg extension         Mini Squats  X10        Step ups fwd and lat         Hr/Tr X10 ea       Sit <> Stand  BUE 2x5 Two foams on chair, 3x5 no UE support Two foams on chair, 1x10 no UE support. 1x5 no UE support but required Karen Two foams on 1x10 no UE support, 1x5 no UE support but reqiured Karen Two foams on chair 1x13 Karen    Standing marches  2x10 2.5lb AW in // B UE support 2x10 2.5lb AW in // B UE support  2x10 3lb AW   Ther Activity        Circuit Training         Transfer Training  Safety with stand <> sit transfer, increased weight shift.         Gait Training                        Modalities

## 2023-12-08 ENCOUNTER — OFFICE VISIT (OUTPATIENT)
Facility: CLINIC | Age: 83
End: 2023-12-08
Payer: COMMERCIAL

## 2023-12-08 DIAGNOSIS — Z74.09 IMPAIRED FUNCTIONAL MOBILITY, BALANCE, GAIT, AND ENDURANCE: ICD-10-CM

## 2023-12-08 DIAGNOSIS — E66.01 SEVERE OBESITY (BMI 35.0-39.9) WITH COMORBIDITY (HCC): ICD-10-CM

## 2023-12-08 DIAGNOSIS — E21.3 HYPERPARATHYROIDISM (HCC): Primary | ICD-10-CM

## 2023-12-08 PROCEDURE — 97110 THERAPEUTIC EXERCISES: CPT

## 2023-12-08 PROCEDURE — 97112 NEUROMUSCULAR REEDUCATION: CPT

## 2023-12-08 NOTE — PROGRESS NOTES
Daily Note     Today's date: 2023  Patient name: Abhishek Wheeler  : 1940  MRN: [id number]  Referring provider: Reji Monahan MD  Dx:   Encounter Diagnosis     ICD-10-CM    1. Hyperparathyroidism ([address])  E21.3       2. Severe obesity (BMI 35.0-39. 9) with comorbidity ([address])  E66.01       3. Impaired functional mobility, balance, gait, and endurance  Z74.09           Start Time: 1232  Stop Time: 1325  Total time in clinic (min): 53 minutes    Subjective: Reports that he is doing well. His knees are a little sore. Feels that he is getting a little stronger. Objective: See treatment diary below      Assessment: Tolerated treatment well. Able to perform some resisted ambulation overground with rolling walker during today's visit - patient tolerated two consecutive 40 foot bouts while reporting that his knees felt a little better. Showing improved activity tolerance. Nearly doubled his two minute walk distance compared to initial evaluation showing good progress towards endurance. Able to do some sit <> stands without external assistance from elevated seat height - roughly 50% of repetitions. Continues to have good motivation for participation during sessions - always willing to perform further repetitions. Patient demonstrated fatigue post treatment, exhibited good technique with therapeutic exercises, and would benefit from continued PT      Plan: Continue per plan of care. Progress treatment as tolerated. Precautions: HTN, Chronic DVT,  Severe Osteoporosis, Hx of cervicalgia, hx of hernias, Fall risk     POC Expires: 1/3/2024      Objective Measures 11/3 11/10 11/17 11/20 12/1/23 12/8    Vitals **  AMEZCUA NV  36                                  Neuro Re-Ed      2  feet    Static Balance   -mod tandem stance attempted 2x10s sophie, requiring intermittent UE support    -stance EC 30s increased postural sway     -FT EO 1 min minimal postural sway.  -mod tandem stance attempted 2x10s sophie, requiring intermittent UE support    -stance EC on foam 3x30s increased postural sway   -stance EC on foam 3x30"  Stance EC on foam 3x30" EC foam //bar 3x30" CGAx1   Dynamic Balance   -raya navigation 4 hurdles lowest setting 2.5lb AW B UE support // 4x sideways & forwards -raya navigation 4x forwards 4x sideways middle setting. 2.5lb AW in // B UE support -raya navigation 4x forwards, 4x sideways, middle setting. 2.5lb AW in // B UE support -raya navigation 4x fwds middle setting 3lb AW in // B UE support    -boomwhacker taps walking in // no UE support to challenge upright posture, 3# AW on feet Raya 2x2 fwd middle height no AW BUE support    Vorx1         Head turns     -gait with HT 6x in // supervision no UE support -gait with HT 6x in // supervision no UE support V & H    Multidirectional Stepping       Lateral stepping 2x laps no AW    HKM          Resisted Amb       Maroon TB 2x2 laps (50' per lap) with RW overground    Side stepping  //bar 2x laps         Overhead ball slam    Reaching overhead and slamming down 5lb ball 10x Reaching overhead and slamming down 5lb ball 10x    Education  POC, HEP, prognosis, balance systems, home set up, goals for therapy, outcome measure results x10 mins Tests & measures: AMEZCUA, results, predicative of future falls       Ther Ex         SLRx3 Hip abd ext 2x10 Hip abduction and extension 2x10 2.5lb AW in // Hip abd, ext, 2x10 2.5lbs      Leg Press         Leg extension          Mini Squats  X10         Step ups fwd and lat          Hr/Tr X10 ea        Sit <> Stand  BUE 2x5 Two foams on chair, 3x5 no UE support Two foams on chair, 1x10 no UE support.  1x5 no UE support but required Karen Two foams on 1x10 no UE support, 1x5 no UE support but reqiured Karen Two foams on chair 1x13 Karen  Two foams on chair 2x fatigue    Standing marches  2x10 2.5lb AW in // B UE support 2x10 2.5lb AW in // B UE support  2x10 3lb AW    Ther Activity         Circuit Training          Transfer Training  Safety with stand <> sit transfer, increased weight shift.          Gait Training                           Modalities

## 2023-12-15 ENCOUNTER — APPOINTMENT (OUTPATIENT)
Facility: CLINIC | Age: 83
End: 2023-12-15
Payer: COMMERCIAL

## 2023-12-22 ENCOUNTER — OFFICE VISIT (OUTPATIENT)
Facility: CLINIC | Age: 83
End: 2023-12-22
Payer: COMMERCIAL

## 2023-12-22 ENCOUNTER — OFFICE VISIT (OUTPATIENT)
Dept: FAMILY MEDICINE CLINIC | Facility: HOSPITAL | Age: 83
End: 2023-12-22
Payer: COMMERCIAL

## 2023-12-22 VITALS
HEART RATE: 76 BPM | WEIGHT: 200 LBS | DIASTOLIC BLOOD PRESSURE: 68 MMHG | RESPIRATION RATE: 16 BRPM | HEIGHT: 63 IN | SYSTOLIC BLOOD PRESSURE: 118 MMHG | BODY MASS INDEX: 35.44 KG/M2 | OXYGEN SATURATION: 95 %

## 2023-12-22 DIAGNOSIS — Z00.00 MEDICARE ANNUAL WELLNESS VISIT, SUBSEQUENT: Primary | ICD-10-CM

## 2023-12-22 DIAGNOSIS — T84.53XA INFECTION ASSOCIATED WITH INTERNAL RIGHT KNEE PROSTHESIS, INITIAL ENCOUNTER (HCC): ICD-10-CM

## 2023-12-22 DIAGNOSIS — E21.3 HYPERPARATHYROIDISM (HCC): Primary | ICD-10-CM

## 2023-12-22 DIAGNOSIS — I10 ESSENTIAL HYPERTENSION: ICD-10-CM

## 2023-12-22 DIAGNOSIS — Z74.09 IMPAIRED FUNCTIONAL MOBILITY, BALANCE, GAIT, AND ENDURANCE: ICD-10-CM

## 2023-12-22 DIAGNOSIS — E66.01 SEVERE OBESITY (BMI 35.0-39.9) WITH COMORBIDITY (HCC): ICD-10-CM

## 2023-12-22 PROBLEM — S81.801A WOUND OF RIGHT LEG, INITIAL ENCOUNTER: Status: RESOLVED | Noted: 2023-05-03 | Resolved: 2023-12-22

## 2023-12-22 PROCEDURE — G0439 PPPS, SUBSEQ VISIT: HCPCS | Performed by: INTERNAL MEDICINE

## 2023-12-22 PROCEDURE — 97110 THERAPEUTIC EXERCISES: CPT

## 2023-12-22 PROCEDURE — 97112 NEUROMUSCULAR REEDUCATION: CPT

## 2023-12-22 RX ORDER — SULFAMETHOXAZOLE AND TRIMETHOPRIM 800; 160 MG/1; MG/1
1 TABLET ORAL DAILY
Qty: 90 TABLET | Refills: 3 | Status: SHIPPED | OUTPATIENT
Start: 2023-12-22 | End: 2024-12-16

## 2023-12-22 RX ORDER — FUROSEMIDE 80 MG
80 TABLET ORAL EVERY MORNING
Qty: 90 TABLET | Refills: 3 | Status: SHIPPED | OUTPATIENT
Start: 2023-12-22

## 2023-12-22 NOTE — PROGRESS NOTES
"Daily Note     Today's date: 2023  Patient name: Isai Abbott  : 1940  MRN: [id number]  Referring provider: Josi Kuhn MD  Dx:   Encounter Diagnosis     ICD-10-CM    1. Hyperparathyroidism (HCC)  E21.3       2. Severe obesity (BMI 35.0-39.9) with comorbidity ([facility])  E66.01       3. Impaired functional mobility, balance, gait, and endurance  Z74.09                      Subjective: Pt states he would like to work on his legs today so that he can learn how to climb the stairs.       Objective: See treatment diary below      Assessment: Pt participated in a skilled PT session focused on balance, gait, and strengthening. Step ups were added into the parallel bars as pt noted new goal of being able to climb some stairs today. Chair was moved away from wall for STS so that pt could not push against the chair with LE's to compensate for LE weakness with rising. Pt was not able to perform as many repetitions today without LE support. Pt too fatigued after LE exercises to trial hurdles. Pt will continue to benefit from skilled PT intervention focused on balance, gait, and strengthening so that he can navigate his environment safely and with increased stability.       Plan: Continue per plan of care.      Precautions: HTN, Chronic DVT,  Severe Osteoporosis, Hx of cervicalgia, hx of hernias, Fall risk     POC Expires: 1/3/2024      Objective Measures 11/3 11/10 11/17 11/20 12/1/23 12/8  12/21   Vitals **  AMEZCUA NV  36                                      Neuro Re-Ed      2  feet     Static Balance   -mod tandem stance attempted 2x10s sophie, requiring intermittent UE support    -stance EC 30s increased postural sway     -FT EO 1 min minimal postural sway. -mod tandem stance attempted 2x10s sophie, requiring intermittent UE support    -stance EC on foam 3x30s increased postural sway   -stance EC on foam 3x30\"  Stance EC on foam 3x30\" EC foam //bar 3x30\" CGAx1 EC foam //bar 3x30\" CGAx1   Dynamic Balance   " -raya navigation 4 hurdles lowest setting 2.5lb AW B UE support // 4x sideways & forwards -raya navigation 4x forwards 4x sideways middle setting. 2.5lb AW in // B UE support -raya navigation 4x forwards, 4x sideways, middle setting. 2.5lb AW in // B UE support -raya navigation 4x fwds middle setting 3lb AW in // B UE support    -boomwhacker taps walking in // no UE support to challenge upright posture, 3# AW on feet Raya 2x2 fwd middle height no AW BUE support  -boomwhacker taps walking in // no UE support to challenge upright posture, 3# AW on feet   Vorx1          Head turns     -gait with HT 6x in // supervision no UE support -gait with HT 6x in // supervision no UE support V & H     Multidirectional Stepping       Lateral stepping 2x laps no AW     HKM           Resisted Amb       Maroon TB 2x2 laps (50' per lap) with RW overground     Side stepping  //bar 2x laps          Overhead ball slam    Reaching overhead and slamming down 5lb ball 10x Reaching overhead and slamming down 5lb ball 10x     Education  POC, HEP, prognosis, balance systems, home set up, goals for therapy, outcome measure results x10 mins Tests & measures: AMEZCUA, results, predicative of future falls        Ther Ex          SLRx3 Hip abd ext 2x10 Hip abduction and extension 2x10 2.5lb AW in // Hip abd, ext, 2x10 2.5lbs       Leg Press          Leg extension           Mini Squats  X10       2x20   Step ups fwd and lat        Fwd onto 6 inch step, 2x14 each LE    Hr/Tr X10 ea         Sit <> Stand  BUE 2x5 Two foams on chair, 3x5 no UE support Two foams on chair, 1x10 no UE support. 1x5 no UE support but required Karen Two foams on 1x10 no UE support, 1x5 no UE support but reqiured Karen Two foams on chair 1x13 Karen  Two foams on chair 2x fatigue  Two foam on chair 1x7 no Ue support, 1x10 no UE support, 1x6 no UE support.    Standing marches  2x10 2.5lb AW in // B UE support 2x10 2.5lb AW in // B UE support  2x10 3lb AW  2x10 3lb AW   Ther  Activity          Circuit Training           Transfer Training  Safety with stand <> sit transfer, increased weight shift.          Gait Training                              Modalities

## 2023-12-22 NOTE — PATIENT INSTRUCTIONS
Please get the vaccination against the RSV virus at your local pharmacy!    Medicare Preventive Visit Patient Instructions  Thank you for completing your Welcome to Medicare Visit or Medicare Annual Wellness Visit today. Your next wellness visit will be due in one year (12/22/2024).  The screening/preventive services that you may require over the next 5-10 years are detailed below. Some tests may not apply to you based off risk factors and/or age. Screening tests ordered at today's visit but not completed yet may show as past due. Also, please note that scanned in results may not display below.  Preventive Screenings:  Service Recommendations Previous Testing/Comments   Colorectal Cancer Screening  Colonoscopy    Fecal Occult Blood Test (FOBT)/Fecal Immunochemical Test (FIT)  Fecal DNA/Cologuard Test  Flexible Sigmoidoscopy Age: 45-75 years old   Colonoscopy: every 10 years (May be performed more frequently if at higher risk)  OR  FOBT/FIT: every 1 year  OR  Cologuard: every 3 years  OR  Sigmoidoscopy: every 5 years  Screening may be recommended earlier than age 45 if at higher risk for colorectal cancer. Also, an individualized decision between you and your healthcare provider will decide whether screening between the ages of 76-85 would be appropriate. Colonoscopy: 07/20/2017  FOBT/FIT: Not on file  Cologuard: Not on file  Sigmoidoscopy: Not on file          Prostate Cancer Screening Individualized decision between patient and health care provider in men between ages of 55-69   Medicare will cover every 12 months beginning on the day after your 50th birthday PSA: No results in last 5 years     Screening Not Indicated     Hepatitis C Screening Once for adults born between 1945 and 1965  More frequently in patients at high risk for Hepatitis C Hep C Antibody: Not on file        Diabetes Screening 1-2 times per year if you're at risk for diabetes or have pre-diabetes Fasting glucose: 114 mg/dL (11/30/2023)  A1C: 5.7  % (5/24/2022)  Screening Current   Cholesterol Screening Once every 5 years if you don't have a lipid disorder. May order more often based on risk factors. Lipid panel: 05/24/2022  Screening Not Indicated  History Lipid Disorder      Other Preventive Screenings Covered by Medicare:  Abdominal Aortic Aneurysm (AAA) Screening: covered once if your at risk. You're considered to be at risk if you have a family history of AAA or a male between the age of 65-75 who smoking at least 100 cigarettes in your lifetime.  Lung Cancer Screening: covers low dose CT scan once per year if you meet all of the following conditions: (1) Age 55-77; (2) No signs or symptoms of lung cancer; (3) Current smoker or have quit smoking within the last 15 years; (4) You have a tobacco smoking history of at least 20 pack years (packs per day x number of years you smoked); (5) You get a written order from a healthcare provider.  Glaucoma Screening: covered annually if you're considered high risk: (1) You have diabetes OR (2) Family history of glaucoma OR (3)  aged 50 and older OR (4)  American aged 65 and older  Osteoporosis Screening: covered every 2 years if you meet one of the following conditions: (1) Have a vertebral abnormality; (2) On glucocorticoid therapy for more than 3 months; (3) Have primary hyperparathyroidism; (4) On osteoporosis medications and need to assess response to drug therapy.  HIV Screening: covered annually if you're between the age of 15-65. Also covered annually if you are younger than 15 and older than 65 with risk factors for HIV infection. For pregnant patients, it is covered up to 3 times per pregnancy.    Immunizations:  Immunization Recommendations   Influenza Vaccine Annual influenza vaccination during flu season is recommended for all persons aged >= 6 months who do not have contraindications   Pneumococcal Vaccine   * Pneumococcal conjugate vaccine = PCV13 (Prevnar 13), PCV15  (Vaxneuvance), PCV20 (Prevnar 20)  * Pneumococcal polysaccharide vaccine = PPSV23 (Pneumovax) Adults 19-63 yo with certain risk factors or if 65+ yo  If never received any pneumonia vaccine: recommend Prevnar 20 (PCV20)  Give PCV20 if previously received 1 dose of PCV13 or PPSV23   Hepatitis B Vaccine 3 dose series if at intermediate or high risk (ex: diabetes, end stage renal disease, liver disease)   Respiratory syncytial virus (RSV) Vaccine - COVERED BY MEDICARE PART D  * RSVPreF3 (Arexvy) CDC recommends that adults 60 years of age and older may receive a single dose of RSV vaccine using shared clinical decision-making (SCDM)   Tetanus (Td) Vaccine - COST NOT COVERED BY MEDICARE PART B Following completion of primary series, a booster dose should be given every 10 years to maintain immunity against tetanus. Td may also be given as tetanus wound prophylaxis.   Tdap Vaccine - COST NOT COVERED BY MEDICARE PART B Recommended at least once for all adults. For pregnant patients, recommended with each pregnancy.   Shingles Vaccine (Shingrix) - COST NOT COVERED BY MEDICARE PART B  2 shot series recommended in those 19 years and older who have or will have weakened immune systems or those 50 years and older     Health Maintenance Due:  There are no preventive care reminders to display for this patient.  Immunizations Due:      Topic Date Due   • HIB Vaccine (1 of 1 - Risk 1-dose series) Never done   • Meningococcal ACWY Vaccine (1 - Risk 2-dose series) Never done   • COVID-19 Vaccine (5 - 2023-24 season) 09/01/2023     Advance Directives   What are advance directives?  Advance directives are legal documents that state your wishes and plans for medical care. These plans are made ahead of time in case you lose your ability to make decisions for yourself. Advance directives can apply to any medical decision, such as the treatments you want, and if you want to donate organs.   What are the types of advance directives?  There  are many types of advance directives, and each state has rules about how to use them. You may choose a combination of any of the following:  Living will:  This is a written record of the treatment you want. You can also choose which treatments you do not want, which to limit, and which to stop at a certain time. This includes surgery, medicine, IV fluid, and tube feedings.   Durable power of  for healthcare (DPAHC):  This is a written record that states who you want to make healthcare choices for you when you are unable to make them for yourself. This person, called a proxy, is usually a family member or a friend. You may choose more than 1 proxy.  Do not resuscitate (DNR) order:  A DNR order is used in case your heart stops beating or you stop breathing. It is a request not to have certain forms of treatment, such as CPR. A DNR order may be included in other types of advance directives.  Medical directive:  This covers the care that you want if you are in a coma, near death, or unable to make decisions for yourself. You can list the treatments you want for each condition. Treatment may include pain medicine, surgery, blood transfusions, dialysis, IV or tube feedings, and a ventilator (breathing machine).  Values history:  This document has questions about your views, beliefs, and how you feel and think about life. This information can help others choose the care that you would choose.  Why are advance directives important?  An advance directive helps you control your care. Although spoken wishes may be used, it is better to have your wishes written down. Spoken wishes can be misunderstood, or not followed. Treatments may be given even if you do not want them. An advance directive may make it easier for your family to make difficult choices about your care.   Weight Management   Why it is important to manage your weight:  Being overweight increases your risk of health conditions such as heart disease, high  blood pressure, type 2 diabetes, and certain types of cancer. It can also increase your risk for osteoarthritis, sleep apnea, and other respiratory problems. Aim for a slow, steady weight loss. Even a small amount of weight loss can lower your risk of health problems.  How to lose weight safely:  A safe and healthy way to lose weight is to eat fewer calories and get regular exercise. You can lose up about 1 pound a week by decreasing the number of calories you eat by 500 calories each day.   Healthy meal plan for weight management:  A healthy meal plan includes a variety of foods, contains fewer calories, and helps you stay healthy. A healthy meal plan includes the following:  Eat whole-grain foods more often.  A healthy meal plan should contain fiber. Fiber is the part of grains, fruits, and vegetables that is not broken down by your body. Whole-grain foods are healthy and provide extra fiber in your diet. Some examples of whole-grain foods are whole-wheat breads and pastas, oatmeal, brown rice, and bulgur.  Eat a variety of vegetables every day.  Include dark, leafy greens such as spinach, kale, troy greens, and mustard greens. Eat yellow and orange vegetables such as carrots, sweet potatoes, and winter squash.   Eat a variety of fruits every day.  Choose fresh or canned fruit (canned in its own juice or light syrup) instead of juice. Fruit juice has very little or no fiber.  Eat low-fat dairy foods.  Drink fat-free (skim) milk or 1% milk. Eat fat-free yogurt and low-fat cottage cheese. Try low-fat cheeses such as mozzarella and other reduced-fat cheeses.  Choose meat and other protein foods that are low in fat.  Choose beans or other legumes such as split peas or lentils. Choose fish, skinless poultry (chicken or turkey), or lean cuts of red meat (beef or pork). Before you cook meat or poultry, cut off any visible fat.   Use less fat and oil.  Try baking foods instead of frying them. Add less fat, such as  margarine, sour cream, regular salad dressing and mayonnaise to foods. Eat fewer high-fat foods. Some examples of high-fat foods include french fries, doughnuts, ice cream, and cakes.  Eat fewer sweets.  Limit foods and drinks that are high in sugar. This includes candy, cookies, regular soda, and sweetened drinks.  Exercise:  Exercise at least 30 minutes per day on most days of the week. Some examples of exercise include walking, biking, dancing, and swimming. You can also fit in more physical activity by taking the stairs instead of the elevator or parking farther away from stores. Ask your healthcare provider about the best exercise plan for you.      © Copyright [employer] 2018 Information is for End User's use only and may not be sold, redistributed or otherwise used for commercial purposes. All illustrations and images included in CareNotes® are the copyrighted property of [employer] or [employer]    Medicare Preventive Visit Patient Instructions  Thank you for completing your Welcome to Medicare Visit or Medicare Annual Wellness Visit today. Your next wellness visit will be due in one year (12/22/2024).  The screening/preventive services that you may require over the next 5-10 years are detailed below. Some tests may not apply to you based off risk factors and/or age. Screening tests ordered at today's visit but not completed yet may show as past due. Also, please note that scanned in results may not display below.  Preventive Screenings:  Service Recommendations Previous Testing/Comments   Colorectal Cancer Screening  Colonoscopy    Fecal Occult Blood Test (FOBT)/Fecal Immunochemical Test (FIT)  Fecal DNA/Cologuard Test  Flexible Sigmoidoscopy Age: 45-75 years old   Colonoscopy: every 10 years (May be performed more frequently if at higher risk)  OR  FOBT/FIT: every 1 year  OR  Cologuard: every 3 years  OR  Sigmoidoscopy: every 5 years  Screening may be recommended earlier than age 45 if at  higher risk for colorectal cancer. Also, an individualized decision between you and your healthcare provider will decide whether screening between the ages of 76-85 would be appropriate. Colonoscopy: 07/20/2017  FOBT/FIT: Not on file  Cologuard: Not on file  Sigmoidoscopy: Not on file          Prostate Cancer Screening Individualized decision between patient and health care provider in men between ages of 55-69   Medicare will cover every 12 months beginning on the day after your 50th birthday PSA: No results in last 5 years     Screening Not Indicated     Hepatitis C Screening Once for adults born between 1945 and 1965  More frequently in patients at high risk for Hepatitis C Hep C Antibody: Not on file        Diabetes Screening 1-2 times per year if you're at risk for diabetes or have pre-diabetes Fasting glucose: 114 mg/dL (11/30/2023)  A1C: 5.7 % (5/24/2022)  Screening Current   Cholesterol Screening Once every 5 years if you don't have a lipid disorder. May order more often based on risk factors. Lipid panel: 05/24/2022  Screening Not Indicated  History Lipid Disorder      Other Preventive Screenings Covered by Medicare:  Abdominal Aortic Aneurysm (AAA) Screening: covered once if your at risk. You're considered to be at risk if you have a family history of AAA or a male between the age of 65-75 who smoking at least 100 cigarettes in your lifetime.  Lung Cancer Screening: covers low dose CT scan once per year if you meet all of the following conditions: (1) Age 55-77; (2) No signs or symptoms of lung cancer; (3) Current smoker or have quit smoking within the last 15 years; (4) You have a tobacco smoking history of at least 20 pack years (packs per day x number of years you smoked); (5) You get a written order from a healthcare provider.  Glaucoma Screening: covered annually if you're considered high risk: (1) You have diabetes OR (2) Family history of glaucoma OR (3)  aged 50 and older OR (4)   American aged 65 and older  Osteoporosis Screening: covered every 2 years if you meet one of the following conditions: (1) Have a vertebral abnormality; (2) On glucocorticoid therapy for more than 3 months; (3) Have primary hyperparathyroidism; (4) On osteoporosis medications and need to assess response to drug therapy.  HIV Screening: covered annually if you're between the age of 15-65. Also covered annually if you are younger than 15 and older than 65 with risk factors for HIV infection. For pregnant patients, it is covered up to 3 times per pregnancy.    Immunizations:  Immunization Recommendations   Influenza Vaccine Annual influenza vaccination during flu season is recommended for all persons aged >= 6 months who do not have contraindications   Pneumococcal Vaccine   * Pneumococcal conjugate vaccine = PCV13 (Prevnar 13), PCV15 (Vaxneuvance), PCV20 (Prevnar 20)  * Pneumococcal polysaccharide vaccine = PPSV23 (Pneumovax) Adults 19-65 yo with certain risk factors or if 65+ yo  If never received any pneumonia vaccine: recommend Prevnar 20 (PCV20)  Give PCV20 if previously received 1 dose of PCV13 or PPSV23   Hepatitis B Vaccine 3 dose series if at intermediate or high risk (ex: diabetes, end stage renal disease, liver disease)   Respiratory syncytial virus (RSV) Vaccine - COVERED BY MEDICARE PART D  * RSVPreF3 (Arexvy) CDC recommends that adults 60 years of age and older may receive a single dose of RSV vaccine using shared clinical decision-making (SCDM)   Tetanus (Td) Vaccine - COST NOT COVERED BY MEDICARE PART B Following completion of primary series, a booster dose should be given every 10 years to maintain immunity against tetanus. Td may also be given as tetanus wound prophylaxis.   Tdap Vaccine - COST NOT COVERED BY MEDICARE PART B Recommended at least once for all adults. For pregnant patients, recommended with each pregnancy.   Shingles Vaccine (Shingrix) - COST NOT COVERED BY MEDICARE PART B  2  shot series recommended in those 19 years and older who have or will have weakened immune systems or those 50 years and older     Health Maintenance Due:  There are no preventive care reminders to display for this patient.  Immunizations Due:      Topic Date Due   • HIB Vaccine (1 of 1 - Risk 1-dose series) Never done   • Meningococcal ACWY Vaccine (1 - Risk 2-dose series) Never done   • COVID-19 Vaccine (5 - 2023-24 season) 09/01/2023     Advance Directives   What are advance directives?  Advance directives are legal documents that state your wishes and plans for medical care. These plans are made ahead of time in case you lose your ability to make decisions for yourself. Advance directives can apply to any medical decision, such as the treatments you want, and if you want to donate organs.   What are the types of advance directives?  There are many types of advance directives, and each state has rules about how to use them. You may choose a combination of any of the following:  Living will:  This is a written record of the treatment you want. You can also choose which treatments you do not want, which to limit, and which to stop at a certain time. This includes surgery, medicine, IV fluid, and tube feedings.   Durable power of  for healthcare (DPAHC):  This is a written record that states who you want to make healthcare choices for you when you are unable to make them for yourself. This person, called a proxy, is usually a family member or a friend. You may choose more than 1 proxy.  Do not resuscitate (DNR) order:  A DNR order is used in case your heart stops beating or you stop breathing. It is a request not to have certain forms of treatment, such as CPR. A DNR order may be included in other types of advance directives.  Medical directive:  This covers the care that you want if you are in a coma, near death, or unable to make decisions for yourself. You can list the treatments you want for each  condition. Treatment may include pain medicine, surgery, blood transfusions, dialysis, IV or tube feedings, and a ventilator (breathing machine).  Values history:  This document has questions about your views, beliefs, and how you feel and think about life. This information can help others choose the care that you would choose.  Why are advance directives important?  An advance directive helps you control your care. Although spoken wishes may be used, it is better to have your wishes written down. Spoken wishes can be misunderstood, or not followed. Treatments may be given even if you do not want them. An advance directive may make it easier for your family to make difficult choices about your care.   Weight Management   Why it is important to manage your weight:  Being overweight increases your risk of health conditions such as heart disease, high blood pressure, type 2 diabetes, and certain types of cancer. It can also increase your risk for osteoarthritis, sleep apnea, and other respiratory problems. Aim for a slow, steady weight loss. Even a small amount of weight loss can lower your risk of health problems.  How to lose weight safely:  A safe and healthy way to lose weight is to eat fewer calories and get regular exercise. You can lose up about 1 pound a week by decreasing the number of calories you eat by 500 calories each day.   Healthy meal plan for weight management:  A healthy meal plan includes a variety of foods, contains fewer calories, and helps you stay healthy. A healthy meal plan includes the following:  Eat whole-grain foods more often.  A healthy meal plan should contain fiber. Fiber is the part of grains, fruits, and vegetables that is not broken down by your body. Whole-grain foods are healthy and provide extra fiber in your diet. Some examples of whole-grain foods are whole-wheat breads and pastas, oatmeal, brown rice, and bulgur.  Eat a variety of vegetables every day.  Include dark, leafy  greens such as spinach, kale, troy greens, and mustard greens. Eat yellow and orange vegetables such as carrots, sweet potatoes, and winter squash.   Eat a variety of fruits every day.  Choose fresh or canned fruit (canned in its own juice or light syrup) instead of juice. Fruit juice has very little or no fiber.  Eat low-fat dairy foods.  Drink fat-free (skim) milk or 1% milk. Eat fat-free yogurt and low-fat cottage cheese. Try low-fat cheeses such as mozzarella and other reduced-fat cheeses.  Choose meat and other protein foods that are low in fat.  Choose beans or other legumes such as split peas or lentils. Choose fish, skinless poultry (chicken or turkey), or lean cuts of red meat (beef or pork). Before you cook meat or poultry, cut off any visible fat.   Use less fat and oil.  Try baking foods instead of frying them. Add less fat, such as margarine, sour cream, regular salad dressing and mayonnaise to foods. Eat fewer high-fat foods. Some examples of high-fat foods include french fries, doughnuts, ice cream, and cakes.  Eat fewer sweets.  Limit foods and drinks that are high in sugar. This includes candy, cookies, regular soda, and sweetened drinks.  Exercise:  Exercise at least 30 minutes per day on most days of the week. Some examples of exercise include walking, biking, dancing, and swimming. You can also fit in more physical activity by taking the stairs instead of the elevator or parking farther away from stores. Ask your healthcare provider about the best exercise plan for you.      © Copyright [employer] 2018 Information is for End User's use only and may not be sold, redistributed or otherwise used for commercial purposes. All illustrations and images included in CareNotes® are the copyrighted property of [employer] or [employer]

## 2023-12-22 NOTE — PROGRESS NOTES
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 They screened negative with a PHQ-2 score of 0.    Falls Plan of Care: referral to physical therapy.       Preventive health issues were discussed with patient, and age appropriate screening tests were ordered as noted in patient's After Visit Summary.  Personalized health advice and appropriate referrals for health education or preventive services given if needed, as noted in patient's After Visit Summary.     History of Present Illness:     Patient presents for a Medicare Wellness Visit    HPI   Patient Care Team:  Tiffanie Anthony MD as PCP - General (Internal Medicine)  Josi Kuhn MD as PCP - Endocrinology (Endocrinology)  MD Adolfo Dumont MD     Review of Systems:     Review of Systems   Constitutional:  Negative for fever.   Respiratory:  Negative for cough and shortness of breath.    Cardiovascular:  Negative for chest pain and palpitations.   Gastrointestinal:  Negative for abdominal pain, blood in stool and constipation.   Genitourinary:  Negative for difficulty urinating and hematuria.   Musculoskeletal:  Positive for gait problem (he is undergoing neuro PT).   Neurological:  Negative for headaches.   Psychiatric/Behavioral:  Negative for dysphoric mood.         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)   • Pure hypercholesterolemia   • Iron deficiency anemia   • Cervicalgia   • Severe obesity (BMI 35.0-39.9) with comorbidity (HCC)   • Hyperparathyroidism (HCC)   • PAF (paroxysmal atrial fibrillation) (HCC)   • Stage 3a chronic kidney disease (HCC)   • Age-related osteoporosis without current pathological fracture      Past Medical and Surgical History:     Past Medical History:   Diagnosis Date   • Basal cell carcinoma     nose    • Benign skin lesion    • BPH (benign prostatic hyperplasia)    • Diverticular disease of colon    • DVT (deep venous thrombosis) (HCC)    • Elevated PSA    • History of blood clots    • Hypotension    • Intestinal obstruction (HCC)    • Social anxiety disorder     last assessed 1/12/17, resolved 10/17/17   • Ventral hernia      Past Surgical History:   Procedure Laterality Date   • APPENDECTOMY     • BASAL CELL CARCINOMA EXCISION      Nose    • HERNIA REPAIR      9 hernia repairs 1995 - 2012   • HIATAL HERNIA REPAIR  1995    with speenectomy   • REPLACEMENT TOTAL KNEE Bilateral    • SPLENECTOMY  1995   • TONSILLECTOMY        Family History:     Family History   Problem Relation Age of Onset   • Other Mother         Bleeding disorder    • Stroke Mother         CVA   • Diabetes Mother    • Heart disease Mother    • Osteoporosis Mother    • Hypothyroidism Mother    • Stroke Father         CVA   • Heart disease Father    • Heart attack Father    • Substance Abuse Neg Hx    • Mental illness Neg Hx       Social History:     Social History     Socioeconomic History   • Marital status: /Civil Union     Spouse name: None   • Number of children: None   • Years of education: None   • Highest education level: None   Occupational History   • None   Tobacco Use   • Smoking  status: Never   • Smokeless tobacco: Never   Vaping Use   • Vaping status: Never Used   Substance and Sexual Activity   • Alcohol use: Never   • Drug use: No   • Sexual activity: None   Other Topics Concern   • None   Social History Narrative    Active advance directive     Dental care, regularly     Living situation, supportive and safe      Social Determinants of Health     Financial Resource Strain: Low Risk  (12/22/2023)    Overall Financial Resource Strain (CARDIA)    • Difficulty of Paying Living Expenses: Not hard at all   Food Insecurity: No Food Insecurity (10/17/2022)    Hunger Vital Sign    • Worried About Running Out of Food in the Last Year: Never true    • Ran Out of Food in the Last Year: Never true   Transportation Needs: No Transportation Needs (12/22/2023)    PRAPARE - Transportation    • Lack of Transportation (Medical): No    • Lack of Transportation (Non-Medical): No   Physical Activity: Not on file   Stress: Not on file   Social Connections: Not on file   Intimate Partner Violence: Not on file   Housing Stability: Low Risk  (10/17/2022)    Housing Stability Vital Sign    • Unable to Pay for Housing in the Last Year: No    • Number of Places Lived in the Last Year: 1    • Unstable Housing in the Last Year: No      Medications and Allergies:     Current Outpatient Medications   Medication Sig Dispense Refill   • apixaban (Eliquis) 2.5 mg Take 1 tablet (2.5 mg total) by mouth 2 (two) times a day 180 tablet 3   • cinacalcet (SENSIPAR) 30 mg tablet Take 1 tablet (30 mg total) by mouth 2 (two) times a day 180 tablet 1   • dutasteride (AVODART) 0.5 mg capsule Take 1 capsule (0.5 mg total) by mouth daily 90 capsule 3   • ferrous sulfate 325 (65 Fe) mg tablet Take 1 tablet (325 mg total) by mouth daily with breakfast 30 tablet 5   • furosemide (LASIX) 80 mg tablet Take 1 tablet (80 mg total) by mouth every morning 90 tablet 3   • lansoprazole (PREVACID) 30 mg capsule Take 1 capsule (30 mg total) by  mouth daily 90 capsule 3   • Multiple Vitamins-Minerals (MULTIVITAMIN ADULT EXTRA C PO)      • nystatin (MYCOSTATIN) powder Apply topically 2 (two) times a day 30 g 5   • ondansetron (ZOFRAN-ODT) 4 mg disintegrating tablet Take 1 tablet (4 mg total) by mouth every 6 (six) hours as needed for nausea or vomiting 20 tablet 5   • polyethylene glycol (MIRALAX) 17 g packet Take by mouth 1 packet daily     • Probiotic Product (SOLUBLE FIBER/PROBIOTICS PO) Take by mouth wife reports the patient takes this daily     • simvastatin (ZOCOR) 10 mg tablet Take 1 tablet (10 mg total) by mouth daily 90 tablet 3   • spironolactone (ALDACTONE) 25 mg tablet Take 0.5 tablets (12.5 mg total) by mouth daily 45 tablet 3   • sulfamethoxazole-trimethoprim (BACTRIM DS) 800-160 mg per tablet Take 1 tablet by mouth daily 90 tablet 3   • tamsulosin (FLOMAX) 0.4 mg Take 1 capsule (0.4 mg total) by mouth daily with dinner 90 capsule 3   • amoxicillin (AMOXIL) 500 mg capsule  (Patient not taking: Reported on 12/22/2023)       No current facility-administered medications for this visit.     Allergies   Allergen Reactions   • Ace Inhibitors Cough   • Fosinopril Cough     Cough after years of being on the medication      Immunizations:     Immunization History   Administered Date(s) Administered   • COVID-19 MODERNA VACC 0.25 ML IM BOOSTER 10/23/2021   • COVID-19 MODERNA VACC 0.5 ML IM 01/22/2021, 02/17/2021, 10/23/2021   • INFLUENZA 09/15/2018, 10/28/2022   • Influenza Split High Dose Preservative Free IM 10/02/2015, 10/17/2017, 09/21/2019   • Influenza, high dose seasonal 0.7 mL 08/30/2020, 10/28/2022, 11/10/2023   • Influenza, seasonal, injectable 09/15/2010, 09/03/2011, 10/01/2012, 08/23/2013   • Pneumococcal Conjugate 13-Valent 10/17/2017   • Pneumococcal Polysaccharide PPV23 01/01/2006, 11/10/2016   • Tdap 05/03/2023      Health Maintenance:     There are no preventive care reminders to display for this patient.      Topic Date Due   • HIB  Vaccine (1 of 1 - Risk 1-dose series) Never done   • Meningococcal ACWY Vaccine (1 - Risk 2-dose series) Never done   • COVID-19 Vaccine (5 - 2023-24 season) 09/01/2023      Medicare Screening Tests and Risk Assessments:     Isai is here for his Subsequent Wellness visit.     Health Risk Assessment:   Patient rates overall health as good. Patient feels that their physical health rating is same. Patient is satisfied with their life. Eyesight was rated as same. Hearing was rated as same. Patient feels that their emotional and mental health rating is same. Patients states they are never, rarely angry. Patient states they are sometimes unusually tired/fatigued. Pain experienced in the last 7 days has been none. Patient states that he has experienced no weight loss or gain in last 6 months.     Depression Screening:   PHQ-2 Score: 0      Fall Risk Screening:   In the past year, patient has experienced: no history of falling in past year      Home Safety:  Patient has trouble with stairs inside or outside of their home. Patient has working smoke alarms and has no working carbon monoxide detector. Home safety hazards include: none.     Nutrition:   Current diet is Regular.     Medications:   Patient is currently taking over-the-counter supplements. OTC medications include: see medication list. Patient is able to manage medications.     Activities of Daily Living (ADLs)/Instrumental Activities of Daily Living (IADLs):   Walk and transfer into and out of bed and chair?: Yes  Dress and groom yourself?: No    Bathe or shower yourself?: No    Feed yourself? Yes  Do your laundry/housekeeping?: No  Manage your money, pay your bills and track your expenses?: Yes  Make your own meals?: Yes    Do your own shopping?: No    ADL comments: Wife helps pt.     Previous Hospitalizations:   Any hospitalizations or ED visits within the last 12 months?: No      Advance Care Planning:   Living will: Yes    Durable POA for healthcare: Yes   "  Advanced directive: Yes    Advanced directive counseling given: Yes    End of Life Decisions reviewed with patient: Yes      PREVENTIVE SCREENINGS      Cardiovascular Screening:    General: Screening Not Indicated and History Lipid Disorder      Diabetes Screening:     General: Screening Current      Colorectal Cancer Screening:     General: Screening Not Indicated      Prostate Cancer Screening:    General: Screening Not Indicated      Osteoporosis Screening:    General: Screening Not Indicated and History Osteoporosis      Lung Cancer Screening:     General: Screening Not Indicated    Screening, Brief Intervention, and Referral to Treatment (SBIRT)    Screening  Typical number of drinks in a day: 0  Typical number of drinks in a week: 0  Interpretation: Low risk drinking behavior.    Single Item Drug Screening:  How often have you used an illegal drug (including marijuana) or a prescription medication for non-medical reasons in the past year? never    Single Item Drug Screen Score: 0  Interpretation: Negative screen for possible drug use disorder    Brief Intervention  Alcohol & drug use screenings were reviewed. No concerns regarding substance use disorder identified.     Other Counseling Topics:   Regular weightbearing exercise.     No results found.     Physical Exam:     /68   Pulse 76   Resp 16   Ht 5' 3\" (1.6 m)   Wt 90.7 kg (200 lb)   SpO2 95%   BMI 35.43 kg/m²     Physical Exam  Constitutional:       General: He is not in acute distress.     Appearance: He is not toxic-appearing.   HENT:      Head: Normocephalic.   Eyes:      Conjunctiva/sclera: Conjunctivae normal.   Cardiovascular:      Rate and Rhythm: Normal rate and regular rhythm.      Heart sounds: No murmur heard.  Pulmonary:      Effort: No respiratory distress.      Breath sounds: No wheezing or rales.   Abdominal:      General: Bowel sounds are normal.      Palpations: Abdomen is soft.      Tenderness: There is no abdominal " tenderness.   Skin:     General: Skin is warm and dry.   Neurological:      Mental Status: He is alert and oriented to person, place, and time.   Psychiatric:         Mood and Affect: Mood normal.          Tiffanie Anthony MD

## 2023-12-29 ENCOUNTER — APPOINTMENT (OUTPATIENT)
Facility: CLINIC | Age: 83
End: 2023-12-29
Payer: COMMERCIAL

## 2023-12-29 ENCOUNTER — HOSPITAL ENCOUNTER (OUTPATIENT)
Dept: INFUSION CENTER | Facility: HOSPITAL | Age: 83
End: 2023-12-29
Attending: STUDENT IN AN ORGANIZED HEALTH CARE EDUCATION/TRAINING PROGRAM
Payer: COMMERCIAL

## 2023-12-29 VITALS
HEIGHT: 63 IN | OXYGEN SATURATION: 93 % | WEIGHT: 198.81 LBS | HEART RATE: 67 BPM | RESPIRATION RATE: 16 BRPM | BODY MASS INDEX: 35.23 KG/M2 | DIASTOLIC BLOOD PRESSURE: 71 MMHG | SYSTOLIC BLOOD PRESSURE: 126 MMHG | TEMPERATURE: 98.4 F

## 2023-12-29 DIAGNOSIS — E21.3 HYPERPARATHYROIDISM (HCC): Primary | ICD-10-CM

## 2023-12-29 DIAGNOSIS — M81.0 AGE-RELATED OSTEOPOROSIS WITHOUT CURRENT PATHOLOGICAL FRACTURE: ICD-10-CM

## 2023-12-29 PROCEDURE — 96374 THER/PROPH/DIAG INJ IV PUSH: CPT

## 2023-12-29 RX ORDER — SODIUM CHLORIDE 9 MG/ML
20 INJECTION, SOLUTION INTRAVENOUS ONCE
Status: COMPLETED | OUTPATIENT
Start: 2023-12-29 | End: 2023-12-29

## 2023-12-29 RX ORDER — SODIUM CHLORIDE 9 MG/ML
20 INJECTION, SOLUTION INTRAVENOUS ONCE
OUTPATIENT
Start: 2024-12-28

## 2023-12-29 RX ORDER — ZOLEDRONIC ACID 5 MG/100ML
5 INJECTION, SOLUTION INTRAVENOUS ONCE
OUTPATIENT
Start: 2024-12-28

## 2023-12-29 RX ORDER — ZOLEDRONIC ACID 5 MG/100ML
5 INJECTION, SOLUTION INTRAVENOUS ONCE
Status: COMPLETED | OUTPATIENT
Start: 2023-12-29 | End: 2023-12-29

## 2023-12-29 RX ADMIN — SODIUM CHLORIDE 20 ML/HR: 9 INJECTION, SOLUTION INTRAVENOUS at 12:45

## 2023-12-29 RX ADMIN — ZOLEDRONIC ACID 5 MG: 0.05 INJECTION, SOLUTION INTRAVENOUS at 12:45

## 2023-12-29 NOTE — PLAN OF CARE
Problem: DISCHARGE PLANNING  Goal: Discharge to home or other facility with appropriate resources  Description: INTERVENTIONS:  - Identify barriers to discharge w/patient and caregiver  - Arrange for needed discharge resources and transportation as appropriate  - Identify discharge learning needs (meds, wound care, etc.)  - Arrange for interpretive services to assist at discharge as needed  - Refer to Case Management Department for coordinating discharge planning if the patient needs post-hospital services based on physician/advanced practitioner order or complex needs related to functional status, cognitive ability, or social support system  Outcome: Progressing     Problem: Knowledge Deficit  Goal: Patient/family/caregiver demonstrates understanding of disease process, treatment plan, medications, and discharge instructions  Description: Complete learning assessment and assess knowledge base.  Interventions:  - Provide teaching at level of understanding  - Provide teaching via preferred learning methods  Outcome: Progressing

## 2023-12-29 NOTE — PROGRESS NOTES
Isai Abbott  tolerated treatment well with no complications.      Pt's spouse will schedule next appt.     AVS printed and given to Isai Abbott:   No (Declined by Isai Abbott)   Left via wheelchair with assistance for d/c.

## 2023-12-29 NOTE — PROGRESS NOTES
Pt arrived on unit for reclast infusion. VSS. CRCL is 66 and wnl for tx. Pt has had no recent dental work or any c/o jaw pain. WCM.

## 2024-01-05 ENCOUNTER — APPOINTMENT (OUTPATIENT)
Dept: LAB | Facility: HOSPITAL | Age: 84
End: 2024-01-05
Payer: COMMERCIAL

## 2024-01-05 DIAGNOSIS — E21.3 HYPERPARATHYROIDISM (HCC): ICD-10-CM

## 2024-01-05 DIAGNOSIS — E66.01 SEVERE OBESITY (BMI 35.0-39.9) WITH COMORBIDITY (HCC): ICD-10-CM

## 2024-01-05 LAB
25(OH)D3 SERPL-MCNC: 26.4 NG/ML (ref 30–100)
ALBUMIN SERPL BCP-MCNC: 3.5 G/DL (ref 3.5–5)
ALP SERPL-CCNC: 106 U/L (ref 34–104)
ALT SERPL W P-5'-P-CCNC: 21 U/L (ref 7–52)
ANION GAP SERPL CALCULATED.3IONS-SCNC: 13 MMOL/L
AST SERPL W P-5'-P-CCNC: 22 U/L (ref 13–39)
BILIRUB SERPL-MCNC: 0.37 MG/DL (ref 0.2–1)
BUN SERPL-MCNC: 24 MG/DL (ref 5–25)
CALCIUM SERPL-MCNC: 8.9 MG/DL (ref 8.4–10.2)
CHLORIDE SERPL-SCNC: 104 MMOL/L (ref 96–108)
CO2 SERPL-SCNC: 24 MMOL/L (ref 21–32)
CREAT SERPL-MCNC: 0.92 MG/DL (ref 0.6–1.3)
GFR SERPL CREATININE-BSD FRML MDRD: 77 ML/MIN/1.73SQ M
GLUCOSE P FAST SERPL-MCNC: 94 MG/DL (ref 65–99)
POTASSIUM SERPL-SCNC: 3.6 MMOL/L (ref 3.5–5.3)
PROT SERPL-MCNC: 6 G/DL (ref 6.4–8.4)
PTH-INTACT SERPL-MCNC: 232.9 PG/ML (ref 12–88)
SODIUM SERPL-SCNC: 141 MMOL/L (ref 135–147)

## 2024-01-05 PROCEDURE — 82306 VITAMIN D 25 HYDROXY: CPT

## 2024-01-05 PROCEDURE — 80053 COMPREHEN METABOLIC PANEL: CPT

## 2024-01-05 PROCEDURE — 83970 ASSAY OF PARATHORMONE: CPT

## 2024-01-05 PROCEDURE — 36415 COLL VENOUS BLD VENIPUNCTURE: CPT

## 2024-01-12 ENCOUNTER — OFFICE VISIT (OUTPATIENT)
Facility: CLINIC | Age: 84
End: 2024-01-12
Payer: COMMERCIAL

## 2024-01-12 DIAGNOSIS — Z74.09 IMPAIRED FUNCTIONAL MOBILITY, BALANCE, GAIT, AND ENDURANCE: ICD-10-CM

## 2024-01-12 DIAGNOSIS — E66.01 SEVERE OBESITY (BMI 35.0-39.9) WITH COMORBIDITY (HCC): ICD-10-CM

## 2024-01-12 DIAGNOSIS — E21.3 HYPERPARATHYROIDISM (HCC): Primary | ICD-10-CM

## 2024-01-12 PROCEDURE — 97112 NEUROMUSCULAR REEDUCATION: CPT

## 2024-01-12 NOTE — PROGRESS NOTES
PT Re-Evaluation     Today's date: 2024  Patient name: Isai Abbott  : 1940  MRN: [id number]  Referring provider: Josi Kuhn MD  Dx:   Encounter Diagnosis     ICD-10-CM    1. Hyperparathyroidism (HCC)  E21.3       2. Severe obesity (BMI 35.0-39.9) with comorbidity (HCC)  E66.01       3. Impaired functional mobility, balance, gait, and endurance  Z74.09                        Assessment  Assessment details: 2024: Pt is an 83 y.o. male who has received 8 visits of skilled PT Intervention addressing balance, gait, and strengthening deficits to address gait and balance deficits in adjunction to recent diagnosis of osteoporosis. Today's assessment reflects significant improvement in 5xSTS, gait speed, TUG, and ABC since previous assessment, reflecting improvement in LE power and reduction in risk of falls. Despite these improvements, pt's gait speed still places him at the level of a household ambulator, and his ABC, TUG, and gait speed indicate that he is at risk of falls. Furthermore, 2MWT reflects significant endurance deficits, indicating ongoing need for CV intervention. Pt will continue to benefit from skilled PT intervention focused on balance, gait, and strengthening so that he can navigate his environment with increased safety and stability.     At IE: Isai is an 83 year old patient presenting to OPPT for evaluation regarding gait and balance deficits in adjunction to recent diagnosis of osteoporosis. Upon evaluation, Isai demonstrates significant deficits and increased fall risk. Proximal BLE strength below 4/5 throughout aside from L hip flexion. Decreased endurance evidenced by ability to only complete 2MWT with distance traversed 130 feet. TUG and 5x STS required UE support to rise from chair, as well as scores being greater than fall risk cut of score indicative of increased fall risk and poor power generation. Gait speed classifies patient as increased fall risk, as well as  non-community ambulator. These impairments limit their ability to perform daily activities as well as their previous level of function causing decreased quality of life.    HEP delivered with emphasis on BLE strengthening with UE support. Education provided regarding HEP, POC, prognosis, goals for therapy, and functional outcome measure results in relation to patient's fall risk. Also worked on sit <> stand transfers to improve overall safety to reduce fall risk and working with walker to improve facilitation of increased safety during these activities. Cuing for increased anterior weight shift.     Patient requires continued skilled PT services in order to improve aforementioned impairments so that they are able to return to highest level of function possible. Without initiation of skilled PT services, patient will continue to have largely decreased functional endurance and mobility leading to increased caregiver burden and likelihood of falls and subsequent hospitalization.     Impairments: abnormal or restricted ROM, activity intolerance, impaired balance, impaired physical strength, lacks appropriate home exercise program and poor posture   Understanding of Dx/Px/POC: good   Prognosis: good    Goals  STG (4 weeks)   Improve (reduce) TUG score by 4 seconds indicating meaningful improvement in fall risk MET  Improve AMEZCUA score by 6 indicating meaningful improvement in fall risk ONGOING  Improve/Reduce 5x STS score by 5 seconds indicating meaningful improvement in fall risk and power generation  MET  Improve generalized strength by 1/5 to demonstrate improvement in facilitation of efficiency of functional activities and reduced fall risk ONGOING  Improve 2MWT score by 50 feet improving endurance and community ambulation. ONGOING  Improve gait speed by 0.05 m/s with LRAD MET    LTG (8 weeks)   Improve (reduce) TUG score by 4 seconds compared to PN indicating meaningful improvement in fall risk   Improve AMEZCUA score  to > 45/56 indicating meaningful improvement in fall risk   Improve/Reduce 5x STS score by 4 seconds compared to PN seconds indicating meaningful improvement in fall risk and power generation   Will be able to complete full 6MWT  Improve gait speed by 0.05 m/s from PN with LRAD         Plan  Plan details: TE: for strengthening, stretching, and flexibility  TA: for functional participation  NR: for balance, coordination, reaction time  MT: for STM, IASTM, joint mobilizations  Gait Training: to improve gait mechanics.    Planned modality interventions: cryotherapy and thermotherapy: hydrocollator packs  Planned therapy interventions: manual therapy, neuromuscular re-education, patient education, postural training, self care, strengthening, stretching, therapeutic activities, therapeutic exercise, home exercise program, graded exercise, gait training, flexibility and balance  Frequency: 2x week  Duration in visits: 8  Duration in weeks: 8  Plan of Care beginning date: 1/12/2024  Plan of Care expiration date: 3/8/2024  Treatment plan discussed with: patient        Subjective Evaluation    History of Present Illness  Mechanism of injury: 1/12/2024: Pt states that he missed the last couple weeks because he felt like he was getting a cold. He had his infusion and decided to wait to schedule PT until after the infusion. Pt states that he feels about 50% of the way to where he would like to be by the time he is done with PT. He feels his strength & his attitude are the biggest things holding him back from feeling ready for discharge. He reports that he does not think he will score as well as on his measures today as compared to how he did last time.     AT IE: Isai is an 83 year old patient presenting to OPPT for evaluation regarding gait and balance deficits. Recent diagnosis of osteoporosis via last dexa scan. Reports that he is here to work on his strength. Last fall was around 7 months ago. Does exercises at home -  "already. Standing marching and squat at the counter are the two that he is doing currently. Wife reports that he has good follow through. \"Everything we do we want to be able to do at home\". Main goal is to get stronger to be able to reduce fall risk going forward.     One dizzy spell in the last two weeks. Reports that he was standing at the counter and noted that he was lightheaded and room spinning. Felt like he was going to fall.     Got PT at home after COVID - but wasn't able to go up the stairs. Doesn't go up currently, wife handles the caretaking duties.     Home setup: Walkers throughout the home - theres no rugs. Anything that could possibly help. \"We set it up as safe as we can be\".   Patient Goals  Patient goals for therapy: improved balance, increased motion and increased strength    Pain  No pain reported    Social Support  Steps to enter house: yes (4\" step, 6\" step from the living room to the kitchen)  1  Stairs in house: yes   12  Lives in: one-story house  Lives with: spouse    Employment status: not working    Diagnostic Tests  Bone scan: abnormal    FCE comments: Narrative & Impression  DXA SCAN     CLINICAL HISTORY:  82-year-old male.   OTHER RISK FACTORS:  Hyperparathyroidism, Limited mobility, Lasix therapy.     PHARMACOLOGIC THERAPY FOR OSTEOPOROSIS:  None.     TECHNIQUE: Bone densitometry was performed using a [employer] Horizon A  bone densitometer.  Regions of interest appear properly placed.         COMPARISON: There are no prior DXA studies performed on this unit for comparison.     RESULTS:      LUMBAR SPINE  Level: L1, L3, L4  (L2 vertebra excluded from analysis due to local structural abnormalities or artifact) :   BMD:  0.854  gm/cm2   T-score: -2.1         LEFT  TOTAL HIP:   BMD:  0.601  gm/cm2   T-score:  -2.9     LEFT  FEMORAL NECK:   BMD:  0.445  gm/cm2   T score: -3.6      LEFT  FOREARM:    33% RADIUS BMD:  0.701  gm/cm2  T-score:  -2.2         IMPRESSION:     1.  Osteoporosis. " [Based on the total left hip]     2.  The 10 year risk of hip fracture is 9.1% with the 10 year risk of major osteoporotic fracture being 17% as calculated by the [employer] fracture risk assessment tool (FRAX, which is based on data generated by the WHO Collaborating Independence   for Metabolic Bone Diseases).  3.  The current NOF guidelines recommend treating patients with a T-score of -2.5 or less in the lumbar spine or hips, or in post-menopausal women and men over the age of 50 with low bone mass (osteopenia) and a FRAX 10 year risk score of >3% for hip   fracture and/or >20% for major osteoporotic fracture.     4.  The NOF recommends follow-up DXA in 1-2 years after initiating therapy for osteoporosis and every 2 years thereafter. More frequent evaluation is appropriate for patients with conditions associated with rapid bone loss, such as glucocorticoid   therapy. The interval between DXA screenings may be longer for individuals without major risk factors and initial T-score in the normal or upper low bone mass range.       Treatments  Previous treatment: physical therapy        Objective      Balance Test 11/3  12/8 1/12   6 Minute Walk Test (ft):      2 Minute Walk Test (ft): 125 feet with RW  228ft. 225ft.   Gait Speed (ft/s): 0.31 m/s   0.52 m/s   5x Sit To Stand (s): 43.72 seconds BUE push to RW   27.10s   ABC 41.25%  53.75%   AMEZCUA NV      TU.98 seconds  19.81s       Manual Muscle Testing - Hip Left Right   Flexion 4- 3+   Extension 3 3   Abduction 3+ 3+   External Rotation 3+ 3+     Manual Muscle Testing - Knee Left Right   Flexion 3+ 3+   Extension 4- 3+     Manual Muscle Testing - Ankle Left Right   Doriflexion 3+ 3-   Plantarflexion 3 3        Transfers    Sit To Stand Contact Guard     Does not perform bed mobility at home - uses a recliner to sleep in.     Vitals 104/70, 94% Spo2, 73 bpm     94% SPO2 post 6MWT     Gait Assessment: Slow cautious shuffling pattern with rolling walker.  "Relatively steady, tendency to have RW get out in front of him too far. Largely forward head and rounded shoulders with thoracic kyphosis        Precautions: HTN, Chronic DVT,  Severe Osteoporosis, Hx of cervicalgia, hx of hernias   POC expires: 03/08/2024      Objective Measures 11/3 11/10 11/17 11/20 12/1/23 12/8  12/21 1/12   Vitals **  AMEZCUA NV  36                                          Neuro Re-Ed      2  feet      Static Balance   -mod tandem stance attempted 2x10s sophie, requiring intermittent UE support    -stance EC 30s increased postural sway     -FT EO 1 min minimal postural sway. -mod tandem stance attempted 2x10s sophie, requiring intermittent UE support    -stance EC on foam 3x30s increased postural sway   -stance EC on foam 3x30\"  Stance EC on foam 3x30\" EC foam //bar 3x30\" CGAx1 EC foam //bar 3x30\" CGAx1    Dynamic Balance   -raya navigation 4 hurdles lowest setting 2.5lb AW B UE support // 4x sideways & forwards -raya navigation 4x forwards 4x sideways middle setting. 2.5lb AW in // B UE support -raya navigation 4x forwards, 4x sideways, middle setting. 2.5lb AW in // B UE support -raya navigation 4x fwds middle setting 3lb AW in // B UE support    -boomwhacker taps walking in // no UE support to challenge upright posture, 3# AW on feet Raya 2x2 fwd middle height no AW BUE support  -boomwhacker taps walking in // no UE support to challenge upright posture, 3# AW on feet    Vorx1           Head turns     -gait with HT 6x in // supervision no UE support -gait with HT 6x in // supervision no UE support V & H      Multidirectional Stepping       Lateral stepping 2x laps no AW      HKM            Resisted Amb       Maroon TB 2x2 laps (50' per lap) with RW overground      Side stepping  //bar 2x laps           Overhead ball slam    Reaching overhead and slamming down 5lb ball 10x Reaching overhead and slamming down 5lb ball 10x      Education  POC, HEP, prognosis, balance systems, home set " up, goals for therapy, outcome measure results x10 mins Tests & measures: AMEZCUA, results, predicative of future falls      ABC, TUG, 5xSTS, Gait speed, 2MWT. Ongoing risk of falls. Importance for ongoing strengthening.    Ther Ex           SLRx3 Hip abd ext 2x10 Hip abduction and extension 2x10 2.5lb AW in // Hip abd, ext, 2x10 2.5lbs        Leg Press           Leg extension            Mini Squats  X10       2x20    Step ups fwd and lat        Fwd onto 6 inch step, 2x14 each LE     Hr/Tr X10 ea          Sit <> Stand  BUE 2x5 Two foams on chair, 3x5 no UE support Two foams on chair, 1x10 no UE support. 1x5 no UE support but required Karen Two foams on 1x10 no UE support, 1x5 no UE support but reqiured Karen Two foams on chair 1x13 Karen  Two foams on chair 2x fatigue  Two foam on chair 1x7 no Ue support, 1x10 no UE support, 1x6 no UE support.     Standing marches  2x10 2.5lb AW in // B UE support 2x10 2.5lb AW in // B UE support  2x10 3lb AW  2x10 3lb AW    Ther Activity           Circuit Training            Transfer Training  Safety with stand <> sit transfer, increased weight shift.           Gait Training                                 Modalities                                    Access Code: [email or web address]  URL: [email or web address]  Date: 11/03/2023  Prepared by: Tommie Ocasio    Exercises  - Sit to Stand with Armchair  - 1 x daily - 7 x weekly - 3 sets - 10 reps  - Standing Hip Abduction with Counter Support  - 1 x daily - 7 x weekly - 3 sets - 10 reps  - Heel Raises with Counter Support  - 1 x daily - 7 x weekly - 3 sets - 10 reps  - Standing Hip Extension with Counter Support  - 1 x daily - 7 x weekly - 3 sets - 10 reps  - Side Stepping with Counter Support  - 1 x daily - 7 x weekly - 3 sets - 10 reps

## 2024-01-19 ENCOUNTER — APPOINTMENT (OUTPATIENT)
Facility: CLINIC | Age: 84
End: 2024-01-19
Payer: COMMERCIAL

## 2024-01-26 ENCOUNTER — APPOINTMENT (OUTPATIENT)
Facility: CLINIC | Age: 84
End: 2024-01-26
Payer: COMMERCIAL

## 2024-01-26 ENCOUNTER — OFFICE VISIT (OUTPATIENT)
Facility: CLINIC | Age: 84
End: 2024-01-26
Payer: COMMERCIAL

## 2024-01-26 DIAGNOSIS — Z74.09 IMPAIRED FUNCTIONAL MOBILITY, BALANCE, GAIT, AND ENDURANCE: ICD-10-CM

## 2024-01-26 DIAGNOSIS — E21.3 HYPERPARATHYROIDISM (HCC): Primary | ICD-10-CM

## 2024-01-26 DIAGNOSIS — E66.01 SEVERE OBESITY (BMI 35.0-39.9) WITH COMORBIDITY (HCC): ICD-10-CM

## 2024-01-26 PROCEDURE — 97110 THERAPEUTIC EXERCISES: CPT

## 2024-01-26 PROCEDURE — 97112 NEUROMUSCULAR REEDUCATION: CPT

## 2024-01-26 NOTE — PROGRESS NOTES
Daily Note     Today's date: 2024  Patient name: Isai Abbott  : 1940  MRN: [id number]  Referring provider: Josi Kuhn MD  Dx:   Encounter Diagnosis     ICD-10-CM    1. Hyperparathyroidism (HCC)  E21.3       2. Severe obesity (BMI 35.0-39.9) with comorbidity (HCC)  E66.01       3. Impaired functional mobility, balance, gait, and endurance  Z74.09                      Subjective: Pt states he is disappointed because he thought today was going to be his last day, but then he realized his wife still wants him to come.       Objective: See treatment diary below      Assessment: Pt participated in a skilled PT session focused on balance, gait, and strengthening intervention. STS progressed with reduction in # of foams, pt required Karen toward modA with the final few repetitions to rise from the chair and reported significant challenge with task. Ambulation with AW added to challenge pt's walking endurance and challenge foot clearance, pt reported feeling a significant challenge with task but felt that the exercise was very beneficial. Increased postural sway observed again with foam EC. Pt able to perform step ups bilaterally with B UE support with close supervision. Pt will continue to benefit from skilled PT intervention focused on balance, gait, and strengthening so that he can navigate his environment with increased safety and stability.       Plan: Continue per plan of care.      Precautions: HTN, Chronic DVT,  Severe Osteoporosis, Hx of cervicalgia, hx of hernias   POC expires: 2024      Objective Measures 11/3 11/10 11/17 11/20 12/1/23 12/8  12/21 1/12 1/26   Vitals **  AMEZCUA NV  36                                              Neuro Re-Ed      2  feet       Static Balance   -mod tandem stance attempted 2x10s sophie, requiring intermittent UE support    -stance EC 30s increased postural sway     -FT EO 1 min minimal postural sway. -mod tandem stance attempted 2x10s sophie, requiring  "intermittent UE support    -stance EC on foam 3x30s increased postural sway   -stance EC on foam 3x30\"  Stance EC on foam 3x30\" EC foam //bar 3x30\" CGAx1 EC foam //bar 3x30\" CGAx1  EC foam // bar 3x1 min CGAx1   Dynamic Balance   -raya navigation 4 hurdles lowest setting 2.5lb AW B UE support // 4x sideways & forwards -raya navigation 4x forwards 4x sideways middle setting. 2.5lb AW in // B UE support -raya navigation 4x forwards, 4x sideways, middle setting. 2.5lb AW in // B UE support -raya navigation 4x fwds middle setting 3lb AW in // B UE support    -boomwhacker taps walking in // no UE support to challenge upright posture, 3# AW on feet Raya 2x2 fwd middle height no AW BUE support  -boomwhacker taps walking in // no UE support to challenge upright posture, 3# AW on feet  Boomwhacker taps walking in // no UE support to challenge upright posture 3# AW on feet   Vorx1            Head turns     -gait with HT 6x in // supervision no UE support -gait with HT 6x in // supervision no UE support V & H       Multidirectional Stepping       Lateral stepping 2x laps no AW       HKM             Resisted Amb       Maroon TB 2x2 laps (50' per lap) with RW overground       Side stepping  //bar 2x laps            Overhead ball slam    Reaching overhead and slamming down 5lb ball 10x Reaching overhead and slamming down 5lb ball 10x       Education  POC, HEP, prognosis, balance systems, home set up, goals for therapy, outcome measure results x10 mins Tests & measures: AMEZCUA, results, predicative of future falls      ABC, TUG, 5xSTS, Gait speed, 2MWT. Ongoing risk of falls. Importance for ongoing strengthening.     Ther Ex            SLRx3 Hip abd ext 2x10 Hip abduction and extension 2x10 2.5lb AW in // Hip abd, ext, 2x10 2.5lbs         Standing hip abduction         2x10 sophie 3# AW   Leg extension             Mini Squats  X10       2x20     Step ups fwd and lat        Fwd onto 6 inch step, 2x14 each LE   FWD onto 6 inch " step 2x14 each LE   Hr/Tr X10 ea           Sit <> Stand  BUE 2x5 Two foams on chair, 3x5 no UE support Two foams on chair, 1x10 no UE support. 1x5 no UE support but required Karen Two foams on 1x10 no UE support, 1x5 no UE support but reqiured Karen Two foams on chair 1x13 Karen  Two foams on chair 2x fatigue  Two foam on chair 1x7 no Ue support, 1x10 no UE support, 1x6 no UE support.   1 foam on chair 1x1 Karen-modA               Standing marches  2x10 2.5lb AW in // B UE support 2x10 2.5lb AW in // B UE support  2x10 3lb AW  2x10 3lb AW  4x10 3lb AW //   Ther Activity            Circuit Training             Transfer Training  Safety with stand <> sit transfer, increased weight shift.            Gait Training            Gait training w RW         [id number] with 3lb AW sophie & RW 8 min               Modalities

## 2024-02-01 ENCOUNTER — TELEPHONE (OUTPATIENT)
Dept: ENDOCRINOLOGY | Facility: HOSPITAL | Age: 84
End: 2024-02-01

## 2024-02-01 NOTE — TELEPHONE ENCOUNTER
No blood work in 3 months, so 04/24 since last blood work was on 01.24. but urine is correct he can do it now

## 2024-02-01 NOTE — TELEPHONE ENCOUNTER
Patient called, he will be completing the 24 hour urine collection and stated his is also due for blood work is this correct?

## 2024-02-02 ENCOUNTER — OFFICE VISIT (OUTPATIENT)
Facility: CLINIC | Age: 84
End: 2024-02-02
Payer: COMMERCIAL

## 2024-02-02 ENCOUNTER — APPOINTMENT (OUTPATIENT)
Dept: LAB | Facility: HOSPITAL | Age: 84
End: 2024-02-02
Payer: COMMERCIAL

## 2024-02-02 DIAGNOSIS — E21.3 HYPERPARATHYROIDISM (HCC): Primary | ICD-10-CM

## 2024-02-02 DIAGNOSIS — Z74.09 IMPAIRED FUNCTIONAL MOBILITY, BALANCE, GAIT, AND ENDURANCE: ICD-10-CM

## 2024-02-02 DIAGNOSIS — E66.01 SEVERE OBESITY (BMI 35.0-39.9) WITH COMORBIDITY (HCC): ICD-10-CM

## 2024-02-02 DIAGNOSIS — E21.3 HYPERPARATHYROIDISM (HCC): ICD-10-CM

## 2024-02-02 LAB
CALCIUM 24H UR-MCNC: 257.5 MG/24 HRS (ref 100–300)
CREAT 24H UR-MRATE: 0.7 G/24HR (ref 0.8–1.8)
SPECIMEN VOL UR: 2500 ML
SPECIMEN VOL UR: 2500 ML

## 2024-02-02 PROCEDURE — 97112 NEUROMUSCULAR REEDUCATION: CPT

## 2024-02-02 PROCEDURE — 82340 ASSAY OF CALCIUM IN URINE: CPT

## 2024-02-02 PROCEDURE — 97116 GAIT TRAINING THERAPY: CPT

## 2024-02-02 PROCEDURE — 82570 ASSAY OF URINE CREATININE: CPT

## 2024-02-02 PROCEDURE — 97110 THERAPEUTIC EXERCISES: CPT

## 2024-02-02 NOTE — PROGRESS NOTES
"Daily Note     Today's date: 2024  Patient name: Isai Abbott  : 1940  MRN: [id number]  Referring provider: Josi Kuhn MD  Dx:   Encounter Diagnosis     ICD-10-CM    1. Hyperparathyroidism (HCC)  E21.3       2. Severe obesity (BMI 35.0-39.9) with comorbidity (HCC)  E66.01       3. Impaired functional mobility, balance, gait, and endurance  Z74.09                        Subjective: Pt states that he is doing well today. He felt we did exercises last session that were really good for him. He was not too sore after last session. .       Objective: See treatment diary below      Assessment: Pt participated in a skilled PT session focused on balance, gait, and strengthening intervention. Spt required Karen-modA to rise from chair with 1 foam pad on it, reflecting ongoing LE power deficits. # of laps around clinic was increased to challenge pt's CV endurance, pt reported significant fatigue upon completion. Hurdles performed with alternating LE's, pt exhibited increased difficulty clearing R LE as compared to L.  Pt will continue to benefit from skilled PT intervention focused on balance, gait, and strengthening so that he can navigate his environment with increased safety and stability.       Plan: Continue per plan of care.      Precautions: HTN, Chronic DVT,  Severe Osteoporosis, Hx of cervicalgia, hx of hernias   POC expires: 2024      Objective Measures 11/3 11/10 11/17 11/20 12/1/23 12/8  12/21 1/12 1/26 2/2   Vitals **  AMEZCUA NV  36                                                  Neuro Re-Ed      2  feet        Static Balance   -mod tandem stance attempted 2x10s sophie, requiring intermittent UE support    -stance EC 30s increased postural sway     -FT EO 1 min minimal postural sway. -mod tandem stance attempted 2x10s sophie, requiring intermittent UE support    -stance EC on foam 3x30s increased postural sway   -stance EC on foam 3x30\"  Stance EC on foam 3x30\" EC foam //bar 3x30\" CGAx1 EC " "foam //bar 3x30\" CGAx1  EC foam // bar 3x1 min CGAx1    Dynamic Balance   -raya navigation 4 hurdles lowest setting 2.5lb AW B UE support // 4x sideways & forwards -raya navigation 4x forwards 4x sideways middle setting. 2.5lb AW in // B UE support -raya navigation 4x forwards, 4x sideways, middle setting. 2.5lb AW in // B UE support -raya navigation 4x fwds middle setting 3lb AW in // B UE support    -boomwhacker taps walking in // no UE support to challenge upright posture, 3# AW on feet Raya 2x2 fwd middle height no AW BUE support  -boomwhacker taps walking in // no UE support to challenge upright posture, 3# AW on feet  Boomwhacker taps walking in // no UE support to challenge upright posture 3# AW on feet -raya navigation fwd middle height 6x 5 hurdles 3# AW on each LE B UE support //       Vorx1             Head turns     -gait with HT 6x in // supervision no UE support -gait with HT 6x in // supervision no UE support V & H        Multidirectional Stepping       Lateral stepping 2x laps no AW        HKM              Resisted Amb       Maroon TB 2x2 laps (50' per lap) with RW overground        Side stepping  //bar 2x laps             Overhead ball slam    Reaching overhead and slamming down 5lb ball 10x Reaching overhead and slamming down 5lb ball 10x        Education  POC, HEP, prognosis, balance systems, home set up, goals for therapy, outcome measure results x10 mins Tests & measures: AMEZCUA, results, predicative of future falls      ABC, TUG, 5xSTS, Gait speed, 2MWT. Ongoing risk of falls. Importance for ongoing strengthening.      Ther Ex             SLRx3 Hip abd ext 2x10 Hip abduction and extension 2x10 2.5lb AW in // Hip abd, ext, 2x10 2.5lbs          Standing hip abduction         2x10 sophie 3# AW    Leg extension              Mini Squats  X10       2x20      Step ups fwd and lat        Fwd onto 6 inch step, 2x14 each LE   FWD onto 6 inch step 2x14 each LE FWD onto 8 inch step 2x10   Hr/Tr X10 " ea            Sit <> Stand  BUE 2x5 Two foams on chair, 3x5 no UE support Two foams on chair, 1x10 no UE support. 1x5 no UE support but required Karen Two foams on 1x10 no UE support, 1x5 no UE support but reqiured Karen Two foams on chair 1x13 Karen  Two foams on chair 2x fatigue  Two foam on chair 1x7 no Ue support, 1x10 no UE support, 1x6 no UE support.   1 foam on chair 1x1 Karen-modA 1 foam on chair 1x10 Karen-modA                Standing marches  2x10 2.5lb AW in // B UE support 2x10 2.5lb AW in // B UE support  2x10 3lb AW  2x10 3lb AW  4x10 3lb AW //    Ther Activity             Circuit Training              Transfer Training  Safety with stand <> sit transfer, increased weight shift.             Gait Training             Gait training w RW         [id number] with 3lb AW sophie & RW 8 min 4x 140 ft with 3lb AW sophie RW 20 minutes                Modalities

## 2024-02-09 ENCOUNTER — APPOINTMENT (OUTPATIENT)
Facility: CLINIC | Age: 84
End: 2024-02-09
Payer: COMMERCIAL

## 2024-02-16 ENCOUNTER — OFFICE VISIT (OUTPATIENT)
Facility: CLINIC | Age: 84
End: 2024-02-16
Payer: COMMERCIAL

## 2024-02-16 DIAGNOSIS — E21.3 HYPERPARATHYROIDISM (HCC): Primary | ICD-10-CM

## 2024-02-16 DIAGNOSIS — E66.01 SEVERE OBESITY (BMI 35.0-39.9) WITH COMORBIDITY (HCC): ICD-10-CM

## 2024-02-16 DIAGNOSIS — Z74.09 IMPAIRED FUNCTIONAL MOBILITY, BALANCE, GAIT, AND ENDURANCE: ICD-10-CM

## 2024-02-16 PROCEDURE — 97116 GAIT TRAINING THERAPY: CPT

## 2024-02-16 PROCEDURE — 97112 NEUROMUSCULAR REEDUCATION: CPT

## 2024-02-16 PROCEDURE — 97110 THERAPEUTIC EXERCISES: CPT

## 2024-02-16 NOTE — PROGRESS NOTES
"Daily Note     Today's date: 2024  Patient name: Isai Abbott  : 1940  MRN: [id number]  Referring provider: Josi Kuhn MD  Dx:   Encounter Diagnosis     ICD-10-CM    1. Hyperparathyroidism (HCC)  E21.3       2. Severe obesity (BMI 35.0-39.9) with comorbidity (HCC)  E66.01       3. Impaired functional mobility, balance, gait, and endurance  Z74.09                          Subjective: Pt states that he is doing well today. He felt we did exercises last session that were really good for him. He was not too sore after last session. He wants to do some walking today.       Objective: See treatment diary below      Assessment: Pt participated in a skilled PT session focused on balance, gait, and strengthening intervention. Able to complete raya navigation with 5lb AW & Step through pattern today. # of laps around clinic was increased to challenge pt's CV endurance, pt reported significant fatigue upon completion but was able to complete 5 laps with 5# AW. Foam EC performed with initial increased LOB and occasional Karen.  Pt will continue to benefit from skilled PT intervention focused on balance, gait, and strengthening so that he can navigate his environment with increased safety and stability.       Plan: Continue per plan of care.      Precautions: HTN, Chronic DVT,  Severe Osteoporosis, Hx of cervicalgia, hx of hernias   POC expires: 2024      Objective Measures 11/3 11/10 11/17 11/20 12/1/23 12/8  12/21 1/12 1/26 2/2 2/16   Vitals **  AMEZCUA NV  36                                                      Neuro Re-Ed      2  feet         Static Balance   -mod tandem stance attempted 2x10s sophie, requiring intermittent UE support    -stance EC 30s increased postural sway     -FT EO 1 min minimal postural sway. -mod tandem stance attempted 2x10s sophie, requiring intermittent UE support    -stance EC on foam 3x30s increased postural sway   -stance EC on foam 3x30\"  Stance EC on foam 3x30\" EC foam " "//bar 3x30\" CGAx1 EC foam //bar 3x30\" CGAx1  EC foam // bar 3x1 min CGAx1  EC Foam // bar 3x1 min CGA x1   Dynamic Balance   -raya navigation 4 hurdles lowest setting 2.5lb AW B UE support // 4x sideways & forwards -raya navigation 4x forwards 4x sideways middle setting. 2.5lb AW in // B UE support -raya navigation 4x forwards, 4x sideways, middle setting. 2.5lb AW in // B UE support -raya navigation 4x fwds middle setting 3lb AW in // B UE support    -boomwhacker taps walking in // no UE support to challenge upright posture, 3# AW on feet Raya 2x2 fwd middle height no AW BUE support  -boomwhacker taps walking in // no UE support to challenge upright posture, 3# AW on feet  Boomwhacker taps walking in // no UE support to challenge upright posture 3# AW on feet -raya navigation fwd middle height 6x 5 hurdles 3# AW on each LE B UE support //     -raya navigation fwd middle height 6x 5 hurdles 5# AW on each LE B UE support // step through pattern    -boomwhacker 3# AW bilaterally 6x    Vorx1              Head turns     -gait with HT 6x in // supervision no UE support -gait with HT 6x in // supervision no UE support V & H         Multidirectional Stepping       Lateral stepping 2x laps no AW         HKM               Resisted Amb       Maroon TB 2x2 laps (50' per lap) with RW overground         Side stepping  //bar 2x laps              Overhead ball slam    Reaching overhead and slamming down 5lb ball 10x Reaching overhead and slamming down 5lb ball 10x         Education  POC, HEP, prognosis, balance systems, home set up, goals for therapy, outcome measure results x10 mins Tests & measures: AMEZCUA, results, predicative of future falls      ABC, TUG, 5xSTS, Gait speed, 2MWT. Ongoing risk of falls. Importance for ongoing strengthening.       Ther Ex              SLRx3 Hip abd ext 2x10 Hip abduction and extension 2x10 2.5lb AW in // Hip abd, ext, 2x10 2.5lbs           Standing hip abduction         2x10 sophie 3# " AW     Leg extension               Mini Squats  X10       2x20       Step ups fwd and lat        Fwd onto 6 inch step, 2x14 each LE   FWD onto 6 inch step 2x14 each LE FWD onto 8 inch step 2x10 FWD on 8 inch step 3x10 5lb AW   Hr/Tr X10 ea             Sit <> Stand  BUE 2x5 Two foams on chair, 3x5 no UE support Two foams on chair, 1x10 no UE support. 1x5 no UE support but required Karen Two foams on 1x10 no UE support, 1x5 no UE support but reqiured Karen Two foams on chair 1x13 Karen  Two foams on chair 2x fatigue  Two foam on chair 1x7 no Ue support, 1x10 no UE support, 1x6 no UE support.   1 foam on chair 1x1 Karen-modA 1 foam on chair 1x10 Karen-modA                  Standing marches  2x10 2.5lb AW in // B UE support 2x10 2.5lb AW in // B UE support  2x10 3lb AW  2x10 3lb AW  4x10 3lb AW //     Ther Activity              Circuit Training               Transfer Training  Safety with stand <> sit transfer, increased weight shift.              Gait Training              Gait training w RW         [id number] with 3lb AW sophie & RW 8 min 4x 140 ft with 3lb AW sophie RW 20 minutes [id number] 5lb AW 20 minutes                 Modalities

## 2024-02-23 ENCOUNTER — OFFICE VISIT (OUTPATIENT)
Facility: CLINIC | Age: 84
End: 2024-02-23
Payer: COMMERCIAL

## 2024-02-23 DIAGNOSIS — Z74.09 IMPAIRED FUNCTIONAL MOBILITY, BALANCE, GAIT, AND ENDURANCE: ICD-10-CM

## 2024-02-23 DIAGNOSIS — E66.01 SEVERE OBESITY (BMI 35.0-39.9) WITH COMORBIDITY (HCC): ICD-10-CM

## 2024-02-23 DIAGNOSIS — E21.3 HYPERPARATHYROIDISM (HCC): Primary | ICD-10-CM

## 2024-02-23 PROCEDURE — 97116 GAIT TRAINING THERAPY: CPT

## 2024-02-23 PROCEDURE — 97112 NEUROMUSCULAR REEDUCATION: CPT

## 2024-02-23 NOTE — PROGRESS NOTES
Daily Note     Today's date: 2024  Patient name: Isai Abbott  : 1940  MRN: [id number]  Referring provider: Josi Kuhn MD  Dx:   Encounter Diagnosis     ICD-10-CM    1. Hyperparathyroidism (HCC)  E21.3       2. Severe obesity (BMI 35.0-39.9) with comorbidity (HCC)  E66.01       3. Impaired functional mobility, balance, gait, and endurance  Z74.09                      Subjective: Pt states that he feels like therapy is really helping him and he would like to continue with PT.       Objective: See treatment diary below      Assessment: Pt participated in a skilled PT session focused on balance, gait, and strengthening. Discussed utilizing a higher walker so that pt can stand straighter, however, pt's walker does not adjust, so he plans on bringing a higher one to try next time. Furthermore, ambulation for 6 minutes with increased success today as compared to last visit. Balance was performed in the // with occasional forward LOB. Trialed raya navigation at medium height with 5lb weights, however, pt was unable to clear. Therefore, lowered raya to accommodate. Pt will continue to benefit from skilled PT intervention focused on balance, gait, and strengthening so that he can navigate his environment with increased safety and stability.       Plan: Continue per plan of care.      Precautions: HTN, Chronic DVT,  Severe Osteoporosis, Hx of cervicalgia, hx of hernias   POC expires: 2024      Objective Measures 11/3 11/10 11/17 11/20 12/1/23 12/8  12/21 1/12 1/26 2/2 2/16 2/23   Vitals **  AMEZCUA NV  36                                                          Neuro Re-Ed      2  feet          Static Balance   -mod tandem stance attempted 2x10s sophie, requiring intermittent UE support    -stance EC 30s increased postural sway     -FT EO 1 min minimal postural sway. -mod tandem stance attempted 2x10s sophie, requiring intermittent UE support    -stance EC on foam 3x30s increased postural sway    "-stance EC on foam 3x30\"  Stance EC on foam 3x30\" EC foam //bar 3x30\" CGAx1 EC foam //bar 3x30\" CGAx1  EC foam // bar 3x1 min CGAx1  EC Foam // bar 3x1 min CGA x1 EC Foam // bar 3x1 min CGA x1   Dynamic Balance   -raya navigation 4 hurdles lowest setting 2.5lb AW B UE support // 4x sideways & forwards -raya navigation 4x forwards 4x sideways middle setting. 2.5lb AW in // B UE support -raya navigation 4x forwards, 4x sideways, middle setting. 2.5lb AW in // B UE support -raya navigation 4x fwds middle setting 3lb AW in // B UE support    -boomwhacker taps walking in // no UE support to challenge upright posture, 3# AW on feet Raya 2x2 fwd middle height no AW BUE support  -boomwhacker taps walking in // no UE support to challenge upright posture, 3# AW on feet  Boomwhacker taps walking in // no UE support to challenge upright posture 3# AW on feet -raya navigation fwd middle height 6x 5 hurdles 3# AW on each LE B UE support //     -raya navigation fwd middle height 6x 5 hurdles 5# AW on each LE B UE support // step through pattern    -boomwhacker 3# AW bilaterally 6x  -raya navigation fwd low height 6x5 hurdles 5# AW on each LE B UE support // step through pattern     -boomwhacker 5#  AW sophie 6x    Vorx1               Head turns     -gait with HT 6x in // supervision no UE support -gait with HT 6x in // supervision no UE support V & H          Multidirectional Stepping       Lateral stepping 2x laps no AW          HKM                Resisted Amb       Maroon TB 2x2 laps (50' per lap) with RW overground          Side stepping  //bar 2x laps               Overhead ball slam    Reaching overhead and slamming down 5lb ball 10x Reaching overhead and slamming down 5lb ball 10x          Education  POC, HEP, prognosis, balance systems, home set up, goals for therapy, outcome measure results x10 mins Tests & measures: SEVEN results, predicative of future falls      ABC, TUG, 5xSTS, Gait speed, 2MWT. Ongoing " risk of falls. Importance for ongoing strengthening.        Ther Ex               SLRx3 Hip abd ext 2x10 Hip abduction and extension 2x10 2.5lb AW in // Hip abd, ext, 2x10 2.5lbs            Standing hip abduction         2x10 sophie 3# AW      Leg extension                Mini Squats  X10       2x20        Step ups fwd and lat        Fwd onto 6 inch step, 2x14 each LE   FWD onto 6 inch step 2x14 each LE FWD onto 8 inch step 2x10 FWD on 8 inch step 3x10 5lb AW    Hr/Tr X10 ea              Sit <> Stand  BUE 2x5 Two foams on chair, 3x5 no UE support Two foams on chair, 1x10 no UE support. 1x5 no UE support but required Karen Two foams on 1x10 no UE support, 1x5 no UE support but reqiured Karen Two foams on chair 1x13 Karen  Two foams on chair 2x fatigue  Two foam on chair 1x7 no Ue support, 1x10 no UE support, 1x6 no UE support.   1 foam on chair 1x1 Karen-modA 1 foam on chair 1x10 Karen-modA                    Standing marches  2x10 2.5lb AW in // B UE support 2x10 2.5lb AW in // B UE support  2x10 3lb AW  2x10 3lb AW  4x10 3lb AW //      Ther Activity               Circuit Training                Transfer Training  Safety with stand <> sit transfer, increased weight shift.               Gait Training               Gait training w RW         [id number] with 3lb AW sophie & RW 8 min 4x 140 ft with 3lb AW sophie RW 20 minutes [id number] 5lb AW 20 minutes [id number] 5lb AW 20 min                  Modalities

## 2024-02-27 NOTE — ASSESSMENT & PLAN NOTE
· Chronically on Eliquis 2 5 mg b i d , also has IVC filter  · Hold Eliquis    Continue IV heparin drip due to COVID protocol 98.8

## 2024-03-01 ENCOUNTER — OFFICE VISIT (OUTPATIENT)
Facility: CLINIC | Age: 84
End: 2024-03-01
Payer: COMMERCIAL

## 2024-03-01 DIAGNOSIS — E21.3 HYPERPARATHYROIDISM (HCC): Primary | ICD-10-CM

## 2024-03-01 DIAGNOSIS — E66.01 SEVERE OBESITY (BMI 35.0-39.9) WITH COMORBIDITY (HCC): ICD-10-CM

## 2024-03-01 DIAGNOSIS — Z74.09 IMPAIRED FUNCTIONAL MOBILITY, BALANCE, GAIT, AND ENDURANCE: ICD-10-CM

## 2024-03-01 PROCEDURE — 97112 NEUROMUSCULAR REEDUCATION: CPT

## 2024-03-01 PROCEDURE — 97116 GAIT TRAINING THERAPY: CPT

## 2024-03-01 NOTE — PROGRESS NOTES
"Daily Note     Today's date: 3/1/2024  Patient name: Isai Abbott  : 1940  MRN: [id number]  Referring provider: Josi Kuhn MD  Dx:   Encounter Diagnosis     ICD-10-CM    1. Hyperparathyroidism (HCC)  E21.3       2. Severe obesity (BMI 35.0-39.9) with comorbidity (HCC)  E66.01       3. Impaired functional mobility, balance, gait, and endurance  Z74.09                        Subjective: Pt presents with an adjustable walker today to try raising it up so that he is not flexed as far forwards. He is doing okay otherwise.     Objective: See treatment diary below      Assessment: Pt participated in a skilled PT session focused on balance, gait, and strengthening. Walker was adjusted to try to support pt in a more upright position, pt was able to still complete 6 laps with RW however did report increased fatigue compared to last visit. Despite this, pt's gait mechanics and stability were improved with higher walker setting.  Pt was able to perform step through pattern with middle height hurdles today. Pt will continue to benefit from skilled PT intervention focused on balance, gait, and strengthening so that he can navigate his environment with increased safety and stability.       Plan: Continue per plan of care.      Precautions: HTN, Chronic DVT,  Severe Osteoporosis, Hx of cervicalgia, hx of hernias   POC expires: 2024      Objective Measures 11/3 11/10 11/17 11/20 12/1/23 12/8  12/21 1/12 1/26 2/2 2/16 2/23 3/1   Vitals **  AMEZCUA NV  36                                                              Neuro Re-Ed      2  feet           Static Balance   -mod tandem stance attempted 2x10s sophie, requiring intermittent UE support    -stance EC 30s increased postural sway     -FT EO 1 min minimal postural sway. -mod tandem stance attempted 2x10s sophie, requiring intermittent UE support    -stance EC on foam 3x30s increased postural sway   -stance EC on foam 3x30\"  Stance EC on foam 3x30\" EC foam //bar " "3x30\" CGAx1 EC foam //bar 3x30\" CGAx1  EC foam // bar 3x1 min CGAx1  EC Foam // bar 3x1 min CGA x1 EC Foam // bar 3x1 min CGA x1 EC foam // bar 3x1 min CGA x1   Dynamic Balance   -raya navigation 4 hurdles lowest setting 2.5lb AW B UE support // 4x sideways & forwards -raya navigation 4x forwards 4x sideways middle setting. 2.5lb AW in // B UE support -raya navigation 4x forwards, 4x sideways, middle setting. 2.5lb AW in // B UE support -raya navigation 4x fwds middle setting 3lb AW in // B UE support    -boomwhacker taps walking in // no UE support to challenge upright posture, 3# AW on feet Raya 2x2 fwd middle height no AW BUE support  -boomwhacker taps walking in // no UE support to challenge upright posture, 3# AW on feet  Boomwhacker taps walking in // no UE support to challenge upright posture 3# AW on feet -raya navigation fwd middle height 6x 5 hurdles 3# AW on each LE B UE support //     -raya navigation fwd middle height 6x 5 hurdles 5# AW on each LE B UE support // step through pattern    -boomwhacker 3# AW bilaterally 6x  -raya navigation fwd low height 6x5 hurdles 5# AW on each LE B UE support // step through pattern     -boomwhacker 5#  AW sophie 6x  -raya navigation fwd middle height 6x5 hurdles 5# AW on each LE B UE support // step through pattern   Vorx1                Head turns     -gait with HT 6x in // supervision no UE support -gait with HT 6x in // supervision no UE support V & H           Multidirectional Stepping       Lateral stepping 2x laps no AW           HKM                 Resisted Amb       Maroon TB 2x2 laps (50' per lap) with RW overground           Side stepping  //bar 2x laps                Overhead ball slam    Reaching overhead and slamming down 5lb ball 10x Reaching overhead and slamming down 5lb ball 10x           Education  POC, HEP, prognosis, balance systems, home set up, goals for therapy, outcome measure results x10 mins Tests & measures: SEVEN, results, " predicative of future falls      ABC, TUG, 5xSTS, Gait speed, 2MWT. Ongoing risk of falls. Importance for ongoing strengthening.         Ther Ex                SLRx3 Hip abd ext 2x10 Hip abduction and extension 2x10 2.5lb AW in // Hip abd, ext, 2x10 2.5lbs             Standing hip abduction         2x10 sophie 3# AW       Leg extension                 Mini Squats  X10       2x20         Step ups fwd and lat        Fwd onto 6 inch step, 2x14 each LE   FWD onto 6 inch step 2x14 each LE FWD onto 8 inch step 2x10 FWD on 8 inch step 3x10 5lb AW  Fwd on 8 inch step 3x10 5lb AW   Hr/Tr X10 ea               Sit <> Stand  BUE 2x5 Two foams on chair, 3x5 no UE support Two foams on chair, 1x10 no UE support. 1x5 no UE support but required Karen Two foams on 1x10 no UE support, 1x5 no UE support but reqiured Karen Two foams on chair 1x13 Karen  Two foams on chair 2x fatigue  Two foam on chair 1x7 no Ue support, 1x10 no UE support, 1x6 no UE support.   1 foam on chair 1x1 Karen-modA 1 foam on chair 1x10 Karen-modA                      Standing marches  2x10 2.5lb AW in // B UE support 2x10 2.5lb AW in // B UE support  2x10 3lb AW  2x10 3lb AW  4x10 3lb AW //       Ther Activity                Circuit Training                 Transfer Training  Safety with stand <> sit transfer, increased weight shift.                Gait Training                Gait training w RW         [id number] with 3lb AW sophie & RW 8 min 4x 140 ft with 3lb AW sophie RW 20 minutes [id number] 5lb AW 20 minutes [id number] 5lb AW 20 min [id number] 16 min higher walker setting (setting 3)                   Group Code                Standing marches             5lb AW 3x10   Standing hip abduction             5lb AW 3x10   Mini squats             5lb AW 3x10   Heel raises             3x10 //

## 2024-03-06 ENCOUNTER — APPOINTMENT (OUTPATIENT)
Facility: CLINIC | Age: 84
End: 2024-03-06
Payer: COMMERCIAL

## 2024-03-07 ENCOUNTER — TELEPHONE (OUTPATIENT)
Dept: DERMATOLOGY | Facility: CLINIC | Age: 84
End: 2024-03-07

## 2024-03-07 NOTE — TELEPHONE ENCOUNTER
Called and spoke with patient to advise his appt with Dr. Rueda on 8/28 has been moved from [place] to [place]. There was also a change in appt time from 10:20 to 11:00. Pt confirmed this appt change.

## 2024-03-08 ENCOUNTER — OFFICE VISIT (OUTPATIENT)
Facility: CLINIC | Age: 84
End: 2024-03-08
Payer: COMMERCIAL

## 2024-03-08 DIAGNOSIS — E66.01 SEVERE OBESITY (BMI 35.0-39.9) WITH COMORBIDITY (HCC): ICD-10-CM

## 2024-03-08 DIAGNOSIS — Z74.09 IMPAIRED FUNCTIONAL MOBILITY, BALANCE, GAIT, AND ENDURANCE: ICD-10-CM

## 2024-03-08 DIAGNOSIS — E21.3 HYPERPARATHYROIDISM (HCC): Primary | ICD-10-CM

## 2024-03-08 PROCEDURE — 97112 NEUROMUSCULAR REEDUCATION: CPT

## 2024-03-08 NOTE — PROGRESS NOTES
PT Re-Evaluation     Today's date: 3/8/2024  Patient name: Isai Abbott  : 1940  MRN: [id number]  Referring provider: Josi Kuhn MD  Dx:   Encounter Diagnosis     ICD-10-CM    1. Hyperparathyroidism (HCC)  E21.3       2. Severe obesity (BMI 35.0-39.9) with comorbidity (HCC)  E66.01       3. Impaired functional mobility, balance, gait, and endurance  Z74.09                          Assessment  Assessment details: 3/8/2024: pt is an 83 y.o. male who has received 14 visits of skilled PT intervention addressing balance, gait, and strength deficits due to deconditioning and osteoporosis. Today's assessment reflects improvement in AMEZCUA balance score, 5xSTS, TUG, and gait speed since previous assessment, reflecting improvements in strength and balance. Pt was able to complete a 6MWT today, which reflects improved endurance capacity. Furthermore, his distance ambulated for 2MWT has also improved. Despite these improvements, pt's AMEZCUA score is still predictive of future falls, and 6MWT reflects significant endurance deficits. His gait speed also continues to classify him as a house hold ambulator. Pt will continue to benefit from skilled PT focused on balance, gait, and strengthening so that he can navigate his environment with increased safety and stability.     2024: Pt is an 83 y.o. male who has received 8 visits of skilled PT Intervention addressing balance, gait, and strengthening deficits to address gait and balance deficits in adjunction to recent diagnosis of osteoporosis. Today's assessment reflects significant improvement in 5xSTS, gait speed, TUG, and ABC since previous assessment, reflecting improvement in LE power and reduction in risk of falls. Despite these improvements, pt's gait speed still places him at the level of a household ambulator, and his ABC, TUG, and gait speed indicate that he is at risk of falls. Furthermore, 2MWT reflects significant endurance deficits, indicating ongoing need  for CV intervention. Pt will continue to benefit from skilled PT intervention focused on balance, gait, and strengthening so that he can navigate his environment with increased safety and stability.     At IE: Isai is an 83 year old patient presenting to OPPT for evaluation regarding gait and balance deficits in adjunction to recent diagnosis of osteoporosis. Upon evaluation, Isai demonstrates significant deficits and increased fall risk. Proximal BLE strength below 4/5 throughout aside from L hip flexion. Decreased endurance evidenced by ability to only complete 2MWT with distance traversed 130 feet. TUG and 5x STS required UE support to rise from chair, as well as scores being greater than fall risk cut of score indicative of increased fall risk and poor power generation. Gait speed classifies patient as increased fall risk, as well as non-community ambulator. These impairments limit their ability to perform daily activities as well as their previous level of function causing decreased quality of life.    HEP delivered with emphasis on BLE strengthening with UE support. Education provided regarding HEP, POC, prognosis, goals for therapy, and functional outcome measure results in relation to patient's fall risk. Also worked on sit <> stand transfers to improve overall safety to reduce fall risk and working with walker to improve facilitation of increased safety during these activities. Cuing for increased anterior weight shift.     Patient requires continued skilled PT services in order to improve aforementioned impairments so that they are able to return to highest level of function possible. Without initiation of skilled PT services, patient will continue to have largely decreased functional endurance and mobility leading to increased caregiver burden and likelihood of falls and subsequent hospitalization.     Impairments: abnormal or restricted ROM, activity intolerance, impaired balance, impaired physical  strength, lacks appropriate home exercise program and poor posture   Understanding of Dx/Px/POC: good   Prognosis: good    Goals  STG (4 weeks)   Improve (reduce) TUG score by 4 seconds indicating meaningful improvement in fall risk MET  Improve AMEZCUA score by 6 indicating meaningful improvement in fall risk ONGOING  Improve/Reduce 5x STS score by 5 seconds indicating meaningful improvement in fall risk and power generation  MET  Improve generalized strength by 1/5 to demonstrate improvement in facilitation of efficiency of functional activities and reduced fall risk ONGOING  Improve 2MWT score by 50 feet improving endurance and community ambulation. MET  Improve gait speed by 0.05 m/s with LRAD MET    LTG (8 weeks)   Improve (reduce) TUG score by 4 seconds compared to PN indicating meaningful improvement in fall risk ONGOING  Improve AMEZCUA score to > 45/56 indicating meaningful improvement in fall risk  ONGOING  Improve/Reduce 5x STS score by 4 seconds compared to PN seconds indicating meaningful improvement in fall risk and power generation ONGOING   Will be able to complete full 6MWT: MET  Improve gait speed by 0.05 m/s from PN with LRAD  MET        Plan  Plan details: TE: for strengthening, stretching, and flexibility  TA: for functional participation  NR: for balance, coordination, reaction time  MT: for STM, IASTM, joint mobilizations  Gait Training: to improve gait mechanics.    Planned modality interventions: cryotherapy and thermotherapy: hydrocollator packs  Planned therapy interventions: manual therapy, neuromuscular re-education, patient education, postural training, self care, strengthening, stretching, therapeutic activities, therapeutic exercise, home exercise program, graded exercise, gait training, flexibility and balance  Frequency: 2x week  Duration in visits: 8  Duration in weeks: 8  Plan of Care beginning date: 3/8/2024  Plan of Care expiration date: 5/3/2024  Treatment plan discussed with:  "patient        Subjective Evaluation    History of Present Illness  Mechanism of injury: 3/8/2024: Pt states that he feels about 80% of the way to where he would like to be. He is limited by ongoing fatigue, soreness after sessions, and reduced endurance. He feels his balance is improving.    1/12/2024: Pt states that he missed the last couple weeks because he felt like he was getting a cold. He had his infusion and decided to wait to schedule PT until after the infusion. Pt states that he feels about 50% of the way to where he would like to be by the time he is done with PT. He feels his strength & his attitude are the biggest things holding him back from feeling ready for discharge. He reports that he does not think he will score as well as on his measures today as compared to how he did last time.     AT IE: Isai is an 83 year old patient presenting to OPPT for evaluation regarding gait and balance deficits. Recent diagnosis of osteoporosis via last dexa scan. Reports that he is here to work on his strength. Last fall was around 7 months ago. Does exercises at home - already. Standing marching and squat at the counter are the two that he is doing currently. Wife reports that he has good follow through. \"Everything we do we want to be able to do at home\". Main goal is to get stronger to be able to reduce fall risk going forward.     One dizzy spell in the last two weeks. Reports that he was standing at the counter and noted that he was lightheaded and room spinning. Felt like he was going to fall.     Got PT at home after COVID - but wasn't able to go up the stairs. Doesn't go up currently, wife handles the caretaking duties.     Home setup: Walkers throughout the home - theres no rugs. Anything that could possibly help. \"We set it up as safe as we can be\".   Patient Goals  Patient goals for therapy: improved balance, increased motion and increased strength    Pain  No pain reported    Social Support  Steps to " "enter house: yes (4\" step, 6\" step from the living room to the kitchen)  1  Stairs in house: yes   12  Lives in: one-story house  Lives with: spouse    Employment status: not working    Diagnostic Tests  Bone scan: abnormal    FCE comments: Narrative & Impression  DXA SCAN     CLINICAL HISTORY:  82-year-old male.   OTHER RISK FACTORS:  Hyperparathyroidism, Limited mobility, Lasix therapy.     PHARMACOLOGIC THERAPY FOR OSTEOPOROSIS:  None.     TECHNIQUE: Bone densitometry was performed using a H[employer] Horizon A  bone densitometer.  Regions of interest appear properly placed.         COMPARISON: There are no prior DXA studies performed on this unit for comparison.     RESULTS:      LUMBAR SPINE  Level: L1, L3, L4  (L2 vertebra excluded from analysis due to local structural abnormalities or artifact) :   BMD:  0.854  gm/cm2   T-score: -2.1         LEFT  TOTAL HIP:   BMD:  0.601  gm/cm2   T-score:  -2.9     LEFT  FEMORAL NECK:   BMD:  0.445  gm/cm2   T score: -3.6      LEFT  FOREARM:    33% RADIUS BMD:  0.701  gm/cm2  T-score:  -2.2         IMPRESSION:     1.  Osteoporosis. [Based on the total left hip]     2.  The 10 year risk of hip fracture is 9.1% with the 10 year risk of major osteoporotic fracture being 17% as calculated by the [employer] fracture risk assessment tool (FRAX, which is based on data generated by the WHO Collaborating [place]   for Metabolic Bone Diseases).  3.  The current NOF guidelines recommend treating patients with a T-score of -2.5 or less in the lumbar spine or hips, or in post-menopausal women and men over the age of 50 with low bone mass (osteopenia) and a FRAX 10 year risk score of >3% for hip   fracture and/or >20% for major osteoporotic fracture.     4.  The NOF recommends follow-up DXA in 1-2 years after initiating therapy for osteoporosis and every 2 years thereafter. More frequent evaluation is appropriate for patients with conditions associated with rapid bone loss, such " as glucocorticoid   therapy. The interval between DXA screenings may be longer for individuals without major risk factors and initial T-score in the normal or upper low bone mass range.       Treatments  Previous treatment: physical therapy        Objective      Balance Test 11/3  12/8 1/12 3/8/2024   6 Minute Walk Test (ft):    671ft   2 Minute Walk Test (ft): 125 feet with RW  228ft. 225ft. 340ft   Gait Speed (ft/s): 0.31 m/s   0.52 m/s 0.66   5x Sit To Stand (s): 43.72 seconds BUE push to RW   27.10s 24.17s   ABC 41.25%  53.75% 53.75%   AMEZCUA NV   40   TU.98 seconds  19.81s 17.27     Flowsheet Rows      Flowsheet Row Most Recent Value   Amezcua Balance Scale    1. Sitting to Standing 3   2. Standing Unsupported 4   3. Sitting with Back Unsupported but Feet Supported on Floor or on a Stool 4   4. Standing to Sitting 3   5.  Transfers 4   6. Standing Unsupported with Eyes Closed 4   7. Standing Unsupported with Feet Together 4   8. Reach Forward with Outstretched Arm While Standing 4   9.  Object from Floor from a Standing Position 4   10. Turning to Look Behind Over Left and Right Shoulders While Standing 2   11. Turn 360 Degrees 2   12. Place Alternate Foot on Step or Stool While Standing Unsupported 1   13. Standing Unsupported One Foot in Front 1   14. Standing on One Leg 0   Amezcua Balance Score 40          Manual Muscle Testing - Hip Left Right   Flexion 4- 3+   Extension 3 3   Abduction 3+ 3+   External Rotation 3+ 3+     Manual Muscle Testing - Knee Left Right   Flexion 3+ 3+   Extension 4- 3+     Manual Muscle Testing - Ankle Left Right   Doriflexion 3+ 3-   Plantarflexion 3 3        Transfers    Sit To Stand Contact Guard     Does not perform bed mobility at home - uses a recliner to sleep in.     Vitals 104/70, 94% Spo2, 73 bpm     94% SPO2 post 6MWT     Gait Assessment: Slow cautious shuffling pattern with rolling walker. Relatively steady, tendency to have RW get out in front of him too far.  Largely forward head and rounded shoulders with thoracic kyphosis        Precautions: HTN, Chronic DVT,  Severe Osteoporosis, Hx of cervicalgia, hx of hernias   POC expires: 05/03/2024      Objective Measures 11/3 11/10 1/12 1/26 2/2 2/16 2/23 3/1 3/8 PN   Vitals **  AMEZCUA NV  36                                              Neuro Re-Ed            Static Balance   -mod tandem stance attempted 2x10s sophie, requiring intermittent UE support    -stance EC 30s increased postural sway     -FT EO 1 min minimal postural sway.  EC foam // bar 3x1 min CGAx1  EC Foam // bar 3x1 min CGA x1 EC Foam // bar 3x1 min CGA x1 EC foam // bar 3x1 min CGA x1    Dynamic Balance   -raya navigation 4 hurdles lowest setting 2.5lb AW B UE support // 4x sideways & forwards  Boomwhacker taps walking in // no UE support to challenge upright posture 3# AW on feet -raya navigation fwd middle height 6x 5 hurdles 3# AW on each LE B UE support //     -raya navigation fwd middle height 6x 5 hurdles 5# AW on each LE B UE support // step through pattern    -boomwhacker 3# AW bilaterally 6x  -raya navigation fwd low height 6x5 hurdles 5# AW on each LE B UE support // step through pattern     -boomwhacker 5#  AW sophie 6x  -raya navigation fwd middle height 6x5 hurdles 5# AW on each LE B UE support // step through pattern    Vorx1            Head turns             Multidirectional Stepping             HKM             Resisted Amb             Side stepping  //bar 2x laps            Overhead ball slam            Education  POC, HEP, prognosis, balance systems, home set up, goals for therapy, outcome measure results x10 mins Tests & measures: AMEZCUA, results, predicative of future falls ABC, TUG, 5xSTS, Gait speed, 2MWT. Ongoing risk of falls. Importance for ongoing strengthening.       ABC TUG 5xSTS 10mWT 6MWT ongoing risk of falls importance of ongoing strengthening   Ther Ex            SLRx3 Hip abd ext 2x10 Hip abduction and extension 2x10 2.5lb AW in  //          Standing hip abduction    2x10 sophie 3# AW        Leg extension             Mini Squats  X10            Step ups fwd and lat     FWD onto 6 inch step 2x14 each LE FWD onto 8 inch step 2x10 FWD on 8 inch step 3x10 5lb AW  Fwd on 8 inch step 3x10 5lb AW    Hr/Tr X10 ea           Sit <> Stand  BUE 2x5 Two foams on chair, 3x5 no UE support  1 foam on chair 1x1 Karen-modA 1 foam on chair 1x10 Karen-modA                   Standing marches  2x10 2.5lb AW in // B UE support  4x10 3lb AW //        Ther Activity            Circuit Training             Transfer Training  Safety with stand <> sit transfer, increased weight shift.            Gait Training            Gait training w RW    [id number] with 3lb AW sophie & RW 8 min 4x 140 ft with 3lb AW sophie RW 20 minutes [id number] 5lb AW 20 minutes [id number] 5lb AW 20 min [id number] 16 min higher walker setting (setting 3)                               Access Code: [email or web address]  URL: [email or web address]  Date: 11/03/2023  Prepared by: Tommie Ocasio    Exercises  - Sit to Stand with Armchair  - 1 x daily - 7 x weekly - 3 sets - 10 reps  - Standing Hip Abduction with Counter Support  - 1 x daily - 7 x weekly - 3 sets - 10 reps  - Heel Raises with Counter Support  - 1 x daily - 7 x weekly - 3 sets - 10 reps  - Standing Hip Extension with Counter Support  - 1 x daily - 7 x weekly - 3 sets - 10 reps  - Side Stepping with Counter Support  - 1 x daily - 7 x weekly - 3 sets - 10 reps

## 2024-03-08 NOTE — PROGRESS NOTES
"Daily Note     Today's date: 3/8/2024  Patient name: Isai Abbott  : 1940  MRN: [id number]  Referring provider: Josi Kuhn MD  Dx:   No diagnosis found.                   Subjective: Pt presents with an adjustable walker today to try raising it up so that he is not flexed as far forwards. He is doing okay otherwise.     Objective: See treatment diary below      Assessment: Pt participated in a skilled PT session focused on balance, gait, and strengthening. Walker was adjusted to try to support pt in a more upright position, pt was able to still complete 6 laps with RW however did report increased fatigue compared to last visit. Despite this, pt's gait mechanics and stability were improved with higher walker setting.  Pt was able to perform step through pattern with middle height hurdles today. Pt will continue to benefit from skilled PT intervention focused on balance, gait, and strengthening so that he can navigate his environment with increased safety and stability.       Plan: Continue per plan of care.      Precautions: HTN, Chronic DVT,  Severe Osteoporosis, Hx of cervicalgia, hx of hernias   POC expires: 2024      Objective Measures 11/3 11/10 11/17 11/20 12/1/23 12/8  12/21 1/12 1/26 2/2 2/16 2/23 3/1   Vitals **  AMEZCUA NV  36                                                              Neuro Re-Ed      2  feet           Static Balance   -mod tandem stance attempted 2x10s sophie, requiring intermittent UE support    -stance EC 30s increased postural sway     -FT EO 1 min minimal postural sway. -mod tandem stance attempted 2x10s sophei, requiring intermittent UE support    -stance EC on foam 3x30s increased postural sway   -stance EC on foam 3x30\"  Stance EC on foam 3x30\" EC foam //bar 3x30\" CGAx1 EC foam //bar 3x30\" CGAx1  EC foam // bar 3x1 min CGAx1  EC Foam // bar 3x1 min CGA x1 EC Foam // bar 3x1 min CGA x1 EC foam // bar 3x1 min CGA x1   Dynamic Balance   -raya navigation 4 hurdles " lowest setting 2.5lb AW B UE support // 4x sideways & forwards -raya navigation 4x forwards 4x sideways middle setting. 2.5lb AW in // B UE support -raya navigation 4x forwards, 4x sideways, middle setting. 2.5lb AW in // B UE support -raya navigation 4x fwds middle setting 3lb AW in // B UE support    -boomwhacker taps walking in // no UE support to challenge upright posture, 3# AW on feet Raya 2x2 fwd middle height no AW BUE support  -boomwhacker taps walking in // no UE support to challenge upright posture, 3# AW on feet  Boomwhacker taps walking in // no UE support to challenge upright posture 3# AW on feet -raya navigation fwd middle height 6x 5 hurdles 3# AW on each LE B UE support //     -raya navigation fwd middle height 6x 5 hurdles 5# AW on each LE B UE support // step through pattern    -boomwhacker 3# AW bilaterally 6x  -raya navigation fwd low height 6x5 hurdles 5# AW on each LE B UE support // step through pattern     -boomwhacker 5#  AW sophie 6x  -raya navigation fwd middle height 6x5 hurdles 5# AW on each LE B UE support // step through pattern   Vorx1                Head turns     -gait with HT 6x in // supervision no UE support -gait with HT 6x in // supervision no UE support V & H           Multidirectional Stepping       Lateral stepping 2x laps no AW           HKM                 Resisted Amb       Maroon TB 2x2 laps (50' per lap) with RW overground           Side stepping  //bar 2x laps                Overhead ball slam    Reaching overhead and slamming down 5lb ball 10x Reaching overhead and slamming down 5lb ball 10x           Education  POC, HEP, prognosis, balance systems, home set up, goals for therapy, outcome measure results x10 mins Tests & measures: AMEZCUA, results, predicative of future falls      ABC, TUG, 5xSTS, Gait speed, 2MWT. Ongoing risk of falls. Importance for ongoing strengthening.         Ther Ex                SLRx3 Hip abd ext 2x10 Hip abduction and extension  2x10 2.5lb AW in // Hip abd, ext, 2x10 2.5lbs             Standing hip abduction         2x10 sophie 3# AW       Leg extension                 Mini Squats  X10       2x20         Step ups fwd and lat        Fwd onto 6 inch step, 2x14 each LE   FWD onto 6 inch step 2x14 each LE FWD onto 8 inch step 2x10 FWD on 8 inch step 3x10 5lb AW  Fwd on 8 inch step 3x10 5lb AW   Hr/Tr X10 ea               Sit <> Stand  BUE 2x5 Two foams on chair, 3x5 no UE support Two foams on chair, 1x10 no UE support. 1x5 no UE support but required Karen Two foams on 1x10 no UE support, 1x5 no UE support but reqiured Karen Two foams on chair 1x13 Karen  Two foams on chair 2x fatigue  Two foam on chair 1x7 no Ue support, 1x10 no UE support, 1x6 no UE support.   1 foam on chair 1x1 Karen-modA 1 foam on chair 1x10 Karen-modA                      Standing marches  2x10 2.5lb AW in // B UE support 2x10 2.5lb AW in // B UE support  2x10 3lb AW  2x10 3lb AW  4x10 3lb AW //       Ther Activity                Circuit Training                 Transfer Training  Safety with stand <> sit transfer, increased weight shift.                Gait Training                Gait training w RW         [id number] with 3lb AW sophie & RW 8 min 4x 140 ft with 3lb AW sophie RW 20 minutes [id number] 5lb AW 20 minutes [id number] 5lb AW 20 min [id number] 16 min higher walker setting (setting 3)                   Group Code                Standing marches             5lb AW 3x10   Standing hip abduction             5lb AW 3x10   Mini squats             5lb AW 3x10   Heel raises             3x10 //

## 2024-03-18 ENCOUNTER — APPOINTMENT (OUTPATIENT)
Facility: CLINIC | Age: 84
End: 2024-03-18
Payer: COMMERCIAL

## 2024-03-22 ENCOUNTER — OFFICE VISIT (OUTPATIENT)
Facility: CLINIC | Age: 84
End: 2024-03-22
Payer: COMMERCIAL

## 2024-03-22 DIAGNOSIS — Z74.09 IMPAIRED FUNCTIONAL MOBILITY, BALANCE, GAIT, AND ENDURANCE: ICD-10-CM

## 2024-03-22 DIAGNOSIS — E21.3 HYPERPARATHYROIDISM (HCC): Primary | ICD-10-CM

## 2024-03-22 DIAGNOSIS — E66.01 SEVERE OBESITY (BMI 35.0-39.9) WITH COMORBIDITY (HCC): ICD-10-CM

## 2024-03-22 PROCEDURE — 97110 THERAPEUTIC EXERCISES: CPT

## 2024-03-22 PROCEDURE — 97116 GAIT TRAINING THERAPY: CPT

## 2024-03-22 NOTE — PROGRESS NOTES
Daily Note     Today's date: 3/22/2024  Patient name: Isai Abbott  : 1940  MRN: [id number]  Referring provider: Josi Kuhn MD  Dx:   Encounter Diagnosis     ICD-10-CM    1. Hyperparathyroidism (HCC)  E21.3       2. Severe obesity (BMI 35.0-39.9) with comorbidity (HCC)  E66.01       3. Impaired functional mobility, balance, gait, and endurance  Z74.09                      Subjective: Pt states that he got outside briefly the other day and walked to chat with a friend for a brief amount of time. He got a new walker which feels more stable.       Objective: See treatment diary below      Assessment: Pt participated in a skilled PT session focused on balance, gait, an dstrength training. Laps around clinic performed in less time today reflecting improved strength & stamina. Step ups performed with 0 LOB. Gait with HT performed in bars with intermittent support. Pt will continue to benefit from skilled PT so that he can navigate his environment with increased safety and stability.       Plan: Continue per plan of care.      Precautions: HTN, Chronic DVT,  Severe Osteoporosis, Hx of cervicalgia, hx of hernias   POC expires: 2024      Objective Measures 11/3 11/10 1/12 1/26 2/2 2/16 2/23 3/1 3/8 PN 3/22   Vitals **  AMEZCUA NV  36                                                  Neuro Re-Ed             Static Balance   -mod tandem stance attempted 2x10s sophie, requiring intermittent UE support    -stance EC 30s increased postural sway     -FT EO 1 min minimal postural sway.  EC foam // bar 3x1 min CGAx1  EC Foam // bar 3x1 min CGA x1 EC Foam // bar 3x1 min CGA x1 EC foam // bar 3x1 min CGA x1     Dynamic Balance   -raya navigation 4 hurdles lowest setting 2.5lb AW B UE support // 4x sideways & forwards  Boomwhacker taps walking in // no UE support to challenge upright posture 3# AW on feet -raya navigation fwd middle height 6x 5 hurdles 3# AW on each LE B UE support //     -raya navigation fwd middle  height 6x 5 hurdles 5# AW on each LE B UE support // step through pattern    -boomwhacker 3# AW bilaterally 6x  -raya navigation fwd low height 6x5 hurdles 5# AW on each LE B UE support // step through pattern     -boomwhacker 5#  AW sophie 6x  -raya navigation fwd middle height 6x5 hurdles 5# AW on each LE B UE support // step through pattern     Vorx1             Head turns           Gait with head turns in // 4x length of bars H & V   Multidirectional Stepping              HKM              Resisted Amb              Side stepping  //bar 2x laps             Overhead ball slam             Education  POC, HEP, prognosis, balance systems, home set up, goals for therapy, outcome measure results x10 mins Tests & measures: AMEZCUA, results, predicative of future falls ABC, TUG, 5xSTS, Gait speed, 2MWT. Ongoing risk of falls. Importance for ongoing strengthening.       ABC TUG 5xSTS 10mWT 6MWT ongoing risk of falls importance of ongoing strengthening    Ther Ex             SLRx3 Hip abd ext 2x10 Hip abduction and extension 2x10 2.5lb AW in //           Standing hip abduction    2x10 sophie 3# AW         Leg extension              Mini Squats  X10             Step ups fwd and lat     FWD onto 6 inch step 2x14 each LE FWD onto 8 inch step 2x10 FWD on 8 inch step 3x10 5lb AW  Fwd on 8 inch step 3x10 5lb AW  Fwd on 8 inch step 3x10 5lb AW   Hr/Tr X10 ea            Sit <> Stand  BUE 2x5 Two foams on chair, 3x5 no UE support  1 foam on chair 1x1 Karen-modA 1 foam on chair 1x10 Karen-modA     1 foam on chair 1x10 Karen-modA                Standing marches  2x10 2.5lb AW in // B UE support  4x10 3lb AW //         Ther Activity             Circuit Training              Transfer Training  Safety with stand <> sit transfer, increased weight shift.             Gait Training             Gait training w RW    [id number] with 3lb AW sophie & RW 8 min 4x 140 ft with 3lb AW sophie RW 20 minutes [id number] 5lb AW 20 minutes [id number] 5lb AW 20 min [id number] 16  min higher walker setting (setting 3)  [id number] 8 min 45s 5# AW

## 2024-03-25 DIAGNOSIS — N40.0 BENIGN PROSTATIC HYPERPLASIA WITHOUT LOWER URINARY TRACT SYMPTOMS: ICD-10-CM

## 2024-03-25 DIAGNOSIS — T84.53XA INFECTION ASSOCIATED WITH INTERNAL RIGHT KNEE PROSTHESIS, INITIAL ENCOUNTER (HCC): ICD-10-CM

## 2024-03-25 RX ORDER — DUTASTERIDE 0.5 MG/1
0.5 CAPSULE, LIQUID FILLED ORAL DAILY
Qty: 90 CAPSULE | Refills: 3 | Status: SHIPPED | OUTPATIENT
Start: 2024-03-25

## 2024-03-25 RX ORDER — SULFAMETHOXAZOLE AND TRIMETHOPRIM 800; 160 MG/1; MG/1
1 TABLET ORAL DAILY
Qty: 90 TABLET | Refills: 3 | Status: SHIPPED | OUTPATIENT
Start: 2024-03-25 | End: 2025-03-20

## 2024-03-29 ENCOUNTER — OFFICE VISIT (OUTPATIENT)
Facility: CLINIC | Age: 84
End: 2024-03-29
Payer: COMMERCIAL

## 2024-03-29 DIAGNOSIS — Z74.09 IMPAIRED FUNCTIONAL MOBILITY, BALANCE, GAIT, AND ENDURANCE: ICD-10-CM

## 2024-03-29 DIAGNOSIS — E21.3 HYPERPARATHYROIDISM (HCC): Primary | ICD-10-CM

## 2024-03-29 DIAGNOSIS — E66.01 SEVERE OBESITY (BMI 35.0-39.9) WITH COMORBIDITY (HCC): ICD-10-CM

## 2024-03-29 PROCEDURE — 97112 NEUROMUSCULAR REEDUCATION: CPT

## 2024-03-29 PROCEDURE — 97116 GAIT TRAINING THERAPY: CPT

## 2024-03-29 NOTE — PROGRESS NOTES
Daily Note     Today's date: 3/29/2024  Patient name: Isai Abbott  : 1940  MRN: [id number]  Referring provider: Josi Kuhn MD  Dx:   Encounter Diagnosis     ICD-10-CM    1. Hyperparathyroidism (HCC)  E21.3       2. Severe obesity (BMI 35.0-39.9) with comorbidity (HCC)  E66.01       3. Impaired functional mobility, balance, gait, and endurance  Z74.09                        Subjective: Pt states that he was doing 120 leg lifts at home and he feels he has made his legs too sore. He would like to reduce the resistance today. He is worried about his mechanical knees.       Objective: See treatment diary below      Assessment: Pt participated in a skilled PT session focused on balance, gait, and strength training. Outdoor gait performed and pt performed curb navigation independently. Stairs performed with emphasis on practicing gait mechanics and pt able to perform with close supervision.   Pt will continue to benefit from skilled PT so that he can navigate his environment with increased safety and stability.       Plan: Continue per plan of care.      Precautions: HTN, Chronic DVT,  Severe Osteoporosis, Hx of cervicalgia, hx of hernias   POC expires: 2024      Objective Measures 11/3 11/10 1/12 1/26 2/2 2/16 2/23 3/1 3/8 PN 3/22 3/29   Vitals **  AMEZCUA NV  36                                                      Neuro Re-Ed              Static Balance   -mod tandem stance attempted 2x10s sophie, requiring intermittent UE support    -stance EC 30s increased postural sway     -FT EO 1 min minimal postural sway.  EC foam // bar 3x1 min CGAx1  EC Foam // bar 3x1 min CGA x1 EC Foam // bar 3x1 min CGA x1 EC foam // bar 3x1 min CGA x1      Dynamic Balance   -raya navigation 4 hurdles lowest setting 2.5lb AW B UE support // 4x sideways & forwards  Boomwhacker taps walking in // no UE support to challenge upright posture 3# AW on feet -raya navigation fwd middle height 6x 5 hurdles 3# AW on each LE B UE  support //     -raya navigation fwd middle height 6x 5 hurdles 5# AW on each LE B UE support // step through pattern    -boomwhacker 3# AW bilaterally 6x  -raya navigation fwd low height 6x5 hurdles 5# AW on each LE B UE support // step through pattern     -boomwhacker 5#  AW sophie 6x  -raya navigation fwd middle height 6x5 hurdles 5# AW on each LE B UE support // step through pattern      Vorx1              Head turns           Gait with head turns in // 4x length of bars H & V    Multidirectional Stepping               HKM               Resisted Amb               Side stepping  //bar 2x laps              Overhead ball slam              Education  POC, HEP, prognosis, balance systems, home set up, goals for therapy, outcome measure results x10 mins Tests & measures: AMEZCUA, results, predicative of future falls ABC, TUG, 5xSTS, Gait speed, 2MWT. Ongoing risk of falls. Importance for ongoing strengthening.       ABC TUG 5xSTS 10mWT 6MWT ongoing risk of falls importance of ongoing strengthening     Ther Ex              SLRx3 Hip abd ext 2x10 Hip abduction and extension 2x10 2.5lb AW in //            Standing hip abduction    2x10 sophie 3# AW          Leg extension               Mini Squats  X10              Step ups fwd and lat     FWD onto 6 inch step 2x14 each LE FWD onto 8 inch step 2x10 FWD on 8 inch step 3x10 5lb AW  Fwd on 8 inch step 3x10 5lb AW  Fwd on 8 inch step 3x10 5lb AW    Hr/Tr X10 ea             Sit <> Stand  BUE 2x5 Two foams on chair, 3x5 no UE support  1 foam on chair 1x1 Karen-modA 1 foam on chair 1x10 Karen-modA     1 foam on chair 1x10 Karen-modA                  Standing marches  2x10 2.5lb AW in // B UE support  4x10 3lb AW //          Ther Activity              Circuit Training               Stair navigation           3lb AW sophie Ascending & descending 2x close supervision.    Transfer Training  Safety with stand <> sit transfer, increased weight shift.              Gait Training              Gait  training w RW    [id number] with 3lb AW sophie & RW 8 min 4x 140 ft with 3lb AW sophie RW 20 minutes [id number] 5lb AW 20 minutes [id number] 5lb AW 20 min [id number] 16 min higher walker setting (setting 3)  [id number] 8 min 45s 5# AW  2 laps outside 25 min 3# AW & RW

## 2024-04-03 ENCOUNTER — TELEPHONE (OUTPATIENT)
Dept: FAMILY MEDICINE CLINIC | Facility: HOSPITAL | Age: 84
End: 2024-04-03

## 2024-04-03 DIAGNOSIS — I82.5Y3 CHRONIC DEEP VEIN THROMBOSIS (DVT) OF PROXIMAL VEIN OF BOTH LOWER EXTREMITIES (HCC): ICD-10-CM

## 2024-04-03 DIAGNOSIS — I10 ESSENTIAL HYPERTENSION: Primary | ICD-10-CM

## 2024-04-03 DIAGNOSIS — I10 ESSENTIAL HYPERTENSION: ICD-10-CM

## 2024-04-03 RX ORDER — SPIRONOLACTONE 25 MG/1
12.5 TABLET ORAL DAILY
Qty: 45 TABLET | Refills: 3 | Status: SHIPPED | OUTPATIENT
Start: 2024-04-03

## 2024-04-03 NOTE — TELEPHONE ENCOUNTER
Per voicemail @ 120v    My name is sIai Beard. My YOB: 1940. I'm calling about refill for Still October and Eliquis to the [facility]. [place] just started with that pharmacy. So I think you need to call me prescription in please. So please call me back and I have an appointment with [facility] one April 26. Am I supposed to do blood work for? That is, I don't see anything indicated on my chart. Thank you. Please call me back [phone].

## 2024-04-05 ENCOUNTER — APPOINTMENT (OUTPATIENT)
Facility: CLINIC | Age: 84
End: 2024-04-05
Payer: COMMERCIAL

## 2024-04-11 DIAGNOSIS — E21.3 HYPERPARATHYROIDISM (HCC): ICD-10-CM

## 2024-04-11 DIAGNOSIS — I10 ESSENTIAL HYPERTENSION: ICD-10-CM

## 2024-04-11 DIAGNOSIS — E78.00 PURE HYPERCHOLESTEROLEMIA: ICD-10-CM

## 2024-04-11 RX ORDER — SIMVASTATIN 10 MG
10 TABLET ORAL DAILY
Qty: 90 TABLET | Refills: 3 | Status: SHIPPED | OUTPATIENT
Start: 2024-04-11

## 2024-04-11 RX ORDER — CINACALCET 30 MG/1
30 TABLET, FILM COATED ORAL 2 TIMES DAILY
Qty: 180 TABLET | Refills: 1 | Status: SHIPPED | OUTPATIENT
Start: 2024-04-11

## 2024-04-11 RX ORDER — FUROSEMIDE 80 MG
80 TABLET ORAL EVERY MORNING
Qty: 90 TABLET | Refills: 3 | Status: SHIPPED | OUTPATIENT
Start: 2024-04-11

## 2024-04-12 ENCOUNTER — APPOINTMENT (OUTPATIENT)
Facility: CLINIC | Age: 84
End: 2024-04-12
Payer: COMMERCIAL

## 2024-04-12 DIAGNOSIS — M81.0 AGE-RELATED OSTEOPOROSIS WITHOUT CURRENT PATHOLOGICAL FRACTURE: ICD-10-CM

## 2024-04-12 DIAGNOSIS — E21.3 HYPERPARATHYROIDISM (HCC): Primary | ICD-10-CM

## 2024-04-16 ENCOUNTER — TELEPHONE (OUTPATIENT)
Dept: FAMILY MEDICINE CLINIC | Facility: HOSPITAL | Age: 84
End: 2024-04-16

## 2024-04-16 DIAGNOSIS — K21.9 GASTROESOPHAGEAL REFLUX DISEASE WITHOUT ESOPHAGITIS: ICD-10-CM

## 2024-04-16 DIAGNOSIS — I10 ESSENTIAL HYPERTENSION: ICD-10-CM

## 2024-04-16 RX ORDER — FUROSEMIDE 40 MG/1
40 TABLET ORAL DAILY PRN
Qty: 30 TABLET | Refills: 5 | Status: SHIPPED | OUTPATIENT
Start: 2024-04-16 | End: 2024-04-17 | Stop reason: SDUPTHER

## 2024-04-16 RX ORDER — FUROSEMIDE 80 MG
80 TABLET ORAL EVERY MORNING
Qty: 90 TABLET | Refills: 3 | Status: SHIPPED | OUTPATIENT
Start: 2024-04-16 | End: 2024-04-17 | Stop reason: SDUPTHER

## 2024-04-16 RX ORDER — FUROSEMIDE 80 MG
80 TABLET ORAL EVERY MORNING
Qty: 90 TABLET | Refills: 3 | Status: SHIPPED | OUTPATIENT
Start: 2024-04-16 | End: 2024-04-16 | Stop reason: SDUPTHER

## 2024-04-16 RX ORDER — LANSOPRAZOLE 30 MG/1
30 CAPSULE, DELAYED RELEASE ORAL DAILY
Qty: 90 CAPSULE | Refills: 3 | Status: SHIPPED | OUTPATIENT
Start: 2024-04-16 | End: 2024-04-16 | Stop reason: SDUPTHER

## 2024-04-16 RX ORDER — LANSOPRAZOLE 30 MG/1
30 CAPSULE, DELAYED RELEASE ORAL DAILY
Qty: 90 CAPSULE | Refills: 3 | Status: SHIPPED | OUTPATIENT
Start: 2024-04-16 | End: 2024-04-17 | Stop reason: SDUPTHER

## 2024-04-16 RX ORDER — FUROSEMIDE 40 MG/1
40 TABLET ORAL EVERY MORNING
Qty: 30 TABLET | Refills: 0 | Status: CANCELLED | OUTPATIENT
Start: 2024-04-16

## 2024-04-16 RX ORDER — LANSOPRAZOLE 30 MG/1
30 CAPSULE, DELAYED RELEASE ORAL DAILY
Qty: 90 CAPSULE | Refills: 3 | Status: CANCELLED | OUTPATIENT
Start: 2024-04-16

## 2024-04-16 NOTE — TELEPHONE ENCOUNTER
Isai called again.  The lasix 80mg order was canceled when a new order was sent over.  So patient needs lasix 80mg #90, lasix 40mg #30, and lansoprazole.  Pharmacy told him they have nothing on order for him.

## 2024-04-16 NOTE — TELEPHONE ENCOUNTER
Patient called back regarding the lasix 40mg tabs.    He states you had wanted to use the 40mg tabs prn foot/leg swelling.    He states he is out of the 40mg tablets.    Can these be sent to the pharm for him?

## 2024-04-17 ENCOUNTER — TELEPHONE (OUTPATIENT)
Dept: FAMILY MEDICINE CLINIC | Facility: HOSPITAL | Age: 84
End: 2024-04-17

## 2024-04-17 DIAGNOSIS — I10 ESSENTIAL HYPERTENSION: ICD-10-CM

## 2024-04-17 DIAGNOSIS — K21.9 GASTROESOPHAGEAL REFLUX DISEASE WITHOUT ESOPHAGITIS: ICD-10-CM

## 2024-04-17 RX ORDER — FUROSEMIDE 80 MG
80 TABLET ORAL EVERY MORNING
Qty: 90 TABLET | Refills: 3 | Status: SHIPPED | OUTPATIENT
Start: 2024-04-17

## 2024-04-17 RX ORDER — LANSOPRAZOLE 30 MG/1
30 CAPSULE, DELAYED RELEASE ORAL DAILY
Qty: 90 CAPSULE | Refills: 3 | Status: SHIPPED | OUTPATIENT
Start: 2024-04-17

## 2024-04-17 RX ORDER — FUROSEMIDE 40 MG/1
40 TABLET ORAL DAILY PRN
Qty: 30 TABLET | Refills: 5 | Status: SHIPPED | OUTPATIENT
Start: 2024-04-17

## 2024-04-17 NOTE — TELEPHONE ENCOUNTER
Patient called back again    The scripts were sent to the wrong pharmacy    They should go to [facility] [address] [facility]

## 2024-04-17 NOTE — TELEPHONE ENCOUNTER
Patient left a message asking about his refills this morning.     I returned his call and LDM to let him know they were sent to his pharmacy and he could reach out to them to see of they are ready to be picked up.

## 2024-04-17 NOTE — TELEPHONE ENCOUNTER
Patient called again. Needs refill of     lansoprazole (PREVACID) 30 mg capsule     Sent to [facility]    He also stated that [facility] told him they cannot fill 40 MG Lasix because [facility] filled it.    He called [facility] and cancelled it and is hoping that will fix it so [facility] can fill it.    I removed [facility] from his chart since he does not use them at all now.

## 2024-04-18 ENCOUNTER — RA CDI HCC (OUTPATIENT)
Dept: OTHER | Facility: HOSPITAL | Age: 84
End: 2024-04-18

## 2024-04-19 ENCOUNTER — APPOINTMENT (OUTPATIENT)
Facility: CLINIC | Age: 84
End: 2024-04-19
Payer: COMMERCIAL

## 2024-04-19 ENCOUNTER — APPOINTMENT (OUTPATIENT)
Dept: LAB | Facility: HOSPITAL | Age: 84
End: 2024-04-19
Payer: COMMERCIAL

## 2024-04-19 DIAGNOSIS — I10 ESSENTIAL HYPERTENSION: ICD-10-CM

## 2024-04-19 LAB
ALBUMIN SERPL BCP-MCNC: 3.6 G/DL (ref 3.5–5)
ALP SERPL-CCNC: 82 U/L (ref 34–104)
ALT SERPL W P-5'-P-CCNC: 16 U/L (ref 7–52)
ANION GAP SERPL CALCULATED.3IONS-SCNC: 5 MMOL/L (ref 4–13)
AST SERPL W P-5'-P-CCNC: 18 U/L (ref 13–39)
BILIRUB SERPL-MCNC: 0.42 MG/DL (ref 0.2–1)
BUN SERPL-MCNC: 26 MG/DL (ref 5–25)
CALCIUM SERPL-MCNC: 8.8 MG/DL (ref 8.4–10.2)
CHLORIDE SERPL-SCNC: 102 MMOL/L (ref 96–108)
CO2 SERPL-SCNC: 34 MMOL/L (ref 21–32)
CREAT SERPL-MCNC: 0.84 MG/DL (ref 0.6–1.3)
ERYTHROCYTE [DISTWIDTH] IN BLOOD BY AUTOMATED COUNT: 15.6 % (ref 11.6–15.1)
GFR SERPL CREATININE-BSD FRML MDRD: 82 ML/MIN/1.73SQ M
GLUCOSE P FAST SERPL-MCNC: 105 MG/DL (ref 65–99)
HCT VFR BLD AUTO: 48.1 % (ref 36.5–49.3)
HGB BLD-MCNC: 15.7 G/DL (ref 12–17)
MCH RBC QN AUTO: 30.8 PG (ref 26.8–34.3)
MCHC RBC AUTO-ENTMCNC: 32.6 G/DL (ref 31.4–37.4)
MCV RBC AUTO: 95 FL (ref 82–98)
PLATELET # BLD AUTO: 409 THOUSANDS/UL (ref 149–390)
PMV BLD AUTO: 10.1 FL (ref 8.9–12.7)
POTASSIUM SERPL-SCNC: 3.6 MMOL/L (ref 3.5–5.3)
PROT SERPL-MCNC: 5.7 G/DL (ref 6.4–8.4)
RBC # BLD AUTO: 5.09 MILLION/UL (ref 3.88–5.62)
SODIUM SERPL-SCNC: 141 MMOL/L (ref 135–147)
WBC # BLD AUTO: 9.24 THOUSAND/UL (ref 4.31–10.16)

## 2024-04-19 PROCEDURE — 80053 COMPREHEN METABOLIC PANEL: CPT

## 2024-04-19 PROCEDURE — 85027 COMPLETE CBC AUTOMATED: CPT

## 2024-04-19 PROCEDURE — 36415 COLL VENOUS BLD VENIPUNCTURE: CPT

## 2024-04-26 ENCOUNTER — APPOINTMENT (OUTPATIENT)
Facility: CLINIC | Age: 84
End: 2024-04-26
Payer: COMMERCIAL

## 2024-04-26 ENCOUNTER — OFFICE VISIT (OUTPATIENT)
Dept: FAMILY MEDICINE CLINIC | Facility: HOSPITAL | Age: 84
End: 2024-04-26
Payer: COMMERCIAL

## 2024-04-26 VITALS
WEIGHT: 203.4 LBS | SYSTOLIC BLOOD PRESSURE: 120 MMHG | TEMPERATURE: 96.7 F | DIASTOLIC BLOOD PRESSURE: 70 MMHG | HEART RATE: 73 BPM | OXYGEN SATURATION: 96 % | BODY MASS INDEX: 36.03 KG/M2 | RESPIRATION RATE: 16 BRPM

## 2024-04-26 DIAGNOSIS — I10 ESSENTIAL HYPERTENSION: ICD-10-CM

## 2024-04-26 DIAGNOSIS — R21 RASH: ICD-10-CM

## 2024-04-26 DIAGNOSIS — I82.5Y3 CHRONIC DEEP VEIN THROMBOSIS (DVT) OF PROXIMAL VEIN OF BOTH LOWER EXTREMITIES (HCC): ICD-10-CM

## 2024-04-26 DIAGNOSIS — I48.0 PAF (PAROXYSMAL ATRIAL FIBRILLATION) (HCC): ICD-10-CM

## 2024-04-26 DIAGNOSIS — E21.3 HYPERPARATHYROIDISM (HCC): ICD-10-CM

## 2024-04-26 DIAGNOSIS — T84.53XS INFECTION ASSOCIATED WITH INTERNAL RIGHT KNEE PROSTHESIS, SEQUELA: Primary | ICD-10-CM

## 2024-04-26 DIAGNOSIS — D50.9 IRON DEFICIENCY ANEMIA, UNSPECIFIED IRON DEFICIENCY ANEMIA TYPE: ICD-10-CM

## 2024-04-26 PROBLEM — I87.8 VENOUS STASIS: Status: RESOLVED | Noted: 2018-04-30 | Resolved: 2024-04-26

## 2024-04-26 PROBLEM — N18.31 STAGE 3A CHRONIC KIDNEY DISEASE (HCC): Status: RESOLVED | Noted: 2022-12-08 | Resolved: 2024-04-26

## 2024-04-26 PROCEDURE — 99214 OFFICE O/P EST MOD 30 MIN: CPT | Performed by: INTERNAL MEDICINE

## 2024-04-26 PROCEDURE — G2211 COMPLEX E/M VISIT ADD ON: HCPCS | Performed by: INTERNAL MEDICINE

## 2024-04-26 RX ORDER — NYSTATIN 100000 U/G
CREAM TOPICAL 2 TIMES DAILY
Qty: 30 G | Refills: 5 | Status: SHIPPED | OUTPATIENT
Start: 2024-04-26

## 2024-04-26 NOTE — ASSESSMENT & PLAN NOTE
Patient has history of iron deficiency anemia requiring transfusions.  His hemoglobin was completely normal on April 19.  He denies signs of GI/ bleeding.  I discontinued ferrous sulfate and we will recheck his hemoglobin and iron studies in 2 months

## 2024-04-26 NOTE — PROGRESS NOTES
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  Continue bactrim  He will see his orthopedist on Monday      PAF (paroxysmal atrial fibrillation) (HCC)  Patient has PAF.  Denies palpitations, lightheadedness, signs of GI/ bleeding.  Heart rhythm is regular today.  Continue Eliquis  Hemoglobin was normal on April 19    Chronic deep vein thrombosis (DVT) of proximal vein of both lower extremities (HCC)  Patient has chronic lower extremity DVTs.  He takes Lasix for the swelling of the legs.  Denies signs of bleeding.  Continue apixaban    Essential hypertension  Blood pressure is controlled.  His electrolytes were normal on April 19.  Including potassium and calcium.  Continue Lasix and spironolactone    Iron deficiency anemia  Patient has history of iron deficiency anemia requiring transfusions.  His hemoglobin was completely normal on April 19.  He denies signs of GI/ bleeding.  I discontinued ferrous sulfate and we will recheck his hemoglobin and iron studies in 2 months       Diagnoses and all orders for this visit:    Infection associated with internal right knee prosthesis, sequela    PAF (paroxysmal atrial fibrillation) (HCC)    Chronic deep vein thrombosis (DVT) of proximal vein of both lower extremities (HCC)    Essential hypertension    Hyperparathyroidism (HCC)    Iron deficiency anemia, unspecified iron deficiency anemia type  -     CBC and differential; Future  -     Iron Panel (Includes Ferritin, Iron Sat%, Iron, and TIBC); Future    Rash  -     nystatin (MYCOSTATIN) cream; Apply topically 2 (two) times a day          Subjective:      Patient ID: Isai Abbott is a 83 y.o. male.      HPI    Patient presents for follow-up of chronic conditions as detailed in the assessment and plan.      The following  portions of the patient's history were reviewed and updated as appropriate: current medications, past family history, past medical history, past social history, past surgical history and problem list.    Review of Systems      Objective:    /70   Pulse 73   Temp (!) 96.7 °F (35.9 °C)   Resp 16   Wt 92.3 kg (203 lb 6.4 oz)   SpO2 96%   BMI 36.03 kg/m²      Physical Exam  Constitutional:       General: He is not in acute distress.     Appearance: He is not ill-appearing or toxic-appearing.   Cardiovascular:      Rate and Rhythm: Normal rate and regular rhythm.      Heart sounds: No murmur heard.  Pulmonary:      Effort: No respiratory distress.      Breath sounds: No wheezing or rales.   Abdominal:      General: Bowel sounds are normal.      Palpations: Abdomen is soft.      Tenderness: There is no abdominal tenderness. There is no guarding.   Musculoskeletal:         General: No swelling or tenderness.   Skin:     General: Skin is warm and dry.   Neurological:      Mental Status: He is alert.           Depression Screening and Follow-up Plan: Patient was screened for depression during today's encounter. They screened negative with a PHQ-2 score of 0.    Falls Plan of Care: balance, strength, and gait training instructions were provided.         Tiffanie Anthony MD

## 2024-04-26 NOTE — ASSESSMENT & PLAN NOTE
Pt c/o b/l knee pain when walking  Has hx of prosthetic joint infection  I saw no evidence of acute inflammatory arthritis of the knees: They were not warm, there was no effusion.  Continue bactrim  He will see his orthopedist on Monday

## 2024-04-26 NOTE — ASSESSMENT & PLAN NOTE
Blood pressure is controlled.  His electrolytes were normal on April 19.  Including potassium and calcium.  Continue Lasix and spironolactone

## 2024-04-26 NOTE — ASSESSMENT & PLAN NOTE
Patient has PAF.  Denies palpitations, lightheadedness, signs of GI/ bleeding.  Heart rhythm is regular today.  Continue Eliquis  Hemoglobin was normal on April 19

## 2024-04-26 NOTE — ASSESSMENT & PLAN NOTE
Patient has chronic lower extremity DVTs.  He takes Lasix for the swelling of the legs.  Denies signs of bleeding.  Continue apixaban

## 2024-05-03 ENCOUNTER — APPOINTMENT (OUTPATIENT)
Dept: LAB | Facility: HOSPITAL | Age: 84
End: 2024-05-03
Payer: COMMERCIAL

## 2024-05-03 ENCOUNTER — OFFICE VISIT (OUTPATIENT)
Dept: ENDOCRINOLOGY | Facility: HOSPITAL | Age: 84
End: 2024-05-03
Payer: COMMERCIAL

## 2024-05-03 VITALS
DIASTOLIC BLOOD PRESSURE: 70 MMHG | OXYGEN SATURATION: 96 % | BODY MASS INDEX: 35.97 KG/M2 | HEIGHT: 63 IN | SYSTOLIC BLOOD PRESSURE: 126 MMHG | HEART RATE: 69 BPM | WEIGHT: 203 LBS

## 2024-05-03 DIAGNOSIS — M81.0 AGE-RELATED OSTEOPOROSIS WITHOUT CURRENT PATHOLOGICAL FRACTURE: ICD-10-CM

## 2024-05-03 DIAGNOSIS — E21.3 HYPERPARATHYROIDISM (HCC): ICD-10-CM

## 2024-05-03 DIAGNOSIS — N18.31 STAGE 3A CHRONIC KIDNEY DISEASE (HCC): ICD-10-CM

## 2024-05-03 DIAGNOSIS — E21.3 HYPERPARATHYROIDISM (HCC): Primary | ICD-10-CM

## 2024-05-03 DIAGNOSIS — E66.01 SEVERE OBESITY (BMI 35.0-39.9) WITH COMORBIDITY (HCC): ICD-10-CM

## 2024-05-03 LAB
25(OH)D3 SERPL-MCNC: 30.5 NG/ML (ref 30–100)
ALBUMIN SERPL BCP-MCNC: 3.9 G/DL (ref 3.5–5)
ALP SERPL-CCNC: 83 U/L (ref 34–104)
ALT SERPL W P-5'-P-CCNC: 19 U/L (ref 7–52)
ANION GAP SERPL CALCULATED.3IONS-SCNC: 10 MMOL/L (ref 4–13)
AST SERPL W P-5'-P-CCNC: 19 U/L (ref 13–39)
BILIRUB SERPL-MCNC: 0.45 MG/DL (ref 0.2–1)
BUN SERPL-MCNC: 37 MG/DL (ref 5–25)
CALCIUM SERPL-MCNC: 9 MG/DL (ref 8.4–10.2)
CHLORIDE SERPL-SCNC: 102 MMOL/L (ref 96–108)
CO2 SERPL-SCNC: 29 MMOL/L (ref 21–32)
CREAT SERPL-MCNC: 1.04 MG/DL (ref 0.6–1.3)
GFR SERPL CREATININE-BSD FRML MDRD: 67 ML/MIN/1.73SQ M
GLUCOSE P FAST SERPL-MCNC: 118 MG/DL (ref 65–99)
POTASSIUM SERPL-SCNC: 3.6 MMOL/L (ref 3.5–5.3)
PROT SERPL-MCNC: 6.2 G/DL (ref 6.4–8.4)
PTH-INTACT SERPL-MCNC: 199.5 PG/ML (ref 12–88)
SODIUM SERPL-SCNC: 141 MMOL/L (ref 135–147)

## 2024-05-03 PROCEDURE — 99214 OFFICE O/P EST MOD 30 MIN: CPT | Performed by: STUDENT IN AN ORGANIZED HEALTH CARE EDUCATION/TRAINING PROGRAM

## 2024-05-03 PROCEDURE — 1160F RVW MEDS BY RX/DR IN RCRD: CPT | Performed by: STUDENT IN AN ORGANIZED HEALTH CARE EDUCATION/TRAINING PROGRAM

## 2024-05-03 PROCEDURE — 36415 COLL VENOUS BLD VENIPUNCTURE: CPT

## 2024-05-03 PROCEDURE — 82306 VITAMIN D 25 HYDROXY: CPT

## 2024-05-03 PROCEDURE — 83970 ASSAY OF PARATHORMONE: CPT

## 2024-05-03 PROCEDURE — 80053 COMPREHEN METABOLIC PANEL: CPT

## 2024-05-03 PROCEDURE — 1159F MED LIST DOCD IN RCRD: CPT | Performed by: STUDENT IN AN ORGANIZED HEALTH CARE EDUCATION/TRAINING PROGRAM

## 2024-05-03 NOTE — PROGRESS NOTES
Established Patient Progress Note       Chief Complaint   Patient presents with    Hyperparathyroidism      History of Present Illness:     Isai Abbott is a 83 y.o. male with a history of primary hyperparathyroidism, osteoporosis- s/p 1 x reclast 12/22, pA.Fib, DVT, CKD3, BPH and immobility who was initially seen by me on 12/22 for hyperparathyroid management. Hx of elevated calcium- upto 12.6 with  confirming most likely has primary hyperparathyroidism. DXA scan done 11/22 showed Osteoporosis with lowest t score of -3.6 at left femur. Neg Xray abdomen for Nephrocalcinosis/no hx of renal stones. Patient declined parathyroid surgery therefore discussed conservative management.     Osteopororis- Started on Reclast 12/22, last infusion 12/23, did have some side effects of feeling flu like after infusion but recovered after. Denies any recent falls/fractures. Last DXA scan 12/22.   No change in height, takes VitD supplement- 800IU daily, Calcium supplement- recommended 1000mg through diet, Weight baring exercises-      Hyperparathyroidism:- Currently on sensipar 30mg daily started on 07/23. Patient is tolerating sensipar ok. Labs done 04/24 calcium 8.8, vitD and PTH pending. Last 24hr urine calcium 257.5. denies any kidney stones, any falls/fractures. Is doing PT and reports is walking more at home and would like to continue PT    Social Hx:- lives with wife who is a nurse at the  infusion center. Is immobile for the most part, denies smoking, alcohol use or any other recreational drugs.    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)    Pure hypercholesterolemia    Iron deficiency anemia     Cervicalgia    Severe obesity (BMI 35.0-39.9) with comorbidity (HCC)    Hyperparathyroidism (HCC)    PAF (paroxysmal atrial fibrillation) (HCC)    Age-related osteoporosis without current pathological fracture      Past Medical History:   Diagnosis Date    Basal cell carcinoma     nose     Benign skin lesion     BPH (benign prostatic hyperplasia)     Diverticular disease of colon     DVT (deep venous thrombosis) (HCC)     Elevated PSA     History of blood clots     Hypotension     Intestinal obstruction (HCC)     Social anxiety disorder     last assessed 1/12/17, resolved 10/17/17    Ventral hernia       Past Surgical History:   Procedure Laterality Date    APPENDECTOMY      BASAL CELL CARCINOMA EXCISION      Nose     HERNIA REPAIR      9 hernia repairs 1995 - 2012    HIATAL HERNIA REPAIR  1995    with speenectomy    REPLACEMENT TOTAL KNEE Bilateral     SPLENECTOMY  1995    TONSILLECTOMY        Family History   Problem Relation Age of Onset    Other Mother         Bleeding disorder     Stroke Mother         CVA    Diabetes Mother     Heart disease Mother     Osteoporosis Mother     Hypothyroidism Mother     Stroke Father         CVA    Heart disease Father     Heart attack Father     Substance Abuse Neg Hx     Mental illness Neg Hx      Social History     Tobacco Use    Smoking status: Never    Smokeless tobacco: Never   Substance Use Topics    Alcohol use: Never     Allergies   Allergen Reactions    Ace Inhibitors Cough    Fosinopril Cough     Cough after years of being on the medication       Current Outpatient Medications:     apixaban (Eliquis) 2.5 mg, Take 1 tablet (2.5 mg total) by mouth 2 (two) times a day, Disp: 180 tablet, Rfl: 3    cinacalcet (SENSIPAR) 30 mg tablet, Take 1 tablet (30 mg total) by mouth 2 (two) times a day, Disp: 180 tablet, Rfl: 1    Diclofenac Sodium (VOLTAREN) 1 %, Apply 4 g topically 4 (four) times a day, Disp: , Rfl:     dutasteride (AVODART) 0.5 mg capsule, Take 1 capsule (0.5 mg  "total) by mouth daily, Disp: 90 capsule, Rfl: 3    furosemide (LASIX) 40 mg tablet, Take 1 tablet (40 mg total) by mouth daily as needed (prn leg edema), Disp: 30 tablet, Rfl: 5    furosemide (LASIX) 80 mg tablet, Take 1 tablet (80 mg total) by mouth every morning, Disp: 90 tablet, Rfl: 3    lansoprazole (PREVACID) 30 mg capsule, Take 1 capsule (30 mg total) by mouth daily, Disp: 90 capsule, Rfl: 3    Multiple Vitamins-Minerals (MULTIVITAMIN ADULT EXTRA C PO), , Disp: , Rfl:     nystatin (MYCOSTATIN) cream, Apply topically 2 (two) times a day, Disp: 30 g, Rfl: 5    ondansetron (ZOFRAN-ODT) 4 mg disintegrating tablet, Take 1 tablet (4 mg total) by mouth every 6 (six) hours as needed for nausea or vomiting, Disp: 20 tablet, Rfl: 5    polyethylene glycol (MIRALAX) 17 g packet, Take by mouth 1 packet daily, Disp: , Rfl:     Probiotic Product (SOLUBLE FIBER/PROBIOTICS PO), Take by mouth wife reports the patient takes this daily, Disp: , Rfl:     simvastatin (ZOCOR) 10 mg tablet, Take 1 tablet (10 mg total) by mouth daily, Disp: 90 tablet, Rfl: 3    spironolactone (ALDACTONE) 25 mg tablet, Take 0.5 tablets (12.5 mg total) by mouth daily, Disp: 45 tablet, Rfl: 3    sulfamethoxazole-trimethoprim (BACTRIM DS) 800-160 mg per tablet, Take 1 tablet by mouth daily, Disp: 90 tablet, Rfl: 3    tamsulosin (FLOMAX) 0.4 mg, Take 1 capsule (0.4 mg total) by mouth daily with dinner, Disp: 90 capsule, Rfl: 3    amoxicillin (AMOXIL) 500 mg capsule, , Disp: , Rfl:     Review of Systems   Constitutional:  Negative for activity change, appetite change, fatigue and unexpected weight change.   HENT:  Negative for trouble swallowing and voice change.    Gastrointestinal:  Negative for constipation and diarrhea.   Endocrine: Negative for polydipsia and polyuria.   Musculoskeletal:  Negative for arthralgias and myalgias.       Physical Exam:  Body mass index is 35.96 kg/m².  /70   Pulse 69   Ht 5' 3\" (1.6 m)   Wt 92.1 kg (203 lb)   " SpO2 96%   BMI 35.96 kg/m²    Wt Readings from Last 3 Encounters:   05/03/24 92.1 kg (203 lb)   04/26/24 92.3 kg (203 lb 6.4 oz)   12/29/23 90.2 kg (198 lb 12.9 oz)       Physical Exam  Constitutional:       Appearance: Normal appearance. He is obese.      Comments: On wheelchair   Cardiovascular:      Rate and Rhythm: Normal rate.   Pulmonary:      Effort: Pulmonary effort is normal.   Abdominal:      General: Bowel sounds are normal.      Palpations: Abdomen is soft.   Neurological:      Mental Status: He is alert. Mental status is at baseline.   Psychiatric:         Mood and Affect: Mood normal.         Radiology     DXA SCAN     CLINICAL HISTORY:  82-year-old male.   OTHER RISK FACTORS:  Hyperparathyroidism, Limited mobility, Lasix therapy.     PHARMACOLOGIC THERAPY FOR OSTEOPOROSIS:  None.     TECHNIQUE: Bone densitometry was performed using a [employer] Horizon A  bone densitometer.  Regions of interest appear properly placed.         COMPARISON: There are no prior DXA studies performed on this unit for comparison.     RESULTS:      LUMBAR SPINE  Level: L1, L3, L4  (L2 vertebra excluded from analysis due to local structural abnormalities or artifact) :   BMD:  0.854  gm/cm2   T-score: -2.1      LEFT  TOTAL HIP:   BMD:  0.601  gm/cm2   T-score:  -2.9     LEFT  FEMORAL NECK:   BMD:  0.445  gm/cm2   T score: -3.6      LEFT  FOREARM:    33% RADIUS BMD:  0.701  gm/cm2  T-score:  -2.2      IMPRESSION:     1.  Osteoporosis. [Based on the total left hip]     2.  The 10 year risk of hip fracture is 9.1% with the 10 year risk of major osteoporotic fracture being 17% as calculated by the [employer] fracture risk assessment tool (FRAX, which is based on data generated by the WHO Collaborating [place]   for Metabolic Bone Diseases).  3.  The current NOF guidelines recommend treating patients with a T-score of -2.5 or less in the lumbar spine or hips, or in post-menopausal women and men over the age of 50 with low  bone mass (osteopenia) and a FRAX 10 year risk score of >3% for hip   fracture and/or >20% for major osteoporotic fracture.     4.  The NOF recommends follow-up DXA in 1-2 years after initiating therapy for osteoporosis and every 2 years thereafter. More frequent evaluation is appropriate for patients with conditions associated with rapid bone loss, such as glucocorticoid   therapy. The interval between DXA screenings may be longer for individuals without major risk factors and initial T-score in the normal or upper low bone mass range.        The FRAX algorithm has certain limitations:  -FRAX has not been validated in patients currently or previously treated with pharmacotherapy for osteoporosis.  In such patients, clinical judgment must be exercised in interpreting FRAX scores.    -Prior hip, vertebral and humeral fragility fractures appear to confer greater risk of subsequent fracture than fractures at other sites (this is especially true for individuals with severe vertebral fractures), but quantification of this incremental   risk is not possible with FRAX.  -FRAX underestimates fracture risk in patients with history of multiple fragility fractures.  -FRAX may underestimate fracture risk in patients with history of frequent falls.  -It is not appropriate to use FRAX to monitor treatment response     ABDOMEN     INDICATION:   E21.3: Hyperparathyroidism, unspecified.     COMPARISON:  Comparison is made to prior studies, most recent CT scan dated March 16, 2013.     VIEWS:  AP supine        FINDINGS:     There is a large amount of stool throughout the colon but no evidence of bowel obstruction.  Stool decreases the sensitivity for the detection of calculi.  However, within this limitation no calculi are identified.     IVC filter projects over the lower lumbar spine.  Multiple clips projected over the left upper and right lower quadrant.  No pathologic calcifications or soft tissue masses.      Visualized lung  bases are clear.     Visualized osseous structures are unremarkable for the patient's age.     IMPRESSION:     No radiopaque calculi identified.  However, large amount of stool throughout the colon obscures visualization of the kidneys and decreases the sensitivity of the exam for calculi.   Latest Reference Range & Units 04/07/23 08:20 07/07/23 07:04 10/13/23 07:09   Sodium 135 - 147 mmol/L 138 140 141   Potassium 3.5 - 5.3 mmol/L 4.1 4.0 3.4 (L)   Chloride 96 - 108 mmol/L 108 107 103   CO2 21 - 32 mmol/L 29 31 33 (H)   Anion Gap mmol/L 1 (L) 2 5   BUN 5 - 25 mg/dL 24 30 (H) 31 (H)   Creatinine 0.60 - 1.30 mg/dL 0.92 0.97 0.90   GLUCOSE FASTING 65 - 99 mg/dL 109 (H) 112 (H) 109 (H)   Calcium 8.4 - 10.2 mg/dL 9.9 10.7 (H) 9.8   CORRECTED CALCIUM 8.3 - 10.1 mg/dL 10.5 (H) 11.3 (H)    AST 13 - 39 U/L 18 17 17   ALT 7 - 52 U/L 26 25 17   Alkaline Phosphatase 34 - 104 U/L 84 105 89   Total Protein 6.4 - 8.4 g/dL 6.1 (L) 6.4 6.2 (L)   Albumin 3.5 - 5.0 g/dL 3.2 (L) 3.3 (L) 3.7   TOTAL BILIRUBIN 0.20 - 1.00 mg/dL 0.48 0.42 0.46   eGFR ml/min/1.73sq m 78 73 79   Phosphorus 2.3 - 4.1 mg/dL  3.3    Magnesium 1.6 - 2.6 mg/dL  2.1    (L): Data is abnormally low  (H): Data is abnormally high     Latest Reference Range & Units 10/21/22 12:53 04/07/23 08:20 07/07/23 07:04 10/13/23 07:09   PARATHYROID HORMONE 12.0 - 88.0 pg/mL 176.7 (H) 177.8 (H) 125.5 (H) 234.7 (H)   PTH-Related Protein pmol/L <2.0      (H): Data is abnormally high     Latest Reference Range & Units 11/03/22 14:19 07/07/23 07:04 10/13/23 07:09   Vit D, 25-Hydroxy 30.0 - 100.0 ng/mL 32.3 28.0 (L) 23.8 (L)   (L): Data is abnormally low     Ref Range & Units 4/7/23  8:20 AM 10/21/22 12:53 PM 5/24/22  7:29 AM 12/8/20  8:49 AM 6/11/20  9:27 AM   PTH 18.4 - 80.1 pg/mL 177.8 High   176.7 High   147.3 High   132.0 High   117.1 High          Ref Range & Units 4/7/23  8:20 AM 11/9/22 11:03 AM 11/3/22  2:19 PM 10/21/22  9:53 AM 10/20/22  5:41 AM 10/19/22 10:38 AM 10/18/22   4:52 AM   Sodium 135 - 147 mmol/L 138   139  139  138  138  138    Potassium 3.5 - 5.3 mmol/L 4.1   3.6  3.8  4.0  3.7  4.3 CM    Chloride 96 - 108 mmol/L 108   107  104  103  104  105    CO2 21 - 32 mmol/L 29   28  28  26  26  26    ANION GAP 4 - 13 mmol/L 1 Low    4  7  9  8  7    BUN 5 - 25 mg/dL 24   24  31 High   29 High   27 High   26 High     Creatinine 0.60 - 1.30 mg/dL 0.92  1.07 CM  1.34 High  CM  1.08 CM  1.00 CM  1.06 CM  0.87 CM    Comment: Standardized to IDMS reference method   Glucose, Fasting 65 - 99 mg/dL 109 High           Comment: Specimen collection should occur prior to Sulfasalazine administration due to the potential for falsely depressed results. Specimen collection should occur prior to Sulfapyridine administration due to the potential for falsely elevated results.   Calcium 8.3 - 10.1 mg/dL 9.9   10.4 High   11.6 High   11.0 High   10.9 High   10.4 High     Corrected Calcium 8.3 - 10.1 mg/dL 10.5 High    11.2 High   12.6 High Panic   12.2 High Panic    11.7 High      Component Ref Range & Units 11/9/22 11:03 AM    Creatinine, Ur mg/dL 30.3    Creatinine Clearance 80.00 - 125.00 mL/min 41.03 Low     Creatinine, 24H Ur 0.8 - 1.8 g/24Hr 0.7 Low     TOTAL URINE VOLUME ml 2,400          Ref Range & Units 11/9/22 11:03 AM    24H Urine Volume mL 2,400    Calcium, 24H Urine 42 - 353 mg/24 hrs 220.8       Ref Range & Units 11/3/22  2:19 PM    Vit D, 25-Hydroxy 30.0 - 100.0 ng/mL 32.3        Impression & Plan:    Problem List Items Addressed This Visit          Endocrine    Hyperparathyroidism (HCC) - Primary       Musculoskeletal and Integument    Age-related osteoporosis without current pathological fracture     Other Visit Diagnoses       Stage 3a chronic kidney disease (HCC)              No orders of the defined types were placed in this encounter.      There are no Patient Instructions on file for this visit.    1) Primary hyperparathyroidism w Hypercalcemia:- Not a surgical candidate by  patient choice. Managed conservatively as listed below.      2) Osteoporosis:- Started on reclast 12/22, with lowest BMD at left femur at T score -3.8. Risk factors includes hyperparathyroidism, immobility, age. No recent falls/fractures. Recommend repeat DXA scan 12/24 2yr after reclast infusion. Last reclast 12/23. Recommend c/w calcium intake of 1000mg daily through diet and VitD intake 800IU daily- through supplementation     3) Hypercalcemia:- Most recent calcium level has improved to 8.8 s/p reclast infusion and also now on sensipar, c/w sensipar to 30mg BID, Repeat Calcium/PTH/VitD in 6 months. Annual 24hr urine calcium to assess nephroliathiasis risk- 02/25. Recommend to keep well hydrated and to aim for urine output >2L per day. Avoid high salt food and stick to low salt diet to reduce risk of kidney stones      RTC in 6-7 months     Discussed with the patient and all questioned fully answered. He will call me if any problems arise.    Counseled patient on diagnostic results, prognosis, risk and benefit of treatment options, instruction for management, importance of treatment compliance, Risk  factor reduction and impressions      Josi Kuhn MD

## 2024-05-14 DIAGNOSIS — N40.0 BENIGN PROSTATIC HYPERPLASIA WITHOUT LOWER URINARY TRACT SYMPTOMS: ICD-10-CM

## 2024-05-14 NOTE — TELEPHONE ENCOUNTER
Medication: tamsulosin    Dose/Frequency: .4mg 1 capsule daily    Quantity: 90    Pharmacy: [facility]    Office:   [x] PCP/Provider -   [] Speciality/Provider -     Does the patient have enough for 3 days?   [x] Yes   [] No - Send as HP to POD

## 2024-05-15 RX ORDER — TAMSULOSIN HYDROCHLORIDE 0.4 MG/1
0.4 CAPSULE ORAL
Qty: 90 CAPSULE | Refills: 3 | OUTPATIENT
Start: 2024-05-15

## 2024-05-15 NOTE — TELEPHONE ENCOUNTER
Pt has someone picking up all his medication tomorrow from [facility] [place], Pt is requesting a rush on RX please

## 2024-05-16 DIAGNOSIS — N40.0 BENIGN PROSTATIC HYPERPLASIA WITHOUT LOWER URINARY TRACT SYMPTOMS: ICD-10-CM

## 2024-05-16 RX ORDER — TAMSULOSIN HYDROCHLORIDE 0.4 MG/1
0.4 CAPSULE ORAL
Qty: 90 CAPSULE | Refills: 3 | OUTPATIENT
Start: 2024-05-16

## 2024-05-16 NOTE — TELEPHONE ENCOUNTER
PT call again because someone will try to get his medication today. PT did call the pharmacy and check if the medication is ready and it the medication has not been sent to the pharmacy as of yet. Please follow up with PT. Thank you

## 2024-05-16 NOTE — TELEPHONE ENCOUNTER
Medication: tamsulosin (FLOMAX)     Dose/Frequency: Take 1 capsule (0.4 mg total) by mouth daily with dinner,     Quantity: 90    Pharmacy: [id number] - [place] - [address] [[id number]]     Office:   [x] PCP/Provider -   [] Speciality/Provider -     Does the patient have enough for 3 days?   [] Yes   [x] No - Send as HP to POD       Patient has been calling in for three days now about medication needing to be refilled. Patient does not have enough to last three days. Patient has a person going tomorrow 05/17/2024 to get his other medications hat are ready to be picked up, and if this medication isnt ready, the patient wont be able to get this from pharmacy for a bit longer.     Please advise as soon as possible, as this is urgent, and needs a rush put on

## 2024-05-17 ENCOUNTER — TELEPHONE (OUTPATIENT)
Age: 84
End: 2024-05-17

## 2024-05-17 ENCOUNTER — EVALUATION (OUTPATIENT)
Facility: CLINIC | Age: 84
End: 2024-05-17
Payer: COMMERCIAL

## 2024-05-17 DIAGNOSIS — R53.81 PHYSICAL DECONDITIONING: Primary | ICD-10-CM

## 2024-05-17 DIAGNOSIS — N40.0 BENIGN PROSTATIC HYPERPLASIA WITHOUT LOWER URINARY TRACT SYMPTOMS: ICD-10-CM

## 2024-05-17 DIAGNOSIS — R26.89 ABNORMALITY OF GAIT DUE TO IMPAIRMENT OF BALANCE: ICD-10-CM

## 2024-05-17 DIAGNOSIS — M40.04 POSTURAL KYPHOSIS OF THORACIC REGION: ICD-10-CM

## 2024-05-17 PROCEDURE — 97162 PT EVAL MOD COMPLEX 30 MIN: CPT

## 2024-05-17 RX ORDER — TAMSULOSIN HYDROCHLORIDE 0.4 MG/1
0.4 CAPSULE ORAL
Qty: 90 CAPSULE | Refills: 3 | Status: SHIPPED | OUTPATIENT
Start: 2024-05-17

## 2024-05-17 NOTE — TELEPHONE ENCOUNTER
Pt called because he's not able to have his script for the tamulosin (Flomax) filled. The pharmacy said he wouldn't be able to get the medication until the 22nd of this month. Please reach out to pt to advise on how to handle this matter.

## 2024-05-17 NOTE — PROGRESS NOTES
PT Evaluation     Today's date: 2024  Patient name: Isai Abbott  : 1940  MRN: [id number]  Referring provider: Josi Kuhn MD  Dx:   Encounter Diagnosis     ICD-10-CM    1. Physical deconditioning  R53.81       2. Abnormality of gait due to impairment of balance  R26.89       3. 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 male returning to physical therapy after a brief hiatus due to knee pain to address balance, gait, and deconditioning compared to previous visits. Today's assessment reflects decline in balance confidence since he was last here. 6MWT does not exhibit statistically significant decline, reflecting minimal change in CV endurance.  However, pt's TUG, gait speed, and AMEZCUA scores declined since he was last here, reflecting balance and gait deficits. R LE > L. Educated pt on use of leg  mobility aid to improve care mobility and pt stated he would look into it. Pt will continue to benefit from skilled PT so that he can navigate his environment with increased safety and stability.     Per Previous IE in 2023: Isai is an 83 year old patient presenting to OPPT for evaluation regarding gait and balance deficits in adjunction to recent diagnosis of osteoporosis. Upon evaluation, Isai demonstrates significant deficits and increased fall risk. Proximal BLE strength below 4/5 throughout aside from L hip flexion. Decreased endurance evidenced by ability to only complete 2MWT with distance traversed 130 feet. TUG and 5x STS required UE support to rise from chair, as well as scores being greater than fall risk cut of score indicative of increased fall risk and poor power generation. Gait speed classifies  patient as increased fall risk, as well as non-community ambulator. These impairments limit their ability to perform daily activities as well as their previous level of function causing decreased quality of life.    HEP delivered with emphasis on BLE strengthening with UE support. Education provided regarding HEP, POC, prognosis, goals for therapy, and functional outcome measure results in relation to patient's fall risk. Also worked on sit <> stand transfers to improve overall safety to reduce fall risk and working with walker to improve facilitation of increased safety during these activities. Cuing for increased anterior weight shift.     Patient requires continued skilled PT services in order to improve aforementioned impairments so that they are able to return to highest level of function possible. Without initiation of skilled PT services, patient will continue to have largely decreased functional endurance and mobility leading to increased caregiver burden and likelihood of falls and subsequent hospitalization.     Understanding of Dx/Px/POC: good     Prognosis: good    Goals  STG (4 weeks)   Improve (reduce) TUG score by 4 seconds indicating meaningful improvement in fall risk   Improve AMEZCUA score by 6 indicating meaningful improvement in fall risk   Improve/Reduce 5x STS score by 5 seconds indicating meaningful improvement in fall risk and power generation   Improve generalized strength by 1/5 to demonstrate improvement in facilitation of efficiency of functional activities and reduced fall risk   Improve gait speed by 0.05 m/s with LRAD     LTG (8 weeks)   Improve (reduce) TUG score by 4 seconds compared to PN indicating meaningful improvement in fall risk   Improve AMEZCUA score to > 45/56 indicating meaningful improvement in fall risk   Improve/Reduce 5x STS score by 4 seconds compared to PN seconds indicating meaningful improvement in fall risk and power generation   Will be able to complete full  6MWT  Improve gait speed by 0.05 m/s from PN with LRAD   Improve 6MWT by 164.4ft to reflect improvement in endurance.         Plan  Planned modality interventions: cryotherapy and thermotherapy: hydrocollator packs    Planned therapy interventions: manual therapy, neuromuscular re-education, patient education, postural training, self care, strengthening, stretching, therapeutic activities, therapeutic exercise, home exercise program, graded exercise, gait training, flexibility and balance    Frequency: 2x week  Duration in weeks: 8  Plan of Care beginning date: 5/17/2024  Plan of Care expiration date: 7/12/2024  Treatment plan discussed with: patient  Plan details: TE: for strengthening, stretching, and flexibility  TA: for functional participation  NR: for balance, coordination, reaction time  MT: for STM, IASTM, joint mobilizations  Gait Training: to improve gait mechanics.          Subjective Evaluation    History of Present Illness  Mechanism of injury: Pt states that he has been out because he has been having knee pain. His orthopedic doctor wants him to low weight and high repetition. His physician wants him to utilize Voltarin gel to manage inflammation. He does not report any falls. He wants to skip the sitting to standing exercises from the stairs. He wants to do stepping up and down from the platform, the stairs, walking, and balance things. He is not having any pain in his knees right now. He has been doing his exercises every day since he was last here and feels he has regressed a bit. He practices getting out of the chair at home with using arms. He feels he has declined overall since he was last here with his overall strength and his stamina and he would like to get back to that. He states that he got taken off of his iron and that may be affecting things too. He was having knee pain on the front of the legs. He wanted to make sure his surgeries weren't deteriorating and his surgeon said that they are  "okay.     Per previous IE in 2023: Isai is an 83 year old patient presenting to OPPT for evaluation regarding gait and balance deficits. Recent diagnosis of osteoporosis via last dexa scan. Reports that he is here to work on his strength. Last fall was around 7 months ago. Does exercises at home - already. Standing marching and squat at the counter are the two that he is doing currently. Wife reports that he has good follow through. \"Everything we do we want to be able to do at home\". Main goal is to get stronger to be able to reduce fall risk going forward.     One dizzy spell in the last two weeks. Reports that he was standing at the counter and noted that he was lightheaded and room spinning. Felt like he was going to fall.     Got PT at home after COVID - but wasn't able to go up the stairs. Doesn't go up currently, wife handles the caretaking duties.     Home setup: Walkers throughout the home - theres no rugs. Anything that could possibly help. \"We set it up as safe as we can be\".   Patient Goals  Patient goals for therapy: improved balance, increased motion and increased strength    Pain  No pain reported  Current pain ratin  At best pain ratin  At worst pain ratin  Location: bilateral knee pain  Quality: dull ache (annoying)  Relieving factors: medications and rest  Aggravating factors: walking and standing  Progression: improved    Social Support  Steps to enter house: yes (4\" step, 6\" step from the living room to the kitchen)  1  Stairs in house: yes   12  Lives in: one-story house  Lives with: spouse    Employment status: not working    Diagnostic Tests  Bone scan: abnormal    FCE comments: Narrative & Impression  DXA SCAN     CLINICAL HISTORY:  82-year-old male.   OTHER RISK FACTORS:  Hyperparathyroidism, Limited mobility, Lasix therapy.     PHARMACOLOGIC THERAPY FOR OSTEOPOROSIS:  None.     TECHNIQUE: Bone densitometry was performed using a HoloPlaytestCloud A  bone densitometer.  " Regions of interest appear properly placed.         COMPARISON: There are no prior DXA studies performed on this unit for comparison.     RESULTS:      LUMBAR SPINE  Level: L1, L3, L4  (L2 vertebra excluded from analysis due to local structural abnormalities or artifact) :   BMD:  0.854  gm/cm2   T-score: -2.1         LEFT  TOTAL HIP:   BMD:  0.601  gm/cm2   T-score:  -2.9     LEFT  FEMORAL NECK:   BMD:  0.445  gm/cm2   T score: -3.6      LEFT  FOREARM:    33% RADIUS BMD:  0.701  gm/cm2  T-score:  -2.2         IMPRESSION:     1.  Osteoporosis. [Based on the total left hip]     2.  The 10 year risk of hip fracture is 9.1% with the 10 year risk of major osteoporotic fracture being 17% as calculated by the [employer] fracture risk assessment tool (FRAX, which is based on data generated by the WHO Collaborating [place]   for Metabolic Bone Diseases).  3.  The current NOF guidelines recommend treating patients with a T-score of -2.5 or less in the lumbar spine or hips, or in post-menopausal women and men over the age of 50 with low bone mass (osteopenia) and a FRAX 10 year risk score of >3% for hip   fracture and/or >20% for major osteoporotic fracture.     4.  The NOF recommends follow-up DXA in 1-2 years after initiating therapy for osteoporosis and every 2 years thereafter. More frequent evaluation is appropriate for patients with conditions associated with rapid bone loss, such as glucocorticoid   therapy. The interval between DXA screenings may be longer for individuals without major risk factors and initial T-score in the normal or upper low bone mass range.       Treatments  Previous treatment: physical therapy        Objective     Strength/Myotome Testing     Left Hip   Planes of Motion   Flexion: 4    Right Hip   Planes of Motion   Flexion: 3+    Left Knee   Flexion: 5    Right Knee   Flexion: 4    Left Ankle/Foot   Dorsiflexion: 4    Right Ankle/Foot   Dorsiflexion: 3+        Flowsheet Rows       Flowsheet Row Most Recent Value   Amezcua Balance Scale    1. Sitting to Standing 3   2. Standing Unsupported 4   3. Sitting with Back Unsupported but Feet Supported on Floor or on a Stool 4   4. Standing to Sitting 3   5.  Transfers 3   6. Standing Unsupported with Eyes Closed 4   7. Standing Unsupported with Feet Together 4   8. Reach Forward with Outstretched Arm While Standing 3   9.  Object from Floor from a Standing Position 3   10. Turning to Look Behind Over Left and Right Shoulders While Standing 2   11. Turn 360 Degrees 2   12. Place Alternate Foot on Step or Stool While Standing Unsupported 0   13. Standing Unsupported One Foot in Front 1   14. Standing on One Leg 1   Amezcua Balance Score 37               Gait Assessment: Slow cautious shuffling pattern with rolling walker. Relatively steady, tendency to have RW get out in front of him too far. Largely forward head and rounded shoulders with thoracic kyphosis        Precautions: HTN, Chronic DVT,  Severe Osteoporosis, Hx of cervicalgia, hx of hernias   POC Expires: 07/12/2024  [employer] Access Code:       Objective Measures 5/17/2024 IE   Vitals **     ABC 45%   6MWT 646ft   5xSTS 22.46   AMEZCUA 37/50   10mWT SS: 0.48m/s W RW    Fast: 0.75 m/s w RW   TUG 25.55s   Neuro Re-Ed    Static Balance     Dynamic Balance     Vorx1    Head turns     Multidirectional Stepping     HKM     Resisted Amb     Side stepping     Overhead ball slam    Education     Ther Ex    SLRx3    Standing hip abduction    Leg extension     Mini Squats     Step ups fwd and lat     Hr/Tr    Sit <> Stand         Standing marches    Ther Activity    Circuit Training     Stair navigation    Transfer Training     Gait Training    Gait training w RW

## 2024-05-17 NOTE — TELEPHONE ENCOUNTER
Not sure what the problem is with this rx.   I q'd it up and sent to dr chau to send to pharm.  I notified pt what I did.  [facility]

## 2024-05-17 NOTE — TELEPHONE ENCOUNTER
Patient called again in regards to Flomax refill.  Patient would like to be contacted as reason for denial.  Please contact patient.

## 2024-05-17 NOTE — TELEPHONE ENCOUNTER
Not sure what the problem is here.  I called [facility] --spoke to pharmacist.  I q'd rx up for dr chau and called pt and said that he will sign off on the rx to [facility].

## 2024-05-21 ENCOUNTER — OFFICE VISIT (OUTPATIENT)
Facility: CLINIC | Age: 84
End: 2024-05-21
Payer: COMMERCIAL

## 2024-05-21 DIAGNOSIS — R26.89 ABNORMALITY OF GAIT DUE TO IMPAIRMENT OF BALANCE: ICD-10-CM

## 2024-05-21 DIAGNOSIS — M40.04 POSTURAL KYPHOSIS OF THORACIC REGION: ICD-10-CM

## 2024-05-21 DIAGNOSIS — R53.81 PHYSICAL DECONDITIONING: Primary | ICD-10-CM

## 2024-05-21 PROCEDURE — 97530 THERAPEUTIC ACTIVITIES: CPT

## 2024-05-21 PROCEDURE — 97116 GAIT TRAINING THERAPY: CPT

## 2024-05-21 PROCEDURE — 97112 NEUROMUSCULAR REEDUCATION: CPT

## 2024-05-21 NOTE — PROGRESS NOTES
Daily Note     Today's date: 2024  Patient name: Isai Abbott  : 1940  MRN: [id number]  Referring provider: Josi Kuhn MD  Dx:   Encounter Diagnosis     ICD-10-CM    1. Physical deconditioning  R53.81       2. Abnormality of gait due to impairment of balance  R26.89       3. 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 He had no soreness after last session.       Objective: See treatment diary below      Assessment: Outdoor ambulation performed with RW and AW to challenge endurance and strength, pt reported significant challenge with performance of task. Pt noted moderate pain and difficult. Stair navigation performed and tried to encourage pt to trial an SPC when ascending or descending the stairs, and pt adamantly declined, so B UE support was used on single HR. Hurdles performed with intermittent reduced foot clearance reflecting ongoing hip flexor strength deficits and ROM limitations. Pt will continue to benefit from skilled PT so that he can navigate his environment with increased safety and stability.       Plan: Continue per plan of care.      Precautions: HTN, Chronic DVT,  Severe Osteoporosis, Hx of cervicalgia, hx of hernias   POC Expires: 2024  [employer] Access Code:       Objective Measures 2024 IE    Vitals **      ABC 45%    6MWT 646ft    5xSTS 22.46    AMEZCUA 37/50    10mWT SS: 0.48m/s W RW    Fast: 0.75 m/s w RW    TUG 25.55s    Neuro Re-Ed     Static Balance      Dynamic Balance      Vorx1     Head turns      Multidirectional Stepping      Salvatore navigation   3 hurdles med height setting 3lb AW sophie 5 hurdles x6   Resisted Amb      Side stepping      Overhead ball slam     Education      Ther Ex     SLRx3     Standing hip abduction     Leg extension      Mini Squats      Step ups fwd and lat      Hr/Tr     Sit <> Stand           Standing marches     Ther Activity     Stair navigation  Ascending &  descending with 3lb AW B UE support on 1 HR 1x 11 steps   Stair navigation     Transfer Training      Gait Training     Gait training w RW  591 ft with RW and 3lb AW sophie

## 2024-05-22 ENCOUNTER — APPOINTMENT (OUTPATIENT)
Facility: CLINIC | Age: 84
End: 2024-05-22
Payer: COMMERCIAL

## 2024-05-24 ENCOUNTER — APPOINTMENT (OUTPATIENT)
Facility: CLINIC | Age: 84
End: 2024-05-24
Payer: COMMERCIAL

## 2024-05-30 NOTE — PROGRESS NOTES
"Daily Note     Today's date: 2024  Patient name: Isai Abbott  : 1940  MRN: [id number]  Referring provider: Josi Kuhn MD  Dx:   Encounter Diagnosis     ICD-10-CM    1. Physical deconditioning  R53.81       2. Abnormality of gait due to impairment of balance  R26.89       3. 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 He spilled coffee the other day and the coffee went on the floor. He did not want to leave the coffee on the floor for 12 hours. He bent down to wipe it up. He had to go under the table and didn't have his walker, and he felt that he was starting to go. He knew where the chair was and he turned to get on the chair and it caught in his right ribs above his hernias and it felt like it went awful deep. He didn't get a bruise, but he does have some pain. He states that he currently has an annoying feeling in his ribs.        Objective: See treatment diary below      Assessment: Due to pt's increased rib pain pt requested to not perform hurdles today. Stair navigation performed without increased in p! With supervision assistance. 1 lap performed around outdoors at slower pace today as compared to previous visits due to residual soreness from fall.  Pt will continue to benefit from skilled PT so that he can navigate his environment with increased safety and stability.       Plan: Continue per plan of care.      Precautions: HTN, Chronic DVT,  Severe Osteoporosis, Hx of cervicalgia, hx of hernias   POC Expires: 2024  [employer] Access Code:       Objective Measures 2024 IE    Vitals **       ABC 45%     6MWT 646ft     5xSTS 22.46     AMEZCUA 37/50     10mWT SS: 0.48m/s W RW    Fast: 0.75 m/s w RW     TUG 25.55s     Neuro Re-Ed      Static Balance    Foam EC 3x30\" in //    Dynamic Balance       Vorx1      Head turns    In // no UE support    Overhead boomwhacker tapping   With OH boomwhacker " taps 3x each.    Multidirectional Stepping       Salvatore navigation   3 hurdles med height setting 3lb AW sophie 5 hurdles x6    Resisted Amb       Side stepping       Overhead ball slam      Education       Ther Ex      SLRx3      Standing hip abduction      Leg extension       Mini Squats       Step ups fwd and lat       Hr/Tr      Sit <> Stand             Standing marches      Ther Activity      Stair navigation  Ascending & descending with 3lb AW B UE support on 1 HR 1x 11 steps Ascending & descending with 3lb AW B UE support on 1 HR 1x11 steps   Stair navigation      Transfer Training       Gait Training      Gait training w RW  591 ft with RW and 3lb AW sophie Gait training 394 ft with RW and 3lb AW sophie

## 2024-05-31 ENCOUNTER — OFFICE VISIT (OUTPATIENT)
Facility: CLINIC | Age: 84
End: 2024-05-31
Payer: COMMERCIAL

## 2024-05-31 DIAGNOSIS — R53.81 PHYSICAL DECONDITIONING: Primary | ICD-10-CM

## 2024-05-31 DIAGNOSIS — R26.89 ABNORMALITY OF GAIT DUE TO IMPAIRMENT OF BALANCE: ICD-10-CM

## 2024-05-31 DIAGNOSIS — M40.04 POSTURAL KYPHOSIS OF THORACIC REGION: ICD-10-CM

## 2024-05-31 PROCEDURE — 97530 THERAPEUTIC ACTIVITIES: CPT

## 2024-05-31 PROCEDURE — 97112 NEUROMUSCULAR REEDUCATION: CPT

## 2024-05-31 PROCEDURE — 97116 GAIT TRAINING THERAPY: CPT

## 2024-06-06 NOTE — PROGRESS NOTES
"Daily Note     Today's date: 2024  Patient name: Isai Abbott  : 1940  MRN: [id number]  Referring provider: Josi Kuhn MD  Dx:   Encounter Diagnosis     ICD-10-CM    1. Physical deconditioning  R53.81       2. Abnormality of gait due to impairment of balance  R26.89       3. 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 He is feeling fine now.         Objective: See treatment diary below      Assessment: Due to pt's increased rib pain pt requested to not perform hurdles today. Stair navigation performed without increased in p! OH boomwhacker taps performed with reduced ROM utilized on R UE due to residual soreness. Gait + HT performed with reduced stride length. Pt will continue to benefit from skilled PT so that he can navigate his environment with increased safety and stability.       Plan: Continue per plan of care.      Precautions: HTN, Chronic DVT,  Severe Osteoporosis, Hx of cervicalgia, hx of hernias   POC Expires: 2024  [employer] Access Code:       Objective Measures 2024 IE    Vitals **        ABC 45%      6MWT 646ft      5xSTS 22.46      AMEZCUA 37/50      10mWT SS: 0.48m/s W RW    Fast: 0.75 m/s w RW      TUG 25.55s      Neuro Re-Ed       Static Balance    Foam EC 3x30\" in //     Dynamic Balance        Vorx1       Head turns    In // no UE support  In // no UE support   Overhead boomwhacker tapping   With OH boomwhacker taps 3x each.  With OH boomwahcker taps 5 laps in //   Multidirectional Stepping        Salvatore navigation   3 hurdles med height setting 3lb AW sophie 5 hurdles x6  3 hurdles med height setting 3lb AW sophie 5 hurdles x6   Resisted Amb        Side stepping        Overhead ball slam       Education        Ther Ex       SLRx3       Standing hip abduction       Leg extension        Mini Squats        Step ups fwd and lat        Hr/Tr       Sit <> Stand   "             Standing marches       Ther Activity       Stair navigation  Ascending & descending with 3lb AW B UE support on 1 HR 1x 11 steps Ascending & descending with 3lb AW B UE support on 1 HR 1x11 steps Ascending & descending with 3lb AW B UE support on 1 HR 1x11 steps    Stair navigation       Transfer Training        Gait Training       Gait training w RW  591 ft with RW and 3lb AW sophie Gait training 394 ft with RW and 3lb AW sophie Gait training 394 ft with RW and 3lb AW sophie

## 2024-06-07 ENCOUNTER — OFFICE VISIT (OUTPATIENT)
Facility: CLINIC | Age: 84
End: 2024-06-07
Payer: COMMERCIAL

## 2024-06-07 DIAGNOSIS — M40.04 POSTURAL KYPHOSIS OF THORACIC REGION: ICD-10-CM

## 2024-06-07 DIAGNOSIS — R53.81 PHYSICAL DECONDITIONING: Primary | ICD-10-CM

## 2024-06-07 DIAGNOSIS — R26.89 ABNORMALITY OF GAIT DUE TO IMPAIRMENT OF BALANCE: ICD-10-CM

## 2024-06-07 PROCEDURE — 97112 NEUROMUSCULAR REEDUCATION: CPT

## 2024-06-07 PROCEDURE — 97116 GAIT TRAINING THERAPY: CPT

## 2024-06-07 PROCEDURE — 97530 THERAPEUTIC ACTIVITIES: CPT

## 2024-06-10 DIAGNOSIS — I10 ESSENTIAL HYPERTENSION: ICD-10-CM

## 2024-06-10 RX ORDER — FUROSEMIDE 40 MG/1
TABLET ORAL
Qty: 90 TABLET | Refills: 1 | Status: SHIPPED | OUTPATIENT
Start: 2024-06-10

## 2024-06-14 ENCOUNTER — OFFICE VISIT (OUTPATIENT)
Facility: CLINIC | Age: 84
End: 2024-06-14
Payer: COMMERCIAL

## 2024-06-14 DIAGNOSIS — M40.04 POSTURAL KYPHOSIS OF THORACIC REGION: ICD-10-CM

## 2024-06-14 DIAGNOSIS — R53.81 PHYSICAL DECONDITIONING: Primary | ICD-10-CM

## 2024-06-14 DIAGNOSIS — R26.89 ABNORMALITY OF GAIT DUE TO IMPAIRMENT OF BALANCE: ICD-10-CM

## 2024-06-14 PROCEDURE — 97530 THERAPEUTIC ACTIVITIES: CPT

## 2024-06-14 PROCEDURE — 97112 NEUROMUSCULAR REEDUCATION: CPT

## 2024-06-14 PROCEDURE — 97116 GAIT TRAINING THERAPY: CPT

## 2024-06-14 NOTE — PROGRESS NOTES
"Daily Note     Today's date: 2024  Patient name: Isai Abbott  : 1940  MRN: [id number]  Referring provider: Josi Kuhn MD  Dx:   Encounter Diagnosis     ICD-10-CM    1. Physical deconditioning  R53.81       2. Abnormality of gait due to impairment of balance  R26.89       3. 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 Good motivation to participate throughout session. Continues to have in general shuffling pattern, most notable when he is outdoors. Used 3# BAW throughout session. Performed some no UE ambulation while in constraints of //bars - patient did well with this, but he did have slow gait with continued kyphosis posturing. Patient demonstrated fatigue post treatment, exhibited good technique with therapeutic exercises, and would benefit from continued PT      Plan: Continue per plan of care.  Progress treatment as tolerated.       Precautions: HTN, Chronic DVT,  Severe Osteoporosis, Hx of cervicalgia, hx of hernias   POC Expires: 2024  [employer] Access Code:       Objective Measures 2024 IE    Vitals **         ABC 45%       6MWT 646ft       5xSTS 22.46       AMEZCUA 37/50       10mWT SS: 0.48m/s W RW    Fast: 0.75 m/s w RW       TUG 25.55s       Neuro Re-Ed        Static Balance    Foam EC 3x30\" in //      Dynamic Balance         Vorx1        Head turns    In // no UE support  In // no UE support    Overhead boomwhacker tapping   With OH boomwhacker taps 3x each.  With OH boomwahcker taps 5 laps in //    Multidirectional Stepping         Salvatore navigation   3 hurdles med height setting 3lb AW sophie 5 hurdles x6  3 hurdles med height setting 3lb AW sophie 5 hurdles x6 6\" step up and over BUE //bar x20 ea    Sit <> stand      1x fatigue UUE    Walking no UE   "    No UE //bar CGA x3 laps   Resisted Amb         Side stepping         Overhead ball slam        Education         Ther Ex        SLRx3        Standing hip abduction        Leg extension         Mini Squats         Step ups fwd and lat         Hr/Tr        Sit <> Stand                 Standing marches        Ther Activity        Stair navigation  Ascending & descending with 3lb AW B UE support on 1 HR 1x 11 steps Ascending & descending with 3lb AW B UE support on 1 HR 1x11 steps Ascending & descending with 3lb AW B UE support on 1 HR 1x11 steps  Ascending & descending with 3lb AW B UE support on 1 HR 1x11 steps    Stair navigation        Transfer Training         Gait Training        Gait training w RW  591 ft with RW and 3lb AW sophie Gait training 394 ft with RW and 3lb AW sophie Gait training 394 ft with RW and 3lb AW sophie Gait training 394 ft with RW and 3lb AW sophie   Curb training

## 2024-06-21 ENCOUNTER — APPOINTMENT (OUTPATIENT)
Facility: CLINIC | Age: 84
End: 2024-06-21
Payer: COMMERCIAL

## 2024-06-28 ENCOUNTER — OFFICE VISIT (OUTPATIENT)
Facility: CLINIC | Age: 84
End: 2024-06-28
Payer: COMMERCIAL

## 2024-06-28 DIAGNOSIS — R53.81 PHYSICAL DECONDITIONING: Primary | ICD-10-CM

## 2024-06-28 DIAGNOSIS — M40.04 POSTURAL KYPHOSIS OF THORACIC REGION: ICD-10-CM

## 2024-06-28 DIAGNOSIS — R26.89 ABNORMALITY OF GAIT DUE TO IMPAIRMENT OF BALANCE: ICD-10-CM

## 2024-06-28 PROCEDURE — 97116 GAIT TRAINING THERAPY: CPT

## 2024-06-28 PROCEDURE — 97530 THERAPEUTIC ACTIVITIES: CPT

## 2024-06-28 NOTE — PROGRESS NOTES
"Daily Note     Today's date: 2024  Patient name: Isai Abbott  : 1940  MRN: [id number]  Referring provider: Josi Kuhn MD  Dx:   Encounter Diagnosis     ICD-10-CM    1. Physical deconditioning  R53.81       2. Abnormality of gait due to impairment of balance  R26.89       3. 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 Was sick last week. Hasn't been doing as many exercises, \"for the past month\".       Objective: See treatment diary below      Assessment: Tolerated treatment fair. Continued per primary therapist POC. Had difficulty with endurance training. Emphasis of session continued to be placed on improving functional outdoor endurance training. Able to complete 2x laps, but in slower fashion compared to prior sessions. Reports that he thinks it would be good for him to take a break in a couple weeks for the rest of the summer. Strongly encouraged to return to doing home exercises. Patient demonstrated fatigue post treatment, exhibited good technique with therapeutic exercises, and would benefit from continued PT. Outcome measures next session or the one after that.       Plan: Continue per plan of care.  Progress treatment as tolerated.       Precautions: HTN, Chronic DVT,  Severe Osteoporosis, Hx of cervicalgia, hx of hernias   POC Expires: 2024  [employer] Access Code:       Objective Measures    Vitals **         ABC        6MWT        5xSTS        AMEZCUA        10mWT        TUG        Neuro Re-Ed        Static Balance    Foam EC 3x30\" in //      Dynamic Balance         Vorx1        Head turns    In // no UE support  In // no UE support    Overhead boomwhacker tapping   With OH boomwhacker taps 3x each.  With OH boomwahcker taps 5 laps in //    Multidirectional Stepping         Salvatore navigation   3 hurdles med height setting 3lb AW " "sophie 5 hurdles x6  3 hurdles med height setting 3lb AW sophie 5 hurdles x6 6\" step up and over BUE //bar x20 ea    Sit <> stand      1x fatigue UUE    Walking no UE      No UE //bar CGA x3 laps   Resisted Amb         Side stepping         Overhead ball slam        Education         Ther Ex        SLRx3        Standing hip abduction        Leg extension         Mini Squats         Step ups fwd and lat         Hr/Tr        Sit <> Stand                 Standing marches        Ther Activity        Stair navigation Ascending & descending with 3lb AW B UE support on 1 HR 1x 11 steps Ascending & descending with 3lb AW B UE support on 1 HR 1x 11 steps Ascending & descending with 3lb AW B UE support on 1 HR 1x11 steps Ascending & descending with 3lb AW B UE support on 1 HR 1x11 steps  Ascending & descending with 3lb AW B UE support on 1 HR 1x11 steps    Stair navigation        Transfer Training         Gait Training        Gait training w RW Gait training 394 ft with RW and 3lb AW sophie 591 ft with RW and 3lb AW sophie Gait training 394 ft with RW and 3lb AW sophie Gait training 394 ft with RW and 3lb AW sophie Gait training 394 ft with RW and 3lb AW sophie   Curb training                             "

## 2024-07-05 ENCOUNTER — OFFICE VISIT (OUTPATIENT)
Facility: CLINIC | Age: 84
End: 2024-07-05
Payer: COMMERCIAL

## 2024-07-05 DIAGNOSIS — R26.89 ABNORMALITY OF GAIT DUE TO IMPAIRMENT OF BALANCE: ICD-10-CM

## 2024-07-05 DIAGNOSIS — M40.04 POSTURAL KYPHOSIS OF THORACIC REGION: ICD-10-CM

## 2024-07-05 DIAGNOSIS — R53.81 PHYSICAL DECONDITIONING: Primary | ICD-10-CM

## 2024-07-05 PROCEDURE — 97110 THERAPEUTIC EXERCISES: CPT

## 2024-07-05 PROCEDURE — 97530 THERAPEUTIC ACTIVITIES: CPT

## 2024-07-05 PROCEDURE — 97112 NEUROMUSCULAR REEDUCATION: CPT

## 2024-07-05 NOTE — PROGRESS NOTES
"Daily Note     Today's date: 2024  Patient name: Isai Abbott  : 1940  MRN: [id number]  Referring provider: Josi Kuhn MD  Dx:   Encounter Diagnosis     ICD-10-CM    1. Physical deconditioning  R53.81       2. Abnormality of gait due to impairment of balance  R26.89       3. 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 He states that once he hurt himself he just had not started back up again.      Objective: See treatment diary below      Assessment: . Patient was educated on importance of performing exercises at home and he expressed understanding. Salvatore navigation performed with step through pattern today. Pt required verbal encouragement to lift his chest during boomwhacker taps today reflected reduced postural endurance. Pt will continue to benefit from skilled PT so that he can navigate his environment with increased safety and        Plan: Continue per plan of care.  Progress treatment as tolerated.       Precautions: HTN, Chronic DVT,  Severe Osteoporosis, Hx of cervicalgia, hx of hernias   POC Expires: 2024  [employer] Access Code:       Objective Measures    Vitals **          ABC         6MWT         5xSTS         AMEZCUA         10mWT         TUG         Neuro Re-Ed         Static Balance    Foam EC 3x30\" in //       Dynamic Balance          Vorx1         Head turns    In // no UE support  In // no UE support     Overhead boomwhacker tapping   With OH boomwhacker taps 3x each.  With OH boomwahcker taps 5 laps in //  With OH Boomwahcker taps 5 laps in // 3lb AW sophie   Multidirectional Stepping          Salvatore navigation   3 hurdles med height setting 3lb AW sophie 5 hurdles x6  3 hurdles med height setting 3lb AW sophie 5 hurdles x6 6\" step up and over BUE //bar x20 ea  Med setting 6\" step over B UE // Bar x20 ea   Sit <> stand      1x " fatigue UUE     Walking no UE      No UE //bar CGA x3 laps    Resisted Amb          Side stepping          Overhead ball slam         Education          Ther Ex         SLRx3         Standing hip abduction         Leg extension          Mini Squats          Step ups fwd and lat          Hr/Tr         Sit <> Stand       1x10 to walker with holding upright stretch for 3x            Standing marches         Ther Activity         Stair navigation Ascending & descending with 3lb AW B UE support on 1 HR 1x 11 steps Ascending & descending with 3lb AW B UE support on 1 HR 1x 11 steps Ascending & descending with 3lb AW B UE support on 1 HR 1x11 steps Ascending & descending with 3lb AW B UE support on 1 HR 1x11 steps  Ascending & descending with 3lb AW B UE support on 1 HR 1x11 steps  Ascending & descending with 3lb AW B UE support on 1 HR 2x11 steps   Stair navigation         Transfer Training          Gait Training         Gait training w RW Gait training 394 ft with RW and 3lb AW sophie 591 ft with RW and 3lb AW sophie Gait training 394 ft with RW and 3lb AW sophie Gait training 394 ft with RW and 3lb AW sophie Gait training 394 ft with RW and 3lb AW sophie 1 lap around the lobby per patient request to warm up as outside was too warm. 80ft 3lb AW sophie   Curb training

## 2024-07-11 NOTE — PROGRESS NOTES
PT Evaluation     Today's date: 2024  Patient name: Isai Abbott  : 1940  MRN: [id number]  Referring provider: Josi Kuhn MD  Dx:   Encounter Diagnosis     ICD-10-CM    1. Physical deconditioning  R53.81       2. Abnormality of gait due to impairment of balance  R26.89       3. 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 male who has received 8 visits of skilled PT to address balance & deconditioning. Since previous assessment, pt had 1 near fall, and has decreased his performance of Hep and functional activities secondary to pain. This correlates with subsequent decrease in 6MWT distance and increase in TUG speed, which reflect regression. Despite this, pt's 5xSTS score improved, AMEZCUA score remained the same. Pt was educated on the importance of performing HEP, and the importance of taking safety measures such as performing interventions when other people are home and utilizing counter support as needed. Pt expressed understanding. Pt will continue to benefit from skilled PT so that he can navigate his environment with increased safety and stability.     Pt is an 83 y.o. male returning to physical therapy after a brief hiatus due to knee pain to address balance, gait, and deconditioning compared to previous visits. Today's assessment reflects decline in balance confidence since he was last here. 6MWT does not exhibit statistically significant decline, reflecting minimal change in CV endurance.  However, pt's TUG, gait speed, and AMEZCUA scores declined since he was last here, reflecting balance and gait deficits. R LE > L. Educated pt on use of leg  mobility aid to improve care mobility and pt  stated he would look into it. Pt will continue to benefit from skilled PT so that he can navigate his environment with increased safety and stability.     Per Previous IE in November of 2023: Isai is an 83 year old patient presenting to OPPT for evaluation regarding gait and balance deficits in adjunction to recent diagnosis of osteoporosis. Upon evaluation, Isai demonstrates significant deficits and increased fall risk. Proximal BLE strength below 4/5 throughout aside from L hip flexion. Decreased endurance evidenced by ability to only complete 2MWT with distance traversed 130 feet. TUG and 5x STS required UE support to rise from chair, as well as scores being greater than fall risk cut of score indicative of increased fall risk and poor power generation. Gait speed classifies patient as increased fall risk, as well as non-community ambulator. These impairments limit their ability to perform daily activities as well as their previous level of function causing decreased quality of life.    HEP delivered with emphasis on BLE strengthening with UE support. Education provided regarding HEP, POC, prognosis, goals for therapy, and functional outcome measure results in relation to patient's fall risk. Also worked on sit <> stand transfers to improve overall safety to reduce fall risk and working with walker to improve facilitation of increased safety during these activities. Cuing for increased anterior weight shift.     Patient requires continued skilled PT services in order to improve aforementioned impairments so that they are able to return to highest level of function possible. Without initiation of skilled PT services, patient will continue to have largely decreased functional endurance and mobility leading to increased caregiver burden and likelihood of falls and subsequent hospitalization.     Barriers to therapy: Transportation  Understanding of Dx/Px/POC: good     Prognosis: good    Goals  STG (4 weeks)    Improve (reduce) TUG score by 4 seconds indicating meaningful improvement in fall risk ONGOING  Improve AMEZCUA score by 6 indicating meaningful improvement in fall risk ONGOING  Improve/Reduce 5x STS score by 5 seconds indicating meaningful improvement in fall risk and power generation ONGOING  Improve generalized strength by 1/5 to demonstrate improvement in facilitation of efficiency of functional activities and reduced fall risk ONGOING  Improve gait speed by 0.05 m/s with LRAD ONGOING    LTG (8 weeks)   Improve (reduce) TUG score by 4 seconds compared to PN indicating meaningful improvement in fall risk ONGOING  Improve AMEZCUA score to > 45/56 indicating meaningful improvement in fall risk ONGOING  Improve/Reduce 5x STS score by 4 seconds compared to PN seconds indicating meaningful improvement in fall risk and power generation ONGOING  Will be able to complete full 6MWT MET  Improve gait speed by 0.05 m/s from PN with LRAD ONGOING  Improve 6MWT by 164.4ft to reflect improvement in endurance. ONGOING        Plan  Patient would benefit from: skilled physical therapy  Planned modality interventions: cryotherapy and thermotherapy: hydrocollator packs    Planned therapy interventions: manual therapy, neuromuscular re-education, patient education, postural training, self care, strengthening, stretching, therapeutic activities, therapeutic exercise, home exercise program, graded exercise, gait training, flexibility and balance    Frequency: 2x week  Duration in weeks: 8  Plan of Care beginning date: 7/11/2024  Plan of Care expiration date: 9/5/2024  Treatment plan discussed with: patient  Plan details: TE: for strengthening, stretching, and flexibility  TA: for functional participation  NR: for balance, coordination, reaction time  MT: for STM, IASTM, joint mobilizations  Gait Training: to improve gait mechanics.          Subjective Evaluation    History of Present Illness  Mechanism of injury: 7/12/2024: Pt states that  "he feels about 70% of the way to where he would like to be this round of care. He feels he is tiring out quickly. He feels exhausted even when he comes in. He states that he has been pushing himself less because of his near fall. He has started doing his sit to stands at home. His stamina is the biggest issue.     At IE: Pt states that he has been out because he has been having knee pain. His orthopedic doctor wants him to low weight and high repetition. His physician wants him to utilize Voltarin gel to manage inflammation. He does not report any falls. He wants to skip the sitting to standing exercises from the stairs. He wants to do stepping up and down from the platform, the stairs, walking, and balance things. He is not having any pain in his knees right now. He has been doing his exercises every day since he was last here and feels he has regressed a bit. He practices getting out of the chair at home with using arms. He feels he has declined overall since he was last here with his overall strength and his stamina and he would like to get back to that. He states that he got taken off of his iron and that may be affecting things too. He was having knee pain on the front of the legs. He wanted to make sure his surgeries weren't deteriorating and his surgeon said that they are okay.     Per previous IE in November 2023: Isai is an 83 year old patient presenting to OPPT for evaluation regarding gait and balance deficits. Recent diagnosis of osteoporosis via last dexa scan. Reports that he is here to work on his strength. Last fall was around 7 months ago. Does exercises at home - already. Standing marching and squat at the counter are the two that he is doing currently. Wife reports that he has good follow through. \"Everything we do we want to be able to do at home\". Main goal is to get stronger to be able to reduce fall risk going forward.     One dizzy spell in the last two weeks. Reports that he was standing " "at the counter and noted that he was lightheaded and room spinning. Felt like he was going to fall.     Got PT at home after COVID - but wasn't able to go up the stairs. Doesn't go up currently, wife handles the caretaking duties.     Home setup: Walkers throughout the home - theres no rugs. Anything that could possibly help. \"We set it up as safe as we can be\".   Patient Goals  Patient goals for therapy: improved balance, increased motion and increased strength    Pain  No pain reported  Current pain ratin  At best pain ratin  At worst pain ratin  Location: bilateral knee pain  Quality: dull ache (annoying)  Relieving factors: medications and rest  Aggravating factors: walking and standing  Progression: improved    Social Support  Steps to enter house: yes (4\" step, 6\" step from the living room to the kitchen)  1  Stairs in house: yes   12  Lives in: one-story house  Lives with: spouse    Employment status: not working    Diagnostic Tests  Bone scan: abnormal    FCE comments: Narrative & Impression  DXA SCAN     CLINICAL HISTORY:  82-year-old male.   OTHER RISK FACTORS:  Hyperparathyroidism, Limited mobility, Lasix therapy.     PHARMACOLOGIC THERAPY FOR OSTEOPOROSIS:  None.     TECHNIQUE: Bone densitometry was performed using a [employer] Horizon A  bone densitometer.  Regions of interest appear properly placed.         COMPARISON: There are no prior DXA studies performed on this unit for comparison.     RESULTS:      LUMBAR SPINE  Level: L1, L3, L4  (L2 vertebra excluded from analysis due to local structural abnormalities or artifact) :   BMD:  0.854  gm/cm2   T-score: -2.1         LEFT  TOTAL HIP:   BMD:  0.601  gm/cm2   T-score:  -2.9     LEFT  FEMORAL NECK:   BMD:  0.445  gm/cm2   T score: -3.6      LEFT  FOREARM:    33% RADIUS BMD:  0.701  gm/cm2  T-score:  -2.2         IMPRESSION:     1.  Osteoporosis. [Based on the total left hip]     2.  The 10 year risk of hip fracture is 9.1% with the 10 year " risk of major osteoporotic fracture being 17% as calculated by the [employer] fracture risk assessment tool (FRAX, which is based on data generated by the WHO Collaborating [employer]   for Metabolic Bone Diseases).  3.  The current NOF guidelines recommend treating patients with a T-score of -2.5 or less in the lumbar spine or hips, or in post-menopausal women and men over the age of 50 with low bone mass (osteopenia) and a FRAX 10 year risk score of >3% for hip   fracture and/or >20% for major osteoporotic fracture.     4.  The NOF recommends follow-up DXA in 1-2 years after initiating therapy for osteoporosis and every 2 years thereafter. More frequent evaluation is appropriate for patients with conditions associated with rapid bone loss, such as glucocorticoid   therapy. The interval between DXA screenings may be longer for individuals without major risk factors and initial T-score in the normal or upper low bone mass range.       Treatments  Previous treatment: physical therapy        Objective     Strength/Myotome Testing     Left Hip   Planes of Motion   Flexion: 4    Right Hip   Planes of Motion   Flexion: 3+    Left Knee   Flexion: 5    Right Knee   Flexion: 4    Left Ankle/Foot   Dorsiflexion: 4    Right Ankle/Foot   Dorsiflexion: 3+        Flowsheet Rows      Flowsheet Row Most Recent Value   Sifuentes Balance Scale    1. Sitting to Standing 3   2. Standing Unsupported 4   3. Sitting with Back Unsupported but Feet Supported on Floor or on a Stool 4   4. Standing to Sitting 3   5.  Transfers 4   6. Standing Unsupported with Eyes Closed 4   7. Standing Unsupported with Feet Together 4   8. Reach Forward with Outstretched Arm While Standing 4   9.  Object from Floor from a Standing Position 4   10. Turning to Look Behind Over Left and Right Shoulders While Standing 2   11. Turn 360 Degrees 2   12. Place Alternate Foot on Step or Stool While Standing Unsupported 1   13. Standing Unsupported One  "Foot in Front 1   14. Standing on One Leg 0   Amezcua Balance Score 40                 Gait Assessment: Slow cautious shuffling pattern with rolling walker. Relatively steady, tendency to have RW get out in front of him too far. Largely forward head and rounded shoulders with thoracic kyphosis        Precautions: HTN, Chronic DVT,  Severe Osteoporosis, Hx of cervicalgia, hx of hernias   POC Expires: 09/05/2024  [employer] Access Code:           Objective Measures 6/28 5/21 5/31 6/7 6/14 7/5 7/12 PN   Vitals **           ABC       63.13%   6MWT       591ft   5xSTS       19.51s with UE support   AMEZCUA          10mWT          TUG       26.63s   Neuro Re-Ed          Static Balance    Foam EC 3x30\" in //        Dynamic Balance           Vorx1          Head turns    In // no UE support  In // no UE support      Overhead boomwhacker tapping   With OH boomwhacker taps 3x each.  With OH boomwahcker taps 5 laps in //  With OH Boomwahcker taps 5 laps in // 3lb AW sophie    Multidirectional Stepping           Salvatore navigation   3 hurdles med height setting 3lb AW sophie 5 hurdles x6  3 hurdles med height setting 3lb AW sophie 5 hurdles x6 6\" step up and over BUE //bar x20 ea  Med setting 6\" step over B UE // Bar x20 ea    Sit <> stand      1x fatigue UUE      Walking no UE      No UE //bar CGA x3 laps     Resisted Amb           Side stepping           Overhead ball slam          Tests and measures       6MWT, ABC, TUG, 5xSTS, AMEZCUA   Education           Ther Ex          SLRx3          Standing hip abduction          Leg extension           Mini Squats           Step ups fwd and lat           Hr/Tr          Sit <> Stand       1x10 to walker with holding upright stretch for 3x              Standing marches          Ther Activity          Stair navigation Ascending & descending with 3lb AW B UE support on 1 HR 1x 11 steps Ascending & descending with 3lb AW B UE support on 1 HR 1x 11 steps Ascending & descending with 3lb AW B UE support on 1 HR " 1x11 steps Ascending & descending with 3lb AW B UE support on 1 HR 1x11 steps  Ascending & descending with 3lb AW B UE support on 1 HR 1x11 steps  Ascending & descending with 3lb AW B UE support on 1 HR 2x11 steps    Stair navigation          Transfer Training           Gait Training          Gait training w RW Gait training 394 ft with RW and 3lb AW sophie 591 ft with RW and 3lb AW sophie Gait training 394 ft with RW and 3lb AW sophie Gait training 394 ft with RW and 3lb AW sophie Gait training 394 ft with RW and 3lb AW sophie 1 lap around the lobby per patient request to warm up as outside was too warm. 80ft 3lb AW sophie    Curb training

## 2024-07-12 ENCOUNTER — EVALUATION (OUTPATIENT)
Facility: CLINIC | Age: 84
End: 2024-07-12
Payer: COMMERCIAL

## 2024-07-12 DIAGNOSIS — R53.81 PHYSICAL DECONDITIONING: Primary | ICD-10-CM

## 2024-07-12 DIAGNOSIS — R26.89 ABNORMALITY OF GAIT DUE TO IMPAIRMENT OF BALANCE: ICD-10-CM

## 2024-07-12 DIAGNOSIS — M40.04 POSTURAL KYPHOSIS OF THORACIC REGION: ICD-10-CM

## 2024-07-12 PROCEDURE — 97112 NEUROMUSCULAR REEDUCATION: CPT

## 2024-07-19 ENCOUNTER — OFFICE VISIT (OUTPATIENT)
Facility: CLINIC | Age: 84
End: 2024-07-19
Payer: COMMERCIAL

## 2024-07-19 DIAGNOSIS — M40.04 POSTURAL KYPHOSIS OF THORACIC REGION: ICD-10-CM

## 2024-07-19 DIAGNOSIS — R53.81 PHYSICAL DECONDITIONING: Primary | ICD-10-CM

## 2024-07-19 DIAGNOSIS — R26.89 ABNORMALITY OF GAIT DUE TO IMPAIRMENT OF BALANCE: ICD-10-CM

## 2024-07-19 PROCEDURE — 97530 THERAPEUTIC ACTIVITIES: CPT

## 2024-07-19 PROCEDURE — 97116 GAIT TRAINING THERAPY: CPT

## 2024-07-19 NOTE — PROGRESS NOTES
"Daily Note     Today's date: 2024  Patient name: Isai Abbott  : 1940  MRN: [id number]  Referring provider: Josi Kuhn MD  Dx:   Encounter Diagnosis     ICD-10-CM    1. Physical deconditioning  R53.81       2. Abnormality of gait due to impairment of balance  R26.89       3. 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 He is doing well today. He did the stairs at home before he cam ein.       Objective: See treatment diary below      Assessment: Pt ambulated two laps outdoors today with 5% AW, which is an improvement from previous session, however the intervention took a significant amount of time, reflecting ongoing reduction in mobility and CV endurance. Stair navigation performed with increased fatigue, likely due to completion of the stairs after performing ambulation tasks. Pt noted mild lightheadedness with boomwhacker taps OH, so required seated rest and session was concluded after this due to being out of time. Pt's lightheadedness resolved with rest and he felt fine before he left the session. will continue to benefit from skilled PT focused on balance, gait, and strengthening so that he can navigate his environment with increased safety and independence.       Plan: Continue per plan of care.      Precautions: HTN, Chronic DVT,  Severe Osteoporosis, Hx of cervicalgia, hx of hernias   POC Expires: 2024  [employer] Access Code:           Objective Measures  PN    Vitals **            ABC       63.13%    6MWT       591ft    5xSTS       19.51s with UE support    AMEZCUA           10mWT           TUG       26.63s    Neuro Re-Ed           Static Balance    Foam EC 3x30\" in //         Dynamic Balance            Vorx1           Head turns    In // no UE support  In // no UE support       Overhead boomwhacker tapping   " "With OH boomwhacker taps 3x each.  With OH boomwahcker taps 5 laps in //  With OH Boomwahcker taps 5 laps in // 3lb AW sophie  With OH boomwhacker taps 5 laps in // 3lb AW sophie   Multidirectional Stepping            Salvatore navigation   3 hurdles med height setting 3lb AW sophie 5 hurdles x6  3 hurdles med height setting 3lb AW sophie 5 hurdles x6 6\" step up and over BUE //bar x20 ea  Med setting 6\" step over B UE // Bar x20 ea     Sit <> stand      1x fatigue UUE       Walking no UE      No UE //bar CGA x3 laps      Resisted Amb            Side stepping            Overhead ball slam           Tests and measures       6MWT, ABC, TUG, 5xSTS, AMEZCUA    Education            Ther Ex           SLRx3           Standing hip abduction           Leg extension            Mini Squats            Step ups fwd and lat            Hr/Tr           Sit <> Stand       1x10 to walker with holding upright stretch for 3x  2x10 to walker with holding upright stretch for 3s              Standing marches           Ther Activity           Stair navigation Ascending & descending with 3lb AW B UE support on 1 HR 1x 11 steps Ascending & descending with 3lb AW B UE support on 1 HR 1x 11 steps Ascending & descending with 3lb AW B UE support on 1 HR 1x11 steps Ascending & descending with 3lb AW B UE support on 1 HR 1x11 steps  Ascending & descending with 3lb AW B UE support on 1 HR 1x11 steps  Ascending & descending with 3lb AW B UE support on 1 HR 2x11 steps  Ascending & descending with 3lb AW B UE support on 1 HR 2x11 steps  took 10 mins   Stair navigation           Transfer Training            Gait Training           Gait training w RW Gait training 394 ft with RW and 3lb AW sophie 591 ft with RW and 3lb AW sophie Gait training 394 ft with RW and 3lb AW sophie Gait training 394 ft with RW and 3lb AW sophie Gait training 394 ft with RW and 3lb AW sophie 1 lap around the lobby per patient request to warm up as outside was too warm. 80ft 3lb AW sophie  [id number] with RW and 5lb " DIAN barrios; took 25 mins   Curb training

## 2024-07-25 NOTE — PROGRESS NOTES
"Daily Note     Today's date: 2024  Patient name: Isai Abbott  : 1940  MRN: [id number]  Referring provider: Josi Kuhn MD  Dx:   No diagnosis found.                 Subjective: Pt reports no fall since he was last here. He is doing well today. He did the stairs at home before he cam ein.       Objective: See treatment diary below      Assessment: Pt ambulated two laps outdoors today with 5% AW, which is an improvement from previous session, however the intervention took a significant amount of time, reflecting ongoing reduction in mobility and CV endurance. Stair navigation performed with increased fatigue, likely due to completion of the stairs after performing ambulation tasks. Pt noted mild lightheadedness with boomwhacker taps OH, so required seated rest and session was concluded after this due to being out of time. Pt's lightheadedness resolved with rest and he felt fine before he left the session. will continue to benefit from skilled PT focused on balance, gait, and strengthening so that he can navigate his environment with increased safety and independence.       Plan: Continue per plan of care.      Precautions: HTN, Chronic DVT,  Severe Osteoporosis, Hx of cervicalgia, hx of hernias   POC Expires: 2024  [employer] Access Code:           Objective Measures  PN    Vitals **             ABC       63.13%     6MWT       591ft     5xSTS       19.51s with UE support     AMEZCUA            10mWT            TUG       26.63s     Neuro Re-Ed            Static Balance    Foam EC 3x30\" in //          Dynamic Balance             Vorx1            Head turns    In // no UE support  In // no UE support        Overhead boomwhacker tapping   With OH boomwhacker taps 3x each.  With OH boomwahcker taps 5 laps in //  With OH Boomwahcker taps 5 laps in // 3lb AW sophie  With OH boomwhacker taps 5 laps in // 3lb AW sophie    Multidirectional Stepping             Salvatore " "navigation   3 hurdles med height setting 3lb AW sophie 5 hurdles x6  3 hurdles med height setting 3lb AW sophie 5 hurdles x6 6\" step up and over BUE //bar x20 ea  Med setting 6\" step over B UE // Bar x20 ea      Sit <> stand      1x fatigue UUE        Walking no UE      No UE //bar CGA x3 laps       Resisted Amb             Side stepping             Overhead ball slam            Tests and measures       6MWT, ABC, TUG, 5xSTS, AMEZCUA     Education             Ther Ex            SLRx3            Standing hip abduction            Leg extension             Mini Squats             Step ups fwd and lat             Hr/Tr            Sit <> Stand       1x10 to walker with holding upright stretch for 3x  2x10 to walker with holding upright stretch for 3s                Standing marches            Ther Activity            Stair navigation Ascending & descending with 3lb AW B UE support on 1 HR 1x 11 steps Ascending & descending with 3lb AW B UE support on 1 HR 1x 11 steps Ascending & descending with 3lb AW B UE support on 1 HR 1x11 steps Ascending & descending with 3lb AW B UE support on 1 HR 1x11 steps  Ascending & descending with 3lb AW B UE support on 1 HR 1x11 steps  Ascending & descending with 3lb AW B UE support on 1 HR 2x11 steps  Ascending & descending with 3lb AW B UE support on 1 HR 2x11 steps  took 10 mins    Stair navigation            Transfer Training             Gait Training            Gait training w RW Gait training 394 ft with RW and 3lb AW sophie 591 ft with RW and 3lb AW sophie Gait training 394 ft with RW and 3lb AW sophie Gait training 394 ft with RW and 3lb AW sophie Gait training 394 ft with RW and 3lb AW sophie 1 lap around the lobby per patient request to warm up as outside was too warm. 80ft 3lb AW sophie  [id number] with RW and 5lb AW sophie; took 25 mins    Curb training                                 "

## 2024-07-26 ENCOUNTER — APPOINTMENT (OUTPATIENT)
Facility: CLINIC | Age: 84
End: 2024-07-26
Payer: COMMERCIAL

## 2024-08-02 ENCOUNTER — OFFICE VISIT (OUTPATIENT)
Facility: CLINIC | Age: 84
End: 2024-08-02
Payer: COMMERCIAL

## 2024-08-02 DIAGNOSIS — R26.89 ABNORMALITY OF GAIT DUE TO IMPAIRMENT OF BALANCE: ICD-10-CM

## 2024-08-02 DIAGNOSIS — R53.81 PHYSICAL DECONDITIONING: Primary | ICD-10-CM

## 2024-08-02 DIAGNOSIS — M40.04 POSTURAL KYPHOSIS OF THORACIC REGION: ICD-10-CM

## 2024-08-02 PROCEDURE — 97530 THERAPEUTIC ACTIVITIES: CPT

## 2024-08-02 PROCEDURE — 97110 THERAPEUTIC EXERCISES: CPT

## 2024-08-02 NOTE — PROGRESS NOTES
"Daily Note     Today's date: 2024  Patient name: Isai Abbott  : 1940  MRN: [id number]  Referring provider: Josi Kuhn MD  Dx:   Encounter Diagnosis     ICD-10-CM    1. Physical deconditioning  R53.81       2. Abnormality of gait due to impairment of balance  R26.89       3. 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 He does not report any falls since he was last here.       Objective: See treatment diary below      Assessment: Due to intense heat ambulation outdoors was held for today. Stair navigation performed without any LOB however, pt required frequent cueing to place his whole foot on the step. Pt was minimally responsive to cueing. STS performed with UE support and stretch in standing to improve extension; pt reported rapid rate of fatigue with performance of tasks. Step ups also performed in // req. B UE support. Pt will continue to benefit from skilled PT focused on balance, gait, and strengthening so that he can navigate his environment with increased safety and independence.       Plan: Continue per plan of care.      Precautions: HTN, Chronic DVT,  Severe Osteoporosis, Hx of cervicalgia, hx of hernias   POC Expires: 2024  [email or web address] Access Code:           Objective Measures  6 PN  8/   Vitals **             ABC       63.13%     6MWT       591ft     5xSTS       19.51s with UE support     AMEZCUA            10mWT            TUG       26.63s     Neuro Re-Ed            Static Balance    Foam EC 3x30\" in //          Dynamic Balance             Vorx1            Head turns    In // no UE support  In // no UE support        Overhead boomwhacker tapping   With OH boomwhacker taps 3x each.  With OH boomwahcker taps 5 laps in //  With OH Boomwahcker taps 5 laps in // 3lb AW sophie  With OH boomwhacker taps 5 laps in // 3lb " "AW sophie    Multidirectional Stepping             Salvatore navigation   3 hurdles med height setting 3lb AW sophie 5 hurdles x6  3 hurdles med height setting 3lb AW sophie 5 hurdles x6 6\" step up and over BUE //bar x20 ea  Med setting 6\" step over B UE // Bar x20 ea   Med setting 6\" step over B UE // Bar x20ea    Sit <> stand      1x fatigue UUE        Walking no UE      No UE //bar CGA x3 laps       Resisted Amb             Side stepping             Overhead ball slam            Tests and measures       6MWT, ABC, TUG, 5xSTS, AMEZCUA     Education             Ther Ex            SLRx3            Standing hip abduction            Leg extension             Mini Squats             Step ups fwd and lat          2x10 in bars each LE.    Hr/Tr            Sit <> Stand       1x10 to walker with holding upright stretch for 3x  2x10 to walker with holding upright stretch for 3s 2x10 to walker with holding upright stretch for 3s               Standing marches            Ther Activity            Stair navigation Ascending & descending with 3lb AW B UE support on 1 HR 1x 11 steps Ascending & descending with 3lb AW B UE support on 1 HR 1x 11 steps Ascending & descending with 3lb AW B UE support on 1 HR 1x11 steps Ascending & descending with 3lb AW B UE support on 1 HR 1x11 steps  Ascending & descending with 3lb AW B UE support on 1 HR 1x11 steps  Ascending & descending with 3lb AW B UE support on 1 HR 2x11 steps  Ascending & descending with 3lb AW B UE support on 1 HR 2x11 steps  took 10 mins Ascending & descending with 3lb AW B UE support on 1 HR 2x11 steps took 11 minutes.    Stair navigation            Transfer Training             Gait Training            Gait training w RW Gait training 394 ft with RW and 3lb AW sophie 591 ft with RW and 3lb AW sophie Gait training 394 ft with RW and 3lb AW sophie Gait training 394 ft with RW and 3lb AW sophie Gait training 394 ft with RW and 3lb AW sophie 1 lap around the lobby per patient request to warm up as " outside was too warm. 80ft 3lb AW sophie  [id number] with RW and 5lb AW sophie; took 25 mins    Curb training

## 2024-08-09 ENCOUNTER — APPOINTMENT (OUTPATIENT)
Facility: CLINIC | Age: 84
End: 2024-08-09
Payer: COMMERCIAL

## 2024-08-09 ENCOUNTER — APPOINTMENT (OUTPATIENT)
Dept: LAB | Facility: HOSPITAL | Age: 84
End: 2024-08-09
Payer: COMMERCIAL

## 2024-08-09 DIAGNOSIS — E21.3 HYPERPARATHYROIDISM (HCC): ICD-10-CM

## 2024-08-09 DIAGNOSIS — D50.9 IRON DEFICIENCY ANEMIA, UNSPECIFIED IRON DEFICIENCY ANEMIA TYPE: ICD-10-CM

## 2024-08-09 DIAGNOSIS — E66.01 SEVERE OBESITY (BMI 35.0-39.9) WITH COMORBIDITY (HCC): ICD-10-CM

## 2024-08-09 LAB
25(OH)D3 SERPL-MCNC: 34.1 NG/ML (ref 30–100)
ALBUMIN SERPL BCG-MCNC: 3.6 G/DL (ref 3.5–5)
ALP SERPL-CCNC: 108 U/L (ref 34–104)
ALT SERPL W P-5'-P-CCNC: 17 U/L (ref 7–52)
ANION GAP SERPL CALCULATED.3IONS-SCNC: 8 MMOL/L (ref 4–13)
AST SERPL W P-5'-P-CCNC: 18 U/L (ref 13–39)
BASOPHILS # BLD AUTO: 0.07 THOUSANDS/ÂΜL (ref 0–0.1)
BASOPHILS NFR BLD AUTO: 1 % (ref 0–1)
BILIRUB SERPL-MCNC: 0.5 MG/DL (ref 0.2–1)
BUN SERPL-MCNC: 34 MG/DL (ref 5–25)
CALCIUM SERPL-MCNC: 8.9 MG/DL (ref 8.4–10.2)
CHLORIDE SERPL-SCNC: 103 MMOL/L (ref 96–108)
CO2 SERPL-SCNC: 28 MMOL/L (ref 21–32)
CREAT SERPL-MCNC: 0.85 MG/DL (ref 0.6–1.3)
EOSINOPHIL # BLD AUTO: 0.13 THOUSAND/ÂΜL (ref 0–0.61)
EOSINOPHIL NFR BLD AUTO: 1 % (ref 0–6)
ERYTHROCYTE [DISTWIDTH] IN BLOOD BY AUTOMATED COUNT: 15.6 % (ref 11.6–15.1)
FERRITIN SERPL-MCNC: 17 NG/ML (ref 24–336)
GFR SERPL CREATININE-BSD FRML MDRD: 81 ML/MIN/1.73SQ M
GLUCOSE P FAST SERPL-MCNC: 102 MG/DL (ref 65–99)
HCT VFR BLD AUTO: 45.8 % (ref 36.5–49.3)
HGB BLD-MCNC: 14.9 G/DL (ref 12–17)
IMM GRANULOCYTES # BLD AUTO: 0.03 THOUSAND/UL (ref 0–0.2)
IMM GRANULOCYTES NFR BLD AUTO: 0 % (ref 0–2)
IRON SATN MFR SERPL: 23 % (ref 15–50)
IRON SERPL-MCNC: 97 UG/DL (ref 50–212)
LYMPHOCYTES # BLD AUTO: 3.71 THOUSANDS/ÂΜL (ref 0.6–4.47)
LYMPHOCYTES NFR BLD AUTO: 36 % (ref 14–44)
MCH RBC QN AUTO: 30.7 PG (ref 26.8–34.3)
MCHC RBC AUTO-ENTMCNC: 32.5 G/DL (ref 31.4–37.4)
MCV RBC AUTO: 94 FL (ref 82–98)
MONOCYTES # BLD AUTO: 1.15 THOUSAND/ÂΜL (ref 0.17–1.22)
MONOCYTES NFR BLD AUTO: 11 % (ref 4–12)
NEUTROPHILS # BLD AUTO: 5.34 THOUSANDS/ÂΜL (ref 1.85–7.62)
NEUTS SEG NFR BLD AUTO: 51 % (ref 43–75)
NRBC BLD AUTO-RTO: 0 /100 WBCS
PLATELET # BLD AUTO: 437 THOUSANDS/UL (ref 149–390)
PMV BLD AUTO: 10.3 FL (ref 8.9–12.7)
POTASSIUM SERPL-SCNC: 3.8 MMOL/L (ref 3.5–5.3)
PROT SERPL-MCNC: 6 G/DL (ref 6.4–8.4)
PTH-INTACT SERPL-MCNC: 174.4 PG/ML (ref 12–88)
RBC # BLD AUTO: 4.86 MILLION/UL (ref 3.88–5.62)
SODIUM SERPL-SCNC: 139 MMOL/L (ref 135–147)
TIBC SERPL-MCNC: 418 UG/DL (ref 250–450)
UIBC SERPL-MCNC: 321 UG/DL (ref 155–355)
WBC # BLD AUTO: 10.43 THOUSAND/UL (ref 4.31–10.16)

## 2024-08-09 PROCEDURE — 83550 IRON BINDING TEST: CPT

## 2024-08-09 PROCEDURE — 36415 COLL VENOUS BLD VENIPUNCTURE: CPT

## 2024-08-09 PROCEDURE — 80053 COMPREHEN METABOLIC PANEL: CPT

## 2024-08-09 PROCEDURE — 82728 ASSAY OF FERRITIN: CPT

## 2024-08-09 PROCEDURE — 83970 ASSAY OF PARATHORMONE: CPT

## 2024-08-09 PROCEDURE — 82306 VITAMIN D 25 HYDROXY: CPT

## 2024-08-09 PROCEDURE — 85025 COMPLETE CBC W/AUTO DIFF WBC: CPT

## 2024-08-09 PROCEDURE — 83540 ASSAY OF IRON: CPT

## 2024-08-09 NOTE — PROGRESS NOTES
"Daily Note     Today's date: 2024  Patient name: Isai Abbott  : 1940  MRN: [id number]  Referring provider: Josi Kuhn MD  Dx:   No diagnosis found.                   Subjective: Pt states he was sick last week and that's why he had to cancel; he had a headache and a bad dizzy spell. He does not report any falls since he was last here.       Objective: See treatment diary below      Assessment: Due to intense heat ambulation outdoors was held for today. Stair navigation performed without any LOB however, pt required frequent cueing to place his whole foot on the step. Pt was minimally responsive to cueing. STS performed with UE support and stretch in standing to improve extension; pt reported rapid rate of fatigue with performance of tasks. Step ups also performed in // req. B UE support. Pt will continue to benefit from skilled PT focused on balance, gait, and strengthening so that he can navigate his environment with increased safety and independence.       Plan: Continue per plan of care.      Precautions: HTN, Chronic DVT,  Severe Osteoporosis, Hx of cervicalgia, hx of hernias   POC Expires: 2024  [employer] Access Code:           Objective Measures  6 PN    Vitals **              ABC       63.13%      6MWT       591ft      5xSTS       19.51s with UE support      AMEZCUA             10mWT             TUG       26.63s      Neuro Re-Ed             Static Balance    Foam EC 3x30\" in //           Dynamic Balance              Vorx1             Head turns    In // no UE support  In // no UE support         Overhead boomwhacker tapping   With OH boomwhacker taps 3x each.  With OH boomwahcker taps 5 laps in //  With OH Boomwahcker taps 5 laps in // 3lb AW sophie  With OH boomwhacker taps 5 laps in // 3lb AW sophie     Multidirectional Stepping              Salvatore navigation   3 hurdles med height setting 3lb AW sophie 5 hurdles x6  3 hurdles med height setting 3lb AW " "sophie 5 hurdles x6 6\" step up and over BUE //bar x20 ea  Med setting 6\" step over B UE // Bar x20 ea   Med setting 6\" step over B UE // Bar x20ea     Sit <> stand      1x fatigue UUE         Walking no UE      No UE //bar CGA x3 laps        Resisted Amb              Side stepping              Overhead ball slam             Tests and measures       6MWT, ABC, TUG, 5xSTS, AMEZCUA      Education              Ther Ex             SLRx3             Standing hip abduction             Leg extension              Mini Squats              Step ups fwd and lat          2x10 in bars each LE.     Hr/Tr             Sit <> Stand       1x10 to walker with holding upright stretch for 3x  2x10 to walker with holding upright stretch for 3s 2x10 to walker with holding upright stretch for 3s                 Standing marches             Ther Activity             Stair navigation Ascending & descending with 3lb AW B UE support on 1 HR 1x 11 steps Ascending & descending with 3lb AW B UE support on 1 HR 1x 11 steps Ascending & descending with 3lb AW B UE support on 1 HR 1x11 steps Ascending & descending with 3lb AW B UE support on 1 HR 1x11 steps  Ascending & descending with 3lb AW B UE support on 1 HR 1x11 steps  Ascending & descending with 3lb AW B UE support on 1 HR 2x11 steps  Ascending & descending with 3lb AW B UE support on 1 HR 2x11 steps  took 10 mins Ascending & descending with 3lb AW B UE support on 1 HR 2x11 steps took 11 minutes.     Stair navigation             Transfer Training              Gait Training             Gait training w RW Gait training 394 ft with RW and 3lb AW sophie 591 ft with RW and 3lb AW sophie Gait training 394 ft with RW and 3lb AW sophie Gait training 394 ft with RW and 3lb AW sophie Gait training 394 ft with RW and 3lb AW sophie 1 lap around the lobby per patient request to warm up as outside was too warm. 80ft 3lb AW sophie  [id number] with RW and 5lb AW sophie; took 25 mins     Curb training                                   "

## 2024-08-14 ENCOUNTER — RA CDI HCC (OUTPATIENT)
Dept: OTHER | Facility: HOSPITAL | Age: 84
End: 2024-08-14

## 2024-08-16 ENCOUNTER — OFFICE VISIT (OUTPATIENT)
Facility: CLINIC | Age: 84
End: 2024-08-16
Payer: COMMERCIAL

## 2024-08-16 DIAGNOSIS — R53.81 PHYSICAL DECONDITIONING: Primary | ICD-10-CM

## 2024-08-16 DIAGNOSIS — M40.04 POSTURAL KYPHOSIS OF THORACIC REGION: ICD-10-CM

## 2024-08-16 DIAGNOSIS — R26.89 ABNORMALITY OF GAIT DUE TO IMPAIRMENT OF BALANCE: ICD-10-CM

## 2024-08-16 PROCEDURE — 97110 THERAPEUTIC EXERCISES: CPT

## 2024-08-16 PROCEDURE — 97116 GAIT TRAINING THERAPY: CPT

## 2024-08-16 NOTE — PROGRESS NOTES
"Daily Note     Today's date: 2024  Patient name: Isai Abbott  : 1940  MRN: [id number]  Referring provider: Josi Kuhn MD  Dx:   Encounter Diagnosis     ICD-10-CM    1. Physical deconditioning  R53.81       2. Abnormality of gait due to impairment of balance  R26.89       3. 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 No new changes, no new falls.       Objective: See treatment diary below      Assessment: Tolerated treatment well. Continued per primary therapist POC. Able to perofrm 2 laps outside without rest break, indicative of better endurance. Better endurance with sit <> stands as well. Continues to have very trunk flexed posture during gait, a lot of UE support on walker. Patient demonstrated fatigue post treatment, exhibited good technique with therapeutic exercises, and would benefit from continued PT      Plan: Continue per plan of care.  Progress treatment as tolerated.       Precautions: HTN, Chronic DVT,  Severe Osteoporosis, Hx of cervicalgia, hx of hernias   POC Expires: 2024  [employer] Access Code:           Objective Measures  6 PN    Vitals **              ABC       63.13%      6MWT       591ft      5xSTS       19.51s with UE support      AMEZCUA             10mWT             TUG       26.63s      Neuro Re-Ed             Static Balance    Foam EC 3x30\" in //           Dynamic Balance              Vorx1             Head turns    In // no UE support  In // no UE support         Overhead boomwhacker tapping   With OH boomwhacker taps 3x each.  With OH boomwahcker taps 5 laps in //  With OH Boomwahcker taps 5 laps in // 3lb AW sophie  With OH boomwhacker taps 5 laps in // 3lb AW sophie     Multidirectional Stepping              Salvatore navigation   3 hurdles med height setting 3lb AW sophie 5 hurdles x6  3 " "hurdles med height setting 3lb AW sophie 5 hurdles x6 6\" step up and over BUE //bar x20 ea  Med setting 6\" step over B UE // Bar x20 ea   Med setting 6\" step over B UE // Bar x20ea     Sit <> stand      1x fatigue UUE         Walking no UE      No UE //bar CGA x3 laps        Resisted Amb              Side stepping              Overhead ball slam             Tests and measures       6MWT, ABC, TUG, 5xSTS, AMEZCUA      Education              Ther Ex             SLRx3             Standing hip abduction             Leg extension              Mini Squats              Step ups fwd and lat          2x10 in bars each LE.     Hr/Tr             Sit <> Stand       1x10 to walker with holding upright stretch for 3x  2x10 to walker with holding upright stretch for 3s 2x10 to walker with holding upright stretch for 3s 2x10                 Standing marches             Ther Activity             Stair navigation Ascending & descending with 3lb AW B UE support on 1 HR 1x 11 steps Ascending & descending with 3lb AW B UE support on 1 HR 1x 11 steps Ascending & descending with 3lb AW B UE support on 1 HR 1x11 steps Ascending & descending with 3lb AW B UE support on 1 HR 1x11 steps  Ascending & descending with 3lb AW B UE support on 1 HR 1x11 steps  Ascending & descending with 3lb AW B UE support on 1 HR 2x11 steps  Ascending & descending with 3lb AW B UE support on 1 HR 2x11 steps  took 10 mins Ascending & descending with 3lb AW B UE support on 1 HR 2x11 steps took 11 minutes.  Ascending & descending with B UE support on 1 HR 2x11 steps took 11 minutes.    Stair navigation             Transfer Training              Gait Training             Gait training w RW Gait training 394 ft with RW and 3lb AW sophie 591 ft with RW and 3lb AW sophie Gait training 394 ft with RW and 3lb AW sophie Gait training 394 ft with RW and 3lb AW sophie Gait training 394 ft with RW and 3lb AW sophie 1 lap around the lobby per patient request to warm up as outside was too warm. " 80ft 3lb AW sophie  [id number] with RW and 5lb AW sophie; took 25 mins  [id number] with RW and 5lb AW sophie; took 25 mins no break between    Curb training

## 2024-08-23 ENCOUNTER — OFFICE VISIT (OUTPATIENT)
Dept: FAMILY MEDICINE CLINIC | Facility: HOSPITAL | Age: 84
End: 2024-08-23
Payer: COMMERCIAL

## 2024-08-23 ENCOUNTER — APPOINTMENT (OUTPATIENT)
Facility: CLINIC | Age: 84
End: 2024-08-23
Payer: COMMERCIAL

## 2024-08-23 VITALS
HEART RATE: 68 BPM | BODY MASS INDEX: 35.79 KG/M2 | DIASTOLIC BLOOD PRESSURE: 68 MMHG | RESPIRATION RATE: 16 BRPM | HEIGHT: 63 IN | WEIGHT: 202 LBS | OXYGEN SATURATION: 93 % | SYSTOLIC BLOOD PRESSURE: 120 MMHG | TEMPERATURE: 97 F

## 2024-08-23 DIAGNOSIS — D50.9 IRON DEFICIENCY ANEMIA, UNSPECIFIED IRON DEFICIENCY ANEMIA TYPE: Primary | ICD-10-CM

## 2024-08-23 DIAGNOSIS — I48.0 PAF (PAROXYSMAL ATRIAL FIBRILLATION) (HCC): ICD-10-CM

## 2024-08-23 DIAGNOSIS — K21.9 GERD WITHOUT ESOPHAGITIS: ICD-10-CM

## 2024-08-23 PROCEDURE — G2211 COMPLEX E/M VISIT ADD ON: HCPCS | Performed by: INTERNAL MEDICINE

## 2024-08-23 PROCEDURE — 99213 OFFICE O/P EST LOW 20 MIN: CPT | Performed by: INTERNAL MEDICINE

## 2024-08-23 RX ORDER — FERROUS SULFATE 325(65) MG
TABLET ORAL
Qty: 36 TABLET | Refills: 3 | Status: SHIPPED | OUTPATIENT
Start: 2024-08-23

## 2024-08-23 NOTE — ASSESSMENT & PLAN NOTE
Patient has GERD.  He feels that his symptoms are very well-controlled on lansoprazole.  He wishes to remain on lansoprazole and not de-escalate to Pepcid and.  His abdomen was soft and nontender today

## 2024-08-23 NOTE — PROGRESS NOTES
"Ambulatory Visit  Name: Isai Abbott      : 1940      MRN: [id number]  Encounter Provider: Tiffanie Anthony MD  Encounter Date: 2024   Encounter department: [facility] [address]    Assessment & Plan   1. Iron deficiency anemia, unspecified iron deficiency anemia type  Assessment & Plan:  Patient has iron deficiency anemia.  He presents for follow-up with his wife.    He denies signs of GI or  bleeding.    CBC showed normal hemoglobin this month, TIBC saturation was normal but his ferritin level decreased.    He decided to resume iron sulfate 325 mg 3 days a week and we will recheck ferritin, iron saturation and hemoglobin before next visit in December  Orders:  -     ferrous sulfate 325 (65 Fe) mg tablet; 1 pill by mouth on ,  and   -     Iron Panel (Includes Ferritin, Iron Sat%, Iron, and TIBC); Future; Expected date: 2024  -     CBC and differential; Future; Expected date: 2024  2. PAF (paroxysmal atrial fibrillation) (HCC)  Assessment & Plan:  Patient has PAF.  Denies palpitations.  Heart rhythm is irregular today.  He will continue Eliquis for CVA prevention  3. GERD without esophagitis  Assessment & Plan:  Patient has GERD.  He feels that his symptoms are very well-controlled on lansoprazole.  He wishes to remain on lansoprazole and not de-escalate to Pepcid and.  His abdomen was soft and nontender today       History of Present Illness     HPI    Review of Systems    Objective     /68   Pulse 68   Temp (!) 97 °F (36.1 °C) (Tympanic)   Resp 16   Ht 5' 3\" (1.6 m)   Wt 91.6 kg (202 lb)   SpO2 93%   BMI 35.78 kg/m²     Physical Exam  Constitutional:       General: He is not in acute distress.     Appearance: He is not toxic-appearing.   Cardiovascular:      Rate and Rhythm: Normal rate and regular rhythm.   Pulmonary:      Effort: No respiratory distress.      Breath sounds: No wheezing or rales.   Abdominal:      General: " Bowel sounds are normal.      Palpations: Abdomen is soft.      Tenderness: There is no abdominal tenderness.   Neurological:      Mental Status: He is alert.       Administrative Statements

## 2024-08-23 NOTE — ASSESSMENT & PLAN NOTE
Patient has PAF.  Denies palpitations.  Heart rhythm is irregular today.  He will continue Eliquis for CVA prevention

## 2024-08-23 NOTE — ASSESSMENT & PLAN NOTE
Patient has iron deficiency anemia.  He presents for follow-up with his wife.    He denies signs of GI or  bleeding.    CBC showed normal hemoglobin this month, TIBC saturation was normal but his ferritin level decreased.    He decided to resume iron sulfate 325 mg 3 days a week and we will recheck ferritin, iron saturation and hemoglobin before next visit in December

## 2024-08-27 ENCOUNTER — TELEPHONE (OUTPATIENT)
Age: 84
End: 2024-08-27

## 2024-08-27 NOTE — TELEPHONE ENCOUNTER
Patient called with 2 questions, he asked if there is a wheelchair at the [place] office, advised him that there is and he also stated he received a letter in the mail that Dr. Rueda is leaving he didn't know if he should keep his appointment or not advised that she is still with us for a couple more days he is okay to keep his appointment

## 2024-08-28 ENCOUNTER — OFFICE VISIT (OUTPATIENT)
Dept: DERMATOLOGY | Facility: CLINIC | Age: 84
End: 2024-08-28
Payer: COMMERCIAL

## 2024-08-28 VITALS — TEMPERATURE: 97.2 F

## 2024-08-28 DIAGNOSIS — L82.1 SEBORRHEIC KERATOSES: ICD-10-CM

## 2024-08-28 DIAGNOSIS — D48.9 NEOPLASM OF UNCERTAIN BEHAVIOR: ICD-10-CM

## 2024-08-28 DIAGNOSIS — D18.01 CHERRY ANGIOMA: Primary | ICD-10-CM

## 2024-08-28 DIAGNOSIS — Z85.828 HISTORY OF BASAL CELL CARCINOMA (BCC): ICD-10-CM

## 2024-08-28 DIAGNOSIS — D22.9 MULTIPLE MELANOCYTIC NEVI: ICD-10-CM

## 2024-08-28 DIAGNOSIS — L81.4 LENTIGO: ICD-10-CM

## 2024-08-28 PROCEDURE — 11102 TANGNTL BX SKIN SINGLE LES: CPT | Performed by: STUDENT IN AN ORGANIZED HEALTH CARE EDUCATION/TRAINING PROGRAM

## 2024-08-28 PROCEDURE — 88341 IMHCHEM/IMCYTCHM EA ADD ANTB: CPT | Performed by: STUDENT IN AN ORGANIZED HEALTH CARE EDUCATION/TRAINING PROGRAM

## 2024-08-28 PROCEDURE — 88305 TISSUE EXAM BY PATHOLOGIST: CPT | Performed by: STUDENT IN AN ORGANIZED HEALTH CARE EDUCATION/TRAINING PROGRAM

## 2024-08-28 PROCEDURE — 99204 OFFICE O/P NEW MOD 45 MIN: CPT | Performed by: STUDENT IN AN ORGANIZED HEALTH CARE EDUCATION/TRAINING PROGRAM

## 2024-08-28 PROCEDURE — 88342 IMHCHEM/IMCYTCHM 1ST ANTB: CPT | Performed by: STUDENT IN AN ORGANIZED HEALTH CARE EDUCATION/TRAINING PROGRAM

## 2024-08-28 NOTE — PROGRESS NOTES
"[facility]atology Clinic Note     Patient Name: Isai Abbott  Encounter Date: 8/28/24     Have you been cared for by a [facility] Dermatologist in the last 3 years and, if so, which description applies to you?    NO.   I am considered a \"new\" patient and must complete all patient intake questions. I am MALE/not capable of bearing children.    REVIEW OF SYSTEMS:  Have you recently had or currently have any of the following? Recent fever or chills? No  Any non-healing wound? YES, stomach for years from multiple surgeries     PAST MEDICAL HISTORY:  Have you personally ever had or currently have any of the following?  If \"YES,\" then please provide more detail. Skin cancer (such as Melanoma, Basal Cell Carcinoma, Squamous Cell Carcinoma?  YES, BCC nose years ago  Tuberculosis, HIV/AIDS, Hepatitis B or C: No  Radiation Treatment No   HISTORY OF IMMUNOSUPPRESSION:   Do you have a history of any of the following:  Systemic Immunosuppression such as Diabetes, Biologic or Immunotherapy, Chemotherapy, Organ Transplantation, Bone Marrow Transplantation or Prednisone?  No     Answering \"YES\" requires the addition of the dotphrase \"IMMUNOSUPPRESSED\" as the first diagnosis of the patient's visit.   FAMILY HISTORY:  Any \"first degree relatives\" (parent, brother, sister, or child) with the following?    Skin Cancer, Pancreatic or Other Cancer? No   PATIENT EXPERIENCE:    Do you want the Dermatologist to perform a COMPLETE skin exam today including a clinical examination under the \"bra and underwear\" areas?  Yes  If necessary, do we have your permission to call and leave a detailed message on your Preferred Phone number that includes your specific medical information?  Yes      Allergies   Allergen Reactions    Ace Inhibitors Cough    Fosinopril Cough     Cough after years of being on the medication      Current Outpatient Medications:     apixaban (Eliquis) 2.5 mg, Take 1 tablet (2.5 mg total) by mouth 2 (two) times a day, " Disp: 180 tablet, Rfl: 3    cinacalcet (SENSIPAR) 30 mg tablet, Take 1 tablet (30 mg total) by mouth 2 (two) times a day, Disp: 180 tablet, Rfl: 1    dutasteride (AVODART) 0.5 mg capsule, Take 1 capsule (0.5 mg total) by mouth daily, Disp: 90 capsule, Rfl: 3    ferrous sulfate 325 (65 Fe) mg tablet, 1 pill by mouth on Mondays, Wednesdays and Fridays, Disp: 36 tablet, Rfl: 3    furosemide (LASIX) 40 mg tablet, TAKE 1 TABLET (40 MG TOTAL) BY MOUTH DAILY AS NEEDED (AS NEEEDED LEG EDEMA), Disp: 90 tablet, Rfl: 1    furosemide (LASIX) 80 mg tablet, Take 1 tablet (80 mg total) by mouth every morning, Disp: 90 tablet, Rfl: 3    lansoprazole (PREVACID) 30 mg capsule, Take 1 capsule (30 mg total) by mouth daily, Disp: 90 capsule, Rfl: 3    Multiple Vitamins-Minerals (MULTIVITAMIN ADULT EXTRA C PO), , Disp: , Rfl:     nystatin (MYCOSTATIN) cream, Apply topically 2 (two) times a day, Disp: 30 g, Rfl: 5    ondansetron (ZOFRAN-ODT) 4 mg disintegrating tablet, Take 1 tablet (4 mg total) by mouth every 6 (six) hours as needed for nausea or vomiting, Disp: 20 tablet, Rfl: 5    polyethylene glycol (MIRALAX) 17 g packet, Take by mouth 1 packet daily, Disp: , Rfl:     Probiotic Product (SOLUBLE FIBER/PROBIOTICS PO), Take by mouth wife reports the patient takes this daily, Disp: , Rfl:     simvastatin (ZOCOR) 10 mg tablet, Take 1 tablet (10 mg total) by mouth daily, Disp: 90 tablet, Rfl: 3    spironolactone (ALDACTONE) 25 mg tablet, Take 0.5 tablets (12.5 mg total) by mouth daily, Disp: 45 tablet, Rfl: 3    sulfamethoxazole-trimethoprim (BACTRIM DS) 800-160 mg per tablet, Take 1 tablet by mouth daily, Disp: 90 tablet, Rfl: 3    tamsulosin (FLOMAX) 0.4 mg, Take 1 capsule (0.4 mg total) by mouth daily with dinner, Disp: 90 capsule, Rfl: 3    amoxicillin (AMOXIL) 500 mg capsule, , Disp: , Rfl:     Diclofenac Sodium (VOLTAREN) 1 %, Apply 4 g topically 4 (four) times a day, Disp: , Rfl:           Whom besides the patient is providing  clinical information about today's encounter?   NO ADDITIONAL HISTORIAN (patient alone provided history)    Physical Exam and Assessment/Plan by Diagnosis:    HISTORY OF BASAL CELL CARCINOMA    Physical Exam:  Anatomic Location Affected:  nose  Morphological Description of scar:  well healed  Suspected Recurrence: No  Pertinent Positives:  Pertinent Negatives:      Additional History of Present Condition:  History of basal cell carcinoma with no sign of recurrence    Assessment and Plan:  Based on a thorough discussion of this condition and the management approach to it (including a comprehensive discussion of the known risks, side effects and potential benefits of treatment), the patient (family) agrees to implement the following specific plan:  Monitor changes  Sun protection with SPF 50 or higher  Continue routine skin exams    How can basal cell carcinoma be prevented?  The most important way to prevent BCC is to avoid sunburn. This is especially important in childhood and early life. Fair skinned individuals and those with a personal or family history of BCC should protect their skin from sun exposure daily, year-round and lifelong.  Stay indoors or under the shade in the middle of the day   Wear covering clothing   Apply high protection factor SPF50+ broad-spectrum sunscreens generously to exposed skin if outdoors   Avoid indoor tanning (sun beds, solaria)  Oral nicotinamide (vitamin B3) in a dose of 500 mg twice daily may reduce the number and severity of BCCs.    What is the outlook for basal cell carcinoma?  Most BCCs are cured by treatment. Cure is most likely if treatment is undertaken when the lesion is small.  About 50% of people with BCC develop a second one within 3 years of the first. They are also at increased risk of other skin cancers, especially melanoma. Regular self-skin examinations and long-term annual skin checks by an experienced health professional are recommended.     NEOPLASM OF  "UNCERTAIN BEHAVIOR OF SKIN    Physical Exam:  (Anatomic Location); (Size and Morphological Description); (Differential Diagnosis):  Mid abdomen; 4 cm x 2.5 cm pink plaque with ulceration and hemorrhagic crust at site of chronic scar from numerous procedures; ddx rule out SCC  Pertinent Positives:  Pertinent Negatives:    Additional History of Present Condition:  patient has had a non-healing wound from multiple surgeries. He has had this spot for around 12 years    Assessment and Plan:  I have discussed with the patient that a sample of skin via a \"skin biopsy” would be potentially helpful to further make a specific diagnosis under the microscope.  Based on a thorough discussion of this condition and the management approach to it (including a comprehensive discussion of the known risks, side effects and potential benefits of treatment), the patient (family) agrees to implement the following specific plan:    Procedure:  Skin Biopsy.  After a thorough discussion of treatment options and risk/benefits/alternatives (including but not limited to local pain, scarring, dyspigmentation, blistering, possible superinfection, and inability to confirm a diagnosis via histopathology), verbal and written consent were obtained and portion of the rash was biopsied for tissue sample.  See below for consent that was obtained from patient and subsequent Procedure Note.    PROCEDURE TANGENTIAL (SHAVE) BIOPSY NOTE:    Performing Physician:  Kassie  with supervision by Dr. Rueda  Anatomic Location; Clinical Description with size (cm); Pre-Op Diagnosis:   Mid abdomen; 4 cm x 2.5 cm pink plaque with ulceration and hemorrhagic crust at site of chronic scar from numerous procedures; ddx rule out SCC  Post-op diagnosis: Same     Local anesthesia: 2% Xylocaine with epi     Topical anesthesia: None    Hemostasis: Aluminum chloride       After obtaining informed consent  at which time there was a discussion about the purpose of biopsy  and low " "risks of infection and bleeding.  The area was prepped and draped in the usual fashion. Anesthesia was obtained with 1% lidocaine with epinephrine. A shave biopsy to an appropriate sampling depth was obtained by Shave (Dermablade or 15 blade) The resulting wound was covered with surgical ointment and bandaged appropriately.     The patient tolerated the procedure well without complications and was without signs of functional compromise.      Specimen has been sent for review by Dermatopathology.    Standard post-procedure care has been explained and has been included in written form within the patient's copy of Informed Consent.    INFORMED CONSENT DISCUSSION AND POST-OPERATIVE INSTRUCTIONS FOR PATIENT    I.  RATIONALE FOR PROCEDURE  I understand that a skin biopsy allows the Dermatologist to test a lesion or rash under the microscope to obtain a diagnosis.  It usually involves numbing the area with numbing medication and removing a small piece of skin; sometimes the area will be closed with sutures. In this specific procedure, sutures are not usually needed.  If any sutures are placed, then they are usually need to be removed in 2 weeks or less.    I understand that my Dermatologist recommends that a skin \"shave\" biopsy be performed today.  A local anesthetic, similar to the kind that a dentist uses when filling a cavity, will be injected with a very small needle into the skin area to be sampled.  The injected skin and tissue underneath \"will go to sleep” and become numb so no pain should be felt afterwards.  An instrument shaped like a tiny \"razor blade\" (shave biopsy instrument) will be used to cut a small piece of tissue and skin from the area so that a sample of tissue can be taken and examined more closely under the microscope.  A slight amount of bleeding will occur, but it will be stopped with direct pressure and a pressure bandage and any other appropriate methods.  I understands that a scar will form where " "the wound was created.  Surgical ointment will be applied to help protect the wound.  Sutures are not usually needed.    II.  RISKS AND POTENTIAL COMPLICATIONS   I understand the risks and potential complications of a skin biopsy include but are not limited to the following:  Bleeding  Infection  Pain  Scar/keloid  Skin discoloration  Incomplete Removal  Recurrence  Nerve Damage/Numbness/Loss of Function  Allergic Reaction to Anesthesia  Biopsies are diagnostic procedures and based on findings additional treatment or evaluation may be required  Loss or destruction of specimen resulting in no additional findings    My Dermatologist has explained to me the nature of the condition, the nature of the procedure, and the benefits to be reasonably expected compared with alternative approaches.  My Dermatologist has discussed the likelihood of major risks or complications of this procedure including the specific risks listed above, such as bleeding, infection, and scarring/keloid.  I understand that a scar is expected after this procedure.  I understand that my physician cannot predict if the scar will form a \"keloid,\" which extends beyond the borders of the wound that is created.  A keloid is a thick, painful, and bumpy scar.  A keloid can be difficult to treat, as it does not always respond well to therapy, which includes injecting cortisone directly into the keloid every few weeks.  While this usually reduces the pain and size of the scar, it does not eliminate it.      I understand that photographs may be taken before and after the procedure.  These will be maintained as part of the medical providers confidential records and may not be made available to me.  I further authorize the medical provider to use the photographs for teaching purposes or to illustrate scientific papers, books, or lectures if in his/her judgment, medical research, education, or science may benefit from its use.    I have had an opportunity to " "fully inquire about the risks and benefits of this procedure and its alternatives.   I have been given ample time and opportunity to ask questions and to seek a second opinion if I wished to do so.  I acknowledge that there have specifically been no guarantees as to the cosmetic results from the procedure.  I am aware that with any procedure there is always the possibility of an unexpected complication.    III. POST-PROCEDURAL CARE (WHAT YOU WILL NEED TO DO \"AFTER THE BIOPSY\" TO OPTIMIZE HEALING)    Keep the area clean and dry.  Try NOT to remove the bandage or get it wet for the first 24 hours.    Gently clean the area and apply surgical ointment (such as Vaseline petrolatum ointment, which is available \"over the counter\" and not a prescription) to the biopsy site for up to 2 weeks straight.  This acts to protect the wound from the outside world.      Sutures are not usually placed in this procedure.  If any sutures were placed, return for suture removal as instructed (generally 1 week for the face, 2 weeks for the body).      Take Acetaminophen (Tylenol) for discomfort, if no contraindications.  Ibuprofen or aspirin could make bleeding worse.    Call our office immediately for signs of infection: fever, chills, increased redness, warmth, tenderness, discomfort/pain, or pus or foul smell coming from the wound.    WHAT TO DO IF THERE IS ANY BLEEDING?  If a small amount of bleeding is noticed, place a clean cloth over the area and apply firm pressure for ten minutes.  Check the wound after 10 minutes of direct pressure.  If bleeding persists, try one more time for an additional 10 minutes of direct pressure on the area.  If the bleeding becomes heavier or does not stop after the second attempt, or if you have any other questions about this procedure, then please call your [facility]'s Dermatologist by calling [phone] (SKIN).     I hereby acknowledge that I have reviewed and verified the site with my Dermatologist " and have requested and authorized my Dermatologist to proceed with the procedure.      SEBORRHEIC KERATOSES  - Relevant exam: Scattered over the trunk/extremities are waxy brown to black plaques and papules with stuck on appearance  - Exam and clinical history consistent with seborrheic keratoses  - Counseled that these are benign growths that do not require treatment  - Counseled that removal of lesions is considered cosmetic and so would incur a fee should patient elect to move forward.   - Patient to hold on treatments for now but will inform us should they desire additional treatments    MELANOCYTIC NEVI  -Relevant exam: Scattered over the trunk/extremities are homogenously pigmented brown macules and papules. ELM performed and without concerning findings.  - Exam and clinical history consistent with melanocytic nevi  - Educated on the ABCDE's of melanoma; handout provided  - Counseled to return to clinic prior to scheduled appointment should any of these lesions change or should any new lesions of concern arise  - Counseled on use of sun protection daily. Reviewed latest FDA sunscreen guidelines, including use of broad spectrum (UVA and UVB blocking) sunscreen or sun protective clothing with SPF 30-50 every 2-3 hours and reapplied after exposure to water; use of photoprotective clothing, including a broad brim hat and UPF rated clothing if outdoors for several hours; avoid use of tanning beds as these pose significant risk for melanoma and skin cancer.    LENTIGINES  OTHER SKIN CHANGES DUE TO CHRONIC EXPOSURE TO NONIONIZING RADIATION  - Relevant exam: Over sun exposed areas are brown macules. ELM performed and without concerning findings.  - Exam and clinical history consistent with lentigines.  - Educated that these are indicative of prior sun exposure.   - Counseled to return to clinic prior to scheduled appointment should any of these lesions change or should any new lesions of concern arise.  - Recommended  use of sunscreen as above and below.  - Counseled on use of sun protection daily. Reviewed latest FDA sunscreen guidelines, including use of broad spectrum (UVA and UVB blocking) sunscreen or sun protective clothing with SPF 30-50 every 2-3 hours and reapplied after exposure to water; use of photoprotective clothing, including a broad brim hat and UPF rated clothing if outdoors for several hours; avoid use of tanning beds as these pose significant risk for melanoma and skin cancer.    CHERRY ANGIOMAS  - Relevant exam: Scattered over the trunk/extremities are red papules  - Exam and clinical history consistent with cherry angiomas  - Educated that these are benign  - Educated that removal is considered aesthetic and would incur a fee.  - Patient does not wish to pursue removal at this time but will contact us should this change.      Scribe Attestation      I,:  Ananda Beverly MA am acting as a scribe while in the presence of the attending physician.:       I,:  Andrew Rueda MD personally performed the services described in this documentation    as scribed in my presence.:

## 2024-08-30 ENCOUNTER — OFFICE VISIT (OUTPATIENT)
Facility: CLINIC | Age: 84
End: 2024-08-30
Payer: COMMERCIAL

## 2024-08-30 DIAGNOSIS — R26.89 ABNORMALITY OF GAIT DUE TO IMPAIRMENT OF BALANCE: ICD-10-CM

## 2024-08-30 DIAGNOSIS — R53.81 PHYSICAL DECONDITIONING: Primary | ICD-10-CM

## 2024-08-30 DIAGNOSIS — M40.04 POSTURAL KYPHOSIS OF THORACIC REGION: ICD-10-CM

## 2024-08-30 PROCEDURE — 97530 THERAPEUTIC ACTIVITIES: CPT

## 2024-08-30 PROCEDURE — 97116 GAIT TRAINING THERAPY: CPT

## 2024-08-30 NOTE — PROGRESS NOTES
"Daily Note     Today's date: 2024  Patient name: Isai Abbott  : 1940  MRN: [id number]  Referring provider: Josi Kuhn MD  Dx:   Encounter Diagnosis     ICD-10-CM    1. Physical deconditioning  R53.81       2. Abnormality of gait due to impairment of balance  R26.89       3. 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 No new changes, no new falls.       Objective: See treatment diary below      Assessment: Two laps performed outdoors with 5# AW with increased time taken to perform today as compared to last visit. Stair navigation performed independently. OH reaching boomwhacker taps performed with minimal difficulty. . Patient demonstrated fatigue post treatment, exhibited good technique with therapeutic exercises, and would benefit from continued PT      Plan: Continue per plan of care.  Progress treatment as tolerated.       Precautions: HTN, Chronic DVT,  Severe Osteoporosis, Hx of cervicalgia, hx of hernias   POC Expires: 2024  Propable Access Code:           Objective Measures  PN    Vitals **               ABC       63.13%       6MWT       591ft       5xSTS       19.51s with UE support       AMEZCUA              10mWT              TUG       26.63s       Neuro Re-Ed              Static Balance    Foam EC 3x30\" in //            Dynamic Balance               Vorx1              Head turns    In // no UE support  In // no UE support          Overhead boomwhacker tapping   With OH boomwhacker taps 3x each.  With OH boomwahcker taps 5 laps in //  With OH Boomwahcker taps 5 laps in // 3lb AW sophie  With OH boomwhacker taps 5 laps in // 3lb AW sophie   With OH boomwhacker taps 5 laps in // 3lb AW sophie   Multidirectional Stepping               Salvatore navigation   3 hurdles med height setting 3lb AW sophie 5 hurdles x6  3 hurdles med height setting 3lb AW sophie 5 hurdles x6 6\" step up " "and over BUE //bar x20 ea  Med setting 6\" step over B UE // Bar x20 ea   Med setting 6\" step over B UE // Bar x20ea   Med setting 6\" step over B UE // Bar x20 each   Sit <> stand      1x fatigue UUE          Walking no UE      No UE //bar CGA x3 laps         Resisted Amb               Side stepping               Overhead ball slam              Tests and measures       6MWT, ABC, TUG, 5xSTS, AMEZCUA       Education               Ther Ex              SLRx3              Standing hip abduction              Leg extension               Mini Squats               Step ups fwd and lat          2x10 in bars each LE.      Hr/Tr              Sit <> Stand       1x10 to walker with holding upright stretch for 3x  2x10 to walker with holding upright stretch for 3s 2x10 to walker with holding upright stretch for 3s 2x10                   Standing marches              Ther Activity              Stair navigation Ascending & descending with 3lb AW B UE support on 1 HR 1x 11 steps Ascending & descending with 3lb AW B UE support on 1 HR 1x 11 steps Ascending & descending with 3lb AW B UE support on 1 HR 1x11 steps Ascending & descending with 3lb AW B UE support on 1 HR 1x11 steps  Ascending & descending with 3lb AW B UE support on 1 HR 1x11 steps  Ascending & descending with 3lb AW B UE support on 1 HR 2x11 steps  Ascending & descending with 3lb AW B UE support on 1 HR 2x11 steps  took 10 mins Ascending & descending with 3lb AW B UE support on 1 HR 2x11 steps took 11 minutes.  Ascending & descending with B UE support on 1 HR 2x11 steps took 11 minutes.  Ascending & descending with B UE support on 1 HR 2x11 steps   Stair navigation              Transfer Training               Gait Training              Gait training w RW Gait training 394 ft with RW and 3lb AW sophie 591 ft with RW and 3lb AW osphie Gait training 394 ft with RW and 3lb AW sophie Gait training 394 ft with RW and 3lb AW sophie Gait training 394 ft with RW and 3lb AW sophie 1 lap around the " lobby per patient request to warm up as outside was too warm. 80ft 3lb AW sophie  [id number] with RW and 5lb AW sophie; took 25 mins  [id number] with RW and 5lb AW sophie; took 25 mins no break between  2x394 ft with RW and 5lb AW sophie 30 mins   Curb training

## 2024-09-04 PROCEDURE — 88342 IMHCHEM/IMCYTCHM 1ST ANTB: CPT | Performed by: STUDENT IN AN ORGANIZED HEALTH CARE EDUCATION/TRAINING PROGRAM

## 2024-09-04 PROCEDURE — 88341 IMHCHEM/IMCYTCHM EA ADD ANTB: CPT | Performed by: STUDENT IN AN ORGANIZED HEALTH CARE EDUCATION/TRAINING PROGRAM

## 2024-09-04 PROCEDURE — 88305 TISSUE EXAM BY PATHOLOGIST: CPT | Performed by: STUDENT IN AN ORGANIZED HEALTH CARE EDUCATION/TRAINING PROGRAM

## 2024-09-05 NOTE — PROGRESS NOTES
"Daily Note     Today's date: 2024  Patient name: Isai Abbott  : 1940  MRN: [id number]  Referring provider: Josi Kuhn MD  Dx:   No diagnosis found.                   Subjective:       Objective: See treatment diary below      Assessment: . Patient demonstrated fatigue post treatment, exhibited good technique with therapeutic exercises, and would benefit from continued PT      Plan: Continue per plan of care.  Progress treatment as tolerated.       Precautions: HTN, Chronic DVT,  Severe Osteoporosis, Hx of cervicalgia, hx of hernias   POC Expires: 2024  [facility] Access Code:           Objective Measures  PN    Vitals **                ABC       63.13%        6MWT       591ft        5xSTS       19.51s with UE support        AMEZCUA               10mWT               TUG       26.63s        Neuro Re-Ed               Static Balance    Foam EC 3x30\" in //             Dynamic Balance                Vorx1               Head turns    In // no UE support  In // no UE support           Overhead boomwhacker tapping   With OH boomwhacker taps 3x each.  With OH boomwahcker taps 5 laps in //  With OH Boomwahcker taps 5 laps in // 3lb AW sophie  With OH boomwhacker taps 5 laps in // 3lb AW sophie   With OH boomwhacker taps 5 laps in // 3lb AW sophie    Multidirectional Stepping                Salvatore navigation   3 hurdles med height setting 3lb AW sophie 5 hurdles x6  3 hurdles med height setting 3lb AW sophie 5 hurdles x6 6\" step up and over BUE //bar x20 ea  Med setting 6\" step over B UE // Bar x20 ea   Med setting 6\" step over B UE // Bar x20ea   Med setting 6\" step over B UE // Bar x20 each    Sit <> stand      1x fatigue UUE           Walking no UE      No UE //bar CGA x3 laps          Resisted Amb                Side stepping                Overhead ball slam               Tests and measures       6MWT, ABC, TUG, 5xSTS, AMEZCUA        Education                Ther Ex  "              SLRx3               Standing hip abduction               Leg extension                Mini Squats                Step ups fwd and lat          2x10 in bars each LE.       Hr/Tr               Sit <> Stand       1x10 to walker with holding upright stretch for 3x  2x10 to walker with holding upright stretch for 3s 2x10 to walker with holding upright stretch for 3s 2x10                     Standing marches               Ther Activity               Stair navigation Ascending & descending with 3lb AW B UE support on 1 HR 1x 11 steps Ascending & descending with 3lb AW B UE support on 1 HR 1x 11 steps Ascending & descending with 3lb AW B UE support on 1 HR 1x11 steps Ascending & descending with 3lb AW B UE support on 1 HR 1x11 steps  Ascending & descending with 3lb AW B UE support on 1 HR 1x11 steps  Ascending & descending with 3lb AW B UE support on 1 HR 2x11 steps  Ascending & descending with 3lb AW B UE support on 1 HR 2x11 steps  took 10 mins Ascending & descending with 3lb AW B UE support on 1 HR 2x11 steps took 11 minutes.  Ascending & descending with B UE support on 1 HR 2x11 steps took 11 minutes.  Ascending & descending with B UE support on 1 HR 2x11 steps    Stair navigation               Transfer Training                Gait Training               Gait training w RW Gait training 394 ft with RW and 3lb AW sophie 591 ft with RW and 3lb AW sophie Gait training 394 ft with RW and 3lb AW sophie Gait training 394 ft with RW and 3lb AW sophie Gait training 394 ft with RW and 3lb AW sophie 1 lap around the lobby per patient request to warm up as outside was too warm. 80ft 3lb AW sophie  [id number] with RW and 5lb AW sophie; took 25 mins  [id number] with RW and 5lb AW sophie; took 25 mins no break between  2x394 ft with RW and 5lb AW sophie 30 mins    Curb training

## 2024-09-05 NOTE — RESULT ENCOUNTER NOTE
DERMATOPATHOLOGY RESULT NOTE    Results reviewed by ordering physician.  Called patient to personally discuss results. No answer, left voicemail with result.  Message also sent through [employer].  Patient instructed to call with any additional questions or concerns.      Instructions for Clinical Derm Team:   (remember to route Result Note to appropriate staff):    None    Result & Plan by Specimen:    Specimen A: benign  Plan: monitor and reassured, benign    Tissue Exam: [id number]  Order: [id number]   Status: Final result      Visible to patient: Yes (seen)      Dx: Neoplasm of uncertain behavior    0 Result Notes     Component   Case Report  Surgical Pathology Report                         Case: [id number]                                  Authorizing Provider:  Andrew Rueda MD Collected:           08/28/2024 7575              Ordering Location:     [facility]      Received:            08/28/2024 2869                                     [place]                                                                Pathologist:           Cherie Bailey MD                                                          Specimen:    Skin, Other, a. mid abdomen                                                              Final Diagnosis  A. Skin, mid abdomen, shave biopsy:    Consistent with SCAR, inflamed (see note).    Note: SOX10, MART-1, and CK AE1/AE3 immunostains were reviewed and support the diagnosis. Multiple levels examined. If the lesion were to progress or persist, additional sampling should be sought.    Electronically signed by Cherie Bailey MD on 9/4/2024 at  5:12 PM

## 2024-09-06 ENCOUNTER — EVALUATION (OUTPATIENT)
Facility: CLINIC | Age: 84
End: 2024-09-06
Payer: COMMERCIAL

## 2024-09-06 DIAGNOSIS — R53.81 PHYSICAL DECONDITIONING: Primary | ICD-10-CM

## 2024-09-06 DIAGNOSIS — R26.89 ABNORMALITY OF GAIT DUE TO IMPAIRMENT OF BALANCE: ICD-10-CM

## 2024-09-06 DIAGNOSIS — M40.04 POSTURAL KYPHOSIS OF THORACIC REGION: ICD-10-CM

## 2024-09-06 PROCEDURE — 97112 NEUROMUSCULAR REEDUCATION: CPT

## 2024-09-06 NOTE — PROGRESS NOTES
PT Re-Evaluation     Today's date: 2024  Patient name: Isai Abbott  : 1940  MRN: [id number]  Referring provider: Josi Kuhn MD  Dx:   Encounter Diagnosis     ICD-10-CM    1. Physical deconditioning  R53.81       2. Abnormality of gait due to impairment of balance  R26.89       3. 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 male who has received 12 visits of skilled PT to address balance and deconditioning. Today's assessment reflects improvement in 5xSTS score and MMT scores, reflecting improved LE strength. Pt's gait speed also improve significantly since previous assessment, however, still classifies him at the level of a household ambulator. AMEZCUA score also reflects improvement since last session. 6MWT not performed today due to time constraints. Pt will therefore continue to benefit from skilled PT so that he can navigate his environment with increased safety and stability.     Re-evaluation 2024: Pt is an [age]y.o. male who has received 8 visits of skilled PT to address balance & deconditioning. Since previous assessment, pt had 1 near fall, and has decreased his performance of Hep and functional activities secondary to pain. This correlates with subsequent decrease in 6MWT distance and increase in TUG speed, which reflect regression. Despite this, pt's 5xSTS score improved, AMEZCUA score remained the same. Pt was educated on the importance of performing HEP, and the importance of taking safety measures such as performing interventions when other people are home and utilizing counter support as needed. Pt expressed understanding. Pt will continue to benefit from skilled PT so that he can navigate his  environment with increased safety and stability.     Pt is an 83 y.o. male returning to physical therapy after a brief hiatus due to knee pain to address balance, gait, and deconditioning compared to previous visits. Today's assessment reflects decline in balance confidence since he was last here. 6MWT does not exhibit statistically significant decline, reflecting minimal change in CV endurance.  However, pt's TUG, gait speed, and AMEZCUA scores declined since he was last here, reflecting balance and gait deficits. R LE > L. Educated pt on use of leg  mobility aid to improve care mobility and pt stated he would look into it. Pt will continue to benefit from skilled PT so that he can navigate his environment with increased safety and stability.     Per Previous IE in November of 2023: Isai is an 83 year old patient presenting to OPPT for evaluation regarding gait and balance deficits in adjunction to recent diagnosis of osteoporosis. Upon evaluation, Isai demonstrates significant deficits and increased fall risk. Proximal BLE strength below 4/5 throughout aside from L hip flexion. Decreased endurance evidenced by ability to only complete 2MWT with distance traversed 130 feet. TUG and 5x STS required UE support to rise from chair, as well as scores being greater than fall risk cut of score indicative of increased fall risk and poor power generation. Gait speed classifies patient as increased fall risk, as well as non-community ambulator. These impairments limit their ability to perform daily activities as well as their previous level of function causing decreased quality of life.    HEP delivered with emphasis on BLE strengthening with UE support. Education provided regarding HEP, POC, prognosis, goals for therapy, and functional outcome measure results in relation to patient's fall risk. Also worked on sit <> stand transfers to improve overall safety to reduce fall risk and working with walker to improve  facilitation of increased safety during these activities. Cuing for increased anterior weight shift.     Patient requires continued skilled PT services in order to improve aforementioned impairments so that they are able to return to highest level of function possible. Without initiation of skilled PT services, patient will continue to have largely decreased functional endurance and mobility leading to increased caregiver burden and likelihood of falls and subsequent hospitalization.     Barriers to therapy: Transportation  Understanding of Dx/Px/POC: good     Prognosis: good    Goals  STG (4 weeks)   Improve (reduce) TUG score by 4 seconds indicating meaningful improvement in fall risk ONGOING  Improve AMEZCUA score by 6 indicating meaningful improvement in fall risk ONGOING  Improve/Reduce 5x STS score by 5 seconds indicating meaningful improvement in fall risk and power generation ONGOING  Improve generalized strength by 1/5 to demonstrate improvement in facilitation of efficiency of functional activities and reduced fall risk MET  Improve gait speed by 0.05 m/s with LRAD MET    LTG (8 weeks)   Improve (reduce) TUG score by 4 seconds compared to PN indicating meaningful improvement in fall risk ONGOING  Improve AMEZCUA score to > 45/56 indicating meaningful improvement in fall risk ONGOING  Improve/Reduce 5x STS score by 4 seconds compared to PN seconds indicating meaningful improvement in fall risk and power generation ONGOING  Will be able to complete full 6MWT MET  Improve gait speed by 0.05 m/s from PN with LRAD MET  Improve 6MWT by 164.4ft to reflect improvement in endurance. ONGOING        Plan  Patient would benefit from: skilled physical therapy  Planned modality interventions: cryotherapy and thermotherapy: hydrocollator packs    Planned therapy interventions: manual therapy, neuromuscular re-education, patient education, postural training, self care, strengthening, stretching, therapeutic activities,  therapeutic exercise, home exercise program, graded exercise, gait training, flexibility and balance    Frequency: 2x week  Duration in weeks: 8  Plan of Care beginning date: 9/5/2024  Plan of Care expiration date: 10/31/2024  Treatment plan discussed with: patient  Plan details: TE: for strengthening, stretching, and flexibility  TA: for functional participation  NR: for balance, coordination, reaction time  MT: for STM, IASTM, joint mobilizations  Gait Training: to improve gait mechanics.        Subjective Evaluation    History of Present Illness  Mechanism of injury: 9/6/2024: Pt states that some days he feels really good and other days he feels pretty blah. He states that overall he feels pretty good. He feels about 80% of the way to where he would like to be. He states that his memory to do his exercises at home and feeling really weak on some days limit him from his remaining 20%.      7/12/2024: Pt states that he feels about 70% of the way to where he would like to be this round of care. He feels he is tiring out quickly. He feels exhausted even when he comes in. He states that he has been pushing himself less because of his near fall. He has started doing his sit to stands at home. His stamina is the biggest issue.     At IE: Pt states that he has been out because he has been having knee pain. His orthopedic doctor wants him to low weight and high repetition. His physician wants him to utilize Voltarin gel to manage inflammation. He does not report any falls. He wants to skip the sitting to standing exercises from the stairs. He wants to do stepping up and down from the platform, the stairs, walking, and balance things. He is not having any pain in his knees right now. He has been doing his exercises every day since he was last here and feels he has regressed a bit. He practices getting out of the chair at home with using arms. He feels he has declined overall since he was last here with his overall strength  "and his stamina and he would like to get back to that. He states that he got taken off of his iron and that may be affecting things too. He was having knee pain on the front of the legs. He wanted to make sure his surgeries weren't deteriorating and his surgeon said that they are okay.     Per previous IE in 2023: Isai is an 83 year old patient presenting to OPPT for evaluation regarding gait and balance deficits. Recent diagnosis of osteoporosis via last dexa scan. Reports that he is here to work on his strength. Last fall was around 7 months ago. Does exercises at home - already. Standing marching and squat at the counter are the two that he is doing currently. Wife reports that he has good follow through. \"Everything we do we want to be able to do at home\". Main goal is to get stronger to be able to reduce fall risk going forward.     One dizzy spell in the last two weeks. Reports that he was standing at the counter and noted that he was lightheaded and room spinning. Felt like he was going to fall.     Got PT at home after COVID - but wasn't able to go up the stairs. Doesn't go up currently, wife handles the caretaking duties.     Home setup: Walkers throughout the home - theres no rugs. Anything that could possibly help. \"We set it up as safe as we can be\".   Patient Goals  Patient goals for therapy: improved balance, increased motion and increased strength    Pain  No pain reported  Current pain ratin  At best pain ratin  At worst pain ratin  Location: bilateral knee pain  Quality: dull ache (annoying)  Relieving factors: medications and rest  Aggravating factors: walking and standing  Progression: improved    Social Support  Steps to enter house: yes (4\" step, 6\" step from the living room to the kitchen)  1  Stairs in house: yes   12  Lives in: one-story house  Lives with: spouse    Employment status: not working    Diagnostic Tests  Bone scan: abnormal    FCE comments: Narrative & " Impression  DXA SCAN     CLINICAL HISTORY:  82-year-old male.   OTHER RISK FACTORS:  Hyperparathyroidism, Limited mobility, Lasix therapy.     PHARMACOLOGIC THERAPY FOR OSTEOPOROSIS:  None.     TECHNIQUE: Bone densitometry was performed using a Hologic Horizon A  bone densitometer.  Regions of interest appear properly placed.         COMPARISON: There are no prior DXA studies performed on this unit for comparison.     RESULTS:      LUMBAR SPINE  Level: L1, L3, L4  (L2 vertebra excluded from analysis due to local structural abnormalities or artifact) :   BMD:  0.854  gm/cm2   T-score: -2.1         LEFT  TOTAL HIP:   BMD:  0.601  gm/cm2   T-score:  -2.9     LEFT  FEMORAL NECK:   BMD:  0.445  gm/cm2   T score: -3.6      LEFT  FOREARM:    33% RADIUS BMD:  0.701  gm/cm2  T-score:  -2.2         IMPRESSION:     1.  Osteoporosis. [Based on the total left hip]     2.  The 10 year risk of hip fracture is 9.1% with the 10 year risk of major osteoporotic fracture being 17% as calculated by the [employer] fracture risk assessment tool (FRAX, which is based on data generated by the WHO Collaborating [employer]   for Metabolic Bone Diseases).  3.  The current NOF guidelines recommend treating patients with a T-score of -2.5 or less in the lumbar spine or hips, or in post-menopausal women and men over the age of 50 with low bone mass (osteopenia) and a FRAX 10 year risk score of >3% for hip   fracture and/or >20% for major osteoporotic fracture.     4.  The NOF recommends follow-up DXA in 1-2 years after initiating therapy for osteoporosis and every 2 years thereafter. More frequent evaluation is appropriate for patients with conditions associated with rapid bone loss, such as glucocorticoid   therapy. The interval between DXA screenings may be longer for individuals without major risk factors and initial T-score in the normal or upper low bone mass range.       Treatments  Previous treatment: physical  "therapy      Objective     Strength/Myotome Testing     Left Hip   Planes of Motion   Flexion: 5    Right Hip   Planes of Motion   Flexion: 5    Left Knee   Flexion: 5  Extension: 4    Right Knee   Flexion: 5  Extension: 4    Left Ankle/Foot   Dorsiflexion: 4    Right Ankle/Foot   Dorsiflexion: 3+        Gait Assessment: Slow cautious shuffling pattern with rolling walker. Relatively steady, tendency to have RW get out in front of him too far. Largely forward head and rounded shoulders with thoracic kyphosis        Precautions: HTN, Chronic DVT,  Severe Osteoporosis, Hx of cervicalgia, hx of hernias   POC Expires: 10/31/2024  [employer] Access Code:         Objective Measures 6/28 5/21 5/31 6/7 6/14 7/5 7/12 PN 7/19 8/2 8/16 8/30 9/6 PN   Vitals **                ABC       63.13%     70%   6MWT       591ft        5xSTS       19.51s with UE support     18.06s   AEMZCUA            42   10mWT            0.59   TUG       26.63s     24.70s   Neuro Re-Ed               Static Balance    Foam EC 3x30\" in //             Dynamic Balance                Vorx1               Head turns    In // no UE support  In // no UE support           Overhead boomwhacker tapping   With OH boomwhacker taps 3x each.  With OH boomwahcker taps 5 laps in //  With OH Boomwahcker taps 5 laps in // 3lb AW sophie  With OH boomwhacker taps 5 laps in // 3lb AW sophie   With OH boomwhacker taps 5 laps in // 3lb AW sophie    Multidirectional Stepping                Salvatore navigation   3 hurdles med height setting 3lb AW sophie 5 hurdles x6  3 hurdles med height setting 3lb AW sophie 5 hurdles x6 6\" step up and over BUE //bar x20 ea  Med setting 6\" step over B UE // Bar x20 ea   Med setting 6\" step over B UE // Bar x20ea   Med setting 6\" step over B UE // Bar x20 each    Sit <> stand      1x fatigue UUE           Walking no UE      No UE //bar CGA x3 laps          Resisted Amb                Side stepping                Overhead ball slam               Tests and measures   "     6MWT, ABC, TUG, 5xSTS, AMEZCUA     6MWT, ABC, TUG, 5xSTS, AMEZCUA   Education                Ther Ex               SLRx3               Standing hip abduction               Leg extension                Mini Squats                Step ups fwd and lat          2x10 in bars each LE.       Hr/Tr               Sit <> Stand       1x10 to walker with holding upright stretch for 3x  2x10 to walker with holding upright stretch for 3s 2x10 to walker with holding upright stretch for 3s 2x10                     Standing marches               Ther Activity               Stair navigation Ascending & descending with 3lb AW B UE support on 1 HR 1x 11 steps Ascending & descending with 3lb AW B UE support on 1 HR 1x 11 steps Ascending & descending with 3lb AW B UE support on 1 HR 1x11 steps Ascending & descending with 3lb AW B UE support on 1 HR 1x11 steps  Ascending & descending with 3lb AW B UE support on 1 HR 1x11 steps  Ascending & descending with 3lb AW B UE support on 1 HR 2x11 steps  Ascending & descending with 3lb AW B UE support on 1 HR 2x11 steps  took 10 mins Ascending & descending with 3lb AW B UE support on 1 HR 2x11 steps took 11 minutes.  Ascending & descending with B UE support on 1 HR 2x11 steps took 11 minutes.  Ascending & descending with B UE support on 1 HR 2x11 steps    Stair navigation               Transfer Training                Gait Training               Gait training w RW Gait training 394 ft with RW and 3lb AW sophie 591 ft with RW and 3lb AW sophie Gait training 394 ft with RW and 3lb AW sophie Gait training 394 ft with RW and 3lb AW sophie Gait training 394 ft with RW and 3lb AW sophie 1 lap around the lobby per patient request to warm up as outside was too warm. 80ft 3lb AW sophie  [id number] with RW and 5lb AW sophie; took 25 mins  [id number] with RW and 5lb AW sophie; took 25 mins no break between  2x394 ft with RW and 5lb AW sophie 30 mins    Curb training

## 2024-09-13 ENCOUNTER — OFFICE VISIT (OUTPATIENT)
Facility: CLINIC | Age: 84
End: 2024-09-13
Payer: COMMERCIAL

## 2024-09-13 DIAGNOSIS — R26.89 ABNORMALITY OF GAIT DUE TO IMPAIRMENT OF BALANCE: ICD-10-CM

## 2024-09-13 DIAGNOSIS — R53.81 PHYSICAL DECONDITIONING: Primary | ICD-10-CM

## 2024-09-13 DIAGNOSIS — M40.04 POSTURAL KYPHOSIS OF THORACIC REGION: ICD-10-CM

## 2024-09-13 PROCEDURE — 97112 NEUROMUSCULAR REEDUCATION: CPT

## 2024-09-13 PROCEDURE — 97116 GAIT TRAINING THERAPY: CPT

## 2024-09-13 PROCEDURE — 97530 THERAPEUTIC ACTIVITIES: CPT

## 2024-09-13 NOTE — PROGRESS NOTES
"Daily Note     Today's date: 2024  Patient name: Isai Abbott  : 1940  MRN: [id number]  Referring provider: Josi Kuhn MD  Dx:   Encounter Diagnosis     ICD-10-CM    1. Physical deconditioning  R53.81       2. Abnormality of gait due to impairment of balance  R26.89       3. 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 He notes that he did not do much exercises because they have family visiting.       Objective: See treatment diary below      Assessment: Pt participated in a skilled PT session focused on balance, gait, and strengthening. Outdoor ambulation performed only 1 lap due to time constraints. 5# weights utilized on stairs with cueing needed for adequate foot placement. Pt will continue to benefit from skilled PT so that he can navigate his environment with increased safety and stability.     Plan: Continue per plan of care.      Precautions: HTN, Chronic DVT,  Severe Osteoporosis, Hx of cervicalgia, hx of hernias   POC Expires: 10/31/2024  [employer] Access Code:         Objective Measures  7 PN  8 PN    Vitals **                   ABC       63.13%     70%    6MWT       591ft         5xSTS       19.51s with UE support     18.06s    AMEZCUA            42    10mWT              0.59    TUG       26.63s     24.70s    Neuro Re-Ed                Static Balance    Foam EC 3x30\" in //              Dynamic Balance                 Vorx1                Head turns    In // no UE support  In // no UE support            Overhead boomwhacker tapping   With OH boomwhacker taps 3x each.  With OH boomwahcker taps 5 laps in //  With OH Boomwahcker taps 5 laps in // 3lb AW sophie  With OH boomwhacker taps 5 laps in // 3lb AW sophie   With OH boomwhacker taps 5 laps in // 3lb AW sophie  With OH boomwahcker taps 5 laps in // 3lb AW sophie   Multidirectional Stepping   " "              Salvatore navigation   3 hurdles med height setting 3lb AW sophie 5 hurdles x6  3 hurdles med height setting 3lb AW sophie 5 hurdles x6 6\" step up and over BUE //bar x20 ea  Med setting 6\" step over B UE // Bar x20 ea   Med setting 6\" step over B UE // Bar x20ea   Med setting 6\" step over B UE // Bar x20 each  Med setting 3# AW sophie x  8 laps   Sit <> stand      1x fatigue UUE            Walking no UE      No UE //bar CGA x3 laps           Resisted Amb                 Side stepping                 Overhead ball slam                Tests and measures       6MWT, ABC, TUG, 5xSTS, AMEZCUA     6MWT, ABC, TUG, 5xSTS, AMEZCUA    Education                 Ther Ex                SLRx3                Standing hip abduction                Leg extension                 Mini Squats                 Step ups fwd and lat          2x10 in bars each LE.        Hr/Tr                Sit <> Stand       1x10 to walker with holding upright stretch for 3x  2x10 to walker with holding upright stretch for 3s 2x10 to walker with holding upright stretch for 3s 2x10    2x10 with UE support including upright stretch for 3s                   Standing [employer]                Ther Activity                  Stair navigation Ascending & descending with 3lb AW B UE support on 1 HR 1x 11 steps Ascending & descending with 3lb AW B UE support on 1 HR 1x 11 steps Ascending & descending with 3lb AW B UE support on 1 HR 1x11 steps Ascending & descending with 3lb AW B UE support on 1 HR 1x11 steps  Ascending & descending with 3lb AW B UE support on 1 HR 1x11 steps  Ascending & descending with 3lb AW B UE support on 1 HR 2x11 steps  Ascending & descending with 3lb AW B UE support on 1 HR 2x11 steps  took 10 mins Ascending & descending with 3lb AW B UE support on 1 HR 2x11 steps took 11 minutes.  Ascending & descending with B UE support on 1 HR 2x11 steps took 11 minutes.  Ascending & descending with B UE support on 1 HR 2x11 steps  Ascending & descending with b " ue support on 1 hr 1x11 steps 5# AW sophie   Stair navigation                  Transfer Training                 Gait Training                Gait training w RW Gait training 394 ft with RW and 3lb AW sophie 591 ft with RW and 3lb AW sophie Gait training 394 ft with RW and 3lb AW sophie Gait training 394 ft with RW and 3lb AW sophie Gait training 394 ft with RW and 3lb AW sophie 1 lap around the lobby per patient request to warm up as outside was too warm. 80ft 3lb AW sophie  [id number] with RW and 5lb AW sophie; took 25 mins  [id number] with RW and 5lb AW sophie; took 25 mins no break between  2x394 ft with RW and 5lb AW sophie 30 mins  1394ft with RW and 5lb AW sophie 20 mins.    Curb training

## 2024-09-18 NOTE — PROGRESS NOTES
"Daily Note     Today's date: 2024  Patient name: Isai Abbott  : 1940  MRN: [id number]  Referring provider: Josi Kuhn MD  Dx:   Encounter Diagnosis     ICD-10-CM    1. Physical deconditioning  R53.81       2. Abnormality of gait due to impairment of balance  R26.89       3. 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 He does not report any falls since he was last here. He notes he walked down the driveway before therapy today.      Objective: See treatment diary below      Assessment: Pt participated in a skilled PT session focused on balance, gait, and strengthening. Indoor ambulation today performed due to increased heat. He reported increased leg fatigue today which he feels is due to his walk down the driveway prior to his therapy session. Increased difficulty lifting his R LE in today's session. Pt reported feeling very tired and almost sat on the floor in today's session. BP was assessed and pt was found to have low BP. Encouraged hydration and told pt to go to the doctor if symptoms worsen. Pt expressed understanding. Pt will continue to benefit from skilled PT so that he can navigate his environment with increased safety and stability.     Plan: Continue per plan of care.      Precautions: HTN, Chronic DVT,  Severe Osteoporosis, Hx of cervicalgia, hx of hernias   POC Expires: 10/31/2024  [employer] Access Code:         Objective Measures  6 PN  PN    Vitals **                    ABC       63.13%     70%     6MWT       591ft          5xSTS       19.51s with UE support     18.06s     AMEZCUA            44     10mWT              0.59     TUG       26.63s     24.70s     Neuro Re-Ed                 Static Balance    Foam EC 3x30\" in //            Foam EC 3x30\"    Dynamic Balance                  Vorx1                 Head turns    In // no UE support  In // " "no UE support          In // with UE support. 4x   Overhead boomwhacker tapping   With OH boomwhacker taps 3x each.  With OH boomwahcker taps 5 laps in //  With OH Boomwahcker taps 5 laps in // 3lb AW sophie  With OH boomwhacker taps 5 laps in // 3lb AW sophie   With OH boomwhacker taps 5 laps in // 3lb AW sophie  With OH boomwahcker taps 5 laps in // 3lb AW sophie With OH boomwhacker taps 5 laps in // 3lb AW bli   Multidirectional Stepping                  Salvatore navigation   3 hurdles med height setting 3lb AW sophie 5 hurdles x6  3 hurdles med height setting 3lb AW sophie 5 hurdles x6 6\" step up and over BUE //bar x20 ea  Med setting 6\" step over B UE // Bar x20 ea   Med setting 6\" step over B UE // Bar x20ea   Med setting 6\" step over B UE // Bar x20 each  Med setting 3# AW sophie x  8 laps Med setting 3# AW sophie x 8 laps   Sit <> stand      1x fatigue UUE             Walking no UE      No UE //bar CGA x3 laps            Resisted Amb                  Side stepping                  Overhead ball slam                 Tests and measures       6MWT, ABC, TUG, 5xSTS, AMEZCUA     6MWT, ABC, TUG, 5xSTS, AMEZCUA     Education                  Ther Ex                 SLRx3                 Standing hip abduction                 Leg extension                  Mini Squats                  Step ups fwd and lat          2x10 in bars each LE.         Hr/Tr                 Sit <> Stand       1x10 to walker with holding upright stretch for 3x  2x10 to walker with holding upright stretch for 3s 2x10 to walker with holding upright stretch for 3s 2x10    2x10 with UE support including upright stretch for 3s 3x10 with UE support including upright stretch for 3s                    Standing marches                 Ther Activity                   Stair navigation Ascending & descending with 3lb AW B UE support on 1 HR 1x 11 steps Ascending & descending with 3lb AW B UE support on 1 HR 1x 11 steps Ascending & descending with 3lb AW B UE support on 1 HR 1x11 steps " Ascending & descending with 3lb AW B UE support on 1 HR 1x11 steps  Ascending & descending with 3lb AW B UE support on 1 HR 1x11 steps  Ascending & descending with 3lb AW B UE support on 1 HR 2x11 steps  Ascending & descending with 3lb AW B UE support on 1 HR 2x11 steps  took 10 mins Ascending & descending with 3lb AW B UE support on 1 HR 2x11 steps took 11 minutes.  Ascending & descending with B UE support on 1 HR 2x11 steps took 11 minutes.  Ascending & descending with B UE support on 1 HR 2x11 steps  Ascending & descending with b ue support on 1 hr 1x11 steps 5# AW sophie    Stair navigation                   Transfer Training                  Gait Training                 Gait training w RW Gait training 394 ft with RW and 3lb AW sophie 591 ft with RW and 3lb AW sophie Gait training 394 ft with RW and 3lb AW sophie Gait training 394 ft with RW and 3lb AW sophie Gait training 394 ft with RW and 3lb AW sophie 1 lap around the lobby per patient request to warm up as outside was too warm. 80ft 3lb AW sophie  [id number] with RW and 5lb AW sophie; took 25 mins  [id number] with RW and 5lb AW sophie; took 25 mins no break between  2x394 ft with RW and 5lb AW sophie 30 mins  1394ft with RW and 5lb AW sophie 20 mins.     Curb training

## 2024-09-20 ENCOUNTER — OFFICE VISIT (OUTPATIENT)
Facility: CLINIC | Age: 84
End: 2024-09-20
Payer: COMMERCIAL

## 2024-09-20 DIAGNOSIS — R53.81 PHYSICAL DECONDITIONING: Primary | ICD-10-CM

## 2024-09-20 DIAGNOSIS — R26.89 ABNORMALITY OF GAIT DUE TO IMPAIRMENT OF BALANCE: ICD-10-CM

## 2024-09-20 DIAGNOSIS — M40.04 POSTURAL KYPHOSIS OF THORACIC REGION: ICD-10-CM

## 2024-09-20 PROCEDURE — 97112 NEUROMUSCULAR REEDUCATION: CPT

## 2024-09-27 ENCOUNTER — OFFICE VISIT (OUTPATIENT)
Facility: CLINIC | Age: 84
End: 2024-09-27
Payer: COMMERCIAL

## 2024-09-27 DIAGNOSIS — R26.89 ABNORMALITY OF GAIT DUE TO IMPAIRMENT OF BALANCE: ICD-10-CM

## 2024-09-27 DIAGNOSIS — M40.04 POSTURAL KYPHOSIS OF THORACIC REGION: ICD-10-CM

## 2024-09-27 DIAGNOSIS — R53.81 PHYSICAL DECONDITIONING: Primary | ICD-10-CM

## 2024-09-27 PROCEDURE — 97110 THERAPEUTIC EXERCISES: CPT

## 2024-09-27 PROCEDURE — 97112 NEUROMUSCULAR REEDUCATION: CPT

## 2024-09-27 NOTE — PROGRESS NOTES
"Daily Note     Today's date: 2024  Patient name: Isai Abbott  : 1940  MRN: [id number]  Referring provider: Josi Kuhn MD  Dx:   Encounter Diagnosis     ICD-10-CM    1. Physical deconditioning  R53.81       2. Abnormality of gait due to impairment of balance  R26.89       3. 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 He notes he is having some pain in his knees today.       Objective: See treatment diary below      Assessment: Pt participated in a skilled PT session focused on balance, gait, and strengthening. Outdoor ambulation and stairs held today due to increased heat and humidity.Improved foot clearance noted with raya navigation today as compared to last visit. Improved ROM with STS today as well. Step ups performed with minimal difficulty. Pt will continue to benefit from skilled PT so that he can navigate his environment with increased safety and stability.     Plan: Continue per plan of care.      Precautions: HTN, Chronic DVT,  Severe Osteoporosis, Hx of cervicalgia, hx of hernias   POC Expires: 10/31/2024  [employer] Access Code:         Objective Measures  PN  PN    Vitals **                     ABC       63.13%     70%      6MWT       591ft           5xSTS       19.51s with UE support     18.06s      AMEZCUA            44      10mWT              0.59      TUG       26.63s     24.70s      Neuro Re-Ed                  Static Balance    Foam EC 3x30\" in //            Foam EC 3x30\"     Dynamic Balance                   Vorx1                  Head turns    In // no UE support  In // no UE support          In // with UE support. 4x In // with UE support 4x.    Overhead boomwhacker tapping   With OH boomwhacker taps 3x each.  With OH boomwahcker taps 5 laps in //  With OH Boomwahcker taps 5 laps in // 3lb AW sophie  With OH boomwhacker taps 5 laps " "in // 3lb AW sophie   With OH boomwhacker taps 5 laps in // 3lb AW sophie  With OH boomwahcker taps 5 laps in // 3lb AW sophie With OH boomwhacker taps 5 laps in // 3lb AW bli With OH boomwhacker taps 5 laps in // 3lb AW sophie   Multidirectional Stepping                   Salvatore navigation   3 hurdles med height setting 3lb AW sophie 5 hurdles x6  3 hurdles med height setting 3lb AW sophie 5 hurdles x6 6\" step up and over BUE //bar x20 ea  Med setting 6\" step over B UE // Bar x20 ea   Med setting 6\" step over B UE // Bar x20ea   Med setting 6\" step over B UE // Bar x20 each  Med setting 3# AW sophie x  8 laps Med setting 3# AW sophie x 8 laps Med setting 3# AW sophie x 8 laps   Sit <> stand      1x fatigue UUE              Walking no UE      No UE //bar CGA x3 laps             Resisted Amb                   Side stepping                   Overhead ball slam                  Tests and measures       6MWT, ABC, TUG, 5xSTS, AMEZCUA     6MWT, ABC, TUG, 5xSTS, AMEZCUA      Education                   Ther Ex                  SLRx3                  Standing hip abduction                  Leg extension                   Mini Squats                   Step ups fwd and lat          2x10 in bars each LE.       3x10 with UE support in bars.   Hr/Tr                  Sit <> Stand       1x10 to walker with holding upright stretch for 3x  2x10 to walker with holding upright stretch for 3s 2x10 to walker with holding upright stretch for 3s 2x10    2x10 with UE support including upright stretch for 3s 3x10 with UE support including upright stretch for 3s 3x10 with UE support including upright stretch for 3s.                      Seated marches                3x10 5#    Ther Activity                    Stair navigation Ascending & descending with 3lb AW B UE support on 1 HR 1x 11 steps Ascending & descending with 3lb AW B UE support on 1 HR 1x 11 steps Ascending & descending with 3lb AW B UE support on 1 HR 1x11 steps Ascending & descending with 3lb AW B UE support " on 1 HR 1x11 steps  Ascending & descending with 3lb AW B UE support on 1 HR 1x11 steps  Ascending & descending with 3lb AW B UE support on 1 HR 2x11 steps  Ascending & descending with 3lb AW B UE support on 1 HR 2x11 steps  took 10 mins Ascending & descending with 3lb AW B UE support on 1 HR 2x11 steps took 11 minutes.  Ascending & descending with B UE support on 1 HR 2x11 steps took 11 minutes.  Ascending & descending with B UE support on 1 HR 2x11 steps  Ascending & descending with b ue support on 1 hr 1x11 steps 5# AW sophie     Stair navigation                    Transfer Training                   Gait Training                  Gait training w RW Gait training 394 ft with RW and 3lb AW sophie 591 ft with RW and 3lb AW sophie Gait training 394 ft with RW and 3lb AW sophie Gait training 394 ft with RW and 3lb AW sophie Gait training 394 ft with RW and 3lb AW sophie 1 lap around the lobby per patient request to warm up as outside was too warm. 80ft 3lb AW sophie  [id number] with RW and 5lb AW sophie; took 25 mins  [id number] with RW and 5lb AW sophie; took 25 mins no break between  2x394 ft with RW and 5lb AW sophie 30 mins  1394ft with RW and 5lb AW sophie 20 mins.      Curb training

## 2024-09-29 DIAGNOSIS — E21.3 HYPERPARATHYROIDISM (HCC): ICD-10-CM

## 2024-09-30 RX ORDER — CINACALCET 30 MG/1
30 TABLET, FILM COATED ORAL 2 TIMES DAILY
Qty: 180 TABLET | Refills: 1 | Status: SHIPPED | OUTPATIENT
Start: 2024-09-30

## 2024-10-04 ENCOUNTER — APPOINTMENT (OUTPATIENT)
Facility: CLINIC | Age: 84
End: 2024-10-04
Payer: COMMERCIAL

## 2024-10-11 ENCOUNTER — APPOINTMENT (OUTPATIENT)
Facility: CLINIC | Age: 84
End: 2024-10-11
Payer: COMMERCIAL

## 2024-10-18 ENCOUNTER — OFFICE VISIT (OUTPATIENT)
Facility: CLINIC | Age: 84
End: 2024-10-18
Payer: COMMERCIAL

## 2024-10-18 DIAGNOSIS — R26.89 ABNORMALITY OF GAIT DUE TO IMPAIRMENT OF BALANCE: ICD-10-CM

## 2024-10-18 DIAGNOSIS — R53.81 PHYSICAL DECONDITIONING: Primary | ICD-10-CM

## 2024-10-18 DIAGNOSIS — M40.04 POSTURAL KYPHOSIS OF THORACIC REGION: ICD-10-CM

## 2024-10-18 PROCEDURE — 97112 NEUROMUSCULAR REEDUCATION: CPT

## 2024-10-18 PROCEDURE — 97530 THERAPEUTIC ACTIVITIES: CPT

## 2024-10-18 NOTE — PROGRESS NOTES
"Daily Note     Today's date: 10/18/2024  Patient name: Isai Abbott  : 1940  MRN: [id number]  Referring provider: Josi Kuhn MD  Dx:   Encounter Diagnosis     ICD-10-CM    1. Physical deconditioning  R53.81       2. Abnormality of gait due to impairment of balance  R26.89       3. 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 He notes he is having some pain in his knees today.       Objective: See treatment diary below      Assessment: Pt participated in a skilled PT session focused on balance, gait, and strengthening. Hurdles navigation performed with step through pattern with minimal LOB, however did exhibit a few instances of reduced foot clearance. Side stepping performed to challenge hip abductors and improve LE stability. Step ups performed to challenge LE strength. Pt will continue to benefit from skilled PT so that he can navigate his environment with increased safety and stability.     Plan: Continue per plan of care.      Precautions: HTN, Chronic DVT,  Severe Osteoporosis, Hx of cervicalgia, hx of hernias   POC Expires: 10/31/2024  [employer] Access Code:         Objective Measures  6 PN  PN 9/13 9/20 9/27 10/18   Vitals **                      ABC       63.13%     70%       6MWT       591ft            5xSTS       19.51s with UE support     18.06s       AMEZCUA            42       10mWT              0.59       TUG       26.63s     24.70s       Neuro Re-Ed                   Static Balance    Foam EC 3x30\" in //            Foam EC 3x30\"      Dynamic Balance                    Vorx1                   Head turns    In // no UE support  In // no UE support          In // with UE support. 4x In // with UE support 4x.  In // with UE support 4x   Overhead boomwhacker tapping   With OH boomwhacker taps 3x each.  With OH boomwahcker taps 5 laps in //  With OH Boomwahcker " "taps 5 laps in // 3lb AW philip  With OH boomwhacker taps 5 laps in // 3lb AW philip   With OH boomwhacker taps 5 laps in // 3lb AW philip  With OH boomwahcker taps 5 laps in // 3lb AW philip With OH boomwhacker taps 5 laps in // 3lb AW bli With OH boomwhacker taps 5 laps in // 3lb AW philip With OH boomwhacker taps 5 laps in // 3lb AW Philip   Multidirectional Stepping                    Salvatore navigation   3 hurdles med height setting 3lb AW philip 5 hurdles x6  3 hurdles med height setting 3lb AW philip 5 hurdles x6 6\" step up and over BUE //bar x20 ea  Med setting 6\" step over B UE // Bar x20 ea   Med setting 6\" step over B UE // Bar x20ea   Med setting 6\" step over B UE // Bar x20 each  Med setting 3# AW philip x  8 laps Med setting 3# AW philip x 8 laps Med setting 3# AW philip x 8 laps Med setting 3# AW philip x 8 laps    Sit <> stand      1x fatigue UUE            3# AW philip sides tepping L & R three laps   Walking no UE      No UE //bar CGA x3 laps              Resisted Amb                    Side stepping                    Overhead ball slam                   Tests and measures       6MWT, ABC, TUG, 5xSTS, AMEZCUA     6MWT, ABC, TUG, 5xSTS, AMEZCUA       Education                    Ther Ex                   SLRx3                   Standing hip abduction                   Leg extension                    Mini Squats                    Step ups fwd and lat          2x10 in bars each LE.       3x10 with UE support in bars. 3x10 with UE support in bars    Hr/Tr                   Sit <> Stand       1x10 to walker with holding upright stretch for 3x  2x10 to walker with holding upright stretch for 3s 2x10 to walker with holding upright stretch for 3s 2x10    2x10 with UE support including upright stretch for 3s 3x10 with UE support including upright stretch for 3s 3x10 with UE support including upright stretch for 3s.  3x10 with UE support including upright stretch for 3s                       Seated marches                3x10 5#     Ther Activity     "                 Stair navigation Ascending & descending with 3lb AW B UE support on 1 HR 1x 11 steps Ascending & descending with 3lb AW B UE support on 1 HR 1x 11 steps Ascending & descending with 3lb AW B UE support on 1 HR 1x11 steps Ascending & descending with 3lb AW B UE support on 1 HR 1x11 steps  Ascending & descending with 3lb AW B UE support on 1 HR 1x11 steps  Ascending & descending with 3lb AW B UE support on 1 HR 2x11 steps  Ascending & descending with 3lb AW B UE support on 1 HR 2x11 steps  took 10 mins Ascending & descending with 3lb AW B UE support on 1 HR 2x11 steps took 11 minutes.  Ascending & descending with B UE support on 1 HR 2x11 steps took 11 minutes.  Ascending & descending with B UE support on 1 HR 2x11 steps  Ascending & descending with b ue support on 1 hr 1x11 steps 5# AW sophie   Step ups in // 8 inch step 1x30   Stair navigation                     Transfer Training                    Gait Training                   Gait training w RW Gait training 394 ft with RW and 3lb AW sophie 591 ft with RW and 3lb AW sophie Gait training 394 ft with RW and 3lb AW sophie Gait training 394 ft with RW and 3lb AW sophie Gait training 394 ft with RW and 3lb AW sophie 1 lap around the lobby per patient request to warm up as outside was too warm. 80ft 3lb AW sophie  [id number] with RW and 5lb AW sophie; took 25 mins  [id number] with RW and 5lb AW sophie; took 25 mins no break between  2x394 ft with RW and 5lb AW sophie 30 mins  1394ft with RW and 5lb AW sophie 20 mins.       Curb training

## 2024-10-22 ENCOUNTER — TELEPHONE (OUTPATIENT)
Age: 84
End: 2024-10-22

## 2024-10-22 NOTE — TELEPHONE ENCOUNTER
Pt called he is due for reclast in December or January. Can u please call him. Does he go for that then or wait till dr hewitt comes back>

## 2024-10-23 ENCOUNTER — DOCUMENTATION (OUTPATIENT)
Dept: ENDOCRINOLOGY | Facility: HOSPITAL | Age: 84
End: 2024-10-23

## 2024-10-23 DIAGNOSIS — E21.3 HYPERPARATHYROIDISM (HCC): Primary | ICD-10-CM

## 2024-10-23 DIAGNOSIS — M81.0 AGE-RELATED OSTEOPOROSIS WITHOUT CURRENT PATHOLOGICAL FRACTURE: ICD-10-CM

## 2024-10-23 RX ORDER — SODIUM CHLORIDE 9 MG/ML
20 INJECTION, SOLUTION INTRAVENOUS ONCE
OUTPATIENT
Start: 2024-12-23

## 2024-10-23 RX ORDER — ZOLEDRONIC ACID 0.05 MG/ML
5 INJECTION, SOLUTION INTRAVENOUS ONCE
OUTPATIENT
Start: 2024-12-23

## 2024-10-23 NOTE — TELEPHONE ENCOUNTER
Message left to call back. Does he want to go to [place] and what day and time are best for him.

## 2024-10-23 NOTE — TELEPHONE ENCOUNTER
Pt called back due to a call about his Reclast. He stated he would call back tomorrow to inform of times and days he would be available since his wife drives him to those appts.

## 2024-10-24 NOTE — PROGRESS NOTES
PT Discharge    Today's date: 10/25/2024  Patient name: Isai Abbott  : 1940  MRN: [id number]  Referring provider: Josi Kuhn MD  Dx:   Encounter Diagnosis     ICD-10-CM    1. Physical deconditioning  R53.81       2. Abnormality of gait due to impairment of balance  R26.89       3. 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 male who has received 18 visits visits of skilled PT to address balance and deconditioning. Today's assessment reflects improvement from last visit across all measures. Due to upcoming winter months and pt's reports of feeling like he has reached maximal medical improvement pt is discharged from skilled PT intervention at this time with plan to d/c to HEP.  Pt and his wife expressed understanding of updated HEP.     Re-evaluation 2024: Pt is an 83 y.o. male who has received 12 visits of skilled PT to address balance and deconditioning. Today's assessment reflects improvement in 5xSTS score and MMT scores, reflecting improved LE strength. Pt's gait speed also improve significantly since previous assessment, however, still classifies him at the level of a household ambulator. AMEZCUA score also reflects improvement since last session. 6MWT not performed today due to time constraints. Pt will therefore continue to benefit from skilled PT so that he can navigate his environment with increased safety and stability.     Re-evaluation 2024: Pt is an [age]y.o. male who has received 8 visits of skilled PT to address balance & deconditioning. Since previous assessment, pt had 1 near fall, and has decreased his performance of Hep and functional activities secondary to pain. This correlates with subsequent  decrease in 6MWT distance and increase in TUG speed, which reflect regression. Despite this, pt's 5xSTS score improved, AMEZCUA score remained the same. Pt was educated on the importance of performing HEP, and the importance of taking safety measures such as performing interventions when other people are home and utilizing counter support as needed. Pt expressed understanding. Pt will continue to benefit from skilled PT so that he can navigate his environment with increased safety and stability.     Pt is an 83 y.o. male returning to physical therapy after a brief hiatus due to knee pain to address balance, gait, and deconditioning compared to previous visits. Today's assessment reflects decline in balance confidence since he was last here. 6MWT does not exhibit statistically significant decline, reflecting minimal change in CV endurance.  However, pt's TUG, gait speed, and AMEZCUA scores declined since he was last here, reflecting balance and gait deficits. R LE > L. Educated pt on use of leg  mobility aid to improve care mobility and pt stated he would look into it. Pt will continue to benefit from skilled PT so that he can navigate his environment with increased safety and stability.     Per Previous IE in November of 2023: Isai is an 83 year old patient presenting to OPPT for evaluation regarding gait and balance deficits in adjunction to recent diagnosis of osteoporosis. Upon evaluation, Isai demonstrates significant deficits and increased fall risk. Proximal BLE strength below 4/5 throughout aside from L hip flexion. Decreased endurance evidenced by ability to only complete 2MWT with distance traversed 130 feet. TUG and 5x STS required UE support to rise from chair, as well as scores being greater than fall risk cut of score indicative of increased fall risk and poor power generation. Gait speed classifies patient as increased fall risk, as well as non-community ambulator. These impairments limit their  ability to perform daily activities as well as their previous level of function causing decreased quality of life.    HEP delivered with emphasis on BLE strengthening with UE support. Education provided regarding HEP, POC, prognosis, goals for therapy, and functional outcome measure results in relation to patient's fall risk. Also worked on sit <> stand transfers to improve overall safety to reduce fall risk and working with walker to improve facilitation of increased safety during these activities. Cuing for increased anterior weight shift.     Patient requires continued skilled PT services in order to improve aforementioned impairments so that they are able to return to highest level of function possible. Without initiation of skilled PT services, patient will continue to have largely decreased functional endurance and mobility leading to increased caregiver burden and likelihood of falls and subsequent hospitalization.     Barriers to therapy: Transportation  Understanding of Dx/Px/POC: good     Prognosis: good    Goals  STG (4 weeks)   Improve (reduce) TUG score by 4 seconds indicating meaningful improvement in fall risk NOT MET  Improve AMEZCUA score by 6 indicating meaningful improvement in fall risk  Improve/Reduce 5x STS score by 5 seconds indicating meaningful improvement in fall risk and power generation MET  Improve generalized strength by 1/5 to demonstrate improvement in facilitation of efficiency of functional activities and reduced fall risk MET  Improve gait speed by 0.05 m/s with LRAD MET    LTG (8 weeks)   Improve (reduce) TUG score by 4 seconds compared to PN indicating meaningful improvement in fall risk NOT MET  Improve AMEZCUA score to > 45/56 indicating meaningful improvement in fall risk NOT MET  Improve/Reduce 5x STS score by 4 seconds compared to PN seconds indicating meaningful improvement in fall risk and power generation MET  Will be able to complete full 6MWT MET  Improve gait speed by 0.05  m/s from PN with LRAD MET  Improve 6MWT by 164.4ft to reflect improvement in endurance. Not retested        Plan  Patient would benefit from: skilled physical therapy  Planned modality interventions: cryotherapy and thermotherapy: hydrocollator packs    Planned therapy interventions: manual therapy, neuromuscular re-education, patient education, postural training, self care, strengthening, stretching, therapeutic activities, therapeutic exercise, home exercise program, graded exercise, gait training, flexibility and balance    Frequency: 2x week  Duration in weeks: 8  Plan of Care beginning date: 9/5/2024  Plan of Care expiration date: 10/31/2024  Treatment plan discussed with: patient and family  Plan details: Discharge to [facility]        Subjective Evaluation    History of Present Illness  Mechanism of injury: 10/24/2024: Pt states that he has not been doing much exercise at home. He notes he has been feeling.     9/6/2024: Pt states that some days he feels really good and other days he feels pretty blah. He states that overall he feels pretty good. He feels about 80% of the way to where he would like to be. He states that his memory to do his exercises at home and feeling really weak on some days limit him from his remaining 20%.      7/12/2024: Pt states that he feels about 70% of the way to where he would like to be this round of care. He feels he is tiring out quickly. He feels exhausted even when he comes in. He states that he has been pushing himself less because of his near fall. He has started doing his sit to stands at home. His stamina is the biggest issue.     At IE: Pt states that he has been out because he has been having knee pain. His orthopedic doctor wants him to low weight and high repetition. His physician wants him to utilize Voltarin gel to manage inflammation. He does not report any falls. He wants to skip the sitting to standing exercises from the stairs. He wants to do stepping up and down from  "the platform, the stairs, walking, and balance things. He is not having any pain in his knees right now. He has been doing his exercises every day since he was last here and feels he has regressed a bit. He practices getting out of the chair at home with using arms. He feels he has declined overall since he was last here with his overall strength and his stamina and he would like to get back to that. He states that he got taken off of his iron and that may be affecting things too. He was having knee pain on the front of the legs. He wanted to make sure his surgeries weren't deteriorating and his surgeon said that they are okay.     Per previous IE in 2023: Isai is an 83 year old patient presenting to OPPT for evaluation regarding gait and balance deficits. Recent diagnosis of osteoporosis via last dexa scan. Reports that he is here to work on his strength. Last fall was around 7 months ago. Does exercises at home - already. Standing marching and squat at the counter are the two that he is doing currently. Wife reports that he has good follow through. \"Everything we do we want to be able to do at home\". Main goal is to get stronger to be able to reduce fall risk going forward.     One dizzy spell in the last two weeks. Reports that he was standing at the counter and noted that he was lightheaded and room spinning. Felt like he was going to fall.     Got PT at home after COVID - but wasn't able to go up the stairs. Doesn't go up currently, wife handles the caretaking duties.     Home setup: Walkers throughout the home - theres no rugs. Anything that could possibly help. \"We set it up as safe as we can be\".   Patient Goals  Patient goals for therapy: improved balance, increased motion and increased strength    Pain  No pain reported  Current pain ratin  At best pain ratin  At worst pain ratin  Location: bilateral knee pain  Quality: dull ache (annoying)  Relieving factors: medications and " "rest  Aggravating factors: walking and standing  Progression: improved    Social Support  Steps to enter house: yes (4\" step, 6\" step from the living room to the kitchen)  1  Stairs in house: yes   12  Lives in: one-story house  Lives with: spouse    Employment status: not working    Diagnostic Tests  Bone scan: abnormal    FCE comments: Narrative & Impression  DXA SCAN     CLINICAL HISTORY:  82-year-old male.   OTHER RISK FACTORS:  Hyperparathyroidism, Limited mobility, Lasix therapy.     PHARMACOLOGIC THERAPY FOR OSTEOPOROSIS:  None.     TECHNIQUE: Bone densitometry was performed using a [employer] Horizon A  bone densitometer.  Regions of interest appear properly placed.         COMPARISON: There are no prior DXA studies performed on this unit for comparison.     RESULTS:      LUMBAR SPINE  Level: L1, L3, L4  (L2 vertebra excluded from analysis due to local structural abnormalities or artifact) :   BMD:  0.854  gm/cm2   T-score: -2.1         LEFT  TOTAL HIP:   BMD:  0.601  gm/cm2   T-score:  -2.9     LEFT  FEMORAL NECK:   BMD:  0.445  gm/cm2   T score: -3.6      LEFT  FOREARM:    33% RADIUS BMD:  0.701  gm/cm2  T-score:  -2.2         IMPRESSION:     1.  Osteoporosis. [Based on the total left hip]     2.  The 10 year risk of hip fracture is 9.1% with the 10 year risk of major osteoporotic fracture being 17% as calculated by the [employer] fracture risk assessment tool (FRAX, which is based on data generated by the WHO Collaborating [place]   for Metabolic Bone Diseases).  3.  The current NOF guidelines recommend treating patients with a T-score of -2.5 or less in the lumbar spine or hips, or in post-menopausal women and men over the age of 50 with low bone mass (osteopenia) and a FRAX 10 year risk score of >3% for hip   fracture and/or >20% for major osteoporotic fracture.     4.  The NOF recommends follow-up DXA in 1-2 years after initiating therapy for osteoporosis and every 2 years thereafter. More " "frequent evaluation is appropriate for patients with conditions associated with rapid bone loss, such as glucocorticoid   therapy. The interval between DXA screenings may be longer for individuals without major risk factors and initial T-score in the normal or upper low bone mass range.       Treatments  Previous treatment: physical therapy        Objective     Strength/Myotome Testing     Left Hip   Planes of Motion   Flexion: 5    Right Hip   Planes of Motion   Flexion: 5    Left Knee   Flexion: 5  Extension: 4    Right Knee   Flexion: 5  Extension: 4    Left Ankle/Foot   Dorsiflexion: 4    Right Ankle/Foot   Dorsiflexion: 3+        Gait Assessment: Slow cautious shuffling pattern with rolling walker. Relatively steady, tendency to have RW get out in front of him too far. Largely forward head and rounded shoulders with thoracic kyphosis        Precautions: HTN, Chronic DVT,  Severe Osteoporosis, Hx of cervicalgia, hx of hernias   POC Expires: 10/31/2024  Meitu Access Code:         Objective Measures 6/28 5/21 5/31 6/7 6/14 7/5 7/12 PN 7/19 8/2 8/16 8/30 9/6 PN 9/13 9/20 9/27 10/18 10/25 PN & DC   Vitals **                       ABC       63.13%     70%     71.25%   6MWT       591ft             5xSTS       19.51s with UE support     18.06s     9.86s   AMEZCUA            42     44   10mWT              0.59     0.69m/s   TUG       26.63s     24.70s     23.74s   Neuro Re-Ed                    Static Balance    Foam EC 3x30\" in //            Foam EC 3x30\"       Dynamic Balance                     Vorx1                    Head turns    In // no UE support  In // no UE support          In // with UE support. 4x In // with UE support 4x.  In // with UE support 4x    Overhead boomwhacker tapping   With OH boomwhacker taps 3x each.  With OH boomwahcker taps 5 laps in //  With OH Boomwahcker taps 5 laps in // 3lb AW sophie  With OH boomwhacker taps 5 laps in // 3lb AW sophie   With OH boomwhacker taps 5 laps in // 3lb AW sophie  " "With OH boomwahcker taps 5 laps in // 3lb AW philip With OH boomwhacker taps 5 laps in // 3lb AW bli With OH boomwhacker taps 5 laps in // 3lb AW philip With OH boomwhacker taps 5 laps in // 3lb AW Philip    Multidirectional Stepping                     Salvatore navigation   3 hurdles med height setting 3lb AW philip 5 hurdles x6  3 hurdles med height setting 3lb AW philip 5 hurdles x6 6\" step up and over BUE //bar x20 ea  Med setting 6\" step over B UE // Bar x20 ea   Med setting 6\" step over B UE // Bar x20ea   Med setting 6\" step over B UE // Bar x20 each  Med setting 3# AW philip x  8 laps Med setting 3# AW philip x 8 laps Med setting 3# AW philip x 8 laps Med setting 3# AW philip x 8 laps     Sit <> stand      1x fatigue UUE            3# AW philip sides tepping L & R three laps    Walking no UE      No UE //bar CGA x3 laps               Resisted Amb                     Side stepping                     Overhead ball slam                    Tests and measures       6MWT, ABC, TUG, 5xSTS, AMEZCUA     6MWT, ABC, TUG, 5xSTS, AMEZCUA     6MWT, ABC, TUG, 5xSTS, AMEZCUA   Education                     Ther Ex                    SLRx3                    Standing hip abduction                    Leg extension                     Mini Squats                     Step ups fwd and lat          2x10 in bars each LE.       3x10 with UE support in bars. 3x10 with UE support in bars     Hr/Tr                    Sit <> Stand       1x10 to walker with holding upright stretch for 3x  2x10 to walker with holding upright stretch for 3s 2x10 to walker with holding upright stretch for 3s 2x10    2x10 with UE support including upright stretch for 3s 3x10 with UE support including upright stretch for 3s 3x10 with UE support including upright stretch for 3s.  3x10 with UE support including upright stretch for 3s                         Seated marches                3x10 5#      Ther Activity                      Stair navigation Ascending & descending with 3lb AW B UE support " on 1 HR 1x 11 steps Ascending & descending with 3lb AW B UE support on 1 HR 1x 11 steps Ascending & descending with 3lb AW B UE support on 1 HR 1x11 steps Ascending & descending with 3lb AW B UE support on 1 HR 1x11 steps  Ascending & descending with 3lb AW B UE support on 1 HR 1x11 steps  Ascending & descending with 3lb AW B UE support on 1 HR 2x11 steps  Ascending & descending with 3lb AW B UE support on 1 HR 2x11 steps  took 10 mins Ascending & descending with 3lb AW B UE support on 1 HR 2x11 steps took 11 minutes.  Ascending & descending with B UE support on 1 HR 2x11 steps took 11 minutes.  Ascending & descending with B UE support on 1 HR 2x11 steps  Ascending & descending with b ue support on 1 hr 1x11 steps 5# AW sophie   Step ups in // 8 inch step 1x30    Stair navigation                      Transfer Training                     Gait Training                    Gait training w RW Gait training 394 ft with RW and 3lb AW sophie 591 ft with RW and 3lb AW sophie Gait training 394 ft with RW and 3lb AW sophie Gait training 394 ft with RW and 3lb AW sophie Gait training 394 ft with RW and 3lb AW sophie 1 lap around the lobby per patient request to warm up as outside was too warm. 80ft 3lb AW sophie  [id number] with RW and 5lb AW sophie; took 25 mins  [id number] with RW and 5lb AW sophie; took 25 mins no break between  2x394 ft with RW and 5lb AW sophie 30 mins  1394ft with RW and 5lb AW sophie 20 mins.        Curb training

## 2024-10-25 ENCOUNTER — OFFICE VISIT (OUTPATIENT)
Facility: CLINIC | Age: 84
End: 2024-10-25
Payer: COMMERCIAL

## 2024-10-25 DIAGNOSIS — M40.04 POSTURAL KYPHOSIS OF THORACIC REGION: ICD-10-CM

## 2024-10-25 DIAGNOSIS — R53.81 PHYSICAL DECONDITIONING: Primary | ICD-10-CM

## 2024-10-25 DIAGNOSIS — R26.89 ABNORMALITY OF GAIT DUE TO IMPAIRMENT OF BALANCE: ICD-10-CM

## 2024-10-25 PROCEDURE — 97112 NEUROMUSCULAR REEDUCATION: CPT

## 2024-11-04 DIAGNOSIS — E21.3 HYPERPARATHYROIDISM (HCC): Primary | ICD-10-CM

## 2024-11-04 DIAGNOSIS — M81.0 AGE-RELATED OSTEOPOROSIS WITHOUT CURRENT PATHOLOGICAL FRACTURE: ICD-10-CM

## 2024-11-04 RX ORDER — SODIUM CHLORIDE 9 MG/ML
20 INJECTION, SOLUTION INTRAVENOUS ONCE
OUTPATIENT
Start: 2025-01-03

## 2024-11-04 RX ORDER — ZOLEDRONIC ACID 0.05 MG/ML
5 INJECTION, SOLUTION INTRAVENOUS ONCE
OUTPATIENT
Start: 2025-01-03

## 2024-12-13 ENCOUNTER — APPOINTMENT (OUTPATIENT)
Dept: LAB | Facility: HOSPITAL | Age: 84
End: 2024-12-13
Payer: COMMERCIAL

## 2024-12-13 DIAGNOSIS — M81.0 AGE-RELATED OSTEOPOROSIS WITHOUT CURRENT PATHOLOGICAL FRACTURE: ICD-10-CM

## 2024-12-13 DIAGNOSIS — N18.31 STAGE 3A CHRONIC KIDNEY DISEASE (HCC): ICD-10-CM

## 2024-12-13 DIAGNOSIS — D50.9 IRON DEFICIENCY ANEMIA, UNSPECIFIED IRON DEFICIENCY ANEMIA TYPE: ICD-10-CM

## 2024-12-13 DIAGNOSIS — E21.3 HYPERPARATHYROIDISM (HCC): ICD-10-CM

## 2024-12-13 LAB
25(OH)D3 SERPL-MCNC: 27.2 NG/ML (ref 30–100)
ALBUMIN SERPL BCG-MCNC: 3.8 G/DL (ref 3.5–5)
ALP SERPL-CCNC: 105 U/L (ref 34–104)
ALT SERPL W P-5'-P-CCNC: 24 U/L (ref 7–52)
ANION GAP SERPL CALCULATED.3IONS-SCNC: 9 MMOL/L (ref 4–13)
AST SERPL W P-5'-P-CCNC: 22 U/L (ref 13–39)
BASOPHILS # BLD AUTO: 0.06 THOUSANDS/ÂΜL (ref 0–0.1)
BASOPHILS NFR BLD AUTO: 1 % (ref 0–1)
BILIRUB SERPL-MCNC: 0.52 MG/DL (ref 0.2–1)
BUN SERPL-MCNC: 28 MG/DL (ref 5–25)
CALCIUM SERPL-MCNC: 9 MG/DL (ref 8.4–10.2)
CHLORIDE SERPL-SCNC: 102 MMOL/L (ref 96–108)
CO2 SERPL-SCNC: 30 MMOL/L (ref 21–32)
CREAT SERPL-MCNC: 0.86 MG/DL (ref 0.6–1.3)
EOSINOPHIL # BLD AUTO: 0.09 THOUSAND/ÂΜL (ref 0–0.61)
EOSINOPHIL NFR BLD AUTO: 1 % (ref 0–6)
ERYTHROCYTE [DISTWIDTH] IN BLOOD BY AUTOMATED COUNT: 15.7 % (ref 11.6–15.1)
FERRITIN SERPL-MCNC: 25 NG/ML (ref 24–336)
GFR SERPL CREATININE-BSD FRML MDRD: 80 ML/MIN/1.73SQ M
GLUCOSE SERPL-MCNC: 100 MG/DL (ref 65–140)
HCT VFR BLD AUTO: 47.1 % (ref 36.5–49.3)
HGB BLD-MCNC: 15.6 G/DL (ref 12–17)
IMM GRANULOCYTES # BLD AUTO: 0.04 THOUSAND/UL (ref 0–0.2)
IMM GRANULOCYTES NFR BLD AUTO: 0 % (ref 0–2)
IRON SATN MFR SERPL: 25 % (ref 15–50)
IRON SERPL-MCNC: 96 UG/DL (ref 50–212)
LYMPHOCYTES # BLD AUTO: 3.26 THOUSANDS/ÂΜL (ref 0.6–4.47)
LYMPHOCYTES NFR BLD AUTO: 35 % (ref 14–44)
MCH RBC QN AUTO: 31.3 PG (ref 26.8–34.3)
MCHC RBC AUTO-ENTMCNC: 33.1 G/DL (ref 31.4–37.4)
MCV RBC AUTO: 95 FL (ref 82–98)
MONOCYTES # BLD AUTO: 0.63 THOUSAND/ÂΜL (ref 0.17–1.22)
MONOCYTES NFR BLD AUTO: 7 % (ref 4–12)
NEUTROPHILS # BLD AUTO: 5.13 THOUSANDS/ÂΜL (ref 1.85–7.62)
NEUTS SEG NFR BLD AUTO: 56 % (ref 43–75)
NRBC BLD AUTO-RTO: 0 /100 WBCS
PLATELET # BLD AUTO: 405 THOUSANDS/UL (ref 149–390)
PMV BLD AUTO: 10.8 FL (ref 8.9–12.7)
POTASSIUM SERPL-SCNC: 3.4 MMOL/L (ref 3.5–5.3)
PROT SERPL-MCNC: 6.2 G/DL (ref 6.4–8.4)
PTH-INTACT SERPL-MCNC: 221.4 PG/ML (ref 12–88)
RBC # BLD AUTO: 4.98 MILLION/UL (ref 3.88–5.62)
SODIUM SERPL-SCNC: 141 MMOL/L (ref 135–147)
TIBC SERPL-MCNC: 383 UG/DL (ref 250–450)
UIBC SERPL-MCNC: 287 UG/DL (ref 155–355)
WBC # BLD AUTO: 9.21 THOUSAND/UL (ref 4.31–10.16)

## 2024-12-13 PROCEDURE — 80053 COMPREHEN METABOLIC PANEL: CPT

## 2024-12-13 PROCEDURE — 83970 ASSAY OF PARATHORMONE: CPT

## 2024-12-13 PROCEDURE — 83550 IRON BINDING TEST: CPT

## 2024-12-13 PROCEDURE — 82306 VITAMIN D 25 HYDROXY: CPT

## 2024-12-13 PROCEDURE — 83540 ASSAY OF IRON: CPT

## 2024-12-13 PROCEDURE — 36415 COLL VENOUS BLD VENIPUNCTURE: CPT

## 2024-12-13 PROCEDURE — 82728 ASSAY OF FERRITIN: CPT

## 2024-12-13 PROCEDURE — 85025 COMPLETE CBC W/AUTO DIFF WBC: CPT

## 2024-12-16 ENCOUNTER — RESULTS FOLLOW-UP (OUTPATIENT)
Dept: ENDOCRINOLOGY | Facility: HOSPITAL | Age: 84
End: 2024-12-16

## 2024-12-19 ENCOUNTER — RA CDI HCC (OUTPATIENT)
Dept: OTHER | Facility: HOSPITAL | Age: 84
End: 2024-12-19

## 2024-12-27 ENCOUNTER — OFFICE VISIT (OUTPATIENT)
Dept: FAMILY MEDICINE CLINIC | Facility: HOSPITAL | Age: 84
End: 2024-12-27
Payer: COMMERCIAL

## 2024-12-27 VITALS
HEART RATE: 74 BPM | HEIGHT: 63 IN | WEIGHT: 205 LBS | SYSTOLIC BLOOD PRESSURE: 126 MMHG | OXYGEN SATURATION: 96 % | DIASTOLIC BLOOD PRESSURE: 84 MMHG | BODY MASS INDEX: 36.32 KG/M2

## 2024-12-27 DIAGNOSIS — Z00.00 MEDICARE ANNUAL WELLNESS VISIT, SUBSEQUENT: Primary | ICD-10-CM

## 2024-12-27 DIAGNOSIS — I10 ESSENTIAL HYPERTENSION: ICD-10-CM

## 2024-12-27 PROCEDURE — G0439 PPPS, SUBSEQ VISIT: HCPCS | Performed by: INTERNAL MEDICINE

## 2024-12-27 RX ORDER — OMEGA-3S/DHA/EPA/FISH OIL/D3 300MG-1000
400 CAPSULE ORAL DAILY
COMMUNITY

## 2024-12-27 NOTE — PROGRESS NOTES
Name: Isai Abbott      : 1940      MRN: [id number]  Encounter Provider: Tiffanie Anthony MD  Encounter Date: 2024   Encounter department: [facility]IMARY [facility] [address]    Assessment & Plan  Medicare annual wellness visit, subsequent         Essential hypertension    Orders:  •  Comprehensive metabolic panel; Future      Depression Screening and Follow-up Plan: Patient was screened for depression during today's encounter. They screened negative with a PHQ-2 score of 0.      Preventive health issues were discussed with patient, and age appropriate screening tests were ordered as noted in patient's After Visit Summary. Personalized health advice and appropriate referrals for health education or preventive services given if needed, as noted in patient's After Visit Summary.    History of Present Illness     HPI   Patient Care Team:  Tiffanie Anthony MD as PCP - General (Internal Medicine)  Josi Kuhn MD as PCP - Endocrinology (Endocrinology)  MD Adolfo Dumont MD    Review of Systems   HENT:  Negative for hearing loss.    Eyes:  Negative for visual disturbance.   Respiratory:  Negative for shortness of breath.    Cardiovascular:  Positive for leg swelling. Negative for chest pain and palpitations.   Gastrointestinal:  Negative for abdominal pain and blood in stool.   Genitourinary:  Negative for difficulty urinating and hematuria.   Psychiatric/Behavioral:  Negative for dysphoric mood.      Medical History Reviewed by provider this encounter:  Tobacco  Allergies  Meds  Problems  Med Hx  Surg Hx  Fam Hx       Annual Wellness Visit Questionnaire   Isai is here for his Subsequent Wellness visit.     Health Risk Assessment:   Patient rates overall health as good. Patient feels that their physical health rating is same. Patient is satisfied with their life. Eyesight was rated as same. Hearing was rated as same. Patient feels that their emotional and mental health rating is  same. Patients states they are never, rarely angry. Patient states they are never, rarely unusually tired/fatigued. Pain experienced in the last 7 days has been none. Patient states that he has experienced no weight loss or gain in last 6 months.     Depression Screening:   PHQ-2 Score: 0      Fall Risk Screening:   In the past year, patient has experienced: no history of falling in past year      Home Safety:  Patient has trouble with stairs inside or outside of their home. Patient has working smoke alarms and has working carbon monoxide detector. Home safety hazards include: none.     Nutrition:   Current diet is Regular and No Added Salt.     Medications:   Patient is not currently taking any over-the-counter supplements. Patient is able to manage medications.     Activities of Daily Living (ADLs)/Instrumental Activities of Daily Living (IADLs):   Walk and transfer into and out of bed and chair?: No  Dress and groom yourself?: No    Bathe or shower yourself?: No    Feed yourself? Yes  Do your laundry/housekeeping?: No  Manage your money, pay your bills and track your expenses?: Yes  Make your own meals?: Yes    Do your own shopping?: No    ADL comments: He has minimal assistance     Previous Hospitalizations:   Any hospitalizations or ED visits within the last 12 months?: No      Advance Care Planning:   Living will: Yes    Advanced directive: Yes    Advanced directive counseling given: Yes      PREVENTIVE SCREENINGS      Cardiovascular Screening:    General: Screening Not Indicated and History Lipid Disorder      Diabetes Screening:     General: Screening Current and Risks and Benefits Discussed      Prostate Cancer Screening:    General: Screening Not Indicated      Osteoporosis Screening:    General: Screening Not Indicated and History Osteoporosis      Lung Cancer Screening:     General: Screening Not Indicated    Screening, Brief Intervention, and Referral to Treatment (SBIRT)    Screening  Typical number of  "drinks in a day: 0  Typical number of drinks in a week: 0  Interpretation: Low risk drinking behavior.    Single Item Drug Screening:  How often have you used an illegal drug (including marijuana) or a prescription medication for non-medical reasons in the past year? never    Single Item Drug Screen Score: 0  Interpretation: Negative screen for possible drug use disorder    Brief Intervention  Alcohol & drug use screenings were reviewed. No concerns regarding substance use disorder identified.     Other Counseling Topics:   Regular weightbearing exercise.     Social Drivers of Health     Financial Resource Strain: Low Risk  (12/22/2023)    Overall Financial Resource Strain (CARDIA)    • Difficulty of Paying Living Expenses: Not hard at all   Food Insecurity: No Food Insecurity (12/27/2024)    Hunger Vital Sign    • Worried About Running Out of Food in the Last Year: Never true    • Ran Out of Food in the Last Year: Never true   Transportation Needs: No Transportation Needs (12/27/2024)    PRAPARE - Transportation    • Lack of Transportation (Medical): No    • Lack of Transportation (Non-Medical): No   Housing Stability: Low Risk  (12/27/2024)    Housing Stability Vital Sign    • Unable to Pay for Housing in the Last Year: No    • Number of Times Moved in the Last Year: 0    • Homeless in the Last Year: No   Utilities: Not At Risk (12/27/2024)    [facility] Utilities    • Threatened with loss of utilities: No     No results found.    Objective   /84   Pulse 74   Ht 5' 3\" (1.6 m)   Wt 93 kg (205 lb)   SpO2 96%   BMI 36.31 kg/m²     Physical Exam  Constitutional:       General: He is not in acute distress.     Appearance: He is not toxic-appearing.   HENT:      Head: Normocephalic.   Cardiovascular:      Rate and Rhythm: Normal rate and regular rhythm.   Pulmonary:      Effort: No respiratory distress.      Breath sounds: No wheezing or rales.   Abdominal:      General: Bowel sounds are normal.      Palpations: " Abdomen is soft.      Tenderness: There is no abdominal tenderness.   Neurological:      Mental Status: He is alert.

## 2024-12-27 NOTE — PATIENT INSTRUCTIONS
Please take furosemide 80 mg in the morning and 40 mg at noon time for 3 days then continue taking Lasix 80 mg in the morning only!      Medicare Preventive Visit Patient Instructions  Thank you for completing your Welcome to Medicare Visit or Medicare Annual Wellness Visit today. Your next wellness visit will be due in one year (12/28/2025).  The screening/preventive services that you may require over the next 5-10 years are detailed below. Some tests may not apply to you based off risk factors and/or age. Screening tests ordered at today's visit but not completed yet may show as past due. Also, please note that scanned in results may not display below.  Preventive Screenings:  Service Recommendations Previous Testing/Comments   Colorectal Cancer Screening  Colonoscopy    Fecal Occult Blood Test (FOBT)/Fecal Immunochemical Test (FIT)  Fecal DNA/Cologuard Test  Flexible Sigmoidoscopy Age: 45-75 years old   Colonoscopy: every 10 years (May be performed more frequently if at higher risk)  OR  FOBT/FIT: every 1 year  OR  Cologuard: every 3 years  OR  Sigmoidoscopy: every 5 years  Screening may be recommended earlier than age 45 if at higher risk for colorectal cancer. Also, an individualized decision between you and your healthcare provider will decide whether screening between the ages of 76-85 would be appropriate. Colonoscopy: 07/20/2017  FOBT/FIT: Not on file  Cologuard: Not on file  Sigmoidoscopy: Not on file          Prostate Cancer Screening Individualized decision between patient and health care provider in men between ages of 55-69   Medicare will cover every 12 months beginning on the day after your 50th birthday PSA: No results in last 5 years     Screening Not Indicated     Hepatitis C Screening Once for adults born between 1945 and 1965  More frequently in patients at high risk for Hepatitis C Hep C Antibody: Not on file        Diabetes Screening 1-2 times per year if you're at risk for diabetes or have  pre-diabetes Fasting glucose: 102 mg/dL (8/9/2024)  A1C: 5.7 % (5/24/2022)  Screening Current   Cholesterol Screening Once every 5 years if you don't have a lipid disorder. May order more often based on risk factors. Lipid panel: 05/24/2022  Screening Not Indicated  History Lipid Disorder      Other Preventive Screenings Covered by Medicare:  Abdominal Aortic Aneurysm (AAA) Screening: covered once if your at risk. You're considered to be at risk if you have a family history of AAA or a male between the age of 65-75 who smoking at least 100 cigarettes in your lifetime.  Lung Cancer Screening: covers low dose CT scan once per year if you meet all of the following conditions: (1) Age 55-77; (2) No signs or symptoms of lung cancer; (3) Current smoker or have quit smoking within the last 15 years; (4) You have a tobacco smoking history of at least 20 pack years (packs per day x number of years you smoked); (5) You get a written order from a healthcare provider.  Glaucoma Screening: covered annually if you're considered high risk: (1) You have diabetes OR (2) Family history of glaucoma OR (3)  aged 50 and older OR (4)  American aged 65 and older  Osteoporosis Screening: covered every 2 years if you meet one of the following conditions: (1) Have a vertebral abnormality; (2) On glucocorticoid therapy for more than 3 months; (3) Have primary hyperparathyroidism; (4) On osteoporosis medications and need to assess response to drug therapy.  HIV Screening: covered annually if you're between the age of 15-65. Also covered annually if you are younger than 15 and older than 65 with risk factors for HIV infection. For pregnant patients, it is covered up to 3 times per pregnancy.    Immunizations:  Immunization Recommendations   Influenza Vaccine Annual influenza vaccination during flu season is recommended for all persons aged >= 6 months who do not have contraindications   Pneumococcal Vaccine   *  Pneumococcal conjugate vaccine = PCV13 (Prevnar 13), PCV15 (Vaxneuvance), PCV20 (Prevnar 20)  * Pneumococcal polysaccharide vaccine = PPSV23 (Pneumovax) Adults 19-65 yo with certain risk factors or if 65+ yo  If never received any pneumonia vaccine: recommend Prevnar 20 (PCV20)  Give PCV20 if previously received 1 dose of PCV13 or PPSV23   Hepatitis B Vaccine 3 dose series if at intermediate or high risk (ex: diabetes, end stage renal disease, liver disease)   Respiratory syncytial virus (RSV) Vaccine - COVERED BY MEDICARE PART D  * RSVPreF3 (Arexvy) CDC recommends that adults 60 years of age and older may receive a single dose of RSV vaccine using shared clinical decision-making (SCDM)   Tetanus (Td) Vaccine - COST NOT COVERED BY MEDICARE PART B Following completion of primary series, a booster dose should be given every 10 years to maintain immunity against tetanus. Td may also be given as tetanus wound prophylaxis.   Tdap Vaccine - COST NOT COVERED BY MEDICARE PART B Recommended at least once for all adults. For pregnant patients, recommended with each pregnancy.   Shingles Vaccine (Shingrix) - COST NOT COVERED BY MEDICARE PART B  2 shot series recommended in those 19 years and older who have or will have weakened immune systems or those 50 years and older     Health Maintenance Due:  There are no preventive care reminders to display for this patient.  Immunizations Due:      Topic Date Due   • HIB Vaccine (1 of 1 - Risk 1-dose series) Never done   • Meningococcal ACWY Vaccine (1 - Risk 2-dose series) Never done   • COVID-19 Vaccine (6 - 2024-25 season) 09/01/2024     Advance Directives   What are advance directives?  Advance directives are legal documents that state your wishes and plans for medical care. These plans are made ahead of time in case you lose your ability to make decisions for yourself. Advance directives can apply to any medical decision, such as the treatments you want, and if you want to donate  organs.   What are the types of advance directives?  There are many types of advance directives, and each state has rules about how to use them. You may choose a combination of any of the following:  Living will:  This is a written record of the treatment you want. You can also choose which treatments you do not want, which to limit, and which to stop at a certain time. This includes surgery, medicine, IV fluid, and tube feedings.   Durable power of  for healthcare (DPAHC):  This is a written record that states who you want to make healthcare choices for you when you are unable to make them for yourself. This person, called a proxy, is usually a family member or a friend. You may choose more than 1 proxy.  Do not resuscitate (DNR) order:  A DNR order is used in case your heart stops beating or you stop breathing. It is a request not to have certain forms of treatment, such as CPR. A DNR order may be included in other types of advance directives.  Medical directive:  This covers the care that you want if you are in a coma, near death, or unable to make decisions for yourself. You can list the treatments you want for each condition. Treatment may include pain medicine, surgery, blood transfusions, dialysis, IV or tube feedings, and a ventilator (breathing machine).  Values history:  This document has questions about your views, beliefs, and how you feel and think about life. This information can help others choose the care that you would choose.  Why are advance directives important?  An advance directive helps you control your care. Although spoken wishes may be used, it is better to have your wishes written down. Spoken wishes can be misunderstood, or not followed. Treatments may be given even if you do not want them. An advance directive may make it easier for your family to make difficult choices about your care.   Weight Management   Why it is important to manage your weight:  Being overweight increases  your risk of health conditions such as heart disease, high blood pressure, type 2 diabetes, and certain types of cancer. It can also increase your risk for osteoarthritis, sleep apnea, and other respiratory problems. Aim for a slow, steady weight loss. Even a small amount of weight loss can lower your risk of health problems.  How to lose weight safely:  A safe and healthy way to lose weight is to eat fewer calories and get regular exercise. You can lose up about 1 pound a week by decreasing the number of calories you eat by 500 calories each day.   Healthy meal plan for weight management:  A healthy meal plan includes a variety of foods, contains fewer calories, and helps you stay healthy. A healthy meal plan includes the following:  Eat whole-grain foods more often.  A healthy meal plan should contain fiber. Fiber is the part of grains, fruits, and vegetables that is not broken down by your body. Whole-grain foods are healthy and provide extra fiber in your diet. Some examples of whole-grain foods are whole-wheat breads and pastas, oatmeal, brown rice, and bulgur.  Eat a variety of vegetables every day.  Include dark, leafy greens such as spinach, kale, troy greens, and mustard greens. Eat yellow and orange vegetables such as carrots, sweet potatoes, and winter squash.   Eat a variety of fruits every day.  Choose fresh or canned fruit (canned in its own juice or light syrup) instead of juice. Fruit juice has very little or no fiber.  Eat low-fat dairy foods.  Drink fat-free (skim) milk or 1% milk. Eat fat-free yogurt and low-fat cottage cheese. Try low-fat cheeses such as mozzarella and other reduced-fat cheeses.  Choose meat and other protein foods that are low in fat.  Choose beans or other legumes such as split peas or lentils. Choose fish, skinless poultry (chicken or turkey), or lean cuts of red meat (beef or pork). Before you cook meat or poultry, cut off any visible fat.   Use less fat and oil.  Try  baking foods instead of frying them. Add less fat, such as margarine, sour cream, regular salad dressing and mayonnaise to foods. Eat fewer high-fat foods. Some examples of high-fat foods include french fries, doughnuts, ice cream, and cakes.  Eat fewer sweets.  Limit foods and drinks that are high in sugar. This includes candy, cookies, regular soda, and sweetened drinks.  Exercise:  Exercise at least 30 minutes per day on most days of the week. Some examples of exercise include walking, biking, dancing, and swimming. You can also fit in more physical activity by taking the stairs instead of the elevator or parking farther away from stores. Ask your healthcare provider about the best exercise plan for you.      © Copyright [employer] 2018 Information is for End User's use only and may not be sold, redistributed or otherwise used for commercial purposes. All illustrations and images included in CareNotes® are the copyrighted property of [employer].A[employer]M., Inc. or [employer]

## 2025-01-09 DIAGNOSIS — M81.0 AGE-RELATED OSTEOPOROSIS WITHOUT CURRENT PATHOLOGICAL FRACTURE: ICD-10-CM

## 2025-01-09 DIAGNOSIS — E21.3 HYPERPARATHYROIDISM (HCC): Primary | ICD-10-CM

## 2025-01-09 RX ORDER — ZOLEDRONIC ACID 0.05 MG/ML
5 INJECTION, SOLUTION INTRAVENOUS ONCE
Status: CANCELLED | OUTPATIENT
Start: 2025-01-10

## 2025-01-09 RX ORDER — SODIUM CHLORIDE 9 MG/ML
20 INJECTION, SOLUTION INTRAVENOUS ONCE
Status: CANCELLED | OUTPATIENT
Start: 2025-01-10

## 2025-01-10 ENCOUNTER — HOSPITAL ENCOUNTER (OUTPATIENT)
Dept: INFUSION CENTER | Facility: HOSPITAL | Age: 85
End: 2025-01-10
Attending: STUDENT IN AN ORGANIZED HEALTH CARE EDUCATION/TRAINING PROGRAM
Payer: COMMERCIAL

## 2025-01-10 VITALS
TEMPERATURE: 97.7 F | BODY MASS INDEX: 36.09 KG/M2 | OXYGEN SATURATION: 94 % | SYSTOLIC BLOOD PRESSURE: 101 MMHG | DIASTOLIC BLOOD PRESSURE: 65 MMHG | RESPIRATION RATE: 18 BRPM | HEART RATE: 70 BPM | HEIGHT: 63 IN | WEIGHT: 203.71 LBS

## 2025-01-10 DIAGNOSIS — M81.0 AGE-RELATED OSTEOPOROSIS WITHOUT CURRENT PATHOLOGICAL FRACTURE: Primary | ICD-10-CM

## 2025-01-10 DIAGNOSIS — E21.3 HYPERPARATHYROIDISM (HCC): ICD-10-CM

## 2025-01-10 PROCEDURE — 96365 THER/PROPH/DIAG IV INF INIT: CPT

## 2025-01-10 RX ORDER — ZOLEDRONIC ACID 0.05 MG/ML
5 INJECTION, SOLUTION INTRAVENOUS ONCE
OUTPATIENT
Start: 2026-01-10

## 2025-01-10 RX ORDER — ZOLEDRONIC ACID 0.05 MG/ML
5 INJECTION, SOLUTION INTRAVENOUS ONCE
Status: COMPLETED | OUTPATIENT
Start: 2025-01-10 | End: 2025-01-10

## 2025-01-10 RX ORDER — SODIUM CHLORIDE 9 MG/ML
20 INJECTION, SOLUTION INTRAVENOUS ONCE
OUTPATIENT
Start: 2026-01-10

## 2025-01-10 RX ORDER — SODIUM CHLORIDE 9 MG/ML
20 INJECTION, SOLUTION INTRAVENOUS ONCE
Status: COMPLETED | OUTPATIENT
Start: 2025-01-10 | End: 2025-01-10

## 2025-01-10 RX ADMIN — SODIUM CHLORIDE 20 ML/HR: 9 INJECTION, SOLUTION INTRAVENOUS at 14:33

## 2025-01-10 RX ADMIN — ZOLEDRONIC ACID 5 MG: 0.05 INJECTION, SOLUTION INTRAVENOUS at 14:33

## 2025-01-10 NOTE — PROGRESS NOTES
Isai Abbott  tolerated treatment well with no complications.      Isai Abbott is aware of future appt on 1/16/2026 at 0900.     AVS printed and given to Isai Abbott:    No (Declined by Isai Abbott)

## 2025-01-12 DIAGNOSIS — I10 ESSENTIAL HYPERTENSION: ICD-10-CM

## 2025-01-12 DIAGNOSIS — N40.0 BENIGN PROSTATIC HYPERPLASIA WITHOUT LOWER URINARY TRACT SYMPTOMS: ICD-10-CM

## 2025-01-13 RX ORDER — SPIRONOLACTONE 25 MG/1
12.5 TABLET ORAL DAILY
Qty: 45 TABLET | Refills: 1 | Status: SHIPPED | OUTPATIENT
Start: 2025-01-13

## 2025-01-13 RX ORDER — DUTASTERIDE 0.5 MG/1
0.5 CAPSULE, LIQUID FILLED ORAL DAILY
Qty: 90 CAPSULE | Refills: 1 | Status: SHIPPED | OUTPATIENT
Start: 2025-01-13

## 2025-01-31 ENCOUNTER — OFFICE VISIT (OUTPATIENT)
Dept: ENDOCRINOLOGY | Facility: HOSPITAL | Age: 85
End: 2025-01-31
Payer: COMMERCIAL

## 2025-01-31 VITALS
BODY MASS INDEX: 36.39 KG/M2 | DIASTOLIC BLOOD PRESSURE: 64 MMHG | HEIGHT: 63 IN | HEART RATE: 56 BPM | WEIGHT: 205.4 LBS | OXYGEN SATURATION: 93 % | SYSTOLIC BLOOD PRESSURE: 102 MMHG

## 2025-01-31 DIAGNOSIS — M81.0 AGE-RELATED OSTEOPOROSIS WITHOUT CURRENT PATHOLOGICAL FRACTURE: Primary | ICD-10-CM

## 2025-01-31 DIAGNOSIS — E21.3 HYPERPARATHYROIDISM (HCC): ICD-10-CM

## 2025-01-31 DIAGNOSIS — Z74.09 IMMOBILITY: ICD-10-CM

## 2025-01-31 PROCEDURE — G2211 COMPLEX E/M VISIT ADD ON: HCPCS | Performed by: PHYSICIAN ASSISTANT

## 2025-01-31 PROCEDURE — 99214 OFFICE O/P EST MOD 30 MIN: CPT | Performed by: PHYSICIAN ASSISTANT

## 2025-01-31 NOTE — PROGRESS NOTES
Isai Abbott 84 y.o. male MRN: [id number]    Encounter: [id number]      Assessment & Plan     Assessment:  This is a 84 y.o.-year-old male with primary hyperparathyroidism with hypercalcemia, and osteoporosis.    Plan:  1.  Primary hyperparathyroidism: Not a surgical candidate.  Manage conservatively as noted below.    2.  Osteoporosis: Most recent Reclast was completed December 2024.  Is due for a DEXA scan at this time.  Once results are in, we will make further recommendations for treatment.  Continue with calcium intake through diet, but increase vitamin D to 800 units daily.  Continue with Reclast at this time.  I also did put in a referral for physical therapy to help with ambulation to reduce risk of falls and fractures.    3.  Hypercalcemia: Most recent calcium came back at 9.0.  Continue with Sensipar 30 mg twice a day.  Will continue to monitor PTH, vitamin D, and calcium.    CC: Hyperparathyroidism and osteoporosis follow-up    History of Present Illness     HPI:  Isai Abbott is a 84 y.o. male with primary hyperparathyroidism, osteoporosis-on Reclast since December 2022, pA.Fib, DVT, CKD3, BPH and immobility who was initially seen on 12/22 for hyperparathyroid management. Hx of elevated calcium- up to 12.6 with  confirming most likely has primary hyperparathyroidism. DXA scan done 11/22 showed Osteoporosis with lowest t score of -3.6 at left femur. Neg Xray abdomen for Nephrocalcinosis/no hx of renal stones. Patient declined parathyroid surgery therefore discussed conservative management.  Doing well today.  Was going to physical therapy on a weekly basis over the last year, but this was discontinued in November.  Wife has noticed a decline in his ability and would feel better to go back to physical therapy.     Osteopororis- Started on Reclast 12/22, last infusion 12/24, did have some side effects of feeling flu like after infusion but recovered after. Denies any recent falls/fractures.  Last DXA scan 12/22.  Has not had an updated DEXA scan completed yet.  No change in height, takes VitD supplement- 400IU daily, Calcium supplement- recommended 1000mg through diet, Weight baring exercises-      Hyperparathyroidism:- Currently on sensipar 30mg twice a day.  Medication was started July 2023.  Patient is tolerating sensipar ok. Labs done December 2024 calcium 9.0, vitD 27 and .4. Last 24hr urine calcium 257.5. denies any kidney stones, any falls/fractures. Is doing PT and reports is walking more at home and would like to continue PT     Social Hx:- lives with wife who is a nurse at the [employer] [place] center. Is immobile for the most part, denies smoking, alcohol use or any other recreational drugs.    Review of Systems   Constitutional:  Negative for activity change, appetite change, chills, diaphoresis, fatigue and unexpected weight change.   HENT:  Negative for sore throat, trouble swallowing and voice change.    Eyes:  Negative for visual disturbance.   Respiratory:  Negative for shortness of breath.    Cardiovascular:  Negative for palpitations and leg swelling.   Gastrointestinal:  Negative for abdominal pain, constipation and diarrhea.   Endocrine: Negative for cold intolerance, heat intolerance, polydipsia, polyphagia and polyuria.   Musculoskeletal:  Positive for gait problem (Utilizing wheelchair).   Skin:  Negative for rash.   Neurological:  Negative for dizziness, tremors, weakness, light-headedness, numbness and headaches.   Hematological:  Negative for adenopathy.   Psychiatric/Behavioral:  Negative for dysphoric mood and sleep disturbance. The patient is not nervous/anxious.        Historical Information   Past Medical History:   Diagnosis Date   • Basal cell carcinoma     nose    • Benign skin lesion    • BPH (benign prostatic hyperplasia)    • Diverticular disease of colon    • DVT (deep venous thrombosis) (HCC)    • Elevated PSA    • History of blood clots    • Hypotension    •  Intestinal obstruction (HCC)    • Social anxiety disorder     last assessed 1/12/17, resolved 10/17/17   • Ventral hernia      Past Surgical History:   Procedure Laterality Date   • APPENDECTOMY     • BASAL CELL CARCINOMA EXCISION      Nose    • HERNIA REPAIR      9 hernia repairs 1995 - 2012   • HIATAL HERNIA REPAIR  1995    with speenectomy   • REPLACEMENT TOTAL KNEE Bilateral    • SPLENECTOMY  1995   • TONSILLECTOMY       Social History   Social History     Substance and Sexual Activity   Alcohol Use Never     Social History     Substance and Sexual Activity   Drug Use No     Social History     Tobacco Use   Smoking Status Never   Smokeless Tobacco Never     Family History:   Family History   Problem Relation Age of Onset   • Other Mother         Bleeding disorder    • Stroke Mother         CVA   • Diabetes Mother    • Heart disease Mother    • Osteoporosis Mother    • Hypothyroidism Mother    • Stroke Father         CVA   • Heart disease Father    • Heart attack Father    • Substance Abuse Neg Hx    • Mental illness Neg Hx        Meds/Allergies   Current Outpatient Medications   Medication Sig Dispense Refill   • amoxicillin (AMOXIL) 500 mg capsule      • apixaban (Eliquis) 2.5 mg Take 1 tablet (2.5 mg total) by mouth 2 (two) times a day 180 tablet 3   • cholecalciferol (VITAMIN D3) 400 units tablet Take 400 Units by mouth daily     • cinacalcet (SENSIPAR) 30 mg tablet TAKE 1 TABLET BY MOUTH TWICE A  tablet 1   • Diclofenac Sodium (VOLTAREN) 1 % Apply 4 g topically 4 (four) times a day (Patient taking differently: Apply 4 g topically daily as needed)     • dutasteride (AVODART) 0.5 mg capsule TAKE 1 CAPSULE BY MOUTH EVERY DAY 90 capsule 1   • ferrous sulfate 325 (65 Fe) mg tablet 1 pill by mouth on Mondays, Wednesdays and Fridays 36 tablet 3   • furosemide (LASIX) 40 mg tablet TAKE 1 TABLET (40 MG TOTAL) BY MOUTH DAILY AS NEEDED (AS NEEEDED LEG EDEMA) 90 tablet 1   • furosemide (LASIX) 80 mg tablet Take 1  "tablet (80 mg total) by mouth every morning 90 tablet 3   • lansoprazole (PREVACID) 30 mg capsule Take 1 capsule (30 mg total) by mouth daily 90 capsule 3   • Multiple Vitamins-Minerals (MULTIVITAMIN ADULT EXTRA C PO)      • nystatin (MYCOSTATIN) cream Apply topically 2 (two) times a day 30 g 5   • ondansetron (ZOFRAN-ODT) 4 mg disintegrating tablet Take 1 tablet (4 mg total) by mouth every 6 (six) hours as needed for nausea or vomiting 20 tablet 5   • polyethylene glycol (MIRALAX) 17 g packet Take by mouth 1 packet daily     • Probiotic Product (SOLUBLE FIBER/PROBIOTICS PO) Take by mouth wife reports the patient takes this daily     • simvastatin (ZOCOR) 10 mg tablet Take 1 tablet (10 mg total) by mouth daily 90 tablet 3   • spironolactone (ALDACTONE) 25 mg tablet TAKE 1/2 TABLET BY MOUTH EVERY DAY 45 tablet 1   • sulfamethoxazole-trimethoprim (BACTRIM DS) 800-160 mg per tablet Take 1 tablet by mouth daily 90 tablet 3   • tamsulosin (FLOMAX) 0.4 mg Take 1 capsule (0.4 mg total) by mouth daily with dinner 90 capsule 3     No current facility-administered medications for this visit.     Allergies   Allergen Reactions   • Ace Inhibitors Cough   • Fosinopril Cough     Cough after years of being on the medication       Objective   Vitals: Blood pressure 102/64, pulse 56, height 5' 2.99\" (1.6 m), weight 93.2 kg (205 lb 6.4 oz), SpO2 93%.    Physical Exam  Vitals and nursing note reviewed.   Constitutional:       General: He is not in acute distress.     Appearance: Normal appearance. He is not diaphoretic.   HENT:      Head: Normocephalic and atraumatic.   Eyes:      General: No scleral icterus.     Extraocular Movements: Extraocular movements intact.      Conjunctiva/sclera: Conjunctivae normal.      Pupils: Pupils are equal, round, and reactive to light.   Cardiovascular:      Rate and Rhythm: Normal rate and regular rhythm.      Heart sounds: No murmur heard.  Pulmonary:      Effort: Pulmonary effort is normal. No " respiratory distress.      Breath sounds: Normal breath sounds. No wheezing.   Musculoskeletal:      Cervical back: Normal range of motion.      Right lower leg: No edema.      Left lower leg: No edema.   Lymphadenopathy:      Cervical: No cervical adenopathy.   Skin:     General: Skin is warm and dry.   Neurological:      Mental Status: He is alert and oriented to person, place, and time. Mental status is at baseline.      Sensory: No sensory deficit.      Gait: Gait abnormal (Utilizing wheelchair).   Psychiatric:         Mood and Affect: Mood normal.         Behavior: Behavior normal.         Thought Content: Thought content normal.         The history was obtained from the review of the chart, patient.    Lab Results:   Lab Results   Component Value Date/Time    Potassium 3.4 (L) 12/13/2024 07:52 AM    Potassium 3.8 08/09/2024 07:12 AM    Potassium 3.6 05/03/2024 07:24 AM    Chloride 102 12/13/2024 07:52 AM    Chloride 103 08/09/2024 07:12 AM    Chloride 102 05/03/2024 07:24 AM    CO2 30 12/13/2024 07:52 AM    CO2 28 08/09/2024 07:12 AM    CO2 29 05/03/2024 07:24 AM    BUN 28 (H) 12/13/2024 07:52 AM    BUN 34 (H) 08/09/2024 07:12 AM    BUN 37 (H) 05/03/2024 07:24 AM    Creatinine 0.86 12/13/2024 07:52 AM    Creatinine 0.85 08/09/2024 07:12 AM    Creatinine 1.04 05/03/2024 07:24 AM    Glucose, Fasting 102 (H) 08/09/2024 07:12 AM    Glucose, Fasting 118 (H) 05/03/2024 07:24 AM    Glucose, Fasting 105 (H) 04/19/2024 07:13 AM    Calcium 9.0 12/13/2024 07:52 AM    Calcium 8.9 08/09/2024 07:12 AM    Calcium 9.0 05/03/2024 07:24 AM    eGFR 80 12/13/2024 07:52 AM    eGFR 81 08/09/2024 07:12 AM    eGFR 67 05/03/2024 07:24 AM    .4 (H) 12/13/2024 07:52 AM    .4 (H) 08/09/2024 07:12 AM    .5 (H) 05/03/2024 07:24 AM    Vit D, 25-Hydroxy 27.2 (L) 12/13/2024 07:52 AM    Vit D, 25-Hydroxy 34.1 08/09/2024 07:12 AM    Vit D, 25-Hydroxy 30.5 05/03/2024 07:24 AM         Imaging Studies:         Results for  orders placed during the hospital encounter of 11/17/22    DXA bone density spine hip and pelvis    Impression  1.  Osteoporosis. [Based on the total left hip]    2.  The 10 year risk of hip fracture is 9.1% with the 10 year risk of major osteoporotic fracture being 17% as calculated by the [employer] fracture risk assessment tool (FRAX, which is based on data generated by the WHO Collaborating [place]  for Metabolic Bone Diseases).  3.  The current NOF guidelines recommend treating patients with a T-score of -2.5 or less in the lumbar spine or hips, or in post-menopausal women and men over the age of 50 with low bone mass (osteopenia) and a FRAX 10 year risk score of >3% for hip  fracture and/or >20% for major osteoporotic fracture.    4.  The NOF recommends follow-up DXA in 1-2 years after initiating therapy for osteoporosis and every 2 years thereafter. More frequent evaluation is appropriate for patients with conditions associated with rapid bone loss, such as glucocorticoid  therapy. The interval between DXA screenings may be longer for individuals without major risk factors and initial T-score in the normal or upper low bone mass range.      The FRAX algorithm has certain limitations:  -FRAX has not been validated in patients currently or previously treated with pharmacotherapy for osteoporosis.  In such patients, clinical judgment must be exercised in interpreting FRAX scores.  -Prior hip, vertebral and humeral fragility fractures appear to confer greater risk of subsequent fracture than fractures at other sites (this is especially true for individuals with severe vertebral fractures), but quantification of this incremental  risk is not possible with FRAX.  -FRAX underestimates fracture risk in patients with history of multiple fragility fractures.  -FRAX may underestimate fracture risk in patients with history of frequent falls.  -It is not appropriate to use FRAX to monitor treatment  "response.      WHO CLASSIFICATION:  Normal (a T-score of -1.0 or higher)  Low bone mineral density (a T-score of less than -1.0 but higher than -2.5)  Osteoporosis (a T-score of -2.5 or less)  Severe osteoporosis (a T-score of -2.5 or less with a fragility fracture)                Workstation performed: [id number]            Results Review Statement: I reviewed radiology reports from this admission including: Dexa.    Portions of the record may have been created with voice recognition software. Occasional wrong word or \"sound a like\" substitutions may have occurred due to the inherent limitations of voice recognition software. Read the chart carefully and recognize, using context, where substitutions have occurred.    "

## 2025-01-31 NOTE — PATIENT INSTRUCTIONS
Get DEXA scan completed at earliest convenience.    Continue with Sensipar 30 mg twice a day.    Continue with vitamin D supplement.    Contact the office with any concerns or questions.    Follow-up in 6 months with labwork completed prior to visit.

## 2025-03-06 ENCOUNTER — HOSPITAL ENCOUNTER (OUTPATIENT)
Dept: BONE DENSITY | Facility: IMAGING CENTER | Age: 85
Discharge: HOME | End: 2025-03-06
Payer: COMMERCIAL

## 2025-03-06 VITALS — BODY MASS INDEX: 39.58 KG/M2 | HEIGHT: 60 IN | WEIGHT: 201.6 LBS

## 2025-03-06 DIAGNOSIS — E21.3 HYPERPARATHYROIDISM (HCC): ICD-10-CM

## 2025-03-06 DIAGNOSIS — M81.0 AGE-RELATED OSTEOPOROSIS WITHOUT CURRENT PATHOLOGICAL FRACTURE: ICD-10-CM

## 2025-03-06 PROCEDURE — 77080 DXA BONE DENSITY AXIAL: CPT

## 2025-03-10 ENCOUNTER — RESULTS FOLLOW-UP (OUTPATIENT)
Dept: ENDOCRINOLOGY | Facility: HOSPITAL | Age: 85
End: 2025-03-10

## 2025-03-21 ENCOUNTER — EVALUATION (OUTPATIENT)
Facility: CLINIC | Age: 85
End: 2025-03-21
Payer: COMMERCIAL

## 2025-03-21 DIAGNOSIS — Z74.09 IMMOBILITY: ICD-10-CM

## 2025-03-21 DIAGNOSIS — M81.0 AGE-RELATED OSTEOPOROSIS WITHOUT CURRENT PATHOLOGICAL FRACTURE: ICD-10-CM

## 2025-03-21 PROCEDURE — 97163 PT EVAL HIGH COMPLEX 45 MIN: CPT | Performed by: PHYSICAL THERAPIST

## 2025-03-21 NOTE — PROGRESS NOTES
PT Evaluation     Today's date: 3/21/2025  Patient name: Isai Abbott  : 1940  MRN: [id number]  Referring provider: Yovani Vizcarra, *  Dx:   Encounter Diagnosis     ICD-10-CM    1. Age-related osteoporosis without current pathological fracture  M81.0 Ambulatory Referral to Physical Therapy      2. Immobility  Z74.09 Ambulatory Referral to Physical Therapy          Start Time: 805  Stop Time: 855  Total time in clinic (min): 50 minutes    Assessment  Impairments: abnormal coordination, abnormal gait, activity intolerance, impaired balance, impaired physical strength, lacks appropriate home exercise program, safety issue and poor posture     Assessment details: Patient presents to skilled PT with history of immobility and decreased strength.  He admits to relying on lift chair to get up and sleeps in this same chair.   Patient displays Abnormal muscle strength with overall grade of 3-/5. Patient sensation is diminished throughout lower extremities. Patient coordination is Abnormal per alterate toe tapping and heel to shin test.   Patient balance scores are as follows: 34/56 AMEZCUA, 28 seconds TUG with Assistive Device with overall results noting high risk for falls.  Patient endurance scores are as follows; 287 feet with  5 minute walk , unable to complete 6 min walk test with RW ( Device ), 27.4 seconds with 5 x sit to stand test requiring UE assist with overall results noting decrease functional endurance and cardio capacity.  His overall confidence is also lessened scoring 28.75 on ABC.  Patient subjective report notes the following functional limitation with limited tolerance to ambulation around his home and difficulty on stairs. Patient will benefit from skilled PT to address noted impairments and functional limitations they are causing with overall goal to return patient to highest level possible with reduced risk for falls.       Please contact me if you have any questions or recommendations.  Thank you for the referral and the opportunity to share in Mallie's care.    Patient verbalized understanding of POC              Understanding of Dx/Px/POC: good     Prognosis: good    Goals  Goals  STG 5 weeks  Patient will improve static balance with feet together Eyes Closed Firm surface to 30 seconds indicating reduction in fall risk  Patient will achieve 190 feet improvement with overall distance achieved of 477 feet with 6 minute walk test which is Minimal Detectable Change pre current research standards with endurance to demonstrate enhance functional capacity   Patient will display 2.3  second improvement with overall score of 25.7 seconds  with TUG test or lower with noted improvement being Minimal Detectable Change pre current research standards with fall risk.    Patient will achieve 40/56 AMEZCUA score with minimal improve by 6 points or more demonstrating Minimal Detectable change per current research standards for this objective test which assess fall risk.   Patient will perform  5 x sit to stand test with overall reduction by 3 seconds to 24.4 score indicating improvement with functional endurance  Patient will be independent in basic strengthening and balance HEP    LTG: 10 weeks   Patient will score low risk for falls with 3/4 fall risk measures   Patient will improve ABC scoring by 25% demonstrating overall improved confidence to home tasks  Patient will be able to carry objects without loss of balance  Patient will be able to ambulate outdoors without any loss of balance  Patient will be independent in community based HEP    Cut off score   All date taken from [facility] Neuro Section or Rehab Measures    AMEZCUA test: 46/56                                              5 x STS Test:  MDC: 6 points                                                  MDC: 2.3 seconds   age norms                                                                 Age Norms   60-69 year old = M: 55, F: 55                        60-69  year old: 11.4 seconds   70-79 year old = M 54,  F: 53                       70-79 year old: 12.6 seconds    80-89 year old = M53,   F: 50                       80-89 year old: 14.8 seconds     TUG test:                                                                     10 Meter Walk Test:  MDC: 4.14 seconds       MDC: .59 ft/sec  Cut off score for Falls                                                  Age Norms  > 13.5 seconds community dwelling adults                20-29; M: 4.56 ft/sec F: 4.62 ft/sec  > 32.2 Frail Elderly                                                     30-39: M 4.76 ft/sec  F: 4.68 ft/sec          40-49: M: 4.79 ft/sec  F: 4.62 ft/sec  6 Minute Walk Test      50-59: M: 4.76 ft/sec  F: 4.56 ft/sec  MDC: 190 feet       60-69: M: 4.56 ft/sec  F: 4.26 ft/sec  Age Norms       70-+    M: 4.36 ft/sec  F: 4.16 ft/sec  60-69:    M: 1876 F: 1765  70-79:    M: 1729 F: 1545  80-89 +: M: 1368 F; 1286     Plan  Patient would benefit from: skilled physical therapy  Planned modality interventions: cryotherapy and thermotherapy: hydrocollator packs    Planned therapy interventions: manual therapy, motor coordination training, neuromuscular re-education, patient education, postural training, sensory integrative techniques, strengthening, stretching, therapeutic activities, therapeutic exercise, gait training, home exercise program, coordination and balance    Frequency: 2x week  Duration in weeks: 10  Treatment plan discussed with: patient        Subjective Evaluation    History of Present Illness  Mechanism of injury: Patient arrived accompanied by his wife, with his RW.  No recent falls noted and no current complaints of pain.  He does experience leg cramps intermittently noted particularly with prolonged sitting.  He denies any dizziness or lightheadedness.  He states going up the stairs is the hardest thing he has to do.  He was receiving PT in the past, and feels he has regressed.    Pain  Current pain  ratin  At best pain ratin  At worst pain rating: 3  Location: leg cramps    Social Support  Steps to enter house: yes  1  Stairs in house: yes   14  Lives in: multiple-level home  Lives with: spouse    Treatments  Previous treatment: physical therapy        Objective     Neurological Testing     Additional Neurological Details  Pitting edema B/L LE    Strength/Myotome Testing     Left Hip   Planes of Motion   Flexion: 3+ and 4-  Extension: 3-  Abduction: 3-    Right Hip   Planes of Motion   Flexion: 3+  Extension: 3-  Abduction: 3- and 2+    Left Knee   Flexion: 4-  Extension: 4    Right Knee   Flexion: 3+  Extension: 4-    Left Ankle/Foot   Dorsiflexion: 3-  Plantar flexion: 3-    Right Ankle/Foot   Dorsiflexion: 2 and 2+  Plantar flexion: 3-  Neuro Exam:     Sensation   Light touch LE: left WNL and right WNL    Coordination   Finger to nose: left WNL and right WNL  Rapid alternating movements: LE impaired and UE Impaired    Transfers   Sit to stand: independent     Functional outcomes   Functional outcome gait comment: Shuffling gait pattern with decreased LE clearance right worse than left.  Increased forward flexion, downward gaze;              Daily Treatment Diary     Precautions: h/o multiple hernia repairs, h/o TKR x2 right, x1 left; h/o splenectomy, h/o osteoporosis, h/o DVT - Minerva filter in place;         POC Expires Reeval for Medicare to be completed  Unit Limit Auth Expiration Date PT/OT/STVisit Limit   2025 By visit N/A BOMN 2025 BOMN    Completed on visit                    DATE 3/21          Auth Status           Approved          Visits # 1         Visits remaining          TESTING          AMEZCUA 34/56         TUG W/ RW: 28         5x STS (with UE) 27.4sec         6 min walk test 5 min completed, 287' w/ RW         ABC 28.75         Neuro Re-Ed          Hurdles          Static balance          Cone taps          Gait with negotiation around obstacles/backward stepping                                                                       Ther Ex          STS                                                                                      Ther Activity                                Gait Training                                Manuals

## 2025-03-23 DIAGNOSIS — E21.3 HYPERPARATHYROIDISM (HCC): ICD-10-CM

## 2025-03-24 RX ORDER — CINACALCET 30 MG/1
30 TABLET, FILM COATED ORAL 2 TIMES DAILY
Qty: 180 TABLET | Refills: 1 | Status: SHIPPED | OUTPATIENT
Start: 2025-03-24

## 2025-03-25 ENCOUNTER — OFFICE VISIT (OUTPATIENT)
Facility: CLINIC | Age: 85
End: 2025-03-25
Payer: COMMERCIAL

## 2025-03-25 DIAGNOSIS — M81.0 AGE-RELATED OSTEOPOROSIS WITHOUT CURRENT PATHOLOGICAL FRACTURE: Primary | ICD-10-CM

## 2025-03-25 PROCEDURE — 97112 NEUROMUSCULAR REEDUCATION: CPT | Performed by: PHYSICAL THERAPIST

## 2025-03-25 PROCEDURE — 97110 THERAPEUTIC EXERCISES: CPT | Performed by: PHYSICAL THERAPIST

## 2025-03-25 NOTE — PROGRESS NOTES
Daily Note     Today's date: 3/25/2025  Patient name: Isai Abbott  : 1940  MRN: [id number]  Referring provider: Yovani Vizcarra, *  Dx:   Encounter Diagnosis     ICD-10-CM    1. Age-related osteoporosis without current pathological fracture  M81.0           Start Time: 220  Stop Time: 0300  Total time in clinic (min): 40 minutes    Subjective: Patient arrived with RW, no current complaints.        Objective: See treatment diary below      Assessment:   Significant posterior pelvic tilt contributing to decreased sit to stand mobility.  Performed Assisted anterior pelvic tilt with strap with improving range and extension.  Discussed positioning at home, current lift chair has arm rests which are too high to allow for ease in pushing up to stand.  Recommended balance pad to elevate seat height with good demonstration in clinic.  Rows with TB initiated to allow for further gains in extension, patient able to use LE as anchor for resistance.  Anterior pelvic tilts for HEP.  Educated patient to continue at home with good demonstration and voiced understanding.  Written instructions given.        Plan: Continue per plan of care.   No complaints end of session.  Continue to reinforce sit to stand at home to promote increased standing and mobility.     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;         POC Expires Reeval for Medicare to be completed  Unit Limit Auth Expiration Date PT/OT/STVisit Limit   2025 By visit N/A BOMN 2025 BOMN    Completed on visit                    DATE 3/21 3/25         Auth Status           Approved          Visits # 1 2        Visits remaining          TESTING          SEVEN 34/56         TUG W/ RW: 28         5x STS (with UE) 27.4sec         6 min walk test 5 min completed, 287' w/ RW         ABC 28.75         Neuro Re-Ed          Hurdles           Static balance          Cone taps          Gait with negotiation around obstacles/backward stepping          Anterior pelvic tilt in sitting  With strap assist 10x2, without assist x10        Standing weight shift  10x2                                                Ther Ex          STS  5x2 off pad        Rows  Red TB 10x2         postural ed  VM                                                               Ther Activity                                Gait Training                                Manuals

## 2025-04-01 ENCOUNTER — OFFICE VISIT (OUTPATIENT)
Facility: CLINIC | Age: 85
End: 2025-04-01
Payer: COMMERCIAL

## 2025-04-01 DIAGNOSIS — M81.0 AGE-RELATED OSTEOPOROSIS WITHOUT CURRENT PATHOLOGICAL FRACTURE: Primary | ICD-10-CM

## 2025-04-01 DIAGNOSIS — Z74.09 IMMOBILITY: ICD-10-CM

## 2025-04-01 PROCEDURE — 97110 THERAPEUTIC EXERCISES: CPT

## 2025-04-01 PROCEDURE — 97112 NEUROMUSCULAR REEDUCATION: CPT

## 2025-04-01 NOTE — PROGRESS NOTES
Daily Note     Today's date: 2025  Patient name: Isai Abbott  : 1940  MRN: [id number]  Referring provider: Yovani Vizcarra, *  Dx:   Encounter Diagnosis     ICD-10-CM    1. Age-related osteoporosis without current pathological fracture  M81.0       2. Immobility  Z74.09             Start Time: 1415  Stop Time: 1500  Total time in clinic (min): 45 minutes    Subjective: Patient reports no new complaints and is agreeable to PT session.       Objective: See treatment diary below      Assessment:  Pt continues to present with excessive forward flexed posture; Vcs for increased thoracic ext with poor carry over. Pt also required Vcs for increased step length especially with LLE during bwd amb. Noted slight improvement with amb with arms overhand in order to facilitate increased posterior chain activation to assist in upright posture. Pt will benefit from continued skilled PT in order to address limitations in strength, balance and endurance to maximize overall functional mobility.         Plan: Continue per plan of care.   No complaints end of session.  Continue to reinforce sit to stand at home to promote increased standing and mobility.     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;         POC Expires Reeval for Medicare to be completed  Unit Limit Auth Expiration Date PT/OT/STVisit Limit   2025 By visit N/A BOMN 2025 BOMN    Completed on visit                    DATE 3/21 3/25 4/1        Auth Status           Approved          Visits # 1 2 3       Visits remaining          TESTING          AMEZCUA 34/56         TUG W/ RW: 28         5x STS (with UE) 27.4sec         6 min walk test 5 min completed, 287' w/ RW         ABC 28.75         Neuro Re-Ed          Hurdles          Static balance          Cone taps          Gait with negotiation around obstacles/backward  stepping          Anterior pelvic tilt in sitting  With strap assist 10x2, without assist x10        Standing weight shift  10x2        Fwd/bwd walking   SOLO in // w/ no UE support x3 laps        Thoracic ext   BUE support on // against GTB x10        Side steps   SOLO in // no ue support x1 lap       Amb w/ arms overhead   SOLO in // x2 laps       Ther Ex          STS  5x2 off pad 3x6 off pad    2x6 w/ BUE support no pad       Rows  Red TB 10x2         postural ed  VM                                                               Ther Activity                                Gait Training                                Manuals

## 2025-04-07 ENCOUNTER — TELEPHONE (OUTPATIENT)
Age: 85
End: 2025-04-07

## 2025-04-07 DIAGNOSIS — T84.53XA INFECTION ASSOCIATED WITH INTERNAL RIGHT KNEE PROSTHESIS, INITIAL ENCOUNTER (HCC): ICD-10-CM

## 2025-04-07 DIAGNOSIS — I82.5Y3 CHRONIC DEEP VEIN THROMBOSIS (DVT) OF PROXIMAL VEIN OF BOTH LOWER EXTREMITIES (HCC): ICD-10-CM

## 2025-04-07 RX ORDER — SULFAMETHOXAZOLE AND TRIMETHOPRIM 800; 160 MG/1; MG/1
1 TABLET ORAL DAILY
Qty: 90 TABLET | Refills: 3 | Status: SHIPPED | OUTPATIENT
Start: 2025-04-07 | End: 2026-04-02

## 2025-04-07 NOTE — TELEPHONE ENCOUNTER
Medication: Eliquis 2.5mg    Dose/Frequency: 1 tab twice day    Quantity: 180    Pharmacy: [facility]     Office:   [x] PCP/Provider -   [] Speciality/Provider -     Does the patient have enough for 3 days?   [x] Yes   [] No - Send as HP to POD

## 2025-04-07 NOTE — TELEPHONE ENCOUNTER
Patient called asking for refill of Bactrim but not on current med list.  Patient would like to know if it can be represcribed.  Please advise.

## 2025-04-08 ENCOUNTER — OFFICE VISIT (OUTPATIENT)
Facility: CLINIC | Age: 85
End: 2025-04-08
Payer: COMMERCIAL

## 2025-04-08 DIAGNOSIS — I82.5Y3 CHRONIC DEEP VEIN THROMBOSIS (DVT) OF PROXIMAL VEIN OF BOTH LOWER EXTREMITIES (HCC): ICD-10-CM

## 2025-04-08 DIAGNOSIS — Z74.09 IMMOBILITY: ICD-10-CM

## 2025-04-08 DIAGNOSIS — M81.0 AGE-RELATED OSTEOPOROSIS WITHOUT CURRENT PATHOLOGICAL FRACTURE: Primary | ICD-10-CM

## 2025-04-08 PROCEDURE — 97110 THERAPEUTIC EXERCISES: CPT | Performed by: PHYSICAL THERAPIST

## 2025-04-08 PROCEDURE — 97112 NEUROMUSCULAR REEDUCATION: CPT | Performed by: PHYSICAL THERAPIST

## 2025-04-08 NOTE — PROGRESS NOTES
"Daily Note     Today's date: 2025  Patient name: Isai Abbott  : 1940  MRN: [id number]  Referring provider: Yovani Vizcarra, *  Dx:   Encounter Diagnosis     ICD-10-CM    1. Age-related osteoporosis without current pathological fracture  M81.0       2. Immobility  Z74.09               Start Time: 0440  Stop Time: 05  Total time in clinic (min): 43 minutes    Subjective:   Patient indicates he has not been doing his exercises, \"he didn't feel he could master them\".  He doesn't have any current complaints.        Objective: See treatment diary below      Assessment:    Reviewed scap retraction with and without TB to encourage increased extension with good demonstration.  Visual cuing to allow feedback for scap retraction assisting cervical extension.  Patient continues to sleep in his lift chair.  Limited ambulation around home with RW.  Encouraged hourly extension to facilitate increased motion with voiced understanding and voiced understanding.  Added hurdles with good clearance over low height.          Plan: Continue per plan of care.   No complaints end of session.  Continue to reinforce sit to stand at home to promote increased standing and mobility.     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;         POC Expires Reeval for Medicare to be completed  Unit Limit Auth Expiration Date PT/OT/STVisit Limit   2025 By visit N/A BOMN 2025 BOMN    Completed on visit                    DATE 3/21 3/25 4/1 4/8       Auth Status           Approved          Visits # 1 2 3 4      Visits remaining          TESTING          SEVEN 34/56         TUG W/ RW: 28         5x STS (with UE) 27.4sec         6 min walk test 5 min completed, 287' w/ RW         ABC 28.75         Neuro Re-Ed          Hurdles          Static balance          Cone taps          Gait with negotiation " around obstacles/backward stepping    Hurdles low 5x4 laps      Anterior pelvic tilt in sitting  With strap assist 10x2, without assist x10        Standing weight shift  10x2        Fwd/bwd walking   SOLO in // w/ no UE support x3 laps        Thoracic ext   BUE support on // against GTB x10        Side steps   SOLO in // no ue support x1 lap In //bars 4 laps      Amb w/ arms overhead   SOLO in // x2 laps       Ther Ex          STS  5x2 off pad 3x6 off pad    2x6 w/ BUE support no pad W/ B/L UE 5x2      Rows  Red TB 10x2  Red TB 5x, no TB 5x2       postural ed  VM  VM                                                             Ther Activity                                Gait Training                                Manuals

## 2025-04-09 DIAGNOSIS — I82.5Y3 CHRONIC DEEP VEIN THROMBOSIS (DVT) OF PROXIMAL VEIN OF BOTH LOWER EXTREMITIES (HCC): ICD-10-CM

## 2025-04-10 ENCOUNTER — RA CDI HCC (OUTPATIENT)
Dept: OTHER | Facility: HOSPITAL | Age: 85
End: 2025-04-10

## 2025-04-10 RX ORDER — APIXABAN 2.5 MG/1
2.5 TABLET, FILM COATED ORAL 2 TIMES DAILY
Qty: 180 TABLET | Refills: 3 | OUTPATIENT
Start: 2025-04-10

## 2025-04-11 ENCOUNTER — APPOINTMENT (OUTPATIENT)
Dept: LAB | Facility: HOSPITAL | Age: 85
End: 2025-04-11
Payer: COMMERCIAL

## 2025-04-11 DIAGNOSIS — I10 ESSENTIAL HYPERTENSION: ICD-10-CM

## 2025-04-11 DIAGNOSIS — M81.0 AGE-RELATED OSTEOPOROSIS WITHOUT CURRENT PATHOLOGICAL FRACTURE: ICD-10-CM

## 2025-04-11 DIAGNOSIS — E21.3 HYPERPARATHYROIDISM (HCC): ICD-10-CM

## 2025-04-11 LAB
ALBUMIN SERPL BCG-MCNC: 3.7 G/DL (ref 3.5–5)
ALP SERPL-CCNC: 117 U/L (ref 34–104)
ALT SERPL W P-5'-P-CCNC: 21 U/L (ref 7–52)
ANION GAP SERPL CALCULATED.3IONS-SCNC: 9 MMOL/L (ref 4–13)
AST SERPL W P-5'-P-CCNC: 22 U/L (ref 13–39)
BILIRUB SERPL-MCNC: 0.42 MG/DL (ref 0.2–1)
BUN SERPL-MCNC: 27 MG/DL (ref 5–25)
CALCIUM SERPL-MCNC: 8.9 MG/DL (ref 8.4–10.2)
CHLORIDE SERPL-SCNC: 102 MMOL/L (ref 96–108)
CO2 SERPL-SCNC: 30 MMOL/L (ref 21–32)
CREAT SERPL-MCNC: 0.99 MG/DL (ref 0.6–1.3)
GFR SERPL CREATININE-BSD FRML MDRD: 70 ML/MIN/1.73SQ M
GLUCOSE P FAST SERPL-MCNC: 114 MG/DL (ref 65–99)
POTASSIUM SERPL-SCNC: 3.7 MMOL/L (ref 3.5–5.3)
PROT SERPL-MCNC: 6.1 G/DL (ref 6.4–8.4)
SODIUM SERPL-SCNC: 141 MMOL/L (ref 135–147)

## 2025-04-11 PROCEDURE — 36415 COLL VENOUS BLD VENIPUNCTURE: CPT

## 2025-04-11 PROCEDURE — 80053 COMPREHEN METABOLIC PANEL: CPT

## 2025-04-13 DIAGNOSIS — E78.00 PURE HYPERCHOLESTEROLEMIA: ICD-10-CM

## 2025-04-13 DIAGNOSIS — K21.9 GASTROESOPHAGEAL REFLUX DISEASE WITHOUT ESOPHAGITIS: ICD-10-CM

## 2025-04-14 ENCOUNTER — RESULTS FOLLOW-UP (OUTPATIENT)
Dept: ENDOCRINOLOGY | Facility: HOSPITAL | Age: 85
End: 2025-04-14

## 2025-04-14 DIAGNOSIS — E78.00 PURE HYPERCHOLESTEROLEMIA: ICD-10-CM

## 2025-04-14 DIAGNOSIS — K21.9 GASTROESOPHAGEAL REFLUX DISEASE WITHOUT ESOPHAGITIS: ICD-10-CM

## 2025-04-14 RX ORDER — LANSOPRAZOLE 30 MG/1
30 CAPSULE, DELAYED RELEASE ORAL DAILY
Qty: 90 CAPSULE | Refills: 1 | Status: SHIPPED | OUTPATIENT
Start: 2025-04-14 | End: 2025-04-14 | Stop reason: SDUPTHER

## 2025-04-14 RX ORDER — SIMVASTATIN 10 MG
10 TABLET ORAL DAILY
Qty: 90 TABLET | Refills: 3 | Status: SHIPPED | OUTPATIENT
Start: 2025-04-14

## 2025-04-14 RX ORDER — LANSOPRAZOLE 30 MG/1
30 CAPSULE, DELAYED RELEASE ORAL DAILY
Qty: 90 CAPSULE | Refills: 3 | Status: SHIPPED | OUTPATIENT
Start: 2025-04-14

## 2025-04-14 RX ORDER — SIMVASTATIN 10 MG
10 TABLET ORAL DAILY
Qty: 30 TABLET | Refills: 0 | Status: SHIPPED | OUTPATIENT
Start: 2025-04-14 | End: 2025-04-14 | Stop reason: SDUPTHER

## 2025-04-15 DIAGNOSIS — E21.3 HYPERPARATHYROIDISM (HCC): Primary | ICD-10-CM

## 2025-04-15 DIAGNOSIS — M81.0 AGE-RELATED OSTEOPOROSIS WITHOUT CURRENT PATHOLOGICAL FRACTURE: ICD-10-CM

## 2025-04-18 ENCOUNTER — OFFICE VISIT (OUTPATIENT)
Dept: FAMILY MEDICINE CLINIC | Facility: HOSPITAL | Age: 85
End: 2025-04-18
Payer: COMMERCIAL

## 2025-04-18 ENCOUNTER — OFFICE VISIT (OUTPATIENT)
Facility: CLINIC | Age: 85
End: 2025-04-18
Payer: COMMERCIAL

## 2025-04-18 VITALS
BODY MASS INDEX: 37.57 KG/M2 | RESPIRATION RATE: 16 BRPM | OXYGEN SATURATION: 95 % | WEIGHT: 199 LBS | DIASTOLIC BLOOD PRESSURE: 76 MMHG | SYSTOLIC BLOOD PRESSURE: 114 MMHG | HEIGHT: 61 IN | HEART RATE: 72 BPM

## 2025-04-18 DIAGNOSIS — I10 ESSENTIAL HYPERTENSION: ICD-10-CM

## 2025-04-18 DIAGNOSIS — I82.5Y3 CHRONIC DEEP VEIN THROMBOSIS (DVT) OF PROXIMAL VEIN OF BOTH LOWER EXTREMITIES (HCC): ICD-10-CM

## 2025-04-18 DIAGNOSIS — I48.0 PAF (PAROXYSMAL ATRIAL FIBRILLATION) (HCC): Primary | ICD-10-CM

## 2025-04-18 DIAGNOSIS — T84.53XS INFECTION ASSOCIATED WITH INTERNAL RIGHT KNEE PROSTHESIS, SEQUELA: ICD-10-CM

## 2025-04-18 DIAGNOSIS — Z74.09 IMMOBILITY: ICD-10-CM

## 2025-04-18 DIAGNOSIS — L85.3 DRY SKIN: ICD-10-CM

## 2025-04-18 DIAGNOSIS — M81.0 AGE-RELATED OSTEOPOROSIS WITHOUT CURRENT PATHOLOGICAL FRACTURE: Primary | ICD-10-CM

## 2025-04-18 DIAGNOSIS — E21.3 HYPERPARATHYROIDISM (HCC): ICD-10-CM

## 2025-04-18 DIAGNOSIS — E66.01 SEVERE OBESITY (BMI 35.0-39.9) WITH COMORBIDITY (HCC): ICD-10-CM

## 2025-04-18 DIAGNOSIS — N40.0 BENIGN PROSTATIC HYPERPLASIA WITHOUT LOWER URINARY TRACT SYMPTOMS: ICD-10-CM

## 2025-04-18 DIAGNOSIS — D50.9 IRON DEFICIENCY ANEMIA, UNSPECIFIED IRON DEFICIENCY ANEMIA TYPE: ICD-10-CM

## 2025-04-18 PROCEDURE — G2211 COMPLEX E/M VISIT ADD ON: HCPCS | Performed by: INTERNAL MEDICINE

## 2025-04-18 PROCEDURE — 99214 OFFICE O/P EST MOD 30 MIN: CPT | Performed by: INTERNAL MEDICINE

## 2025-04-18 PROCEDURE — 97110 THERAPEUTIC EXERCISES: CPT | Performed by: PHYSICAL THERAPIST

## 2025-04-18 PROCEDURE — 97140 MANUAL THERAPY 1/> REGIONS: CPT | Performed by: PHYSICAL THERAPIST

## 2025-04-18 RX ORDER — AMMONIUM LACTATE 12 G/100G
CREAM TOPICAL AS NEEDED
Qty: 140 G | Refills: 5 | Status: SHIPPED | OUTPATIENT
Start: 2025-04-18

## 2025-04-18 NOTE — PROGRESS NOTES
Name: Isai Abbott      : 1940      MRN: [id number]  Encounter Provider: Tiffanie Anthony MD  Encounter Date: 2025   Encounter department: [facility]IMARY [facility] [address]  :  Assessment & Plan  PAF (paroxysmal atrial fibrillation) (Formerly [facility])  Patient has history of PAF.  Denies palpitations.  Heart rhythm is regular today.  He denies signs of GI or  bleeding.  His creatinine was 0.99 on   I increased the dose of Eliquis to 5 mg twice daily from 2.5 mg twice daily for seizure prophylaxis.  I will check his hemoglobin and iron studies in July with the rest of his ordered blood work.  His hemoglobin and iron studies were normal on   Orders:  •  apixaban (Eliquis) 5 mg; Take 1 tablet (5 mg total) by mouth 2 (two) times a day    Chronic deep vein thrombosis (DVT) of proximal vein of both lower extremities (Formerly [facility])  Patient has chronic lower extremity DVT, history of PE, status post IVC filter.  Denies signs of GI bleeding.  Continue Eliquis       Hyperparathyroidism (Formerly [facility])  Patient has hyperparathyroidism.  He is on Sensipar, Reclast and vitamin D.  Reviewed DEXA scan report from  that showed worsening of the osteoporosis in his left hip and left forearm.  Patient is not a surgical candidate.  We are controlling his hyperparathyroidism with Sensipar   His PTH and calcium will be checked in July       Essential hypertension  Patient's blood pressure is controlled today.  Electrolytes and renal function are stable on .  He will continue furosemide and spironolactone       Infection associated with internal right knee prosthesis, sequela  He has history of right knee prosthesis infection.  He is on chronic suppression with Bactrim.  He had no erythema, effusion or warmth of the right knee today on exam       Severe obesity (BMI 35.0-39.9) with comorbidity (Formerly [facility])  BMI is 37.6.  He has hypertension and severe obesity.  He is watching his food intake, he increase his protein  "intake because his protein was mildly creased at 6.1 on April 11.  Albumin was normal.         Benign prostatic hyperplasia without lower urinary tract symptoms  He has BPH with frequency.  Denies dysuria or hematuria.  He feels that his urination is stable.  Continue Flomax       Dry skin  He has dry skin of the feet, he sees wound care who recommended Lac-Hydrin.  I prescribed Lac-Hydrin.  Orders:  •  ammonium lactate (LAC-HYDRIN) 12 % cream; Apply topically as needed for dry skin    Iron deficiency anemia, unspecified iron deficiency anemia type  He has history of iron deficiency anemia.  I will check his hemoglobin and iron levels and July and he will call if he sees any signs of GI or  bleeding after increasing the dose of Eliquis to 5 mg twice daily      Orders:  •  CBC and Platelet; Future  •  Iron Panel (Includes Ferritin, Iron Sat%, Iron, and TIBC); Future           History of Present Illness   HPI  Review of Systems    Objective   /76 (BP Location: Left arm, Patient Position: Sitting, Cuff Size: Standard)   Pulse 72   Resp 16   Ht 5' 1\" (1.549 m)   Wt 90.3 kg (199 lb)   SpO2 95%   BMI 37.60 kg/m²      Physical Exam  Constitutional:       General: He is not in acute distress.     Appearance: He is not toxic-appearing.   HENT:      Head: Normocephalic.   Cardiovascular:      Rate and Rhythm: Normal rate and regular rhythm.      Heart sounds: No murmur heard.  Pulmonary:      Effort: No respiratory distress.      Breath sounds: No wheezing or rales.   Abdominal:      Palpations: Abdomen is soft.      Tenderness: There is no abdominal tenderness.   Neurological:      Mental Status: He is alert.         "

## 2025-04-18 NOTE — ASSESSMENT & PLAN NOTE
He has BPH with frequency.  Denies dysuria or hematuria.  He feels that his urination is stable.  Continue Flomax

## 2025-04-18 NOTE — ASSESSMENT & PLAN NOTE
Patient's blood pressure is controlled today.  Electrolytes and renal function are stable on April 11.  He will continue furosemide and spironolactone

## 2025-04-18 NOTE — PROGRESS NOTES
Daily Note     Today's date: 2025  Patient name: Isai Abbott  : 1940  MRN: [id number]  Referring provider: Yovani Vizcarra, *  Dx:   Encounter Diagnosis     ICD-10-CM    1. Age-related osteoporosis without current pathological fracture  M81.0       2. Immobility  Z74.09                 Start Time: 1250  Stop Time: 1330  Total time in clinic (min): 40 minutes    Subjective:   Patient indicates he has been working on increased standing at home and has been flattening his chair further back.  No current complaints.      Objective: See treatment diary below      Assessment:    Patient states he continues to have difficulty with sit to stand.  Transfer to mat today with ability to slowly extend with wedge behind down to one pillow.  Hip/knee extension down to mat with iliopsoas release.  SCM tightness limiting cervical retraction improved post release.  Continue to reinforce importance of postural positioning to allow for strengthening.  Sit to stand with improved exertion after stretch.        Plan: Continue per plan of care.   No complaints end of session.  Continue to reinforce sit to stand at home to promote increased standing and mobility.     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;         POC Expires Reeval for Medicare to be completed  Unit Limit Auth Expiration Date PT/OT/STVisit Limit   2025 By visit N/A BOMN 2025 BOMN    Completed on visit                    DATE 3/21 3/25 4/1 4/8 4/18      Auth Status           Approved          Visits # 1 2 3 4 5     Visits remaining          TESTING          AMEZCUA 34/56         TUG W/ RW: 28         5x STS (with UE) 27.4sec         6 min walk test 5 min completed, 287' w/ RW         ABC 28.75         Neuro Re-Ed          Hurdles          Static balance          Cone taps          Gait with negotiation around  obstacles/backward stepping    Hurdles low 5x4 laps      Anterior pelvic tilt in sitting  With strap assist 10x2, without assist x10        Standing weight shift  10x2        Fwd/bwd walking   SOLO in // w/ no UE support x3 laps        Thoracic ext   BUE support on // against GTB x10        Side steps   SOLO in // no ue support x1 lap In //bars 4 laps      Amb w/ arms overhead   SOLO in // x2 laps       Ther Ex          STS  5x2 off pad 3x6 off pad    2x6 w/ BUE support no pad W/ B/L UE 5x2      Rows  Red TB 10x2  Red TB 5x, no TB 5x2       postural ed  VM  VM       Supine positioning - hip/knee extension     5xea      Cervical retraction     5x2      STS     Raised mat 5x2                           Ther Activity                                Gait Training                                Manuals          SCM, Pect major, iliopsoas TPR     VM

## 2025-04-18 NOTE — ASSESSMENT & PLAN NOTE
He has history of iron deficiency anemia.  I will check his hemoglobin and iron levels and July and he will call if he sees any signs of GI or  bleeding after increasing the dose of Eliquis to 5 mg twice daily      Orders:  •  CBC and Platelet; Future  •  Iron Panel (Includes Ferritin, Iron Sat%, Iron, and TIBC); Future

## 2025-04-18 NOTE — ASSESSMENT & PLAN NOTE
He has history of right knee prosthesis infection.  He is on chronic suppression with Bactrim.  He had no erythema, effusion or warmth of the right knee today on exam

## 2025-04-18 NOTE — ASSESSMENT & PLAN NOTE
Patient has history of PAF.  Denies palpitations.  Heart rhythm is regular today.  He denies signs of GI or  bleeding.  His creatinine was 0.99 on April 11  I increased the dose of Eliquis to 5 mg twice daily from 2.5 mg twice daily for seizure prophylaxis.  I will check his hemoglobin and iron studies in July with the rest of his ordered blood work.  His hemoglobin and iron studies were normal on December 13  Orders:  •  apixaban (Eliquis) 5 mg; Take 1 tablet (5 mg total) by mouth 2 (two) times a day

## 2025-04-18 NOTE — ASSESSMENT & PLAN NOTE
Patient has hyperparathyroidism.  He is on Sensipar, Reclast and vitamin D.  Reviewed DEXA scan report from March 6 that showed worsening of the osteoporosis in his left hip and left forearm.  Patient is not a surgical candidate.  We are controlling his hyperparathyroidism with Sensipar   His PTH and calcium will be checked in July

## 2025-04-18 NOTE — ASSESSMENT & PLAN NOTE
Patient has chronic lower extremity DVT, history of PE, status post IVC filter.  Denies signs of GI bleeding.  Continue Eliquis

## 2025-04-18 NOTE — ASSESSMENT & PLAN NOTE
BMI is 37.6.  He has hypertension and severe obesity.  He is watching his food intake, he increase his protein intake because his protein was mildly creased at 6.1 on April 11.  Albumin was normal.

## 2025-04-25 ENCOUNTER — OFFICE VISIT (OUTPATIENT)
Facility: CLINIC | Age: 85
End: 2025-04-25
Payer: COMMERCIAL

## 2025-04-25 DIAGNOSIS — Z74.09 IMMOBILITY: ICD-10-CM

## 2025-04-25 DIAGNOSIS — M81.0 AGE-RELATED OSTEOPOROSIS WITHOUT CURRENT PATHOLOGICAL FRACTURE: Primary | ICD-10-CM

## 2025-04-25 PROCEDURE — 97140 MANUAL THERAPY 1/> REGIONS: CPT | Performed by: PHYSICAL THERAPIST

## 2025-04-25 PROCEDURE — 97110 THERAPEUTIC EXERCISES: CPT | Performed by: PHYSICAL THERAPIST

## 2025-04-25 NOTE — PROGRESS NOTES
Daily Note     Today's date: 2025  Patient name: Isai Abbott  : 1940  MRN: [id number]  Referring provider: Yovani Vizcarra, *  Dx:   Encounter Diagnosis     ICD-10-CM    1. Age-related osteoporosis without current pathological fracture  M81.0       2. Immobility  Z74.09                   Start Time: 0940  Stop Time: 1030  Total time in clinic (min): 50 minutes    Subjective:   No current complaints and no increase in soreness after last session.      Objective: See treatment diary below      Assessment:    Overall improved transition to mat with gradual increase in extension on wedge.  Added hip abd/add and quad sets to allow for improved LE activation.  Discussed current shoes as they are very heavy, patient agreed to bring his other pair next session.  Wife instructed in assisted rows to allow for improved trunk extension.  Use of sitting on balance pad significantly improves postural position.  Recommend seat adjustment at home with voiced understanding.  Added resisted lateral flexion right cervical to improve cervical alignment with good tolerance.        Plan: Continue per plan of care.   No complaints end of session.  Continue to reinforce sit to stand at home to promote increased standing and mobility.     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;         POC Expires Reeval for Medicare to be completed  Unit Limit Auth Expiration Date PT/OT/STVisit Limit   2025 By visit N/A BOMN 2025 BOMN    Completed on visit                    DATE 3/21 3/25 4/1 4/8 4/18 4/25     Auth Status           Approved          Visits # 1 2 3 4 5 6    Visits remaining          TESTING          AMEZCUA 34/56         TUG W/ RW: 28         5x STS (with UE) 27.4sec         6 min walk test 5 min completed, 287' w/ RW         ABC 28.75         Neuro Re-Ed          Hurdles         "  Static balance          Cone taps          Gait with negotiation around obstacles/backward stepping    Hurdles low 5x4 laps      Anterior pelvic tilt in sitting  With strap assist 10x2, without assist x10        Standing weight shift  10x2        Fwd/bwd walking   SOLO in // w/ no UE support x3 laps    With RW 40' x4 with cuing for trunk extension    Thoracic ext   BUE support on // against GTB x10        Side steps   SOLO in // no ue support x1 lap In //bars 4 laps      Amb w/ arms overhead   SOLO in // x2 laps       Ther Ex          STS  5x2 off pad 3x6 off pad    2x6 w/ BUE support no pad W/ B/L UE 5x2      Rows  Red TB 10x2  Red TB 5x, no TB 5x2  Standing with red TB 5x3     postural ed  VM  VM       Supine positioning - hip/knee extension     5xea 5xea     Cervical retraction     5x2 5x2     STS     Raised mat 5x2      Hip add/abd supine      5x2     Quad/glut sets      10\" x5    Ther Activity                                Gait Training                                Manuals          SCM, Pect major, iliopsoas TPR     VM VM- left SCM     Resisted lateral neck flex right to increase left release      10\" x3                    "

## 2025-05-02 ENCOUNTER — OFFICE VISIT (OUTPATIENT)
Facility: CLINIC | Age: 85
End: 2025-05-02
Payer: COMMERCIAL

## 2025-05-02 DIAGNOSIS — M81.0 AGE-RELATED OSTEOPOROSIS WITHOUT CURRENT PATHOLOGICAL FRACTURE: Primary | ICD-10-CM

## 2025-05-02 DIAGNOSIS — R11.2 NAUSEA AND VOMITING, UNSPECIFIED VOMITING TYPE: ICD-10-CM

## 2025-05-02 DIAGNOSIS — Z74.09 IMMOBILITY: ICD-10-CM

## 2025-05-02 PROCEDURE — 97110 THERAPEUTIC EXERCISES: CPT | Performed by: PHYSICAL THERAPIST

## 2025-05-02 PROCEDURE — 97112 NEUROMUSCULAR REEDUCATION: CPT | Performed by: PHYSICAL THERAPIST

## 2025-05-02 RX ORDER — ONDANSETRON 4 MG/1
4 TABLET, ORALLY DISINTEGRATING ORAL EVERY 6 HOURS PRN
Qty: 20 TABLET | Refills: 5 | Status: SHIPPED | OUTPATIENT
Start: 2025-05-02

## 2025-05-02 NOTE — TELEPHONE ENCOUNTER
Medication: ondansetron (ZOFRAN-ODT) 4 mg disintegrating tablet     Dose/Frequency:     Take 1 tablet (4 mg total) by mouth every 6 (six) hours as needed for nausea or vomiting       Quantity: 20 tablet     Pharmacy: [id number] - [place], [place] - [address]     Office:   [x] PCP/Provider -   [] Speciality/Provider -     Does the patient have enough for 3 days?   [] Yes   [x] No - Send as HP to POD

## 2025-05-02 NOTE — PROGRESS NOTES
Daily Note     Today's date: 2025  Patient name: Isai Abbott  : 1940  MRN: [id number]  Referring provider: Yovani Vizcarra, *  Dx:   Encounter Diagnosis     ICD-10-CM    1. Age-related osteoporosis without current pathological fracture  M81.0       2. Immobility  Z74.09                   Start Time: 1246  Stop Time: 1330  Total time in clinic (min): 44 minutes    Subjective:   No current complaints and no increase in soreness after last session.      Objective: See treatment diary below      Assessment:     Cuing for forward flexion with STS, improving ease.  Added bridging to increase extensor strengthening with good demonstration and voiced understanding.  Discussed home activity to promote increased walking and STS.  Improved transition sit to from supine      Plan: Continue per plan of care.   No complaints end of session.  Continue to reinforce sit to stand at home to promote increased standing and mobility.     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;         POC Expires Reeval for Medicare to be completed  Unit Limit Auth Expiration Date PT/OT/STVisit Limit   2025 By visit N/A BOMN 2025 BOMN    Completed on visit                    DATE 3/21 4/1 4/8 4/18 4/25 5/2    Auth Status          Approved         Visits # 1 3 4 5 6 7   Visits remaining         TESTING         AMEZCUA 34/56        TUG W/ RW: 28        5x STS (with UE) 27.4sec        6 min walk test 5 min completed, 287' w/ RW        ABC 28.75        Neuro Re-Ed         Hurdles         Static balance         Cone taps         Gait with negotiation around obstacles/backward stepping   Hurdles low 5x4 laps      Anterior pelvic tilt in sitting         Standing weight shift         Fwd/bwd walking  SOLO in // w/ no UE support x3 laps    With RW 40' x4 with cuing for trunk extension With ' with cuing  "for trunk extension;   Thoracic ext  BUE support on // against GTB x10        Side steps  SOLO in // no ue support x1 lap In //bars 4 laps      Amb w/ arms overhead  SOLO in // x2 laps       Ther Ex         STS  3x6 off pad    2x6 w/ BUE support no pad W/ B/L UE 5x2   5x3 off raised height   Rows   Red TB 5x, no TB 5x2  Standing with red TB 5x3 Supine 5x3    postural ed   VM       Supine positioning - hip/knee extension    5xea 5xea     Cervical retraction    5x2 5x2 5x2 supine    STS    Raised mat 5x2     Bridging      5x2    Hip add/abd supine     5x2 5x2    Quad/glut sets     10\" x5 10\" x4   Ther Activity                             Gait Training                             Manuals         SCM, Pect major, iliopsoas TPR    VM VM- left SCM VM- B/L SCM    Resisted lateral neck flex right to increase left release     10\" x3                    "

## 2025-05-04 DIAGNOSIS — I10 ESSENTIAL HYPERTENSION: ICD-10-CM

## 2025-05-04 DIAGNOSIS — N40.0 BENIGN PROSTATIC HYPERPLASIA WITHOUT LOWER URINARY TRACT SYMPTOMS: ICD-10-CM

## 2025-05-05 RX ORDER — TAMSULOSIN HYDROCHLORIDE 0.4 MG/1
0.4 CAPSULE ORAL
Qty: 90 CAPSULE | Refills: 1 | Status: SHIPPED | OUTPATIENT
Start: 2025-05-05

## 2025-05-05 RX ORDER — FUROSEMIDE 80 MG/1
80 TABLET ORAL EVERY MORNING
Qty: 90 TABLET | Refills: 1 | Status: SHIPPED | OUTPATIENT
Start: 2025-05-05

## 2025-05-09 ENCOUNTER — OFFICE VISIT (OUTPATIENT)
Facility: CLINIC | Age: 85
End: 2025-05-09
Payer: COMMERCIAL

## 2025-05-09 DIAGNOSIS — M81.0 AGE-RELATED OSTEOPOROSIS WITHOUT CURRENT PATHOLOGICAL FRACTURE: ICD-10-CM

## 2025-05-09 DIAGNOSIS — Z74.09 IMMOBILITY: Primary | ICD-10-CM

## 2025-05-09 PROCEDURE — 97140 MANUAL THERAPY 1/> REGIONS: CPT | Performed by: PHYSICAL THERAPIST

## 2025-05-09 PROCEDURE — 97110 THERAPEUTIC EXERCISES: CPT | Performed by: PHYSICAL THERAPIST

## 2025-05-09 NOTE — PROGRESS NOTES
Daily Note     Today's date: 2025  Patient name: Isai Abbott  : 1940  MRN: [id number]  Referring provider: Yovani Vizcarra, *  Dx:   Encounter Diagnosis     ICD-10-CM    1. Immobility  Z74.09       2. Age-related osteoporosis without current pathological fracture  M81.0                     Start Time: 142  Stop Time: 224  Total time in clinic (min): 42 minutes    Subjective:   No current complaints and no increase in soreness after last session.      Objective: See treatment diary below      Assessment:     Marching in supine with improved exertion.  Repeated in sitting with improved performance.  Discussed his difficulty getting dressed to lift his legs into and out of his pants.  Instructed in use of reacher and given HEP for hip flexion with good demonstration to improve strength.  Extension stretching continues to be beneficial to increase his mobility and is tolerated well on wedge.      Plan: Continue per plan of care.   No complaints end of session.  Continue to reinforce sit to stand at home to promote increased standing and mobility.     Daily Treatment Diary     Precautions: h/o multiple hernia repairs, h/o TKR x2 right, x1 left; h/o splenectomy, h/o osteoporosis, h/o DVT - [place] filter in place;         POC Expires Reeval for Medicare to be completed  Unit Limit Auth Expiration Date PT/OT/STVisit Limit   2025 By visit N/A BOMN 2025 BOMN    Completed on visit                    DATE 3/21 4/8 4/18 4/25 5/2 5/9    Auth Status          Approved         Visits # 1 4 5 6 7 8   Visits remaining         TESTING         AMEZCUA 34/56        TUG W/ RW: 28        5x STS (with UE) 27.4sec        6 min walk test 5 min completed, 287' w/ RW        ABC 28.75        Neuro Re-Ed         Hurdles         Static balance         Cone taps         Gait with negotiation around obstacles/backward stepping  Hurdles low 5x4 laps       Anterior pelvic tilt in sitting         Standing weight shift  "                 Fwd/bwd walking    With RW 40' x4 with cuing for trunk extension With ' with cuing for trunk extension;    Thoracic ext         Side steps  In //bars 4 laps       Amb w/ arms overhead         Ther Ex         STS  W/ B/L UE 5x2   5x3 off raised height    Rows  Red TB 5x, no TB 5x2  Standing with red TB 5x3 Supine 5x3 Supine 5x3    postural ed  VM        Supine positioning - hip/knee extension   5xea 5xea      Cervical retraction   5x2 5x2 5x2 supine 5x2    STS   Raised mat 5x2      Bridging     5x2    Seated marching      5x2   Supine marching      5x2    Hip add/abd supine    5x2 5x2 5x2    Quad/glut sets    10\" x5 10\" x4 10\" x4   Ther Activity                             Gait Training                             Manuals         SCM, Pect major, iliopsoas TPR   VM VM- left SCM VM- B/L SCM VM- B/L pect release, UT release    Resisted lateral neck flex right to increase left release    10\" x3                     "

## 2025-05-15 ENCOUNTER — OFFICE VISIT (OUTPATIENT)
Facility: CLINIC | Age: 85
End: 2025-05-15
Payer: COMMERCIAL

## 2025-05-15 DIAGNOSIS — Z74.09 IMMOBILITY: Primary | ICD-10-CM

## 2025-05-15 DIAGNOSIS — M81.0 AGE-RELATED OSTEOPOROSIS WITHOUT CURRENT PATHOLOGICAL FRACTURE: ICD-10-CM

## 2025-05-15 PROCEDURE — 97140 MANUAL THERAPY 1/> REGIONS: CPT | Performed by: PHYSICAL THERAPIST

## 2025-05-15 PROCEDURE — 97110 THERAPEUTIC EXERCISES: CPT | Performed by: PHYSICAL THERAPIST

## 2025-05-15 NOTE — PROGRESS NOTES
Daily Note     Today's date: 5/15/2025  Patient name: Isai Abbott  : 1940  MRN: [id number]  Referring provider: Yovani Vizcarra, *  Dx:   Encounter Diagnosis     ICD-10-CM    1. Immobility  Z74.09       2. Age-related osteoporosis without current pathological fracture  M81.0                       Start Time: 0303  Stop Time: 0350  Total time in clinic (min): 47 minutes    Subjective:   He states he is feeling tired today with overall stiffness.        Objective: See treatment diary below      Assessment:   Tolerance to increased extension on wedge with good tolerance.  Reviewed HEP for LE stretching/strengthening with good tolerance.  Assistance continues to be needed supine to/from sit.  Initiated ambulation along // bars with elevated hand support- with ability to look forward and achieve increased thoracic extension.  Elevated RW with improved position in gait.  Discussed lack of support with increased fatigue, however, patient indicates he likes being able to see forward with new height.    Plan: Continue per plan of care.   No complaints end of session.  Assess tolerance to increased device height.  Continue to progress extension strengthening.     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;         POC Expires Reeval for Medicare to be completed  Unit Limit Auth Expiration Date PT/OT/STVisit Limit   2025 By visit N/A BOMN 2025 BOMN    Completed on visit                    DATE 3/21 4/18 4/25 5/2 5/9 5/15    Auth Status          Approved         Visits # 1 5 6 7 8 9   Visits remaining         TESTING         SEVEN 34/56        TUG W/ RW: 28        5x STS (with UE) 27.4sec        6 min walk test 5 min completed, 287' w/ RW        ABC 28.75        Neuro Re-Ed         Hurdles         Static balance         Cone taps         Gait with negotiation around  "obstacles/backward stepping         Anterior pelvic tilt in sitting         Standing weight shift                  Fwd/bwd walking   With RW 40' x4 with cuing for trunk extension With ' with cuing for trunk extension;  Walking along // bars level 49, sidestepping 4 laps    Thoracic ext         Side steps         Amb w/ arms overhead         Ther Ex         STS    5x3 off raised height     Rows   Standing with red TB 5x3 Supine 5x3 Supine 5x3     postural ed          Supine positioning - hip/knee extension  5xea 5xea   5x2    Cervical retraction  5x2 5x2 5x2 supine 5x2 5x2    STS  Raised mat 5x2       Bridging    5x2  5x2 off wedge   Seated marching     5x2 5x2   Supine marching     5x2 5x2    Hip add/abd supine   5x2 5x2 5x2 5x2    Quad/glut sets   10\" x5 10\" x4 10\" x4 10\" x4ea   Ther Activity                             Gait Training                             Manuals         SCM, Pect major, iliopsoas TPR  VM VM- left SCM VM- B/L SCM VM- B/L pect release, UT release VM- right SCM, pect release    Resisted lateral neck flex right to increase left release   10\" x3                      "

## 2025-05-23 ENCOUNTER — APPOINTMENT (OUTPATIENT)
Facility: CLINIC | Age: 85
End: 2025-05-23
Payer: COMMERCIAL

## 2025-05-30 ENCOUNTER — OFFICE VISIT (OUTPATIENT)
Facility: CLINIC | Age: 85
End: 2025-05-30
Payer: COMMERCIAL

## 2025-05-30 DIAGNOSIS — Z74.09 IMMOBILITY: ICD-10-CM

## 2025-05-30 DIAGNOSIS — M81.0 AGE-RELATED OSTEOPOROSIS WITHOUT CURRENT PATHOLOGICAL FRACTURE: Primary | ICD-10-CM

## 2025-05-30 PROCEDURE — 97112 NEUROMUSCULAR REEDUCATION: CPT | Performed by: PHYSICAL THERAPIST

## 2025-05-30 PROCEDURE — 97110 THERAPEUTIC EXERCISES: CPT | Performed by: PHYSICAL THERAPIST

## 2025-05-30 NOTE — PROGRESS NOTES
PT PROGRESS NOTE/ DAILY NOTE    Today's date: 2025  Patient name: Isai Abbott  : 1940  MRN: [id number]  Referring provider: Yovani Vizcarra, *  Dx:   Encounter Diagnosis     ICD-10-CM    1. Age-related osteoporosis without current pathological fracture  M81.0       2. Immobility  Z74.09           Start Time: 1026  Stop Time: 1105  Total time in clinic (min): 39 minutes    Assessment  Impairments: abnormal coordination, abnormal gait, activity intolerance, impaired balance, impaired physical strength, lacks appropriate home exercise program, safety issue and poor posture     Assessment details: Patient presents to skilled PT with history of immobility and decreased strength.  He admits to relying on lift chair to get up and sleeps in this same chair.   Patient displays Abnormal muscle strength with overall grade of 3-/5. Patient sensation is diminished throughout lower extremities. Patient coordination is Abnormal per alterate toe tapping and heel to shin test.   Patient balance scores are as follows: 34/56 AMEZCUA, 28 seconds TUG with Assistive Device with overall results noting high risk for falls.  Patient endurance scores are as follows; 287 feet with  5 minute walk , unable to complete 6 min walk test with RW ( Device ), 27.4 seconds with 5 x sit to stand test requiring UE assist with overall results noting decrease functional endurance and cardio capacity.  His overall confidence is also lessened scoring 28.75 on ABC.  Patient subjective report notes the following functional limitation with limited tolerance to ambulation around his home and difficulty on stairs. Patient will benefit from skilled PT to address noted impairments and functional limitations they are causing with overall goal to return patient to highest level possible with reduced risk for falls.       Please contact me if you have any questions or recommendations. Thank you for the referral and the opportunity to share in  Isai's care.    Patient verbalized understanding of POC    5/30  Patient has made nice gains in improved 5x STS and improved TUG.  He is more confident on ABC scoring and demonstrates nice gains with ability to complete 6 min walk test achieving 420'.  Recommend continuation of PT per POC.  Patient in agreement with treatment plan.  Reviewed HEP and continue to encourage distance ambulation to facilitate increased endurance.  Readjusted his walker to allow for improved height.              Understanding of Dx/Px/POC: good     Prognosis: good    Goals  Goals  STG 5 weeks  Patient will improve static balance with feet together Eyes Closed Firm surface to 30 seconds indicating reduction in fall risk- mostly met  Patient will achieve 190 feet improvement with overall distance achieved of 477 feet with 6 minute walk test which is Minimal Detectable Change pre current research standards with endurance to demonstrate enhance functional capacity - mostly met  Patient will display 2.3  second improvement with overall score of 25.7 seconds  with TUG test or lower with noted improvement being Minimal Detectable Change pre current research standards with fall risk.- MET    Patient will achieve 40/56 AMEZCUA score with minimal improve by 6 points or more demonstrating Minimal Detectable change per current research standards for this objective test which assess fall risk- held  Patient will perform  5 x sit to stand test with overall reduction by 3 seconds to 24.4 score indicating improvement with functional endurance- mostly met  Patient will be independent in basic strengthening and balance HEP- MET    LTG: 10 weeks   Patient will score low risk for falls with 3/4 fall risk measures   Patient will improve ABC scoring by 25% demonstrating overall improved confidence to home tasks  Patient will be able to carry objects without loss of balance  Patient will be able to ambulate outdoors without any loss of balance  Patient will be  independent in community based HEP    Cut off score   All date taken from [facility] Neuro Section or Rehab Measures    AMEZCUA test: 46/56                                              5 x STS Test:  MDC: 6 points                                                  MDC: 2.3 seconds   age norms                                                                 Age Norms   60-69 year old = M: 55, F: 55                        60-69 year old: 11.4 seconds   70-79 year old = M 54,  F: 53                       70-79 year old: 12.6 seconds    80-89 year old = M53,   F: 50                       80-89 year old: 14.8 seconds     TUG test:                                                                     10 Meter Walk Test:  MDC: 4.14 seconds       MDC: .59 ft/sec  Cut off score for Falls                                                  Age Norms  > 13.5 seconds community dwelling adults                20-29; M: 4.56 ft/sec F: 4.62 ft/sec  > 32.2 Frail Elderly                                                     30-39: M 4.76 ft/sec  F: 4.68 ft/sec          40-49: M: 4.79 ft/sec  F: 4.62 ft/sec  6 Minute Walk Test      50-59: M: 4.76 ft/sec  F: 4.56 ft/sec  MDC: 190 feet       60-69: M: 4.56 ft/sec  F: 4.26 ft/sec  Age Norms       70-+    M: 4.36 ft/sec  F: 4.16 ft/sec  60-69:    M: 1876 F: 1765  70-79:    M: 1729 F: 1545  80-89 +: M: 1368 F; 1286     Plan  Patient would benefit from: skilled physical therapy  Planned modality interventions: cryotherapy and thermotherapy: hydrocollator packs    Planned therapy interventions: manual therapy, motor coordination training, neuromuscular re-education, patient education, postural training, sensory integrative techniques, strengthening, stretching, therapeutic activities, therapeutic exercise, gait training, home exercise program, coordination and balance    Frequency: 2x week  Duration in weeks: 6  Treatment plan discussed with: patient        Subjective Evaluation    History of Present  Illness  Mechanism of injury: Patient arrived accompanied by his wife, with his RW.  No recent falls noted and no current complaints of pain.  He does experience leg cramps intermittently noted particularly with prolonged sitting.  He denies any dizziness or lightheadedness.  He states going up the stairs is the hardest thing he has to do.  He was receiving PT in the past, and feels he has regressed.        Slight pain noted mostly lumbar.  Bruising left eyelid noted, per patient he walked into the refrigerator door.  No fall noted.    Pain  Current pain ratin  At best pain ratin  At worst pain rating: 3  Location: leg cramps    Social Support  Steps to enter house: yes  1  Stairs in house: yes   14  Lives in: multiple-level home  Lives with: spouse    Treatments  Previous treatment: physical therapy        Objective     Neurological Testing     Additional Neurological Details  Pitting edema B/L LE    Strength/Myotome Testing     Left Hip   Planes of Motion   Flexion: 3+ and 4-  Extension: 3-  Abduction: 3-    Right Hip   Planes of Motion   Flexion: 3+  Extension: 3-  Abduction: 3- and 2+    Left Knee   Flexion: 4-  Extension: 4    Right Knee   Flexion: 3+  Extension: 4-    Left Ankle/Foot   Dorsiflexion: 3-  Plantar flexion: 3-    Right Ankle/Foot   Dorsiflexion: 2 and 2+  Plantar flexion: 3-  Neuro Exam:     Sensation   Light touch LE: left WNL and right WNL    Coordination   Finger to nose: left WNL and right WNL  Rapid alternating movements: LE impaired and UE Impaired    Transfers   Sit to stand: independent     Functional outcomes   Functional outcome gait comment: Shuffling gait pattern with decreased LE clearance right worse than left.  Increased forward flexion, downward gaze;        Improved LE clearance in stepping, fatigue with occasional standing rests.  Readjusted walker due to too much height currently not allowing for UE support.            Daily Treatment Diary     Precautions: h/o  multiple hernia repairs, h/o TKR x2 right, x1 left; h/o splenectomy, h/o osteoporosis, h/o DVT - darrel filter in place;         POC Expires Reeval for Medicare to be completed  Unit Limit Auth Expiration Date PT/OT/STVisit Limit   5/30/2025 By visit N/A BOMN 12/31/2025 BOMN    Completed on visit                    DATE 3/21 5/30         Auth Status           Approved          Visits # 1 10        Visits remaining          TESTING          AMEZCUA 34/56         TUG W/ RW: 28 W/ RW 25.9        5x STS (with UE) 27.4sec W/ UE 26 sec        6 min walk test 5 min completed, 287' w/ RW 6 min 420' w/ RW        ABC 28.75 48.13        Neuro Re-Ed          Hurdles          Static balance          Cone taps          Gait with negotiation around obstacles/backward stepping  100' x2                                                                    Ther Ex          STS          Bridging  x4         Scap retraction  5x2                                                               Ther Activity                                Gait Training                                Manuals

## 2025-06-06 ENCOUNTER — OFFICE VISIT (OUTPATIENT)
Facility: CLINIC | Age: 85
End: 2025-06-06
Attending: PHYSICIAN ASSISTANT
Payer: COMMERCIAL

## 2025-06-06 DIAGNOSIS — M81.0 AGE-RELATED OSTEOPOROSIS WITHOUT CURRENT PATHOLOGICAL FRACTURE: Primary | ICD-10-CM

## 2025-06-06 DIAGNOSIS — Z74.09 IMMOBILITY: ICD-10-CM

## 2025-06-06 PROCEDURE — 97112 NEUROMUSCULAR REEDUCATION: CPT | Performed by: PHYSICAL THERAPIST

## 2025-06-06 PROCEDURE — 97110 THERAPEUTIC EXERCISES: CPT | Performed by: PHYSICAL THERAPIST

## 2025-06-06 NOTE — PROGRESS NOTES
Daily Note     Today's date: 2025  Patient name: Isai Abbott  : 1940  MRN: [id number]  Referring provider: Yovani Vizcarra, *  Dx:   Encounter Diagnosis     ICD-10-CM    1. Age-related osteoporosis without current pathological fracture  M81.0       2. Immobility  Z74.09                         Start Time: 1230  Stop Time: 1315  Total time in clinic (min): 45 minutes    Subjective:   Continued fatigue, slight increase in LBP.       Objective: See treatment diary below      Assessment:   Thoracic extension stretch on wedge with good tolerance.  LE strengthening with avoidance today of bridging due to increased LBP.  Posterior pelvic tilt with decreased LBP noted, patient encouraged to perform at home.  Ambulation with RW with good tolerance, discussed ways he can increase ambulation at home.  Scap retraction with use of band tied to doorknob to allow for more frequent strengthening without assist of wife.      Plan: Continue per plan of care.   No complaints end of session.  Continue to progress extension strengthening.     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;         POC Expires Reeval for Medicare to be completed  Unit Limit Auth Expiration Date PT/OT/STVisit Limit   2025 By visit N/A BOMN 2025 BOMN    Completed on visit                    DATE 3/21 5/9 5/15 5/30 6/6    Auth Status         Approved        Visits # 1 8 9 10 11   Visits remaining    PROGRESS NOTE    TESTING        AMEZCUA 34/56       TUG W/ RW: 28       5x STS (with UE) 27.4sec       6 min walk test 5 min completed, 287' w/ RW       ABC 28.75       Neuro Re-Ed        Hurdles        Static balance        Cone taps        Gait with negotiation around obstacles/backward stepping        Anterior pelvic tilt in sitting        Standing weight shift                Fwd/bwd walking   Walking along //  "bars level 49, sidestepping 4 laps   Walking RW 60'x2   Thoracic ext        Side steps        Amb w/ arms overhead        Ther Ex        STS        Rows  Supine 5x3   TB red seated 10x2    postural ed         Supine positioning - hip/knee extension   5x2  5x2    Cervical retraction  5x2 5x2      STS        Bridging   5x2 off wedge     Seated marching  5x2 5x2     Scap retraction     Red TB 10x   SLR     5x2   Posterior pelvic tilt     10\" x5   Supine marching  5x2 5x2  5x2    Hip add/abd supine  5x2 5x2      Quad/glut sets  10\" x4 10\" x4ea  10\" x4   Ther Activity                          Gait Training                          Manuals        SCM, Pect major, iliopsoas TPR  VM- B/L pect release, UT release VM- right SCM, pect release  VM- B/L pect release    Resisted lateral neck flex right to increase left release                        "

## 2025-06-13 ENCOUNTER — OFFICE VISIT (OUTPATIENT)
Facility: CLINIC | Age: 85
End: 2025-06-13
Attending: PHYSICIAN ASSISTANT
Payer: COMMERCIAL

## 2025-06-13 DIAGNOSIS — M81.0 AGE-RELATED OSTEOPOROSIS WITHOUT CURRENT PATHOLOGICAL FRACTURE: ICD-10-CM

## 2025-06-13 DIAGNOSIS — Z74.09 IMMOBILITY: Primary | ICD-10-CM

## 2025-06-13 PROCEDURE — 97110 THERAPEUTIC EXERCISES: CPT | Performed by: PHYSICAL THERAPIST

## 2025-06-13 PROCEDURE — 97140 MANUAL THERAPY 1/> REGIONS: CPT | Performed by: PHYSICAL THERAPIST

## 2025-06-13 NOTE — PROGRESS NOTES
Daily Note     Today's date: 2025  Patient name: Isai Abbott  : 1940  MRN: [id number]  Referring provider: Yovnai Vizcarra, *  Dx:   Encounter Diagnosis     ICD-10-CM    1. Immobility  Z74.09       2. Age-related osteoporosis without current pathological fracture  M81.0                           Start Time: 1241  Stop Time: 1320  Total time in clinic (min): 39 minutes    Subjective:   He arrived with RW, no current complaints.        Objective: See treatment diary below      Assessment:   Bridging off wedge to allow for decreased lumbar irritation with good tolerance.  Hip  tightness into abduction on right and tightness left df responding well to stretch.  Added anterior/posterior pelvic tilts in sitting to promote motion and extension with good demonstration, written instructions given.        Plan: Continue per plan of care.   No complaints end of session.  Continue to progress extension strengthening.     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;         POC Expires Reeval for Medicare to be completed  Unit Limit Auth Expiration Date PT/OT/STVisit Limit   2025 By visit N/A BOMN 2025 BOMN    Completed on visit                    DATE 3/21 5/9 5/15 5/30 6/6 6/13    Auth Status          Approved         Visits # 1 8 9 10 11 12   Visits remaining    PROGRESS NOTE     TESTING         AMEZCUA 34/56        TUG W/ RW: 28        5x STS (with UE) 27.4sec        6 min walk test 5 min completed, 287' w/ RW        ABC 28.75        Neuro Re-Ed         Hurdles         Static balance         Cone taps         Gait with negotiation around obstacles/backward stepping         Anterior pelvic tilt in sitting         Standing weight shift                  Fwd/bwd walking   Walking along // bars level 49, sidestepping 4 laps   Walking RW 60'x2    Thoracic ext         Side steps      "    Amb w/ arms overhead         Ther Ex         STS         Rows  Supine 5x3   TB red seated 10x2 TB red standing with band in door 5x2, 2 sets   Anterior/posterior pelvic tilt      Seated 5x3    postural ed          Supine positioning - hip/knee extension   5x2  5x2 5x2    Cervical retraction  5x2 5x2       STS         Bridging   5x2 off wedge      Seated marching  5x2 5x2      Scap retraction     Red TB 10x    SLR     5x2 5x2   Posterior pelvic tilt     10\" x5 10\" x5   Supine marching  5x2 5x2  5x2 5x2   Hamstring and gastroc stretch      20\" x3ea    Hip add/abd supine  5x2 5x2   5x2    Quad/glut sets  10\" x4 10\" x4ea  10\" x4 10\" x4   Ther Activity                             Gait Training                             Manuals         SCM, Pect major, iliopsoas TPR  VM- B/L pect release, UT release VM- right SCM, pect release  VM- B/L pect release VM- B/L pect release    Resisted lateral neck flex right to increase left release                         "

## 2025-06-19 ENCOUNTER — OFFICE VISIT (OUTPATIENT)
Facility: CLINIC | Age: 85
End: 2025-06-19
Attending: PHYSICIAN ASSISTANT
Payer: COMMERCIAL

## 2025-06-19 DIAGNOSIS — Z74.09 IMMOBILITY: ICD-10-CM

## 2025-06-19 DIAGNOSIS — M81.0 AGE-RELATED OSTEOPOROSIS WITHOUT CURRENT PATHOLOGICAL FRACTURE: Primary | ICD-10-CM

## 2025-06-19 PROCEDURE — 97110 THERAPEUTIC EXERCISES: CPT | Performed by: PHYSICAL THERAPIST

## 2025-06-19 NOTE — PROGRESS NOTES
Daily Note     Today's date: 2025  Patient name: Isai Abbott  : 1940  MRN: [id number]  Referring provider: Yovnai Vizcarra, *  Dx:   Encounter Diagnosis     ICD-10-CM    1. Age-related osteoporosis without current pathological fracture  M81.0       2. Immobility  Z74.09                             Start Time: 1058  Stop Time: 1140  Total time in clinic (min): 42 minutes    Subjective:   He arrived with RW, no current complaints.        Objective: See treatment diary below      Assessment:   Progressed lower abdominal strengthening to assist with improved posterior pelvic position due to tendency to fall into anterior tilt in sitting and stance.  Posterior tilt in supine with difficulty, improved firing with hip adduction MET.  Patient given written instructions for home and demonstrates well with voiced understanding.       Plan: Continue per plan of care.   No complaints end of session.  Continue to progress extension strengthening.     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;         POC Expires Reeval for Medicare to be completed  Unit Limit Auth Expiration Date PT/OT/STVisit Limit   2025 By visit N/A BOMN 2025 BOMN    Completed on visit                    DATE 3/21 5/15 5/30 6/6 6/13 6/19    Auth Status          Approved         Visits # 1 9 10 11 12 13   Visits remaining   PROGRESS NOTE      TESTING         AMEZCUA 34/56        TUG W/ RW: 28        5x STS (with UE) 27.4sec        6 min walk test 5 min completed, 287' w/ RW        ABC 28.75        Neuro Re-Ed         Hurdles         Static balance         Cone taps         Gait with negotiation around obstacles/backward stepping         Anterior pelvic tilt in sitting         Standing weight shift                  Fwd/bwd walking  Walking along // bars level 49, sidestepping 4 laps   Walking RW 60'x2    "  Thoracic ext         Side steps         Amb w/ arms overhead         Ther Ex         STS         Rows    TB red seated 10x2 TB red standing with band in door 5x2, 2 sets reviewed   Anterior/posterior pelvic tilt     Seated 5x3 Supine 5x2, repeated with hip adduction MET 10\" x5, 2 sets    postural ed          Supine positioning - hip/knee extension  5x2  5x2 5x2 5x2    Cervical retraction  5x2        STS         Bridging  5x2 off wedge       Seated marching  5x2       Scap retraction    Red TB 10x     SLR    5x2 5x2 5x2   Posterior pelvic tilt    10\" x5 10\" x5 10\" x4   Supine marching  5x2  5x2 5x2 5x2   Hamstring and gastroc stretch     20\" x3ea     Hip add/abd supine  5x2   5x2 5x2    Quad/glut sets  10\" x4ea  10\" x4 10\" x4 10\" x4   Ther Activity                             Gait Training                             Manuals         SCM, Pect major, iliopsoas TPR  VM- right SCM, pect release  VM- B/L pect release VM- B/L pect release     Resisted lateral neck flex right to increase left release                         "

## 2025-06-20 ENCOUNTER — APPOINTMENT (OUTPATIENT)
Facility: CLINIC | Age: 85
End: 2025-06-20
Attending: PHYSICIAN ASSISTANT
Payer: COMMERCIAL

## 2025-06-27 ENCOUNTER — OFFICE VISIT (OUTPATIENT)
Facility: CLINIC | Age: 85
End: 2025-06-27
Attending: PHYSICIAN ASSISTANT
Payer: COMMERCIAL

## 2025-06-27 DIAGNOSIS — M81.0 AGE-RELATED OSTEOPOROSIS WITHOUT CURRENT PATHOLOGICAL FRACTURE: ICD-10-CM

## 2025-06-27 DIAGNOSIS — Z74.09 IMMOBILITY: Primary | ICD-10-CM

## 2025-06-27 PROCEDURE — 97140 MANUAL THERAPY 1/> REGIONS: CPT | Performed by: PHYSICAL THERAPIST

## 2025-06-27 PROCEDURE — 97110 THERAPEUTIC EXERCISES: CPT | Performed by: PHYSICAL THERAPIST

## 2025-06-27 NOTE — PROGRESS NOTES
Daily Note     Today's date: 2025  Patient name: Isai Abbott  : 1940  MRN: [id number]  Referring provider: Yovani Vizcarra, *  Dx:   Encounter Diagnosis     ICD-10-CM    1. Immobility  Z74.09       2. Age-related osteoporosis without current pathological fracture  M81.0                               Start Time: 1240  Stop Time: 1323  Total time in clinic (min): 43 minutes    Subjective:   He arrived with RW, no current complaints.        Objective: See treatment diary below      Assessment:   Nice gains in MET with hip add.  Added isometric oblique exercises with ball.  Use of aeromat substituted due to patient having this pad at home with good demonstration.  Posterior pelvic tilt remains challenging even with manual cuing.        Plan: Continue per plan of care.   No complaints end of session.  Continue to progress extension strengthening.     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;         POC Expires Reeval for Medicare to be completed  Unit Limit Auth Expiration Date PT/OT/STVisit Limit   2025 By visit N/A BOMN 2025 BOMN    Completed on visit                    DATE 3/21 5/30 6/6 6/13 6/19 6/27    Auth Status          Approved         Visits # 1 10 11 12 13 14   Visits remaining  PROGRESS NOTE       TESTING         AMEZCUA 34/56        TUG W/ RW: 28        5x STS (with UE) 27.4sec        6 min walk test 5 min completed, 287' w/ RW        ABC 28.75        Neuro Re-Ed         Hurdles         Static balance         Cone taps         Gait with negotiation around obstacles/backward stepping         Anterior pelvic tilt in sitting         Standing weight shift                  Fwd/bwd walking   Walking RW 60'x2      Thoracic ext         Side steps         Amb w/ arms overhead         Ther Ex         STS         Rows   TB red seated 10x2 TB red standing with band  "in door 5x2, 2 sets reviewed    Anterior/posterior pelvic tilt    Seated 5x3 Supine 5x2, repeated with hip adduction MET 10\" x5, 2 sets Supine 5x2, MET hip add 10\" x5, 2 sets   MET oblique abdominal       10\" x4ea    postural ed          Supine positioning - hip/knee extension   5x2 5x2 5x2 5x2    Cervical retraction          STS         Bridging         Seated marching         Scap retraction   Red TB 10x      SLR   5x2 5x2 5x2    Posterior pelvic tilt   10\" x5 10\" x5 10\" x4 10\" x5   Supine marching   5x2 5x2 5x2 5x2, 2 sets   Hamstring and gastroc stretch    20\" x3ea  20\" x4ea    Hip add/abd supine    5x2 5x2 5x2    Quad/glut sets   10\" x4 10\" x4 10\" x4 10\" x4   Ther Activity                             Gait Training                             Manuals         SCM, Pect major, iliopsoas TPR   VM- B/L pect release VM- B/L pect release  VM- B/L pect release    Resisted lateral neck flex right to increase left release                         "

## 2025-07-11 ENCOUNTER — OFFICE VISIT (OUTPATIENT)
Facility: CLINIC | Age: 85
End: 2025-07-11
Attending: PHYSICIAN ASSISTANT
Payer: COMMERCIAL

## 2025-07-11 DIAGNOSIS — Z74.09 IMMOBILITY: ICD-10-CM

## 2025-07-11 DIAGNOSIS — M81.0 AGE-RELATED OSTEOPOROSIS WITHOUT CURRENT PATHOLOGICAL FRACTURE: Primary | ICD-10-CM

## 2025-07-11 PROCEDURE — 97110 THERAPEUTIC EXERCISES: CPT | Performed by: PHYSICAL THERAPIST

## 2025-07-11 PROCEDURE — 97140 MANUAL THERAPY 1/> REGIONS: CPT | Performed by: PHYSICAL THERAPIST

## 2025-07-11 NOTE — PROGRESS NOTES
"Daily Note     Today's date: 2025  Patient name: Isai Abbott  : 1940  MRN: [id number]  Referring provider: Yovani Vizcarra, *  Dx:   Encounter Diagnosis     ICD-10-CM    1. Age-related osteoporosis without current pathological fracture  M81.0       2. Immobility  Z74.09                                 Start Time: 1238  Stop Time: 1320  Total time in clinic (min): 42 minutes    Subjective:   He arrived with RW, no current complaints.  \"I feel older this week, I'm moving slower\"      Objective: See treatment diary below      Assessment:   Initial extension to mat with slow ability to fully extend.  AAROM and LE strengthening with good tolerance.  He states he has not been able to perform rows and LE exercises this past week but agreed to return to exercise with wife's assist.       Plan: Continue per plan of care.   No complaints end of session.  Continue to progress extension strengthening.     Daily Treatment Diary     Precautions: h/o multiple hernia repairs, h/o TKR x2 right, x1 left; h/o splenectomy, h/o osteoporosis, h/o DVT - [place] filter in place;         POC Expires Reeval for Medicare to be completed  Unit Limit Auth Expiration Date PT/OT/STVisit Limit   2025 By visit N/A BOMN 2025 BOMN    Completed on visit                    DATE 3/21 5/30 6/13 6/19 6/27 7/11    Auth Status          Approved         Visits # 1 10 12 13 14 15   Visits remaining  PROGRESS NOTE       TESTING         AMEZCUA 34/56        TUG W/ RW: 28        5x STS (with UE) 27.4sec        6 min walk test 5 min completed, 287' w/ RW        ABC 28.75        Neuro Re-Ed         Hurdles         Static balance         Cone taps         Gait with negotiation around obstacles/backward stepping         Anterior pelvic tilt in sitting         Standing weight shift                  Fwd/bwd walking         Thoracic ext         Side steps         Amb w/ arms overhead         Ther Ex         STS         Rows   TB red " "standing with band in door 5x2, 2 sets reviewed  Seated 5x2   Anterior/posterior pelvic tilt   Seated 5x3 Supine 5x2, repeated with hip adduction MET 10\" x5, 2 sets Supine 5x2, MET hip add 10\" x5, 2 sets Supine 5x2, MET hip add 10\" x5   MET oblique abdominal      10\" x4ea     postural ed          Supine positioning - hip/knee extension   5x2 5x2 5x2 5x2    Cervical retraction          STS         Bridging      5x2   Seated marching         Scap retraction         SLR   5x2 5x2     Posterior pelvic tilt   10\" x5 10\" x4 10\" x5 10\" x5   Supine marching   5x2 5x2 5x2, 2 sets 5x2   Hamstring and gastroc stretch   20\" x3ea  20\" x4ea 20\" x4ea    Hip add/abd supine   5x2 5x2 5x2 5x2, 2 sets ea    Quad/glut sets   10\" x4 10\" x4 10\" x4 10\" x4   Ther Activity                             Gait Training                             Manuals         SCM, Pect major, iliopsoas TPR   VM- B/L pect release  VM- B/L pect release VM- B/L pect release    Resisted lateral neck flex right to increase left release                         "

## 2025-07-14 DIAGNOSIS — I10 ESSENTIAL HYPERTENSION: ICD-10-CM

## 2025-07-14 DIAGNOSIS — R21 RASH: ICD-10-CM

## 2025-07-15 RX ORDER — NYSTATIN 100000 U/G
CREAM TOPICAL 2 TIMES DAILY
Qty: 30 G | Refills: 5 | Status: SHIPPED | OUTPATIENT
Start: 2025-07-15

## 2025-07-15 RX ORDER — SPIRONOLACTONE 25 MG/1
12.5 TABLET ORAL DAILY
Qty: 45 TABLET | Refills: 1 | Status: SHIPPED | OUTPATIENT
Start: 2025-07-15

## 2025-07-25 ENCOUNTER — APPOINTMENT (OUTPATIENT)
Dept: LAB | Facility: HOSPITAL | Age: 85
End: 2025-07-25
Payer: COMMERCIAL

## 2025-07-25 ENCOUNTER — OFFICE VISIT (OUTPATIENT)
Facility: CLINIC | Age: 85
End: 2025-07-25
Attending: PHYSICIAN ASSISTANT
Payer: COMMERCIAL

## 2025-07-25 DIAGNOSIS — D50.9 IRON DEFICIENCY ANEMIA, UNSPECIFIED IRON DEFICIENCY ANEMIA TYPE: ICD-10-CM

## 2025-07-25 DIAGNOSIS — M81.0 AGE-RELATED OSTEOPOROSIS WITHOUT CURRENT PATHOLOGICAL FRACTURE: Primary | ICD-10-CM

## 2025-07-25 DIAGNOSIS — M81.0 AGE-RELATED OSTEOPOROSIS WITHOUT CURRENT PATHOLOGICAL FRACTURE: ICD-10-CM

## 2025-07-25 DIAGNOSIS — Z74.09 IMMOBILITY: ICD-10-CM

## 2025-07-25 LAB
25(OH)D3 SERPL-MCNC: 39.4 NG/ML (ref 30–100)
ALBUMIN SERPL BCG-MCNC: 3.3 G/DL (ref 3.5–5)
ALP SERPL-CCNC: 110 U/L (ref 34–104)
ALT SERPL W P-5'-P-CCNC: 17 U/L (ref 7–52)
ANION GAP SERPL CALCULATED.3IONS-SCNC: 8 MMOL/L (ref 4–13)
AST SERPL W P-5'-P-CCNC: 23 U/L (ref 13–39)
BILIRUB SERPL-MCNC: 0.43 MG/DL (ref 0.2–1)
BUN SERPL-MCNC: 25 MG/DL (ref 5–25)
CALCIUM ALBUM COR SERPL-MCNC: 9.6 MG/DL (ref 8.3–10.1)
CALCIUM SERPL-MCNC: 9 MG/DL (ref 8.4–10.2)
CHLORIDE SERPL-SCNC: 101 MMOL/L (ref 96–108)
CO2 SERPL-SCNC: 31 MMOL/L (ref 21–32)
CREAT SERPL-MCNC: 0.9 MG/DL (ref 0.6–1.3)
ERYTHROCYTE [DISTWIDTH] IN BLOOD BY AUTOMATED COUNT: 14.8 % (ref 11.6–15.1)
FERRITIN SERPL-MCNC: 30 NG/ML (ref 30–336)
GFR SERPL CREATININE-BSD FRML MDRD: 79 ML/MIN/1.73SQ M
GLUCOSE P FAST SERPL-MCNC: 95 MG/DL (ref 65–99)
HCT VFR BLD AUTO: 44.4 % (ref 36.5–49.3)
HGB BLD-MCNC: 14.6 G/DL (ref 12–17)
IRON SATN MFR SERPL: 28 % (ref 15–50)
IRON SERPL-MCNC: 99 UG/DL (ref 50–212)
MCH RBC QN AUTO: 31.6 PG (ref 26.8–34.3)
MCHC RBC AUTO-ENTMCNC: 32.9 G/DL (ref 31.4–37.4)
MCV RBC AUTO: 96 FL (ref 82–98)
PLATELET # BLD AUTO: 390 THOUSANDS/UL (ref 149–390)
PMV BLD AUTO: 9.8 FL (ref 8.9–12.7)
POTASSIUM SERPL-SCNC: 3.9 MMOL/L (ref 3.5–5.3)
PROT SERPL-MCNC: 5.4 G/DL (ref 6.4–8.4)
PTH-INTACT SERPL-MCNC: 136.1 PG/ML (ref 12–88)
RBC # BLD AUTO: 4.62 MILLION/UL (ref 3.88–5.62)
SODIUM SERPL-SCNC: 140 MMOL/L (ref 135–147)
TIBC SERPL-MCNC: 355.6 UG/DL (ref 250–450)
TRANSFERRIN SERPL-MCNC: 254 MG/DL (ref 203–362)
UIBC SERPL-MCNC: 257 UG/DL (ref 155–355)
WBC # BLD AUTO: 9.19 THOUSAND/UL (ref 4.31–10.16)

## 2025-07-25 PROCEDURE — 83970 ASSAY OF PARATHORMONE: CPT

## 2025-07-25 PROCEDURE — 36415 COLL VENOUS BLD VENIPUNCTURE: CPT

## 2025-07-25 PROCEDURE — 97140 MANUAL THERAPY 1/> REGIONS: CPT | Performed by: PHYSICAL THERAPIST

## 2025-07-25 PROCEDURE — 83540 ASSAY OF IRON: CPT

## 2025-07-25 PROCEDURE — 85027 COMPLETE CBC AUTOMATED: CPT

## 2025-07-25 PROCEDURE — 82306 VITAMIN D 25 HYDROXY: CPT

## 2025-07-25 PROCEDURE — 83550 IRON BINDING TEST: CPT

## 2025-07-25 PROCEDURE — 82728 ASSAY OF FERRITIN: CPT

## 2025-07-25 PROCEDURE — 97110 THERAPEUTIC EXERCISES: CPT | Performed by: PHYSICAL THERAPIST

## 2025-07-25 PROCEDURE — 80053 COMPREHEN METABOLIC PANEL: CPT

## 2025-07-25 NOTE — PROGRESS NOTES
Daily Note     Today's date: 2025  Patient name: Isai Abbott  : 1940  MRN: [id number]  Referring provider: Yovani Vizcarra, *  Dx:   Encounter Diagnosis     ICD-10-CM    1. Age-related osteoporosis without current pathological fracture  M81.0       2. Immobility  Z74.09                                   Start Time: 1240  Stop Time: 1320  Total time in clinic (min): 40 minutes    Subjective:   He missed the last few weeks due to appointment mix up.  He has no present complaints, but states he has not been able to do much at home.        Objective: See treatment diary below      Assessment:  Assist to mat to achieve increased extension with gradual decrease in support.  LE AROM with good tolerance.  Reviewed isometric strengthening with good tolerance.  Added ball resist for oblique abdominals.      Plan: Continue per plan of care.   No complaints end of session.  Continue to progress extension strengthening.     Daily Treatment Diary     Precautions: h/o multiple hernia repairs, h/o TKR x2 right, x1 left; h/o splenectomy, h/o osteoporosis, h/o DVT - [place] filter in place;         POC Expires Reeval for Medicare to be completed  Unit Limit Auth Expiration Date PT/OT/STVisit Limit   2025 By visit N/A BOMN 2025 BOMN    Completed on visit                    DATE 3/21 6/13 6/19 6/27 7/11 7/25    Auth Status          Approved         Visits # 1 12 13 14 15 16   Visits remaining         TESTING         SEVEN 34/56        TUG W/ RW: 28        5x STS (with UE) 27.4sec        6 min walk test 5 min completed, 287' w/ RW        ABC 28.75        Neuro Re-Ed         Hurdles         Static balance         Cone taps         Gait with negotiation around obstacles/backward stepping         Anterior pelvic tilt in sitting         Standing weight shift                  Fwd/bwd walking         Thoracic ext         Side steps         Amb w/ arms overhead         Ther Ex         STS         Rows  TB red  "standing with band in door 5x2, 2 sets reviewed  Seated 5x2    Anterior/posterior pelvic tilt  Seated 5x3 Supine 5x2, repeated with hip adduction MET 10\" x5, 2 sets Supine 5x2, MET hip add 10\" x5, 2 sets Supine 5x2, MET hip add 10\" x5 Supine 5x2, MET hip add 10\" x5, 2 sets   MET oblique abdominal     10\" x4ea  10\" x4ea with ball for resist in supine    postural ed          Supine positioning - hip/knee extension  5x2 5x2 5x2 5x2 5x2    Cervical retraction          STS         Bridging     5x2 5x2   Seated marching         Scap retraction         SLR  5x2 5x2      Posterior pelvic tilt  10\" x5 10\" x4 10\" x5 10\" x5 10\" x5   Supine marching  5x2 5x2 5x2, 2 sets 5x2 5x2, 2 sets   Hamstring and gastroc stretch  20\" x3ea  20\" x4ea 20\" x4ea     Hip add/abd supine  5x2 5x2 5x2 5x2, 2 sets ea 5x2, 2 sets    Quad/glut sets  10\" x4 10\" x4 10\" x4 10\" x4    Ther Activity                             Gait Training                             Manuals         SCM, Pect major, iliopsoas TPR  VM- B/L pect release  VM- B/L pect release VM- B/L pect release VM- B/L pect release    Resisted lateral neck flex right to increase left release                         "

## 2025-08-01 ENCOUNTER — OFFICE VISIT (OUTPATIENT)
Facility: CLINIC | Age: 85
End: 2025-08-01
Attending: PHYSICIAN ASSISTANT
Payer: COMMERCIAL

## 2025-08-01 ENCOUNTER — OFFICE VISIT (OUTPATIENT)
Dept: ENDOCRINOLOGY | Facility: HOSPITAL | Age: 85
End: 2025-08-01
Payer: COMMERCIAL

## 2025-08-01 VITALS
BODY MASS INDEX: 36.02 KG/M2 | HEART RATE: 68 BPM | WEIGHT: 190.8 LBS | HEIGHT: 61 IN | SYSTOLIC BLOOD PRESSURE: 118 MMHG | DIASTOLIC BLOOD PRESSURE: 78 MMHG

## 2025-08-01 DIAGNOSIS — N18.31 STAGE 3A CHRONIC KIDNEY DISEASE (HCC): ICD-10-CM

## 2025-08-01 DIAGNOSIS — M81.0 AGE-RELATED OSTEOPOROSIS WITHOUT CURRENT PATHOLOGICAL FRACTURE: ICD-10-CM

## 2025-08-01 DIAGNOSIS — Z74.09 IMMOBILITY: ICD-10-CM

## 2025-08-01 DIAGNOSIS — M81.0 AGE-RELATED OSTEOPOROSIS WITHOUT CURRENT PATHOLOGICAL FRACTURE: Primary | ICD-10-CM

## 2025-08-01 DIAGNOSIS — E21.3 HYPERPARATHYROIDISM (HCC): Primary | ICD-10-CM

## 2025-08-01 PROCEDURE — 97140 MANUAL THERAPY 1/> REGIONS: CPT | Performed by: PHYSICAL THERAPIST

## 2025-08-01 PROCEDURE — G2211 COMPLEX E/M VISIT ADD ON: HCPCS | Performed by: STUDENT IN AN ORGANIZED HEALTH CARE EDUCATION/TRAINING PROGRAM

## 2025-08-01 PROCEDURE — 97110 THERAPEUTIC EXERCISES: CPT | Performed by: PHYSICAL THERAPIST

## 2025-08-01 PROCEDURE — 99214 OFFICE O/P EST MOD 30 MIN: CPT | Performed by: STUDENT IN AN ORGANIZED HEALTH CARE EDUCATION/TRAINING PROGRAM

## 2025-08-01 RX ORDER — ZOLEDRONIC ACID 0.05 MG/ML
5 INJECTION, SOLUTION INTRAVENOUS ONCE
OUTPATIENT
Start: 2026-01-29

## 2025-08-01 RX ORDER — SODIUM CHLORIDE 9 MG/ML
20 INJECTION, SOLUTION INTRAVENOUS ONCE
OUTPATIENT
Start: 2026-01-29

## 2025-08-08 ENCOUNTER — OFFICE VISIT (OUTPATIENT)
Facility: CLINIC | Age: 85
End: 2025-08-08
Attending: PHYSICIAN ASSISTANT
Payer: COMMERCIAL

## 2025-08-08 DIAGNOSIS — Z74.09 IMMOBILITY: ICD-10-CM

## 2025-08-08 DIAGNOSIS — M81.0 AGE-RELATED OSTEOPOROSIS WITHOUT CURRENT PATHOLOGICAL FRACTURE: Primary | ICD-10-CM

## 2025-08-08 PROCEDURE — 97110 THERAPEUTIC EXERCISES: CPT | Performed by: PHYSICAL THERAPIST

## 2025-08-08 PROCEDURE — 97140 MANUAL THERAPY 1/> REGIONS: CPT | Performed by: PHYSICAL THERAPIST

## 2025-08-18 DIAGNOSIS — D50.9 IRON DEFICIENCY ANEMIA, UNSPECIFIED IRON DEFICIENCY ANEMIA TYPE: ICD-10-CM

## 2025-08-20 RX ORDER — FERROUS SULFATE 325(65) MG
TABLET ORAL
Qty: 36 TABLET | Refills: 1 | Status: SHIPPED | OUTPATIENT
Start: 2025-08-20

## 2025-08-21 PROBLEM — M54.2 CERVICALGIA: Status: RESOLVED | Noted: 2020-01-10 | Resolved: 2025-08-21

## 2025-08-22 ENCOUNTER — OFFICE VISIT (OUTPATIENT)
Dept: FAMILY MEDICINE CLINIC | Facility: HOSPITAL | Age: 85
End: 2025-08-22
Payer: COMMERCIAL

## 2025-08-22 ENCOUNTER — OFFICE VISIT (OUTPATIENT)
Facility: CLINIC | Age: 85
End: 2025-08-22
Attending: PHYSICIAN ASSISTANT
Payer: COMMERCIAL

## 2025-08-22 VITALS
RESPIRATION RATE: 16 BRPM | TEMPERATURE: 97.7 F | DIASTOLIC BLOOD PRESSURE: 60 MMHG | BODY MASS INDEX: 36.44 KG/M2 | HEIGHT: 61 IN | HEART RATE: 68 BPM | SYSTOLIC BLOOD PRESSURE: 110 MMHG | OXYGEN SATURATION: 96 % | WEIGHT: 193 LBS

## 2025-08-22 DIAGNOSIS — N40.0 BENIGN PROSTATIC HYPERPLASIA WITHOUT LOWER URINARY TRACT SYMPTOMS: Primary | ICD-10-CM

## 2025-08-22 DIAGNOSIS — M81.0 AGE-RELATED OSTEOPOROSIS WITHOUT CURRENT PATHOLOGICAL FRACTURE: Primary | ICD-10-CM

## 2025-08-22 DIAGNOSIS — I10 ESSENTIAL HYPERTENSION: ICD-10-CM

## 2025-08-22 DIAGNOSIS — Z74.09 IMMOBILITY: ICD-10-CM

## 2025-08-22 PROCEDURE — G2211 COMPLEX E/M VISIT ADD ON: HCPCS | Performed by: INTERNAL MEDICINE

## 2025-08-22 PROCEDURE — 99213 OFFICE O/P EST LOW 20 MIN: CPT | Performed by: INTERNAL MEDICINE

## 2025-08-22 PROCEDURE — 97110 THERAPEUTIC EXERCISES: CPT | Performed by: PHYSICAL THERAPIST

## 2025-08-22 PROCEDURE — 97140 MANUAL THERAPY 1/> REGIONS: CPT | Performed by: PHYSICAL THERAPIST

## 2025-08-22 RX ORDER — DUTASTERIDE 0.5 MG/1
0.5 CAPSULE, LIQUID FILLED ORAL DAILY
Qty: 90 CAPSULE | Refills: 3 | Status: SHIPPED | OUTPATIENT
Start: 2025-08-22